# Patient Record
Sex: FEMALE | Race: WHITE | NOT HISPANIC OR LATINO | Employment: OTHER | URBAN - METROPOLITAN AREA
[De-identification: names, ages, dates, MRNs, and addresses within clinical notes are randomized per-mention and may not be internally consistent; named-entity substitution may affect disease eponyms.]

---

## 2017-02-01 ENCOUNTER — OFFICE VISIT (OUTPATIENT)
Dept: OBSTETRICS AND GYNECOLOGY | Facility: CLINIC | Age: 72
End: 2017-02-01
Payer: MEDICARE

## 2017-02-01 VITALS
HEIGHT: 62 IN | DIASTOLIC BLOOD PRESSURE: 84 MMHG | SYSTOLIC BLOOD PRESSURE: 122 MMHG | BODY MASS INDEX: 29.32 KG/M2 | WEIGHT: 159.31 LBS

## 2017-02-01 DIAGNOSIS — N89.8 VAGINAL ODOR: Primary | ICD-10-CM

## 2017-02-01 DIAGNOSIS — R82.90 CLOUDY URINE: ICD-10-CM

## 2017-02-01 LAB
CANDIDA RRNA VAG QL PROBE: NEGATIVE
G VAGINALIS RRNA GENITAL QL PROBE: NEGATIVE
T VAGINALIS RRNA GENITAL QL PROBE: NEGATIVE

## 2017-02-01 PROCEDURE — 1160F RVW MEDS BY RX/DR IN RCRD: CPT | Mod: S$GLB,,, | Performed by: OBSTETRICS & GYNECOLOGY

## 2017-02-01 PROCEDURE — 1159F MED LIST DOCD IN RCRD: CPT | Mod: S$GLB,,, | Performed by: OBSTETRICS & GYNECOLOGY

## 2017-02-01 PROCEDURE — 87088 URINE BACTERIA CULTURE: CPT

## 2017-02-01 PROCEDURE — 87186 SC STD MICRODIL/AGAR DIL: CPT

## 2017-02-01 PROCEDURE — 87077 CULTURE AEROBIC IDENTIFY: CPT

## 2017-02-01 PROCEDURE — 99999 PR PBB SHADOW E&M-EST. PATIENT-LVL II: CPT | Mod: PBBFAC,,, | Performed by: OBSTETRICS & GYNECOLOGY

## 2017-02-01 PROCEDURE — 1157F ADVNC CARE PLAN IN RCRD: CPT | Mod: S$GLB,,, | Performed by: OBSTETRICS & GYNECOLOGY

## 2017-02-01 PROCEDURE — 99203 OFFICE O/P NEW LOW 30 MIN: CPT | Mod: S$GLB,,, | Performed by: OBSTETRICS & GYNECOLOGY

## 2017-02-01 PROCEDURE — 87480 CANDIDA DNA DIR PROBE: CPT

## 2017-02-01 PROCEDURE — 87086 URINE CULTURE/COLONY COUNT: CPT

## 2017-02-01 PROCEDURE — 1126F AMNT PAIN NOTED NONE PRSNT: CPT | Mod: S$GLB,,, | Performed by: OBSTETRICS & GYNECOLOGY

## 2017-02-01 RX ORDER — DICLOFENAC SODIUM 10 MG/G
2 GEL TOPICAL DAILY
COMMUNITY
End: 2020-07-28

## 2017-02-01 RX ORDER — CYANOCOBALAMIN 1000 UG/ML
INJECTION, SOLUTION INTRAMUSCULAR; SUBCUTANEOUS
Refills: 1 | COMMUNITY
Start: 2016-12-31 | End: 2021-03-02

## 2017-02-01 RX ORDER — FUROSEMIDE 20 MG/1
TABLET ORAL
Refills: 1 | COMMUNITY
Start: 2016-12-20 | End: 2018-07-09 | Stop reason: ALTCHOICE

## 2017-02-01 RX ORDER — LACTULOSE 10 G/15ML
SOLUTION ORAL; RECTAL
Refills: 0 | COMMUNITY
Start: 2017-01-25 | End: 2018-07-09 | Stop reason: ALTCHOICE
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
## 2017-02-01 NOTE — LETTER
February 1, 2017      Aaron Tim MD  [address]  [address]  [facility] [address]           [facility] [place] [id number]  [facility] [address]  Phone: [phone]  Fax: [phone]          Patient: Trudy R Dakin   MR Number: [id number]   YOB: 1945   Date of Visit: 2/1/2017       Dear Dr. Aaron Tim:    Thank you for referring Trudy Dakin to me for evaluation. Attached you will find relevant portions of my assessment and plan of care.    If you have questions, please do not hesitate to call me. I look forward to following Trudy Dakin along with you.    Sincerely,    Khadijah Lewis MD    Enclosure  CC:  No Recipients    If you would like to receive this communication electronically, please contact [email or web address] or [phone] to request more information on [employer] access.    For providers and/or their staff who would like to refer a patient to [facility], please contact us through our one-stop-shop provider referral line, [facility], at [phone].    If you feel you have received this communication in error or would no longer like to receive these types of communications, please e-mail [email or web address]

## 2017-02-03 ENCOUNTER — TELEPHONE (OUTPATIENT)
Dept: OBSTETRICS AND GYNECOLOGY | Facility: CLINIC | Age: 72
End: 2017-02-03

## 2017-02-03 LAB — BACTERIA UR CULT: NORMAL

## 2017-02-03 NOTE — TELEPHONE ENCOUNTER
Debbie Thomspon patient called -wants to know the results for her culture that she had recently,please call at  [phone]

## 2017-02-06 ENCOUNTER — TELEPHONE (OUTPATIENT)
Dept: OBSTETRICS AND GYNECOLOGY | Facility: CLINIC | Age: 72
End: 2017-02-06

## 2017-02-06 DIAGNOSIS — N39.0 URINARY TRACT INFECTION WITHOUT HEMATURIA, SITE UNSPECIFIED: Primary | ICD-10-CM

## 2017-02-06 RX ORDER — NITROFURANTOIN 25; 75 MG/1; MG/1
100 CAPSULE ORAL 2 TIMES DAILY
Qty: 14 CAPSULE | Refills: 0 | Status: SHIPPED | OUTPATIENT
Start: 2017-02-06 | End: 2017-02-13

## 2017-02-06 NOTE — TELEPHONE ENCOUNTER
Message left for patient explaining culture results and abx treatment plan. Macrobid sent to pharmacy.

## 2017-02-06 NOTE — TELEPHONE ENCOUNTER
Message left for patient explaining culture results and treatment plan. Asked to call the office with any questions.

## 2017-05-09 ENCOUNTER — HOSPITAL ENCOUNTER (EMERGENCY)
Facility: HOSPITAL | Age: 72
Discharge: HOME | End: 2017-05-09
Attending: EMERGENCY MEDICINE
Payer: MEDICARE

## 2017-05-09 VITALS
TEMPERATURE: 99 F | OXYGEN SATURATION: 100 % | WEIGHT: 161 LBS | HEART RATE: 53 BPM | SYSTOLIC BLOOD PRESSURE: 154 MMHG | BODY MASS INDEX: 29.63 KG/M2 | HEIGHT: 62 IN | DIASTOLIC BLOOD PRESSURE: 66 MMHG | RESPIRATION RATE: 18 BRPM

## 2017-05-09 DIAGNOSIS — R07.9 CHEST PAIN: ICD-10-CM

## 2017-05-09 DIAGNOSIS — I49.9 CARDIAC ARRHYTHMIA, UNSPECIFIED CARDIAC ARRHYTHMIA TYPE: ICD-10-CM

## 2017-05-09 DIAGNOSIS — R07.9 CHEST PAIN, UNSPECIFIED TYPE: Primary | ICD-10-CM

## 2017-05-09 LAB
ALBUMIN SERPL BCP-MCNC: 3.6 G/DL
ALP SERPL-CCNC: 64 U/L
ALT SERPL W/O P-5'-P-CCNC: 13 U/L
ANION GAP SERPL CALC-SCNC: 10 MMOL/L
AST SERPL-CCNC: 14 U/L
BACTERIA #/AREA URNS HPF: ABNORMAL /HPF
BASOPHILS # BLD AUTO: 0.04 K/UL
BASOPHILS NFR BLD: 0.5 %
BILIRUB SERPL-MCNC: 0.5 MG/DL
BILIRUB UR QL STRIP: NEGATIVE
BNP SERPL-MCNC: 91 PG/ML
BUN SERPL-MCNC: 10 MG/DL
CALCIUM SERPL-MCNC: 9.5 MG/DL
CHLORIDE SERPL-SCNC: 104 MMOL/L
CLARITY UR: ABNORMAL
CO2 SERPL-SCNC: 25 MMOL/L
COLOR UR: YELLOW
CREAT SERPL-MCNC: 0.8 MG/DL
DIFFERENTIAL METHOD: NORMAL
EOSINOPHIL # BLD AUTO: 0.4 K/UL
EOSINOPHIL NFR BLD: 4.3 %
ERYTHROCYTE [DISTWIDTH] IN BLOOD BY AUTOMATED COUNT: 13.8 %
EST. GFR  (AFRICAN AMERICAN): >60 ML/MIN/1.73 M^2
EST. GFR  (NON AFRICAN AMERICAN): >60 ML/MIN/1.73 M^2
GLUCOSE SERPL-MCNC: 104 MG/DL
GLUCOSE SERPL-MCNC: 105 MG/DL (ref 70–110)
GLUCOSE UR QL STRIP: NEGATIVE
HCT VFR BLD AUTO: 38.8 %
HGB BLD-MCNC: 12.9 G/DL
HGB UR QL STRIP: ABNORMAL
KETONES UR QL STRIP: NEGATIVE
LEUKOCYTE ESTERASE UR QL STRIP: ABNORMAL
LYMPHOCYTES # BLD AUTO: 2.4 K/UL
LYMPHOCYTES NFR BLD: 29.6 %
MCH RBC QN AUTO: 29.8 PG
MCHC RBC AUTO-ENTMCNC: 33.2 %
MCV RBC AUTO: 90 FL
MICROSCOPIC COMMENT: ABNORMAL
MONOCYTES # BLD AUTO: 0.5 K/UL
MONOCYTES NFR BLD: 5.5 %
NEUTROPHILS # BLD AUTO: 4.9 K/UL
NEUTROPHILS NFR BLD: 59.9 %
NITRITE UR QL STRIP: NEGATIVE
PH UR STRIP: 6 [PH] (ref 5–8)
PLATELET # BLD AUTO: 245 K/UL
PMV BLD AUTO: 9.7 FL
POTASSIUM SERPL-SCNC: 4.2 MMOL/L
PROT SERPL-MCNC: 6.9 G/DL
PROT UR QL STRIP: NEGATIVE
RBC # BLD AUTO: 4.33 M/UL
RBC #/AREA URNS HPF: 0 /HPF (ref 0–4)
SODIUM SERPL-SCNC: 139 MMOL/L
SP GR UR STRIP: 1.01 (ref 1–1.03)
SQUAMOUS #/AREA URNS HPF: 2 /HPF
TROPONIN I SERPL DL<=0.01 NG/ML-MCNC: 0.01 NG/ML
URN SPEC COLLECT METH UR: ABNORMAL
UROBILINOGEN UR STRIP-ACNC: NEGATIVE EU/DL
WBC # BLD AUTO: 8.18 K/UL
WBC #/AREA URNS HPF: 36 /HPF (ref 0–5)

## 2017-05-09 PROCEDURE — 81000 URINALYSIS NONAUTO W/SCOPE: CPT

## 2017-05-09 PROCEDURE — 93005 ELECTROCARDIOGRAM TRACING: CPT

## 2017-05-09 PROCEDURE — 63600175 PHARM REV CODE 636 W HCPCS: Performed by: EMERGENCY MEDICINE

## 2017-05-09 PROCEDURE — 87186 SC STD MICRODIL/AGAR DIL: CPT

## 2017-05-09 PROCEDURE — 96365 THER/PROPH/DIAG IV INF INIT: CPT

## 2017-05-09 PROCEDURE — 84484 ASSAY OF TROPONIN QUANT: CPT

## 2017-05-09 PROCEDURE — 83880 ASSAY OF NATRIURETIC PEPTIDE: CPT

## 2017-05-09 PROCEDURE — 80053 COMPREHEN METABOLIC PANEL: CPT

## 2017-05-09 PROCEDURE — 82962 GLUCOSE BLOOD TEST: CPT

## 2017-05-09 PROCEDURE — 87086 URINE CULTURE/COLONY COUNT: CPT

## 2017-05-09 PROCEDURE — 99284 EMERGENCY DEPT VISIT MOD MDM: CPT | Mod: 25

## 2017-05-09 PROCEDURE — 87088 URINE BACTERIA CULTURE: CPT

## 2017-05-09 PROCEDURE — 25000003 PHARM REV CODE 250: Performed by: EMERGENCY MEDICINE

## 2017-05-09 PROCEDURE — 85025 COMPLETE CBC W/AUTO DIFF WBC: CPT

## 2017-05-09 PROCEDURE — 87077 CULTURE AEROBIC IDENTIFY: CPT

## 2017-05-09 RX ORDER — DICYCLOMINE HYDROCHLORIDE 10 MG/ML
20 INJECTION INTRAMUSCULAR
Status: DISCONTINUED | OUTPATIENT
Start: 2017-05-09 | End: 2017-05-09

## 2017-05-09 RX ORDER — ASPIRIN 325 MG
325 TABLET ORAL
Status: COMPLETED | OUTPATIENT
Start: 2017-05-09 | End: 2017-05-09

## 2017-05-09 RX ORDER — CEPHALEXIN 500 MG/1
500 CAPSULE ORAL 4 TIMES DAILY
Qty: 40 CAPSULE | Refills: 0 | Status: SHIPPED | OUTPATIENT
Start: 2017-05-09 | End: 2017-05-19

## 2017-05-09 RX ADMIN — ASPIRIN 325 MG ORAL TABLET 325 MG: 325 PILL ORAL at 01:05

## 2017-05-09 RX ADMIN — CEFTRIAXONE 1 G: 1 INJECTION, SOLUTION INTRAVENOUS at 01:05

## 2017-05-09 NOTE — ED TRIAGE NOTES
Patient comes to ED with complaints of chest pain that started last night at 1245. Mid sternal chest pain and SOB. Describes a tightness feeling in chest. Chest pain at rest and exertion. Chest pain at the moment only comes and goes. Right now she states no pain. History of DM.

## 2017-05-09 NOTE — ED NOTES
Pt states she now feels tingling in both arms. She has one episode before she came to ED and then one just now. Both last only a minute or so. Also states she is feeling dizzy at rest.

## 2017-05-09 NOTE — DISCHARGE INSTRUCTIONS
About Arrhythmias    Electrical impulses cause the normal heart to beat 60 to 100 times a minute while at rest. These impulses come from a natural pacemaker deep inside the heart muscle. Each impulse causes the heart muscle to contract. This causes the blood to flow through the heart and out to the tissues and organs of your body.  An arrhythmia is a change from the normal speed or pattern of these electrical impulses. This can cause the heart to beat too fast (tachycardia); or too slow (bradycardia); or in an unsteady pattern (irregular rhythm).  Symptoms of arrhythmias  Different people experience arrhythmias differently. Sometimes they may not have symptoms, but just notice a change in their pulse. Symptoms can include:  · Fluttering feeling in the chest  · Shortness of breath  · Chest pain or pressure  · Neck fullness  · Lightheadedness or dizziness  · Fainting or almost fainting  · Palpitations (the sense that your heart is fluttering or beating fast or hard or irregularly)  · Tiredness, fatigue, or weakness  · Cardiac arrest  Causes of arrhythmias  Arrhythmias are most often due to heart disease such as:  · Coronary artery disease  · Heart valve disease  · Enlarged heart  · High blood pressure  · Heart failure  Other causes of  arrhythmia include:  · Certain medicines (such as asthma inhalers and decongestants)  · Some herbal supplements  · Cardiac stimulant drugs (such as cocaine, amphetamine, diet pills, certain decongestant cold medicines, caffeine, and nicotine)  · Excessive alcohol use  · Anxiety and panic disorder  · Thyroid disease  · Anemia  · Diabetes  · Sleep apnea  · Obesity  · Congenital heart disease  · Cardiac genetic diseases  Arrhythmias can often be prevented. The cause and type of arrhythmia determines the best treatment. Sometimes your doctor may want to monitor your heart rate over a 24-hour period or longer. This can help identify the cause of your arrhythmia and find the best treatment.  This can be done with a Holter monitor, a portable EKG recording device attached by wires to your chest. Or you may get an event monitor, which you can place over the skin in front of your heart to record heart rhythms. You can carry this with you as you go about your routine activities during the monitoring period. Implantable loop recorders may also be used to monitor the heart rhythm for up to 2 years. This miniature device is placed underneath the skin overlying the heart.  Home care  The following guidelines will help you care for yourself at home:  · Avoid cardiac stimulants (such as cocaine, amphetamine, diet pills, certain decongestant cold medicines, caffeine, and nicotine).  · If you smoke, stop smoking. Contact your doctor or a local stop-smoking program for help.  · Tell your doctor about any prescription, over-the-counter, or herbal medicines you take. These may be affecting your heart rhythm.  Follow-up care  Follow up with your healthcare provider, or as advised. If a Holter monitor has been recommended, contact the cardiologist you have been referred to as soon as you can  the device. Other outpatient tests may also be arranged for you at that time.  Call 911  This is the fastest and safest way to get to the emergency department. The paramedics can also start treatment on the way to the hospital, if needed.  Don't wait until your symptoms are severe to call 911. Other reasons to call 911 besides chest pain include:  · Chest, shoulder, arm, neck, or back pain  · Shortness of breath  · Feeling lightheaded, faint, or dizzy  · Unexplained fainting  · Rapid heart beat  · Slower than usual heart rate compared to your normal  · Very irregular heartbeat  · Chest pain (angina) with weakness, dizziness, heavy sweating, nausea, or vomiting  · Extreme drowsiness, or confusion  · Weakness of an arm or leg or one side of the face  · Difficulty with speech or vision  When to seek medical advice  Remember,  "things are not always like they are on TV. Sometimes it is not so obvious. You may only feel weak or just "not right." If it is not clear or if you have any doubt, call for advice.  · Seek help for chest pain, or it feels different from usual, even if your symptoms are mild.  · Don't drive yourself. Have someone else drive. If no one can drive you, call 911.  · If your doctor has given you medicines to take when you have symptoms, take them, but do not delay getting help while trying to find them.  Date Last Reviewed: 4/25/2016 © 2000-2016 [employer]. [address] [place] [address], [place] [address]. All rights reserved. This information is not intended as a substitute for professional medical care. Always follow your healthcare professional's instructions.          Uncertain Causes of Chest Pain    Chest pain can happen for a number of reasons. Sometimes the cause can't be determined. If your condition does not seem serious, and your pain does not appear to be coming from your heart, your healthcare provider may recommend watching it closely. Sometimes the signs of a serious problem take more time to appear. Many problems not related to your heart can cause chest pain.These include:  · Musculoskeletal. Costochondritis, an inflammation of the tissues around the ribs that can occur from trauma or overuse injuries  · Respiratory. Pneumonia, pneumothorax, or pneumonitis (inflammation of the lining of the chest and lungs)  · Gastrointestinal. Esophageal reflux, heartburn, or gallbladder disease  · Anxiety and panic disorders  · Nerve compression and neuritis  · Miscellaneous problems such as aortic aneurysm or pulmonary embolism (a blood clot in the lungs)  Home care  After your visit, follow these recommendations:  · Rest today and avoid strenuous activity.  · Take any prescribed medicine as directed.  · Be aware of any recurrent chest pain and notice any changes  Follow-up care  Follow up with your healthcare " provider if you do not start to feel better within 24 hours, or as advised.  Call 911  Call 911 if any of these occur:  · A change in the type of pain: if it feels different, becomes more severe, lasts longer, or begins to spread into your shoulder, arm, neck, jaw or back  · Shortness of breath or increased pain with breathing  · Weakness, dizziness, or fainting  · Rapid heart beat  · Crushing sensation in your chest  When to seek medical advice  Call your healthcare provider right away if any of the following occur:  · Cough with dark colored sputum (phlegm) or blood  · Fever of 100.4ºF (38ºC) or higher, or as directed by your healthcare provider  · Swelling, pain or redness in one leg  · Shortness of breath  Date Last Reviewed: 12/30/2015  © 9114-8050 [employer]. [address] [place] [address]. All rights reserved. This information is not intended as a substitute for professional medical care. Always follow your healthcare professional's instructions.

## 2017-05-09 NOTE — ED AVS SNAPSHOT
[facility]  [address] [facility] [address]               Trudy R Dakin   2017 12:26 PM   ED    Description:  Female : 1945   Department:  [facility]           Your Care was Coordinated By:     Provider Role From To    Ezequiel Leiva MD Attending Provider 17 1235 --      Reason for Visit     Chest Pain           Diagnoses this Visit        Comments    Chest pain, unspecified type    -  Primary     Chest pain         Cardiac arrhythmia, unspecified cardiac arrhythmia type           ED Disposition     ED Disposition Condition Comment    Discharge  Our goal in the emergency department is to always give you outstanding care and exceptional service. You may receive a survey by mail or e-mail in the next week regarding your experience in our ED. We would greatly appreciate your completing and returnin g the survey. Your feedback provides us with a way to recognize our staff who give very good care and it helps us learn how to improve when your experience was below our aspiration of excellence.              To Do List           Follow-up Information     Follow up with Aaron Tim MD.    Specialty:  Family Medicine    Why:  As needed    Contact information:    [address]  [address]  [place] [address]  [phone]          Follow up with [facility].    Specialty:  Emergency Medicine    Why:  If symptoms worsen    Contact information:    [address]  [facility] [address] [phone]        Follow up with Panchito Connor MD.    Specialties:  Cardiology, INTERVENTIONAL CARDIOLOGY    Why:  FOR SPECIALTY EVALUATION    Contact information:    [address]  [address]  [facility] [address]  [phone]         These Medications        Disp Refills Start End    cephALEXin (KEFLEX) 500 MG capsule 40 capsule 0 2017    Take 1 capsule (500 mg total) by mouth 4 (four) times daily. -  Oral    Pharmacy: [facility] [id number]  [address] [place] [address] [address]  AT [facility] [address] Ph #: [phone]         [facility]     [facility] On Call Nurse Care Line - 24/7 Assistance  Unless otherwise directed by your provider, please contact [facility], our nurse care line that is available for 24/7 assistance.     Registered nurses in the [facility] provide: appointment scheduling, clinical advisement, health education, and other advisory services.  Call: [phone] (toll free)               Medications           Message regarding Medications     Verify the changes and/or additions to your medication regime listed below are the same as discussed with your clinician today.  If any of these changes or additions are incorrect, please notify your healthcare provider.        START taking these NEW medications        Refills    cephALEXin (KEFLEX) 500 MG capsule 0    Sig: Take 1 capsule (500 mg total) by mouth 4 (four) times daily.    Class: Normal    Route: Oral      These medications were administered today        Dose Freq    aspirin tablet 325 mg 325 mg ED 1 Time    Sig: Take 1 tablet (325 mg total) by mouth ED 1 Time.    Class: Normal    Route: Oral    cefTRIAXone (ROCEPHIN) 1 g in dextrose 5 % 50 mL IVPB 1 g ED 1 Time    Sig: Inject 50 mLs (1 g total) into the vein ED 1 Time.    Class: Normal    Route: Intravenous           Verify that the below list of medications is an accurate representation of the medications you are currently taking.  If none reported, the list may be blank. If incorrect, please contact your healthcare provider. Carry this list with you in case of emergency.           Current Medications     amitriptyline (ELAVIL) 50 MG tablet Take 50 mg by mouth every evening.    cephALEXin (KEFLEX) 500 MG capsule Take 1 capsule (500 mg total) by mouth 4 (four) times daily.    cyanocobalamin 1,000 mcg/mL injection     diclofenac sodium (VOLTAREN) 1 % Gel Apply 2 g  "topically once daily.    furosemide (LASIX) 20 MG tablet TK 1 T PO  QD PRF SWELLING    GENERLAC 10 gram/15 mL solution TK 15 ML PO ONCE D FOR 10 DAYS    hydrocodone-acetaminophen 10-325mg (NORCO)  mg Tab Take 1 tablet by mouth every 6 (six) hours as needed (pain).    ibuprofen (ADVIL,MOTRIN) 600 MG tablet Take 600mg PO TID with food    irbesartan (AVAPRO) 300 MG tablet Take 300 mg by mouth once daily.    metformin (GLUCOPHAGE-XR) 500 MG 24 hr tablet Take 500 mg by mouth 2 (two) times daily with meals.    metoprolol tartrate (LOPRESSOR) 50 MG tablet Take 50 mg by mouth 2 (two) times daily.    oxycodone-acetaminophen (PERCOCET)  mg per tablet Take 1 tablet by mouth every 4 (four) hours as needed for Pain.    pregabalin (LYRICA) 75 MG capsule Take 75 mg by mouth 3 (three) times daily.           Clinical Reference Information           Your Vitals Were     BP Pulse Temp Resp Height Weight    154/66 53 98.5 °F (36.9 °C) 18 5' 2" (1.575 m) 73 kg (161 lb)    SpO2 BMI             100% 29.45 kg/m2         Allergies as of 5/9/2017     No Known Allergies      Immunizations Administered on Date of Encounter - 5/9/2017     None      ED Micro, Lab, POCT     Start Ordered       Status Ordering Provider    05/09/17 1329 05/09/17 1328  Urine culture **CANNOT BE ORDERED STAT**  Add-on      Completed     05/09/17 1329 05/09/17 1329  Urine culture  Once      In process     05/09/17 1315 05/09/17 1314    Once,   Status:  Canceled      Canceled     05/09/17 1248 05/09/17 1247  Urinalysis  STAT      Final result     05/09/17 1247 05/09/17 1247  Urinalysis Microscopic  Once      Final result     05/09/17 1237 05/09/17 1236  CBC auto differential  STAT      Final result     05/09/17 1237 05/09/17 1236  Comprehensive metabolic panel  STAT      Final result     05/09/17 1237 05/09/17 1236  Troponin I  Once      Final result     05/09/17 1237 05/09/17 1236  B-Type natriuretic peptide (BNP)  STAT      Final result     05/09/17 0000 " 05/09/17 1226  POCT glucose     Comments:  This order was created through External Result Entry    Completed       ED Imaging Orders     Start Ordered       Status Ordering Provider    05/09/17 1237 05/09/17 1236  X-Ray Chest PA And Lateral  1 time imaging      Final result         Discharge Instructions         About Arrhythmias    Electrical impulses cause the normal heart to beat 60 to 100 times a minute while at rest. These impulses come from a natural pacemaker deep inside the heart muscle. Each impulse causes the heart muscle to contract. This causes the blood to flow through the heart and out to the tissues and organs of your body.  An arrhythmia is a change from the normal speed or pattern of these electrical impulses. This can cause the heart to beat too fast (tachycardia); or too slow (bradycardia); or in an unsteady pattern (irregular rhythm).  Symptoms of arrhythmias  Different people experience arrhythmias differently. Sometimes they may not have symptoms, but just notice a change in their pulse. Symptoms can include:  · Fluttering feeling in the chest  · Shortness of breath  · Chest pain or pressure  · Neck fullness  · Lightheadedness or dizziness  · Fainting or almost fainting  · Palpitations (the sense that your heart is fluttering or beating fast or hard or irregularly)  · Tiredness, fatigue, or weakness  · Cardiac arrest  Causes of arrhythmias  Arrhythmias are most often due to heart disease such as:  · Coronary artery disease  · Heart valve disease  · Enlarged heart  · High blood pressure  · Heart failure  Other causes of  arrhythmia include:  · Certain medicines (such as asthma inhalers and decongestants)  · Some herbal supplements  · Cardiac stimulant drugs (such as cocaine, amphetamine, diet pills, certain decongestant cold medicines, caffeine, and nicotine)  · Excessive alcohol use  · Anxiety and panic disorder  · Thyroid disease  · Anemia  · Diabetes  · Sleep apnea  · Obesity  · Congenital  heart disease  · Cardiac genetic diseases  Arrhythmias can often be prevented. The cause and type of arrhythmia determines the best treatment. Sometimes your doctor may want to monitor your heart rate over a 24-hour period or longer. This can help identify the cause of your arrhythmia and find the best treatment. This can be done with a Holter monitor, a portable EKG recording device attached by wires to your chest. Or you may get an event monitor, which you can place over the skin in front of your heart to record heart rhythms. You can carry this with you as you go about your routine activities during the monitoring period. Implantable loop recorders may also be used to monitor the heart rhythm for up to 2 years. This miniature device is placed underneath the skin overlying the heart.  Home care  The following guidelines will help you care for yourself at home:  · Avoid cardiac stimulants (such as cocaine, amphetamine, diet pills, certain decongestant cold medicines, caffeine, and nicotine).  · If you smoke, stop smoking. Contact your doctor or a local stop-smoking program for help.  · Tell your doctor about any prescription, over-the-counter, or herbal medicines you take. These may be affecting your heart rhythm.  Follow-up care  Follow up with your healthcare provider, or as advised. If a Holter monitor has been recommended, contact the cardiologist you have been referred to as soon as you can  the device. Other outpatient tests may also be arranged for you at that time.  Call 911  This is the fastest and safest way to get to the emergency department. The paramedics can also start treatment on the way to the hospital, if needed.  Don't wait until your symptoms are severe to call 911. Other reasons to call 911 besides chest pain include:  · Chest, shoulder, arm, neck, or back pain  · Shortness of breath  · Feeling lightheaded, faint, or dizzy  · Unexplained fainting  · Rapid heart beat  · Slower than usual  "heart rate compared to your normal  · Very irregular heartbeat  · Chest pain (angina) with weakness, dizziness, heavy sweating, nausea, or vomiting  · Extreme drowsiness, or confusion  · Weakness of an arm or leg or one side of the face  · Difficulty with speech or vision  When to seek medical advice  Remember, things are not always like they are on TV. Sometimes it is not so obvious. You may only feel weak or just "not right." If it is not clear or if you have any doubt, call for advice.  · Seek help for chest pain, or it feels different from usual, even if your symptoms are mild.  · Don't drive yourself. Have someone else drive. If no one can drive you, call 911.  · If your doctor has given you medicines to take when you have symptoms, take them, but do not delay getting help while trying to find them.  Date Last Reviewed: 4/25/2016  © 1481-9594 [employer]. [address] [place] [address], [place] [address]. All rights reserved. This information is not intended as a substitute for professional medical care. Always follow your healthcare professional's instructions.          Uncertain Causes of Chest Pain    Chest pain can happen for a number of reasons. Sometimes the cause can't be determined. If your condition does not seem serious, and your pain does not appear to be coming from your heart, your healthcare provider may recommend watching it closely. Sometimes the signs of a serious problem take more time to appear. Many problems not related to your heart can cause chest pain.These include:  · Musculoskeletal. Costochondritis, an inflammation of the tissues around the ribs that can occur from trauma or overuse injuries  · Respiratory. Pneumonia, pneumothorax, or pneumonitis (inflammation of the lining of the chest and lungs)  · Gastrointestinal. Esophageal reflux, heartburn, or gallbladder disease  · Anxiety and panic disorders  · Nerve compression and neuritis  · Miscellaneous problems such as aortic " aneurysm or pulmonary embolism (a blood clot in the lungs)  Home care  After your visit, follow these recommendations:  · Rest today and avoid strenuous activity.  · Take any prescribed medicine as directed.  · Be aware of any recurrent chest pain and notice any changes  Follow-up care  Follow up with your healthcare provider if you do not start to feel better within 24 hours, or as advised.  Call 911  Call 911 if any of these occur:  · A change in the type of pain: if it feels different, becomes more severe, lasts longer, or begins to spread into your shoulder, arm, neck, jaw or back  · Shortness of breath or increased pain with breathing  · Weakness, dizziness, or fainting  · Rapid heart beat  · Crushing sensation in your chest  When to seek medical advice  Call your healthcare provider right away if any of the following occur:  · Cough with dark colored sputum (phlegm) or blood  · Fever of 100.4ºF (38ºC) or higher, or as directed by your healthcare provider  · Swelling, pain or redness in one leg  · Shortness of breath  Date Last Reviewed: 12/30/2015  © 1337-7639 [employer]. [address] [place] [address]. All rights reserved. This information is not intended as a substitute for professional medical care. Always follow your healthcare professional's instructions.          [facility] Sign-Up     Activating your [facility] account is as easy as 1-2-3!     1) Visit [email or web address], select Sign Up Now, enter this activation code and your date of birth, then select Next.  [email or web address]  Expires: 6/23/2017  3:19 PM      2) Create a username and password to use when you visit [facility] in the future and select a security question in case you lose your password and select Next.    3) Enter your e-mail address and click Sign Up!    Additional Information  If you have questions, please e-mail [email or web address] or call [phone] to talk to our [facility] staff. Remember, [facility] is NOT to  be used for urgent needs. For medical emergencies, dial 911.          [facility] complies with applicable Federal civil rights laws and does not discriminate on the basis of race, color, national origin, age, disability, or sex.        Language Assistance Services     ATTENTION: Language assistance services are available, free of charge. Please call [phone].      ATENCIÓN: Si habla español, tiene a callaway disposición servicios gratuitos de asistencia lingüística. Llame al [phone].     CHÚ Ý: N?u b?n nói Ti?ng Vi?t, có các d?ch v? h? tr? ngôn ng? mi?n phí dành cho b?n. G?i s? [phone].
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
## 2017-05-11 LAB — BACTERIA UR CULT: NORMAL

## 2017-05-11 NOTE — PROVIDER PROGRESS NOTES - EMERGENCY DEPT.
Encounter Date: 5/9/2017    ED Physician Progress Notes        Physician Note:   5/10/17 1714:  Pt sent home on Keflex.  Susceptibility pending in urine culture.  JS    05/11/2017 5:26 PM Culture did not test keflex sensitivity, although pansensitive. LC

## 2017-06-14 ENCOUNTER — OFFICE VISIT (OUTPATIENT)
Dept: CARDIOLOGY | Facility: CLINIC | Age: 72
End: 2017-06-14
Payer: MEDICARE

## 2017-06-14 VITALS — BODY MASS INDEX: 30 KG/M2 | WEIGHT: 164 LBS

## 2017-06-14 DIAGNOSIS — E78.2 MIXED HYPERLIPIDEMIA: ICD-10-CM

## 2017-06-14 DIAGNOSIS — R07.9 CHEST PAIN, UNSPECIFIED TYPE: Primary | ICD-10-CM

## 2017-06-14 DIAGNOSIS — E66.9 OBESITY, UNSPECIFIED OBESITY SEVERITY, UNSPECIFIED OBESITY TYPE: ICD-10-CM

## 2017-06-14 DIAGNOSIS — I10 ESSENTIAL HYPERTENSION: ICD-10-CM

## 2017-06-14 DIAGNOSIS — M79.605 PAIN IN BOTH LOWER EXTREMITIES: ICD-10-CM

## 2017-06-14 DIAGNOSIS — M79.604 PAIN IN BOTH LOWER EXTREMITIES: ICD-10-CM

## 2017-06-14 PROCEDURE — 99204 OFFICE O/P NEW MOD 45 MIN: CPT | Mod: S$GLB,,, | Performed by: INTERNAL MEDICINE

## 2017-06-14 PROCEDURE — 99999 PR PBB SHADOW E&M-EST. PATIENT-LVL II: CPT | Mod: PBBFAC,,, | Performed by: INTERNAL MEDICINE

## 2017-06-14 PROCEDURE — 1159F MED LIST DOCD IN RCRD: CPT | Mod: S$GLB,,, | Performed by: INTERNAL MEDICINE

## 2017-06-14 PROCEDURE — 1126F AMNT PAIN NOTED NONE PRSNT: CPT | Mod: S$GLB,,, | Performed by: INTERNAL MEDICINE

## 2017-06-14 RX ORDER — NITROGLYCERIN 0.4 MG/1
0.4 TABLET SUBLINGUAL EVERY 5 MIN PRN
Qty: 20 TABLET | Refills: 12 | Status: ON HOLD | OUTPATIENT
Start: 2017-06-14 | End: 2018-06-20 | Stop reason: HOSPADM

## 2017-06-14 RX ORDER — ATORVASTATIN CALCIUM 10 MG/1
TABLET, FILM COATED ORAL
COMMUNITY
Start: 2017-05-08 | End: 2020-06-23

## 2017-06-14 RX ORDER — TRAMADOL HYDROCHLORIDE 50 MG/1
TABLET ORAL
COMMUNITY
Start: 2017-05-08 | End: 2018-08-24

## 2017-06-14 NOTE — PROGRESS NOTES
Subjective:   Patient ID:  Trudy R Dakin is a 71 y.o. female who presents for evaluation and treatment of Chest Pain; Hyperlipidemia; and Hypertension      HPI:       She is here with her  for chest pain that she describes as tightness. Occasionally she has dyspnea as well but not at the same time. She experienced chest tightness while she was dancing but not while mowing her yard with pusher mower. No previous heart disease. She has HTN, HLP, DM, age, and family history of AMI (father). No previous cardiac issues.       ECG 5/9/2017: sinus bradycardia      ED visit 5/9/2017: TNI -nl      H/H 12.9/38.8  Cr 0.8  Plt 245  BNP 91          Patient Active Problem List    Diagnosis Date Noted    Mixed hyperlipidemia 06/14/2017    Essential hypertension 06/14/2017    Chest pain 06/14/2017    Obesity 06/14/2017    Rotator cuff tear 11/10/2016    Limb pain 11/08/2012                    Review of Systems   Constitution: Negative for diaphoresis, weakness, night sweats, weight gain and weight loss.   HENT: Negative for congestion.    Eyes: Negative for blurred vision, discharge and double vision.   Cardiovascular: Positive for chest pain, claudication and dyspnea on exertion. Negative for cyanosis, irregular heartbeat, leg swelling, near-syncope, orthopnea, palpitations, paroxysmal nocturnal dyspnea and syncope.   Respiratory: Negative for cough, shortness of breath and wheezing.    Endocrine: Negative for cold intolerance, heat intolerance and polyphagia.   Hematologic/Lymphatic: Negative for adenopathy and bleeding problem. Does not bruise/bleed easily.   Skin: Negative for dry skin and nail changes.   Musculoskeletal: Negative for arthritis, back pain, falls, joint pain, myalgias and neck pain.   Gastrointestinal: Negative for bloating, abdominal pain, change in bowel habit and constipation.   Genitourinary: Negative for bladder incontinence, dysuria, flank pain, genital sores and missed menses.   Neurological:  Negative for aphonia, brief paralysis, difficulty with concentration and dizziness.   Psychiatric/Behavioral: Negative for altered mental status and memory loss. The patient does not have insomnia.    Allergic/Immunologic: Negative for environmental allergies.       Objective:   Physical Exam   Constitutional: She is oriented to person, place, and time. She appears well-developed and well-nourished. She is not intubated.   HENT:   Head: Normocephalic and atraumatic.   Right Ear: External ear normal.   Left Ear: External ear normal.   Mouth/Throat: Oropharynx is clear and moist.   Eyes: Conjunctivae and EOM are normal. Pupils are equal, round, and reactive to light. Right eye exhibits no discharge. Left eye exhibits no discharge. No scleral icterus.   Neck: Normal range of motion. Neck supple. Normal carotid pulses, no hepatojugular reflux and no JVD present. Carotid bruit is not present. No tracheal deviation present. No thyromegaly present.   Cardiovascular: Normal rate, regular rhythm, S1 normal and S2 normal.   No extrasystoles are present. PMI is not displaced.  Exam reveals no gallop, no S3, no distant heart sounds, no friction rub and no midsystolic click.    Murmur heard.   Systolic murmur is present with a grade of 2/6  at the upper right sternal border, lower right sternal border  Pulses:       Carotid pulses are 2+ on the right side, and 2+ on the left side.       Radial pulses are 2+ on the right side, and 2+ on the left side.        Femoral pulses are 2+ on the right side, and 2+ on the left side.       Popliteal pulses are 2+ on the right side, and 2+ on the left side.        Dorsalis pedis pulses are 1+ on the right side, and 1+ on the left side.        Posterior tibial pulses are 1+ on the right side, and 1+ on the left side.   Pulmonary/Chest: Effort normal and breath sounds normal. No accessory muscle usage or stridor. No apnea, no tachypnea and no bradypnea. She is not intubated. No respiratory  distress. She has no decreased breath sounds. She has no wheezes. She has no rales. She exhibits no tenderness and no bony tenderness.   Abdominal: She exhibits no distension, no pulsatile liver, no abdominal bruit, no ascites, no pulsatile midline mass and no mass. There is no tenderness. There is no rebound and no guarding.   Musculoskeletal: Normal range of motion. She exhibits no edema or tenderness.   Lymphadenopathy:     She has no cervical adenopathy.   Neurological: She is alert and oriented to person, place, and time. She has normal reflexes. No cranial nerve deficit. Coordination normal.   Skin: Skin is warm. No rash noted. No erythema. No pallor.   Psychiatric: She has a normal mood and affect. Her behavior is normal. Judgment and thought content normal.       Assessment:     1. Chest pain, unspecified type    2. Mixed hyperlipidemia    3. Essential hypertension    4. Obesity, unspecified obesity severity, unspecified obesity type    5. Pain in both lower extremities        Plan:           Ischemic work up   Echo     Nuclear stress test   If abnormal she will come for [facility]    R radial access    5 fr sheath      AMANDA candidate  Patient is a candidate for drug eluting stents. Verbally the patient has expressed full understanding of the clinical importance of dual antiplatelet therapy compliance. Drug eluting stents require a minimum of 12 months of dual anti-platelet therapy.      Risks, benefits and alternatives of cardiac catheterization and possible intervention were discussed with the patient. All questions were answered and informed consent obtained.   I discussed the importance of compliance with dual antiplatelet therapy with the patient to prevent acute or late stent thrombosis with premature discontinuation of the therapy.         Continue with current medical plan and lifestyle changes.  Return sooner for concerns or questions. If symptoms persist go to the ED  I have reviewed all pertinent data on  this patient       I have reviewed the patient's medical history in detail and updated the computerized patient record.    Orders Placed This Encounter   Procedures    NM Myocardial Perfusion Spect Multi Pharmacologic     Standing Status:   Future     Standing Expiration Date:   6/14/2018     Order Specific Question:   Stress Medication to use:     Answer:   Regadenoson    Lipid panel     fasting     Standing Status:   Future     Standing Expiration Date:   8/14/2018    Cardiology Lab Ankle Brachial Indices W Stress     Standing Status:   Future     Standing Expiration Date:   6/14/2018    NM Multi Study RX Stress Card Component     Standing Status:   Future     Standing Expiration Date:   6/14/2018     Order Specific Question:   Which medicaton for the stress procedure?     Answer:   Regadenoson    2D Echo w/ Color Flow Doppler     Standing Status:   Future     Standing Expiration Date:   6/14/2018       Follow up as scheduled. Return sooner for concerns or questions            She expressed verbal understanding and agreed with the plan        Patient's Medications   New Prescriptions    NITROGLYCERIN (NITROSTAT) 0.4 MG SL TABLET    Place 1 tablet (0.4 mg total) under the tongue every 5 (five) minutes as needed for Chest pain.   Previous Medications    AMITRIPTYLINE (ELAVIL) 50 MG TABLET    Take 50 mg by mouth every evening.    ATORVASTATIN (LIPITOR) 10 MG TABLET        CYANOCOBALAMIN 1,000 MCG/ML INJECTION        DICLOFENAC SODIUM (VOLTAREN) 1 % GEL    Apply 2 g topically once daily.    FUROSEMIDE (LASIX) 20 MG TABLET    TK 1 T PO  QD PRF SWELLING    GENERLAC 10 GRAM/15 ML SOLUTION    TK 15 ML PO ONCE D FOR 10 DAYS    HYDROCODONE-ACETAMINOPHEN 10-325MG (NORCO)  MG TAB    Take 1 tablet by mouth every 6 (six) hours as needed (pain).    IBUPROFEN (ADVIL,MOTRIN) 600 MG TABLET    Take 600mg PO TID with food    IRBESARTAN (AVAPRO) 300 MG TABLET    Take 300 mg by mouth once daily.    METFORMIN (GLUCOPHAGE-XR)  500 MG 24 HR TABLET    Take 500 mg by mouth 2 (two) times daily with meals.    METOPROLOL TARTRATE (LOPRESSOR) 50 MG TABLET    Take 50 mg by mouth 2 (two) times daily.    OXYCODONE-ACETAMINOPHEN (PERCOCET)  MG PER TABLET    Take 1 tablet by mouth every 4 (four) hours as needed for Pain.    PREGABALIN (LYRICA) 75 MG CAPSULE    Take 75 mg by mouth 3 (three) times daily.    TRAMADOL (ULTRAM) 50 MG TABLET       Modified Medications    No medications on file   Discontinued Medications    No medications on file

## 2017-06-14 NOTE — PATIENT INSTRUCTIONS
Heart Disease Education    The heart beats 60 to 100 times per minute, 24 hours a day. This equals almost 1000,000 times a day. It pumps blood with oxygen and nutrients to the tissues and organs of the body. But the heart is a muscle and needs its own supply of blood. Blood flow to the heart is supplied by the coronary arteries. Coronary artery disease (atherosclerosis) is a result of cholesterol, saturated fat, and calcium deposits (plaques) that build up inside the walls. This causes inflammation within the coronary arteries. These plaques narrow the artery and reduce blood flow to the heart muscle. The reduction in blood flow to the heart muscle decreases oxygen supply to the heart. If the narrowing is significant enough, the oxygen supply to one or more regions of the heart can be temporarily or permanently shut down. This can cause chest pain, and possibly death of heart tissue (heart attack).  Types of chest pain  Angina is the name for pain in the heart muscle. Angina is a warning sign of serious heart disease. When untreated it can lead to a heart attack, also known as acute myocardial infarction, or AMI. Angina occurs when there is not enough blood and oxygen flowing to the heart for the amount of work it is doing. This most often happens during physical exertion, when the heart is working hardest. It is usually relieved by rest or nitroglycerin. Angina may also occur after a large meal when extra blood is sent to the digestive organs and less goes to the heart. In the case of advanced or unstable heart disease, angina can occur at rest or awaken you from sleep. Angina usually lasts from a few minutes up to 20 minutes or more. When treated early, the effects of angina can be reversed without permanent damage to the heart. Angina is a serious condition and needs to be evaluated by a medical professional immediately.  There are two types of angina -- stable and unstable:  · Stable angina usually occurs  with a predictable level of activity. Being stable, its character, severity, and occurrence do not change much over time. It usually starts with activity, and resolves with rest or taking your medicine as instructed by your doctor. The symptoms usually do not last long.  · Unstable angina changes or gets worse over time. It is different from whatever you are used to. It may feel different or worse, begin without cause, occur with exercise or exertion, wake you up from sleep, and last longer. It may not respond in the same way as it does when you take your usual medicines for an attack. This type of angina can be a warning sign of an impending heart attack.     A heart attack is usually the result of a blood clot that suddenly forms in a coronary artery that has been narrowed with plaque. When this occurs, blood flow may be cut off to a part of the heart muscle, causing the cells to die. This weakens the pumping action of the heart, which affects the delivery of blood to all the other organs in the body including the brain. This damage is not reversible. However, early treatment can limit the amount of damage.  The pain you feel with angina and a heart attack may have a similar quality. However, it is usually different in intensity and duration. Here are some typical descriptions of a heart attack:  · It is most often experienced as a squeezing, crushing, pressure-like sensation in the center of the chest.  · It is sometimes described as something heavy sitting on my chest.  · It may feel more like a bad case of indigestion.  · The pain may spread from the chest to the arm, shoulder, throat or jaw.  · Sometimes the pain is not felt in the chest at all, but only in the arm, shoulder, throat or jaw.  · There may also be nausea, vomiting, dizziness or light-headedness, sweating and trouble breathing.  · Palpitations, or your heart beating rapidly  · A new, irregular heart beat  · Unexplained weakness  You may not be  "able to tell the difference between "bad" angina and a heart attack at home. Seek help if your symptoms are different than usual. Do not be in denial or just try to "tough it out."  Call 911  This is the fastest and safest way to get to the emergency department. The paramedics can also start treatment on the way to the hospital, saving valuable time for your heart.  · If the angina gets worse, if it continues, or if it stops and returns, call 911 immediately. Do not delay. You may be having a heart attack.  · After you call 911, take a second tablet or spray unless instructed otherwise. When repeating doses, sit down if possible, because it can make you feel lightheaded or dizzy. Wait another 5 minutes. If the angina still does not go away, take a third tablet or spray. Do not take more than 3 tablets or sprays within 15 minutes. Stay on the phone with 911 for further instruction.  · Your healthcare provider may give you slightly different instructions than those above. If so, follow them carefully.  Do not wait until symptoms become severe to call 911.  Other reasons to call 911 include:  · Trouble breathing  · Feeling lightheaded, faint, or dizzy  · Rapid heart beat  · Slower than usual heart rate compared to your normal  · Angina with weakness, dizziness, fainting, heavy sweating, nausea, or vomiting  · Extreme drowsiness, confusion  · Weakness of an arm or leg or one side of the face  · Difficulty with speech or vision  When to seek medical care  Remember, the signs and symptoms of a heart attack are not always like they are on TV. Sometimes they are not so obvious. You may only feel weak, or just not right. If it is not clear or if you have any doubt, call for advice.  · Seek help if there is a change in the type of pain, if it feels different, or if your symptoms are mild.  · Do not drive yourself. Have someone else drive you. If no one can drive, call 911.  · Do not delay. Fast diagnosis and treatment can " "prevent or limit the amount of heart damage during a heart attack.  · Do not go to your doctor's office or a clinic as they may not be able to provide all the testing and treatment required for this condition.  · If your doctor has given you medicine to take when symptoms occur, take them but don't delay getting help trying to locate medicines.  What happens in the emergency department  The emergency department is connected to your local emergency medical system (EMS) through 911. That's why during a cardiac emergency, calling 911 is the fastest way to get help. The goal of the emergency department is to rapidly screen, evaluate, and treat people.  Once you are there, an electrocardiogram (ECG or heart tracing) will be done. Blood samples may be taken to look for the presence of heart enzymes that leak from damaged heart cells and show if a heart attack is occurring. You will often be evaluated by a heart specialist (cardiologist) who decides the best course of action. In the case of severe angina or early heart attack, and depending on the circumstances, powerful "clot busting" medicines can be used to dissolve blood clots in the coronary artery. In other cases, you may be taken to a cardiac catheterization lab. Here, a tiny balloon-tipped catheter is advanced through blood vessels to the heart. There the balloon is inflated pushing open the blood vessel restoring blood flow.  Risk factors for heart disease  Risk factors for heart disease are a combination of genetic and lifestyle. Many risk factors work by either directly or indirectly damaging the blood vessels of the heart, or by increasing the risk of forming blood or cholesterol clots, which then clog up and block the arteries.     Examples of physical lifestyle risk factors:  · Cigarette smoking  · High blood pressure  · High blood cholesterol  · Use of stimulant drugs such as cocaine, crack, and amphetamines  · Eating a high-fat, high-cholesterol " meal  · Diabetes   · Obesity which increases risk for diabetes and high blood pressure  · Lack of regular physical activity     Examples of emotional lifestyle factors:  · Chronic high stress levels release stress hormones. These raise blood pressure and cholesterol level and makes blood clot more easily.  · Held-in anger, hostile or cynical attitude  · Social and emotional isolation, lack of intimacy  · Loss of relationship  · Depression  Other factors that increase the risk of heart attack that you cannot control :  · Age. The older you get beyond 40, the greater is your risk of significant coronary artery disease.  · Gender. More men than women get heart disease; but once past menopause, women who are not taking estrogen replacement have the same risk as men for a heart attack.  · Family history. If your mother, father, brother or sister has coronary artery disease, your risk of having it is higher than a person your age without this family history.  What can you do to decrease your risk  To reduce your risk of heart disease:  · Get regular checkups with your doctor.  · Take your medicines for blood pressure, cholesterol or diabetes as directed.  · Watch your diet. Eat a heart healthy diet choosing fresh foods, less salt, cholesterol, and fat  · Stop smoking. Get help if needed.  · Get regular exercise.  · Manage stress.  · Carry a list of medicines and doses in your wallet.  Date Last Reviewed: 12/30/2015  © 4125-0355 [employer]. [address] [place] [address], [place] [address]. All rights reserved. This information is not intended as a substitute for professional medical care. Always follow your healthcare professional's instructions.

## 2017-06-29 ENCOUNTER — HOSPITAL ENCOUNTER (OUTPATIENT)
Dept: RADIOLOGY | Facility: HOSPITAL | Age: 72
Discharge: HOME | End: 2017-06-29
Attending: INTERNAL MEDICINE
Payer: MEDICARE

## 2017-06-29 ENCOUNTER — HOSPITAL ENCOUNTER (OUTPATIENT)
Dept: CARDIOLOGY | Facility: HOSPITAL | Age: 72
Discharge: HOME | End: 2017-06-29
Attending: INTERNAL MEDICINE
Payer: MEDICARE

## 2017-06-29 DIAGNOSIS — M79.604 PAIN IN BOTH LOWER EXTREMITIES: ICD-10-CM

## 2017-06-29 DIAGNOSIS — R07.9 CHEST PAIN, UNSPECIFIED TYPE: ICD-10-CM

## 2017-06-29 DIAGNOSIS — M79.605 PAIN IN BOTH LOWER EXTREMITIES: ICD-10-CM

## 2017-06-29 LAB
AORTIC VALVE STENOSIS: ABNORMAL
DIASTOLIC DYSFUNCTION: NO
DIASTOLIC DYSFUNCTION: YES
ESTIMATED PA SYSTOLIC PRESSURE: 47.36
MITRAL VALVE MOBILITY: NORMAL
MITRAL VALVE REGURGITATION: ABNORMAL
RETIRED EF AND QEF - SEE NOTES: 60 (ref 55–65)
TRICUSPID VALVE REGURGITATION: ABNORMAL

## 2017-06-29 PROCEDURE — 93924 LWR XTR VASC STDY BILAT: CPT

## 2017-06-29 PROCEDURE — 93306 TTE W/DOPPLER COMPLETE: CPT

## 2017-06-29 PROCEDURE — 78452 HT MUSCLE IMAGE SPECT MULT: CPT | Mod: TC

## 2017-06-29 PROCEDURE — 93018 CV STRESS TEST I&R ONLY: CPT | Mod: ,,, | Performed by: INTERNAL MEDICINE

## 2017-06-29 PROCEDURE — 93924 LWR XTR VASC STDY BILAT: CPT | Mod: 26,,, | Performed by: INTERNAL MEDICINE

## 2017-06-29 PROCEDURE — 93016 CV STRESS TEST SUPVJ ONLY: CPT | Mod: ,,, | Performed by: INTERNAL MEDICINE

## 2017-06-29 PROCEDURE — 78452 HT MUSCLE IMAGE SPECT MULT: CPT | Mod: 26,,, | Performed by: RADIOLOGY

## 2017-06-29 PROCEDURE — 93306 TTE W/DOPPLER COMPLETE: CPT | Mod: 26,,, | Performed by: INTERNAL MEDICINE

## 2017-06-30 LAB — VASCULAR ANKLE BRACHIAL INDEX (ABI) LEFT: 1.11 (ref 0.9–1.2)

## 2017-07-04 NOTE — PROGRESS NOTES
Your stress test was normal        If chest tightness continues we will proceed with an angiogram         Thanks        ZN

## 2017-07-04 NOTE — PROGRESS NOTES
Your heart function is preserved        You have diastolic dysfunction with mild aortic stenosis         Overall unremarkable          Thanks        ZN

## 2017-07-10 ENCOUNTER — TELEPHONE (OUTPATIENT)
Dept: CARDIOLOGY | Facility: CLINIC | Age: 72
End: 2017-07-10

## 2017-07-10 NOTE — TELEPHONE ENCOUNTER
----- Message from Mary Jane Lockett sent at 7/10/2017 11:43 AM CDT -----  Contact: [phone]/self   Patient would like to get her test results. Please advise.

## 2017-07-11 DIAGNOSIS — Z12.31 SCREENING MAMMOGRAM FOR HIGH-RISK PATIENT: Primary | ICD-10-CM

## 2017-07-26 ENCOUNTER — HOSPITAL ENCOUNTER (OUTPATIENT)
Dept: RADIOLOGY | Facility: HOSPITAL | Age: 72
Discharge: HOME | End: 2017-07-26
Attending: FAMILY MEDICINE
Payer: MEDICARE

## 2017-07-26 DIAGNOSIS — Z12.31 SCREENING MAMMOGRAM FOR HIGH-RISK PATIENT: ICD-10-CM

## 2017-07-26 PROCEDURE — 77067 SCR MAMMO BI INCL CAD: CPT | Mod: 26,,, | Performed by: RADIOLOGY

## 2017-07-26 PROCEDURE — 77067 SCR MAMMO BI INCL CAD: CPT | Mod: TC

## 2017-07-26 PROCEDURE — 77063 BREAST TOMOSYNTHESIS BI: CPT | Mod: 26,,, | Performed by: RADIOLOGY

## 2017-12-01 DIAGNOSIS — M25.562 LEFT KNEE PAIN: Primary | ICD-10-CM

## 2017-12-01 DIAGNOSIS — R05.9 COUGH: Primary | ICD-10-CM

## 2017-12-04 ENCOUNTER — HOSPITAL ENCOUNTER (OUTPATIENT)
Dept: RADIOLOGY | Facility: HOSPITAL | Age: 72
Discharge: HOME | End: 2017-12-04
Attending: FAMILY MEDICINE
Payer: MEDICARE

## 2017-12-04 DIAGNOSIS — R05.9 COUGH: ICD-10-CM

## 2017-12-04 DIAGNOSIS — M25.562 LEFT KNEE PAIN: ICD-10-CM

## 2017-12-04 PROCEDURE — 71020 XR CHEST PA AND LATERAL: CPT | Mod: 26,,, | Performed by: RADIOLOGY

## 2017-12-04 PROCEDURE — 73560 X-RAY EXAM OF KNEE 1 OR 2: CPT | Mod: 26,LT,, | Performed by: RADIOLOGY

## 2017-12-04 PROCEDURE — 71020 XR CHEST PA AND LATERAL: CPT | Mod: TC

## 2017-12-04 PROCEDURE — 73560 X-RAY EXAM OF KNEE 1 OR 2: CPT | Mod: TC,LT

## 2018-04-06 DIAGNOSIS — R51.9 HEAD ACHE: Primary | ICD-10-CM

## 2018-04-11 ENCOUNTER — HOSPITAL ENCOUNTER (OUTPATIENT)
Dept: RADIOLOGY | Facility: HOSPITAL | Age: 73
Discharge: HOME | End: 2018-04-11
Attending: FAMILY MEDICINE
Payer: MEDICARE

## 2018-04-11 DIAGNOSIS — R51.9 HEAD ACHE: ICD-10-CM

## 2018-04-11 PROCEDURE — 70450 CT HEAD/BRAIN W/O DYE: CPT | Mod: TC

## 2018-04-11 PROCEDURE — 70450 CT HEAD/BRAIN W/O DYE: CPT | Mod: 26,,, | Performed by: RADIOLOGY

## 2018-06-19 ENCOUNTER — HOSPITAL ENCOUNTER (OUTPATIENT)
Facility: HOSPITAL | Age: 73
LOS: 1 days | Discharge: HOME | End: 2018-06-21
Attending: EMERGENCY MEDICINE | Admitting: INTERNAL MEDICINE
Payer: MEDICARE

## 2018-06-19 DIAGNOSIS — R00.1 JUNCTIONAL BRADYCARDIA: ICD-10-CM

## 2018-06-19 DIAGNOSIS — E87.6 HYPOKALEMIA: ICD-10-CM

## 2018-06-19 DIAGNOSIS — R00.1 BRADYCARDIA: ICD-10-CM

## 2018-06-19 DIAGNOSIS — R53.1 WEAKNESS: ICD-10-CM

## 2018-06-19 DIAGNOSIS — R42 DIZZINESS: ICD-10-CM

## 2018-06-19 DIAGNOSIS — M54.6 THORACIC SPINE PAIN: ICD-10-CM

## 2018-06-19 DIAGNOSIS — R29.898 RIGHT LEG WEAKNESS: ICD-10-CM

## 2018-06-19 DIAGNOSIS — R20.0 RIGHT LEG NUMBNESS: ICD-10-CM

## 2018-06-19 DIAGNOSIS — I49.8 JUNCTIONAL RHYTHM: Primary | ICD-10-CM

## 2018-06-19 DIAGNOSIS — I51.89 DIASTOLIC DYSFUNCTION: ICD-10-CM

## 2018-06-19 DIAGNOSIS — R07.9 CHEST PAIN: ICD-10-CM

## 2018-06-19 PROBLEM — I35.0 AORTIC STENOSIS: Status: ACTIVE | Noted: 2018-06-19

## 2018-06-19 PROBLEM — E11.9 TYPE 2 DIABETES MELLITUS, WITHOUT LONG-TERM CURRENT USE OF INSULIN: Status: ACTIVE | Noted: 2018-06-19

## 2018-06-19 LAB
ALBUMIN SERPL BCP-MCNC: 3.4 G/DL
ALP SERPL-CCNC: 70 U/L
ALT SERPL W/O P-5'-P-CCNC: 20 U/L
ANION GAP SERPL CALC-SCNC: 9 MMOL/L
AORTIC VALVE REGURGITATION: ABNORMAL
AST SERPL-CCNC: 22 U/L
BASOPHILS # BLD AUTO: 0.03 K/UL
BASOPHILS NFR BLD: 0.5 %
BILIRUB SERPL-MCNC: 0.5 MG/DL
BUN SERPL-MCNC: 11 MG/DL
CALCIUM SERPL-MCNC: 9.4 MG/DL
CHLORIDE SERPL-SCNC: 106 MMOL/L
CHOLEST SERPL-MCNC: 206 MG/DL
CHOLEST/HDLC SERPL: 6.4 {RATIO}
CO2 SERPL-SCNC: 26 MMOL/L
CREAT SERPL-MCNC: 0.8 MG/DL
DIASTOLIC DYSFUNCTION: YES
DIFFERENTIAL METHOD: ABNORMAL
EOSINOPHIL # BLD AUTO: 0.2 K/UL
EOSINOPHIL NFR BLD: 2.5 %
ERYTHROCYTE [DISTWIDTH] IN BLOOD BY AUTOMATED COUNT: 13.7 %
EST. GFR  (AFRICAN AMERICAN): >60 ML/MIN/1.73 M^2
EST. GFR  (NON AFRICAN AMERICAN): >60 ML/MIN/1.73 M^2
ESTIMATED AVG GLUCOSE: 134 MG/DL
FERRITIN SERPL-MCNC: 53 NG/ML
FOLATE SERPL-MCNC: 5.2 NG/ML
GLUCOSE SERPL-MCNC: 140 MG/DL
HBA1C MFR BLD HPLC: 6.3 %
HCT VFR BLD AUTO: 36.7 %
HDLC SERPL-MCNC: 32 MG/DL
HDLC SERPL: 15.5 %
HGB BLD-MCNC: 11.7 G/DL
INR PPP: 1
IRON SERPL-MCNC: 63 UG/DL
LDLC SERPL CALC-MCNC: 121.4 MG/DL
LYMPHOCYTES # BLD AUTO: 2.3 K/UL
LYMPHOCYTES NFR BLD: 36.1 %
MCH RBC QN AUTO: 29.3 PG
MCHC RBC AUTO-ENTMCNC: 31.9 G/DL
MCV RBC AUTO: 92 FL
MITRAL VALVE MOBILITY: NORMAL
MITRAL VALVE REGURGITATION: ABNORMAL
MONOCYTES # BLD AUTO: 0.3 K/UL
MONOCYTES NFR BLD: 4.8 %
NEUTROPHILS # BLD AUTO: 3.5 K/UL
NEUTROPHILS NFR BLD: 55.9 %
NONHDLC SERPL-MCNC: 174 MG/DL
PLATELET # BLD AUTO: 227 K/UL
PMV BLD AUTO: 9.9 FL
POCT GLUCOSE: 141 MG/DL (ref 70–110)
POTASSIUM SERPL-SCNC: 4.3 MMOL/L
PROT SERPL-MCNC: 6.6 G/DL
PROTHROMBIN TIME: 10.3 SEC
RBC # BLD AUTO: 4 M/UL
RETIRED EF AND QEF - SEE NOTES: 60 (ref 55–65)
SATURATED IRON: 16 %
SODIUM SERPL-SCNC: 141 MMOL/L
TOTAL IRON BINDING CAPACITY: 403 UG/DL
TRANSFERRIN SERPL-MCNC: 272 MG/DL
TRIGL SERPL-MCNC: 263 MG/DL
TROPONIN I SERPL DL<=0.01 NG/ML-MCNC: 0.01 NG/ML
TSH SERPL DL<=0.005 MIU/L-ACNC: 2.25 UIU/ML
VIT B12 SERPL-MCNC: 549 PG/ML
WBC # BLD AUTO: 6.28 K/UL

## 2018-06-19 PROCEDURE — 82728 ASSAY OF FERRITIN: CPT

## 2018-06-19 PROCEDURE — 82962 GLUCOSE BLOOD TEST: CPT | Mod: 59

## 2018-06-19 PROCEDURE — 82746 ASSAY OF FOLIC ACID SERUM: CPT

## 2018-06-19 PROCEDURE — 94761 N-INVAS EAR/PLS OXIMETRY MLT: CPT

## 2018-06-19 PROCEDURE — G8978 MOBILITY CURRENT STATUS: HCPCS | Mod: CL

## 2018-06-19 PROCEDURE — G8997 SWALLOW GOAL STATUS: HCPCS | Mod: CH

## 2018-06-19 PROCEDURE — 85025 COMPLETE CBC W/AUTO DIFF WBC: CPT

## 2018-06-19 PROCEDURE — 99900039 HC SLP GENERIC THERAPY SCREENING (STAT)

## 2018-06-19 PROCEDURE — 84443 ASSAY THYROID STIM HORMONE: CPT

## 2018-06-19 PROCEDURE — 99285 EMERGENCY DEPT VISIT HI MDM: CPT | Mod: 25

## 2018-06-19 PROCEDURE — 93306 TTE W/DOPPLER COMPLETE: CPT

## 2018-06-19 PROCEDURE — 93005 ELECTROCARDIOGRAM TRACING: CPT

## 2018-06-19 PROCEDURE — 63600175 PHARM REV CODE 636 W HCPCS: Performed by: STUDENT IN AN ORGANIZED HEALTH CARE EDUCATION/TRAINING PROGRAM

## 2018-06-19 PROCEDURE — 82607 VITAMIN B-12: CPT

## 2018-06-19 PROCEDURE — 63600175 PHARM REV CODE 636 W HCPCS: Performed by: INTERNAL MEDICINE

## 2018-06-19 PROCEDURE — 99220 PR INITIAL OBSERVATION CARE,LEVL III: CPT | Mod: ,,, | Performed by: NURSE PRACTITIONER

## 2018-06-19 PROCEDURE — G0378 HOSPITAL OBSERVATION PER HR: HCPCS

## 2018-06-19 PROCEDURE — 93010 ELECTROCARDIOGRAM REPORT: CPT | Mod: 76,,, | Performed by: INTERNAL MEDICINE

## 2018-06-19 PROCEDURE — 25500020 PHARM REV CODE 255: Performed by: INTERNAL MEDICINE

## 2018-06-19 PROCEDURE — 25000003 PHARM REV CODE 250: Performed by: INTERNAL MEDICINE

## 2018-06-19 PROCEDURE — G8996 SWALLOW CURRENT STATUS: HCPCS | Mod: CI

## 2018-06-19 PROCEDURE — 97161 PT EVAL LOW COMPLEX 20 MIN: CPT

## 2018-06-19 PROCEDURE — 85610 PROTHROMBIN TIME: CPT

## 2018-06-19 PROCEDURE — 83036 HEMOGLOBIN GLYCOSYLATED A1C: CPT

## 2018-06-19 PROCEDURE — G8979 MOBILITY GOAL STATUS: HCPCS | Mod: CK

## 2018-06-19 PROCEDURE — 84484 ASSAY OF TROPONIN QUANT: CPT

## 2018-06-19 PROCEDURE — 83540 ASSAY OF IRON: CPT

## 2018-06-19 PROCEDURE — 25000003 PHARM REV CODE 250: Performed by: STUDENT IN AN ORGANIZED HEALTH CARE EDUCATION/TRAINING PROGRAM

## 2018-06-19 PROCEDURE — A9585 GADOBUTROL INJECTION: HCPCS | Performed by: INTERNAL MEDICINE

## 2018-06-19 PROCEDURE — 92610 EVALUATE SWALLOWING FUNCTION: CPT

## 2018-06-19 PROCEDURE — 93010 ELECTROCARDIOGRAM REPORT: CPT | Mod: ,,, | Performed by: INTERNAL MEDICINE

## 2018-06-19 PROCEDURE — G8998 SWALLOW D/C STATUS: HCPCS | Mod: CH

## 2018-06-19 PROCEDURE — 93306 TTE W/DOPPLER COMPLETE: CPT | Mod: 26,,, | Performed by: INTERNAL MEDICINE

## 2018-06-19 PROCEDURE — 80061 LIPID PANEL: CPT

## 2018-06-19 PROCEDURE — 80053 COMPREHEN METABOLIC PANEL: CPT

## 2018-06-19 RX ORDER — GLUCAGON 1 MG
1 KIT INJECTION
Status: DISCONTINUED | OUTPATIENT
Start: 2018-06-19 | End: 2018-06-21 | Stop reason: HOSPADM

## 2018-06-19 RX ORDER — INSULIN ASPART 100 [IU]/ML
1-10 INJECTION, SOLUTION INTRAVENOUS; SUBCUTANEOUS
Status: DISCONTINUED | OUTPATIENT
Start: 2018-06-19 | End: 2018-06-21 | Stop reason: HOSPADM

## 2018-06-19 RX ORDER — SODIUM CHLORIDE 0.9 % (FLUSH) 0.9 %
5 SYRINGE (ML) INJECTION
Status: DISCONTINUED | OUTPATIENT
Start: 2018-06-19 | End: 2018-06-21 | Stop reason: HOSPADM

## 2018-06-19 RX ORDER — IRBESARTAN 150 MG/1
300 TABLET ORAL DAILY
Status: DISCONTINUED | OUTPATIENT
Start: 2018-06-19 | End: 2018-06-21 | Stop reason: HOSPADM

## 2018-06-19 RX ORDER — ATORVASTATIN CALCIUM 40 MG/1
40 TABLET, FILM COATED ORAL DAILY
Status: DISCONTINUED | OUTPATIENT
Start: 2018-06-19 | End: 2018-06-21 | Stop reason: HOSPADM

## 2018-06-19 RX ORDER — ACETAMINOPHEN 325 MG/1
650 TABLET ORAL EVERY 4 HOURS PRN
Status: DISCONTINUED | OUTPATIENT
Start: 2018-06-19 | End: 2018-06-21 | Stop reason: HOSPADM

## 2018-06-19 RX ORDER — ENOXAPARIN SODIUM 100 MG/ML
40 INJECTION SUBCUTANEOUS EVERY 24 HOURS
Status: DISCONTINUED | OUTPATIENT
Start: 2018-06-19 | End: 2018-06-21 | Stop reason: HOSPADM

## 2018-06-19 RX ORDER — AMITRIPTYLINE HYDROCHLORIDE 25 MG/1
25 TABLET, FILM COATED ORAL NIGHTLY
Status: DISCONTINUED | OUTPATIENT
Start: 2018-06-19 | End: 2018-06-21 | Stop reason: HOSPADM

## 2018-06-19 RX ORDER — GABAPENTIN 100 MG/1
100 CAPSULE ORAL 3 TIMES DAILY
Status: DISCONTINUED | OUTPATIENT
Start: 2018-06-19 | End: 2018-06-19

## 2018-06-19 RX ORDER — GADOBUTROL 604.72 MG/ML
10 INJECTION INTRAVENOUS
Status: COMPLETED | OUTPATIENT
Start: 2018-06-19 | End: 2018-06-19

## 2018-06-19 RX ORDER — IBUPROFEN 200 MG
24 TABLET ORAL
Status: DISCONTINUED | OUTPATIENT
Start: 2018-06-19 | End: 2018-06-21 | Stop reason: HOSPADM

## 2018-06-19 RX ORDER — AMLODIPINE BESYLATE 5 MG/1
10 TABLET ORAL DAILY
Status: DISCONTINUED | OUTPATIENT
Start: 2018-06-19 | End: 2018-06-21 | Stop reason: HOSPADM

## 2018-06-19 RX ORDER — IBUPROFEN 200 MG
16 TABLET ORAL
Status: DISCONTINUED | OUTPATIENT
Start: 2018-06-19 | End: 2018-06-21 | Stop reason: HOSPADM

## 2018-06-19 RX ORDER — ASPIRIN 81 MG/1
81 TABLET ORAL DAILY
Status: DISCONTINUED | OUTPATIENT
Start: 2018-06-19 | End: 2018-06-21 | Stop reason: HOSPADM

## 2018-06-19 RX ADMIN — AMITRIPTYLINE HYDROCHLORIDE 25 MG: 25 TABLET, FILM COATED ORAL at 09:06

## 2018-06-19 RX ADMIN — GADOBUTROL 10 ML: 604.72 INJECTION INTRAVENOUS at 05:06

## 2018-06-19 RX ADMIN — AMLODIPINE BESYLATE 10 MG: 5 TABLET ORAL at 09:06

## 2018-06-19 RX ADMIN — LORAZEPAM 2 MG: 2 INJECTION INTRAMUSCULAR; INTRAVENOUS at 03:06

## 2018-06-19 RX ADMIN — ATORVASTATIN CALCIUM 40 MG: 40 TABLET, FILM COATED ORAL at 03:06

## 2018-06-19 RX ADMIN — ENOXAPARIN SODIUM 40 MG: 100 INJECTION SUBCUTANEOUS at 05:06

## 2018-06-19 RX ADMIN — IRBESARTAN 300 MG: 150 TABLET ORAL at 05:06

## 2018-06-19 RX ADMIN — ASPIRIN 81 MG: 81 TABLET, COATED ORAL at 05:06

## 2018-06-19 NOTE — PLAN OF CARE
Problem: Physical Therapy Goal  Goal: Physical Therapy Goal  Goals to be met by: 18     Patient will increase functional independence with mobility by performin. Sit to stand transfer with Modified [place]  2. Bed to chair transfer with Modified [place]   3. Gait  x 150 feet with Modified [place].   4. Stand for 5 minutes with Modified [place]    Outcome: Ongoing (interventions implemented as appropriate)  PT evaluation completed, note to follow. Recommendations are ongoing as pt did not participate in standing and gait assessment today 2/2 high BP and RN present requesting no OOB today. Pt sat EOB with distant supervision and presenting with RLE weakness.
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
## 2018-06-19 NOTE — HPI
Trudy Dakin is a 72-year-old female with diverticulitis, DM, HTN. She presented to the ED with RLE weakness this AM. She reports for the past several months she has had some gradual worsening of right leg weakness. She does state that she has known radiculopathy.  She states last night in the middle of the night she got up to use the restroom and noted that her right leg was weak.  She was able to ambulate but seemed to need to drag the right lower extremity.  She went back to sleep and weakness continued into today.  She also reports some lightheadedness.  She states that this began last night as well and that this is worse when she stands up or has certain positional changes.  She complains of associated burning/tingling pain of bilateral upper extremity and of right lower extremity.  She denies any headache, vision loss, chest pain, shortness of breath, nausea, vomiting, abdominal pain, bowel or bladder incontinence, saddle anesthesia, fever, chills or inability to bear weight.  Patient initially thought that symptoms related to elevated blood sugar as she states that she was at her daughter's house over the past 3 days and had not been checking her blood glucose. EKG in the ED was questionable for junctional bradycardia and cardiology was consulted. She is on BB therapy at home. Last dose yesterday. She is SB at baseline with low amplitude p waves. She is being admitted to [facility] medicine for TIA.
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
## 2018-06-19 NOTE — HOSPITAL COURSE
06/19/2018 Patient admitted to [facility] Medicine with TIA, neurosurgery consulted for radiculopathy. CT head unremarkable. EKG in ED SB. Monitoring overnight on telemetry.
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
## 2018-06-19 NOTE — PLAN OF CARE
Notified Dr. Neumann of /89. Dr. Neumann to put in parameters for permissive hypertension.  Pt stated she does not take gabapentin any longer and also wanted her Elavil for tonight to help her sleep.  Dr. Neumann to modify orders accordingly.

## 2018-06-19 NOTE — ASSESSMENT & PLAN NOTE
Felt to be SB with low amplitude p waves similar to previous  Repeat EKG  Monitor on telemetry  Hold BB for now  Echo
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
## 2018-06-19 NOTE — PLAN OF CARE
Pt having a lot of anxiety about MRI.  Pt's /93 HR 68.  Notified Dr. Neumann.  Vaughn ordered for MRI.

## 2018-06-19 NOTE — PLAN OF CARE
Problem: SLP Goal  Goal: SLP Goal  Short Term Goals:  1. Pt will participate in ongoing swallow assessment to determine least restrictive diet.   2. Pt will tolerate regular  tray/thin liquids with no audible s/s of dysphagia and good oral clearance.   3. Pt will implement safe swallowing strategies 100% of the time given min assist.        Outcome: Ongoing (interventions implemented as appropriate)  Clinical swallow eval completed, see report for details.
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
## 2018-06-20 LAB
25(OH)D3+25(OH)D2 SERPL-MCNC: 26 NG/ML
ALBUMIN SERPL BCP-MCNC: 3.2 G/DL
ALP SERPL-CCNC: 71 U/L
ALT SERPL W/O P-5'-P-CCNC: 21 U/L
ANION GAP SERPL CALC-SCNC: 9 MMOL/L
AST SERPL-CCNC: 22 U/L
BASOPHILS # BLD AUTO: 0.02 K/UL
BASOPHILS NFR BLD: 0.4 %
BILIRUB SERPL-MCNC: 0.4 MG/DL
BUN SERPL-MCNC: 8 MG/DL
CALCIUM SERPL-MCNC: 9.5 MG/DL
CHLORIDE SERPL-SCNC: 102 MMOL/L
CO2 SERPL-SCNC: 27 MMOL/L
CREAT SERPL-MCNC: 0.7 MG/DL
DIFFERENTIAL METHOD: NORMAL
EOSINOPHIL # BLD AUTO: 0.2 K/UL
EOSINOPHIL NFR BLD: 2.8 %
ERYTHROCYTE [DISTWIDTH] IN BLOOD BY AUTOMATED COUNT: 13.6 %
EST. GFR  (AFRICAN AMERICAN): >60 ML/MIN/1.73 M^2
EST. GFR  (NON AFRICAN AMERICAN): >60 ML/MIN/1.73 M^2
GLUCOSE SERPL-MCNC: 163 MG/DL
HCT VFR BLD AUTO: 38.3 %
HGB BLD-MCNC: 12.5 G/DL
LYMPHOCYTES # BLD AUTO: 2.1 K/UL
LYMPHOCYTES NFR BLD: 39.5 %
MCH RBC QN AUTO: 29.7 PG
MCHC RBC AUTO-ENTMCNC: 32.6 G/DL
MCV RBC AUTO: 91 FL
MONOCYTES # BLD AUTO: 0.3 K/UL
MONOCYTES NFR BLD: 5.8 %
NEUTROPHILS # BLD AUTO: 2.7 K/UL
NEUTROPHILS NFR BLD: 51.3 %
PLATELET # BLD AUTO: 244 K/UL
PMV BLD AUTO: 10.3 FL
POCT GLUCOSE: 140 MG/DL (ref 70–110)
POCT GLUCOSE: 148 MG/DL (ref 70–110)
POCT GLUCOSE: 150 MG/DL (ref 70–110)
POTASSIUM SERPL-SCNC: 3.7 MMOL/L
PROT SERPL-MCNC: 6.3 G/DL
RBC # BLD AUTO: 4.21 M/UL
SODIUM SERPL-SCNC: 138 MMOL/L
WBC # BLD AUTO: 5.31 K/UL

## 2018-06-20 PROCEDURE — 93005 ELECTROCARDIOGRAM TRACING: CPT

## 2018-06-20 PROCEDURE — 80053 COMPREHEN METABOLIC PANEL: CPT

## 2018-06-20 PROCEDURE — G8987 SELF CARE CURRENT STATUS: HCPCS | Mod: CH

## 2018-06-20 PROCEDURE — 63600175 PHARM REV CODE 636 W HCPCS: Performed by: INTERNAL MEDICINE

## 2018-06-20 PROCEDURE — 25000003 PHARM REV CODE 250: Performed by: STUDENT IN AN ORGANIZED HEALTH CARE EDUCATION/TRAINING PROGRAM

## 2018-06-20 PROCEDURE — G0378 HOSPITAL OBSERVATION PER HR: HCPCS

## 2018-06-20 PROCEDURE — 93010 ELECTROCARDIOGRAM REPORT: CPT | Mod: ,,, | Performed by: INTERNAL MEDICINE

## 2018-06-20 PROCEDURE — 63600175 PHARM REV CODE 636 W HCPCS: Performed by: STUDENT IN AN ORGANIZED HEALTH CARE EDUCATION/TRAINING PROGRAM

## 2018-06-20 PROCEDURE — 94761 N-INVAS EAR/PLS OXIMETRY MLT: CPT

## 2018-06-20 PROCEDURE — 97165 OT EVAL LOW COMPLEX 30 MIN: CPT

## 2018-06-20 PROCEDURE — G8989 SELF CARE D/C STATUS: HCPCS | Mod: CH

## 2018-06-20 PROCEDURE — 85025 COMPLETE CBC W/AUTO DIFF WBC: CPT

## 2018-06-20 PROCEDURE — 97116 GAIT TRAINING THERAPY: CPT | Mod: 59

## 2018-06-20 PROCEDURE — 97530 THERAPEUTIC ACTIVITIES: CPT

## 2018-06-20 PROCEDURE — 36415 COLL VENOUS BLD VENIPUNCTURE: CPT

## 2018-06-20 PROCEDURE — 25000003 PHARM REV CODE 250: Performed by: INTERNAL MEDICINE

## 2018-06-20 PROCEDURE — 82306 VITAMIN D 25 HYDROXY: CPT

## 2018-06-20 RX ORDER — ASPIRIN 81 MG/1
81 TABLET ORAL DAILY
Refills: 0 | COMMUNITY
Start: 2018-06-21 | End: 2018-08-24

## 2018-06-20 RX ADMIN — AMLODIPINE BESYLATE 10 MG: 5 TABLET ORAL at 08:06

## 2018-06-20 RX ADMIN — IRBESARTAN 300 MG: 150 TABLET ORAL at 08:06

## 2018-06-20 RX ADMIN — ENOXAPARIN SODIUM 40 MG: 100 INJECTION SUBCUTANEOUS at 05:06

## 2018-06-20 RX ADMIN — ATORVASTATIN CALCIUM 40 MG: 40 TABLET, FILM COATED ORAL at 08:06

## 2018-06-20 RX ADMIN — ASPIRIN 81 MG: 81 TABLET, COATED ORAL at 08:06

## 2018-06-20 RX ADMIN — LORAZEPAM 2 MG: 2 INJECTION INTRAMUSCULAR; INTRAVENOUS at 10:06

## 2018-06-20 RX ADMIN — LORAZEPAM 2 MG: 2 INJECTION INTRAMUSCULAR; INTRAVENOUS at 06:06

## 2018-06-20 RX ADMIN — AMITRIPTYLINE HYDROCHLORIDE 25 MG: 25 TABLET, FILM COATED ORAL at 08:06

## 2018-06-20 NOTE — PLAN OF CARE
Problem: Patient Care Overview  Goal: Plan of Care Review  Outcome: Ongoing (interventions implemented as appropriate)  Plan of care reviewed with patient. Patient verbalized complete understanding. Patient attempted to have her MRI done, but due to anxiety she was unable to complete it. Ativan was given.  Patient will re-attempt to have the MRI tomorrow.  Fall precautions maintained. Bed in lowest position, locked, call light within reach, and bed alarm on. Side rails up x's 2 with slip resistant socks on. Nurse instructed patient to notify staff for any assistance and pt verbalized complete understanding. Patient on telemetry throughout shift with no ectopy noted. Will continue to monitor
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
## 2018-06-20 NOTE — PLAN OF CARE
Problem: Occupational Therapy Goal  Goal: Occupational Therapy Goal  Outcome: Outcome(s) achieved Date Met: 06/20/18  OT eval/tx completed this date. Pt lives w/ spouse in [place] w/ 0STE; T/S combo. PLOF: (I) w/o DME w/all I/BADLs, driving & yardwork. Pt reports having 3 falls w/in past 2 mos--all while perf standing tub t/f's. Currently, pt perf LB dress, toileting & G/H at Mod (I), however [facility]. Edu re: strongly rec TTB & GB use & provided info for purchase; provided red theraband & UE TE handouts; discussed possibility of ins coverage for aquatic wellness classes. Pt/fly verbalized understanding.    Pt w/o change in fxnl status from PLOF & no further OT svcs indicated at this time.

## 2018-06-20 NOTE — PROGRESS NOTES
"[facility] Internal Medicine Resident HO-1 Progress Note    Subjective:      Trudy R Dakin is a 72 y.o.  female who is being followed by the [facility] IM service at [facility] for TIA.     This morning Ms. Dakin is feeling good. She denies any R leg weakness, other focal numbness/tingling or weakness, or new neurologic symptoms. No chest pain or shortness of breath.      Objective:   Last 24 Hour Vital Signs:  BP  Min: 135/67  Max: 224/93  Temp  Av.7 °F (36.5 °C)  Min: 96.4 °F (35.8 °C)  Max: 98.2 °F (36.8 °C)  Pulse  Av.1  Min: 52  Max: 86  Resp  Av.4  Min: 16  Max: 18  SpO2  Av.2 %  Min: 92 %  Max: 99 %  Height  Av' 2" (157.5 cm)  Min: 5' 2" (157.5 cm)  Max: 5' 2" (157.5 cm)  Weight  Av.4 kg (164 lb 0.4 oz)  Min: 74.4 kg (164 lb 0.4 oz)  Max: 74.4 kg (164 lb 0.4 oz)  No intake/output data recorded.    Physical Examination:  General:          Alert and awake in NAD, on 2L NC, obese.  Head:               Normocephalic and atraumatic  Eyes:               Wearing corrective glasses. EOMi with anicteric sclera and clear conjunctivae  Mouth:             Oropharynx clear and without exudate; moist mucous membranes. Wearing dentures.   Cardio:             Bradycardiac at HR 58. Regular rhythm with normal S1 and S2; no murmurs or rubs  Resp:               CTAB and unlabored; no wheezes, crackles or rhonchi  Abdom:            Soft, NTND with normoactive bowel sounds  Extrem:            WWP with no clubbing, cyanosis or edema  Skin:                No rashes, lesions, or color changes  Pulses:            2+ and symmetric distally  Neuro:               - MS: AAOx3; cooperative, pleasant.   - CN: PERRL, EOMI, visual fields intact. L ptosis. Reported decreased sensation to light touch in L V2 and V3 regions. Gag reflex present. No uvula or tongue deviation. Hearing intact. 5/5 shoulder shrugs. Tongue able to deviate laterally.   - Motor: 5/5 UEs. 4/5 R knee flexion. Otherwise 5/5 in " the rest of LEs.   - Sensation: to light touch intact throughout.    Laboratory:  Laboratory Data Reviewed: yes  Pertinent Findings:  Lab Results   Component Value Date    WBC 5.31 06/20/2018    HGB 12.5 06/20/2018    HCT 38.3 06/20/2018    MCV 91 06/20/2018     06/20/2018     CMP  Sodium   Date Value Ref Range Status   06/20/2018 138 136 - 145 mmol/L Final     Potassium   Date Value Ref Range Status   06/20/2018 3.7 3.5 - 5.1 mmol/L Final     Chloride   Date Value Ref Range Status   06/20/2018 102 95 - 110 mmol/L Final     CO2   Date Value Ref Range Status   06/20/2018 27 23 - 29 mmol/L Final     Glucose   Date Value Ref Range Status   06/20/2018 163 (H) 70 - 110 mg/dL Final     BUN, Bld   Date Value Ref Range Status   06/20/2018 8 8 - 23 mg/dL Final     Creatinine   Date Value Ref Range Status   06/20/2018 0.7 0.5 - 1.4 mg/dL Final     Calcium   Date Value Ref Range Status   06/20/2018 9.5 8.7 - 10.5 mg/dL Final     Total Protein   Date Value Ref Range Status   06/20/2018 6.3 6.0 - 8.4 g/dL Final     Albumin   Date Value Ref Range Status   06/20/2018 3.2 (L) 3.5 - 5.2 g/dL Final     Total Bilirubin   Date Value Ref Range Status   06/20/2018 0.4 0.1 - 1.0 mg/dL Final     Comment:     For infants and newborns, interpretation of results should be based  on gestational age, weight and in agreement with clinical  observations.  Premature Infant recommended reference ranges:  Up to 24 hours.............<8.0 mg/dL  Up to 48 hours............<12.0 mg/dL  3-5 days..................<15.0 mg/dL  6-29 days.................<15.0 mg/dL       Alkaline Phosphatase   Date Value Ref Range Status   06/20/2018 71 55 - 135 U/L Final     AST   Date Value Ref Range Status   06/20/2018 22 10 - 40 U/L Final     ALT   Date Value Ref Range Status   06/20/2018 21 10 - 44 U/L Final     Anion Gap   Date Value Ref Range Status   06/20/2018 9 8 - 16 mmol/L Final     eGFR if    Date Value Ref Range Status   06/20/2018 >60  ">60 mL/min/1.73 m^2 Final     eGFR if non    Date Value Ref Range Status   06/20/2018 >60 >60 mL/min/1.73 m^2 Final     Comment:     Calculation used to obtain the estimated glomerular filtration  rate (eGFR) is the CKD-EPI equation.          Other Results:  EKG (my interpretation):   6/20 EKG - NSR.    Radiology Studies  6/19 MRI/MRA Brain, MRA Neck  No acute intracranial abnormality.  No significant arterial abnormalities.  Empty sella configuration, unchanged.  Posterior right ethmoidal air cell opacification.  Correlate clinically.    Current Medications:     Infusions:       Scheduled:   amitriptyline  25 mg Oral QHS    amLODIPine  10 mg Oral Daily    aspirin  81 mg Oral Daily    atorvastatin  40 mg Oral Daily    enoxaparin  40 mg Subcutaneous Daily    irbesartan  300 mg Oral Daily        PRN:  acetaminophen, dextrose 50%, dextrose 50%, glucagon (human recombinant), glucose, glucose, insulin aspart U-100, sodium chloride 0.9%    Antibiotics and Day Number of Therapy:  None    Lines and Day Number of Therapy:  PIV    Assessment:     Trudy R Dakin is a 72 y.o.female with  Patient Active Problem List    Diagnosis Date Noted    Junctional bradycardia 06/19/2018    Bradycardia 06/19/2018    Weakness 06/19/2018    Diastolic dysfunction 06/19/2018    Type 2 diabetes mellitus, without long-term current use of insulin 06/19/2018    Aortic stenosis 06/19/2018    Mixed hyperlipidemia 06/14/2017    Essential hypertension 06/14/2017    Chest pain 06/14/2017    Obesity 06/14/2017    Rotator cuff tear 11/10/2016    Limb pain 11/08/2012        Plan:     R Leg Weakness, Resolved; Likely TIA.  - 2 month history of intermittent mild R leg weakness - "difficulty to lift up right leg." 1 day history of worsening R leg weakness, associated with tingling. Symptoms resolved upon initial evaluation. No history of CVA per patient.   - Initial glucose 140 from CMP.   - CT head without contrast on 6/19 " without acute abnormality. MRI/MRA negative for acute abnormality.   - Initial NIHSS score of 1 (residual facial asymmetry). ABCD score of 5.   - Continue ASA 81 mg daily, irbesartan 300 mg daily, Lipitor 40 mg daily.   - Will follow up neurology, PT/OT evaluation.     Chronic Low Back Pain  - Reports a few months of bilateral lower back pain with bilateral feet numbness/tingling.    - Neurosurgery consulted. Ordered thoracic and lumbar MRI. Will follow up results and recs.      Sinus Bradycardia, asymptomatic  - Initial EKG with junctional rhythm. Repeat EKG with sinus bradycardia.  - Asymptomatic. No syncope reported.   - Cardiology consulted. Appreciate assistance.   - Repeat EKG with NSR today.   - Will continue irbesartan 300 mg daily. Hold BB per cards recs.     HLD  - Continue Lipitor 40 mg daily     T2DM   - SSI PRN.     Obesity  - Body mass index is 30.99 kg/m².  - Counseled healthy and balanced diet and lifestyle modification.     Dispo: Pending neurosurgery, neurology, PT/OT evaluation.    Ramírez Neumann  [facility] Internal Medicine HO-1  [facility] IM Service Team B    [facility] Medicine Hospitalist Pager numbers:   [facility] Hospitalist Medicine Team A (Denae/Connor): [phone]  [facility] Hospitalist Medicine Team B (Luz/Yudelka):  [phone]
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
## 2018-06-20 NOTE — PLAN OF CARE
Problem: Patient Care Overview  Goal: Plan of Care Review  Outcome: Ongoing (interventions implemented as appropriate)  Pt on RA with documented sats.92%. Will continue to monitor.

## 2018-06-20 NOTE — PLAN OF CARE
Problem: Physical Therapy Goal  Goal: Physical Therapy Goal  Goals to be met by: 18     Patient will increase functional independence with mobility by performin. Sit to stand transfer with Modified [place]  2. Bed to chair transfer with Modified [place]   3. Gait  x 150 feet with Modified [place].   4. Stand for 5 minutes with Modified [place]     Outcome: Ongoing (interventions implemented as appropriate)  Pt with improved activity tolerance today, completing all standing activities with supervision to ensure safety as pt presents with lateral trunk sway at decreased magdy compared to her baseline. Recommending OP PT upon d/c.

## 2018-06-20 NOTE — PT/OT/SLP PROGRESS
Physical Therapy Treatment    Patient Name:  Trudy R Dakin   MRN:  [id number]    Recommendations:     Discharge Recommendations:  outpatient PT   Discharge Equipment Recommendations: bath bench, grab bar   Barriers to discharge: None    Assessment:     Trudy R Dakin is a 72 y.o. female admitted with a medical diagnosis of Junctional bradycardia.  She presents with the following impairments/functional limitations:  gait instability, impaired balance. Pt with improved activity tolerance today, completing all standing activities with supervision to ensure safety as pt presents with lateral trunk sway at decreased magdy compared to her baseline. Recommending OP PT upon d/c.    Rehab Prognosis: Excellent; patient would benefit from acute skilled PT services to address these deficits and reach maximum level of function.      Recent Surgery: * No surgery found *      Plan:     During this hospitalization, patient to be seen 6 x/week to address the above listed problems via gait training, therapeutic activities, therapeutic exercises, neuromuscular re-education  · Plan of Care Expires:  07/19/18   Plan of Care Reviewed with: patient, daughter    Subjective     Communicated with ELIO Juárez prior to session.  Patient found supine with HOB elevated upon PT entry to room, agreeable to treatment.      Chief Complaint: none  Patient comments/goals: pt reporting she wants to go home  Pain/Comfort:  · Pain Rating 1: 0/10  · Pain Rating Post-Intervention 1: 0/10    Patients cultural, spiritual, Jew conflicts given the current situation: none reported    Objective:     Patient found with: telemetry     General Precautions: Standard, fall   Orthopedic Precautions:N/A   Braces: N/A     Functional Mobility:  · Bed Mobility:     · Scooting: modified independence  · Supine to Sit: modified independence  · Sit to Supine: modified independence  · Transfers:     · Sit to Stand:  supervision with no AD  · Gait: ~220 ft with no AD and  supervision as pt ambulating with lateral trunk sway and reports this is not her baseline but it may be due to the medication she took prior to going to the MRI       AM-PAC 6 CLICK MOBILITY  Turning over in bed (including adjusting bedclothes, sheets and blankets)?: 4  Sitting down on and standing up from a chair with arms (e.g., wheelchair, bedside commode, etc.): 3  Moving from lying on back to sitting on the side of the bed?: 4  Moving to and from a bed to a chair (including a wheelchair)?: 3  Need to walk in hospital room?: 3  Climbing 3-5 steps with a railing?: 3  Total Score: 20       Therapeutic Activities and Exercises:  Pt is very pleasant and reporting she is back to feeling at her baseline at rest. Pt still reporting R lower leg numbness today but that it feels like her baseline.   Lateral trunk sway with gait and reporting this is not her baseline but believes it is due to the medication she received to go to the MRI.     Patient left HOB elevated with all lines intact, call button in reach, bed alarm on, RN notified and pt's daughter present.    GOALS:    Physical Therapy Goals        Problem: Physical Therapy Goal    Goal Priority Disciplines Outcome Goal Variances Interventions   Physical Therapy Goal     PT/OT, PT Ongoing (interventions implemented as appropriate)     Description:  Goals to be met by: 18     Patient will increase functional independence with mobility by performin. Sit to stand transfer with Modified [place]  2. Bed to chair transfer with Modified [place]   3. Gait  x 150 feet with Modified [place].   4. Stand for 5 minutes with Modified [place]                      Time Tracking:     PT Received On: 18  PT Start Time: 1305     PT Stop Time: 1324  PT Total Time (min): 19 min     Billable Minutes: Gait Training 15    Treatment Type: Treatment  PT/PTA: PT     PTA Visit Number: 0     Nasima Moran, PT  2018

## 2018-06-20 NOTE — PT/OT/SLP PROGRESS
Physical Therapy  Missed Session    Patient Name:  Trudy R Dakin   MRN:  [id number]    Patient not seen today secondary to pt OFT for MRI. Will follow-up as available.    Nasima Moran, PT   6/20/2018

## 2018-06-21 ENCOUNTER — TELEPHONE (OUTPATIENT)
Dept: NEUROSURGERY | Facility: CLINIC | Age: 73
End: 2018-06-21

## 2018-06-21 VITALS
OXYGEN SATURATION: 95 % | HEART RATE: 98 BPM | SYSTOLIC BLOOD PRESSURE: 140 MMHG | TEMPERATURE: 98 F | WEIGHT: 164 LBS | DIASTOLIC BLOOD PRESSURE: 88 MMHG | BODY MASS INDEX: 30.18 KG/M2 | RESPIRATION RATE: 18 BRPM | HEIGHT: 62 IN

## 2018-06-21 DIAGNOSIS — M47.12 OSTEOARTHRITIS OF CERVICAL SPINE WITH MYELOPATHY: Primary | ICD-10-CM

## 2018-06-21 PROBLEM — R29.898 RIGHT LEG WEAKNESS: Status: ACTIVE | Noted: 2018-06-21

## 2018-06-21 LAB
ALBUMIN SERPL BCP-MCNC: 3.6 G/DL
ALP SERPL-CCNC: 82 U/L
ALT SERPL W/O P-5'-P-CCNC: 23 U/L
ANION GAP SERPL CALC-SCNC: 11 MMOL/L
AST SERPL-CCNC: 19 U/L
BASOPHILS # BLD AUTO: 0.02 K/UL
BASOPHILS NFR BLD: 0.3 %
BILIRUB SERPL-MCNC: 0.6 MG/DL
BUN SERPL-MCNC: 7 MG/DL
CALCIUM SERPL-MCNC: 9.6 MG/DL
CHLORIDE SERPL-SCNC: 102 MMOL/L
CO2 SERPL-SCNC: 25 MMOL/L
CREAT SERPL-MCNC: 0.7 MG/DL
DIFFERENTIAL METHOD: NORMAL
EOSINOPHIL # BLD AUTO: 0.2 K/UL
EOSINOPHIL NFR BLD: 3 %
ERYTHROCYTE [DISTWIDTH] IN BLOOD BY AUTOMATED COUNT: 13.5 %
EST. GFR  (AFRICAN AMERICAN): >60 ML/MIN/1.73 M^2
EST. GFR  (NON AFRICAN AMERICAN): >60 ML/MIN/1.73 M^2
GLUCOSE SERPL-MCNC: 159 MG/DL
HCT VFR BLD AUTO: 40.9 %
HGB BLD-MCNC: 13.6 G/DL
LYMPHOCYTES # BLD AUTO: 2.1 K/UL
LYMPHOCYTES NFR BLD: 35.4 %
MAGNESIUM SERPL-MCNC: 2 MG/DL
MCH RBC QN AUTO: 30 PG
MCHC RBC AUTO-ENTMCNC: 33.3 G/DL
MCV RBC AUTO: 90 FL
MONOCYTES # BLD AUTO: 0.4 K/UL
MONOCYTES NFR BLD: 6.1 %
NEUTROPHILS # BLD AUTO: 3.3 K/UL
NEUTROPHILS NFR BLD: 54.9 %
PLATELET # BLD AUTO: 217 K/UL
PMV BLD AUTO: 10.2 FL
POCT GLUCOSE: 152 MG/DL (ref 70–110)
POCT GLUCOSE: 164 MG/DL (ref 70–110)
POTASSIUM SERPL-SCNC: 3.4 MMOL/L
PROT SERPL-MCNC: 6.8 G/DL
RBC # BLD AUTO: 4.54 M/UL
SODIUM SERPL-SCNC: 138 MMOL/L
WBC # BLD AUTO: 6.04 K/UL

## 2018-06-21 PROCEDURE — 97530 THERAPEUTIC ACTIVITIES: CPT

## 2018-06-21 PROCEDURE — 25000003 PHARM REV CODE 250: Performed by: STUDENT IN AN ORGANIZED HEALTH CARE EDUCATION/TRAINING PROGRAM

## 2018-06-21 PROCEDURE — 36415 COLL VENOUS BLD VENIPUNCTURE: CPT

## 2018-06-21 PROCEDURE — G0378 HOSPITAL OBSERVATION PER HR: HCPCS

## 2018-06-21 PROCEDURE — 25000003 PHARM REV CODE 250: Performed by: INTERNAL MEDICINE

## 2018-06-21 PROCEDURE — 80053 COMPREHEN METABOLIC PANEL: CPT

## 2018-06-21 PROCEDURE — 83735 ASSAY OF MAGNESIUM: CPT

## 2018-06-21 PROCEDURE — 85025 COMPLETE CBC W/AUTO DIFF WBC: CPT

## 2018-06-21 PROCEDURE — 97116 GAIT TRAINING THERAPY: CPT

## 2018-06-21 RX ORDER — POTASSIUM CHLORIDE 20 MEQ/1
20 TABLET, EXTENDED RELEASE ORAL
Status: COMPLETED | OUTPATIENT
Start: 2018-06-21 | End: 2018-06-21

## 2018-06-21 RX ORDER — LORAZEPAM 2 MG/1
2 TABLET ORAL ONCE AS NEEDED
Qty: 1 TABLET | Refills: 0 | Status: SHIPPED | OUTPATIENT
Start: 2018-06-21 | End: 2018-06-21

## 2018-06-21 RX ORDER — LORAZEPAM 2 MG/1
2 TABLET ORAL ONCE AS NEEDED
Qty: 1 TABLET | Refills: 0 | Status: SHIPPED | OUTPATIENT
Start: 2018-06-21 | End: 2018-08-24

## 2018-06-21 RX ADMIN — ASPIRIN 81 MG: 81 TABLET, COATED ORAL at 09:06

## 2018-06-21 RX ADMIN — POTASSIUM CHLORIDE 20 MEQ: 1500 TABLET, EXTENDED RELEASE ORAL at 12:06

## 2018-06-21 RX ADMIN — POTASSIUM CHLORIDE 20 MEQ: 1500 TABLET, EXTENDED RELEASE ORAL at 09:06

## 2018-06-21 RX ADMIN — ATORVASTATIN CALCIUM 40 MG: 40 TABLET, FILM COATED ORAL at 09:06

## 2018-06-21 RX ADMIN — IRBESARTAN 300 MG: 150 TABLET ORAL at 09:06

## 2018-06-21 NOTE — PROGRESS NOTES
MRI thoracic and lumbar spine reviewed and discussed with Dr. Martin. No significant pathology to explain the leg weakness. Will order cervical spine MRI    MD Charissa Hull PA-C  Neurosurgery

## 2018-06-21 NOTE — PROGRESS NOTES
"[facility] Resident HO-1 Progress Note    Subjective:      Trudy R Dakin is a 72 y.o.  female who is being followed by the [facility] IM service at [facility] for TIA.     No acute event overnight noted. No foot numbness or tingling reported. No back pain. Patient is feeling "good" this morning.      Objective:   Last 24 Hour Vital Signs:  BP  Min: 123/73  Max: 158/87  Temp  Av.2 °F (36.8 °C)  Min: 96.9 °F (36.1 °C)  Max: 98.8 °F (37.1 °C)  Pulse  Av.4  Min: 78  Max: 108  Resp  Av.3  Min: 18  Max: 20  SpO2  Av.3 %  Min: 92 %  Max: 96 %  I/O last 3 completed shifts:  In: -   Out: 430 [Urine:430]    Physical Examination:  General:          Alert and awake in NAD, on room air, obese.  Head:               Normocephalic and atraumatic  Eyes:               Wearing corrective glasses. EOMi with anicteric sclera and clear conjunctivae  Mouth:             Oropharynx clear and without exudate; moist mucous membranes. Wearing dentures.   Cardio:             2/6 holosystolic murmur best heard at mitral region that radiates to L axilla.  normal S1 and S2; no murmurs or rubs  Resp:               CTAB and unlabored; no wheezes, crackles or rhonchi  Abdom:            Soft, NTND with normoactive bowel sounds  Extrem:            WWP with no clubbing, cyanosis or edema  Skin:                No rashes, lesions, or color changes  Pulses:            2+ and symmetric distally  Neuro:               - MS: AAOx3; cooperative, pleasant.   - CN: PERRL, EOMI, visual fields intact. L ptosis. Reported decreased sensation to light touch in L V2 and V3 regions. Gag reflex present. No uvula or tongue deviation. Hearing intact. 5/5 shoulder shrugs. Tongue able to deviate laterally.   - Motor: 5/5 ext strength bilaterally.   - Sensation: to light touch intact throughout.    Laboratory:  Laboratory Data Reviewed: yes  Pertinent Findings:  Lab Results   Component Value Date    WBC 6.04 2018    " HGB 13.6 06/21/2018    HCT 40.9 06/21/2018    MCV 90 06/21/2018     06/21/2018     CMP  Sodium   Date Value Ref Range Status   06/21/2018 138 136 - 145 mmol/L Final     Potassium   Date Value Ref Range Status   06/21/2018 3.4 (L) 3.5 - 5.1 mmol/L Final     Chloride   Date Value Ref Range Status   06/21/2018 102 95 - 110 mmol/L Final     CO2   Date Value Ref Range Status   06/21/2018 25 23 - 29 mmol/L Final     Glucose   Date Value Ref Range Status   06/21/2018 159 (H) 70 - 110 mg/dL Final     BUN, Bld   Date Value Ref Range Status   06/21/2018 7 (L) 8 - 23 mg/dL Final     Creatinine   Date Value Ref Range Status   06/21/2018 0.7 0.5 - 1.4 mg/dL Final     Calcium   Date Value Ref Range Status   06/21/2018 9.6 8.7 - 10.5 mg/dL Final     Total Protein   Date Value Ref Range Status   06/21/2018 6.8 6.0 - 8.4 g/dL Final     Albumin   Date Value Ref Range Status   06/21/2018 3.6 3.5 - 5.2 g/dL Final     Total Bilirubin   Date Value Ref Range Status   06/21/2018 0.6 0.1 - 1.0 mg/dL Final     Comment:     For infants and newborns, interpretation of results should be based  on gestational age, weight and in agreement with clinical  observations.  Premature Infant recommended reference ranges:  Up to 24 hours.............<8.0 mg/dL  Up to 48 hours............<12.0 mg/dL  3-5 days..................<15.0 mg/dL  6-29 days.................<15.0 mg/dL       Alkaline Phosphatase   Date Value Ref Range Status   06/21/2018 82 55 - 135 U/L Final     AST   Date Value Ref Range Status   06/21/2018 19 10 - 40 U/L Final     ALT   Date Value Ref Range Status   06/21/2018 23 10 - 44 U/L Final     Anion Gap   Date Value Ref Range Status   06/21/2018 11 8 - 16 mmol/L Final     eGFR if    Date Value Ref Range Status   06/21/2018 >60 >60 mL/min/1.73 m^2 Final     eGFR if non    Date Value Ref Range Status   06/21/2018 >60 >60 mL/min/1.73 m^2 Final     Comment:     Calculation used to obtain the estimated  glomerular filtration  rate (eGFR) is the CKD-EPI equation.          Other Results:  EKG (my interpretation):   6/20 EKG - NSR.    Radiology Studies  6/20 MRI Lumbar Spine  Multilevel degenerative change of the lumbar spine as detailed above most pronounced at L3/L4 with posterior disc osteophyte, ligamentum flavum hypertrophy and facet joint arthropathy with moderate central canal and bilateral neural foraminal stenosis as detailed above.    Previous L4/L5 disc extrusion is no longer seen.    6/20 MRI Thoracic spine  1. Disc dehydration throughout the dorsal spine from degenerative disc disease.  2. Multiple T1 and T2 hyperintense lesions representing hemangiomas.  3. No disc herniations or spinal stenosis or cord compression.    6/19 MRI/MRA Brain, MRA Neck  No acute intracranial abnormality.  No significant arterial abnormalities.  Empty sella configuration, unchanged.  Posterior right ethmoidal air cell opacification.  Correlate clinically.    Current Medications:     Infusions:       Scheduled:   amitriptyline  25 mg Oral QHS    amLODIPine  10 mg Oral Daily    aspirin  81 mg Oral Daily    atorvastatin  40 mg Oral Daily    enoxaparin  40 mg Subcutaneous Daily    irbesartan  300 mg Oral Daily    potassium chloride  20 mEq Oral Q2H        PRN:  acetaminophen, dextrose 50%, dextrose 50%, glucagon (human recombinant), glucose, glucose, insulin aspart U-100, sodium chloride 0.9%    Antibiotics and Day Number of Therapy:  None    Lines and Day Number of Therapy:  PIV    Assessment:     Trudy R Dakin is a 72 y.o.female with  Patient Active Problem List    Diagnosis Date Noted    Junctional bradycardia 06/19/2018    Bradycardia 06/19/2018    Weakness 06/19/2018    Diastolic dysfunction 06/19/2018    Type 2 diabetes mellitus, without long-term current use of insulin 06/19/2018    Aortic stenosis 06/19/2018    Mixed hyperlipidemia 06/14/2017    Essential hypertension 06/14/2017    Chest pain 06/14/2017     "Obesity 06/14/2017    Rotator cuff tear 11/10/2016    Limb pain 11/08/2012        Plan:     R Leg Weakness, Resolved; Likely TIA.  - 2 month history of intermittent mild R leg weakness - "difficulty to lift up right leg." 1 day history of worsening R leg weakness, associated with tingling. Symptoms resolved upon initial evaluation. No history of CVA per patient.   - Initial glucose 140 from CMP.   - CT head without contrast on 6/19 without acute abnormality. MRI/MRA negative for acute abnormality.   - Initial NIHSS score of 1 (residual facial asymmetry). ABCD score of 5.   - Continue ASA 81 mg daily, irbesartan 300 mg daily, Lipitor 40 mg daily.   - Symptoms resolved.     Chronic Low Back Pain  - Reports a few months of bilateral lower back pain with bilateral feet numbness/tingling.    - Neurosurgery consulted. Will follow up results and recs.      Sinus Bradycardia, asymptomatic  - Initial EKG with junctional rhythm. Repeat EKG with sinus bradycardia.  - Asymptomatic. No syncope reported.   - Cardiology consulted. Appreciate assistance.   - Repeat EKG with NSR today.   - Will continue irbesartan 300 mg daily. Hold BB per cards recs.     HLD  - Continue Lipitor 40 mg daily     T2DM   - SSI PRN.     Obesity  - Body mass index is 30.99 kg/m².  - Counseled healthy and balanced diet and lifestyle modification.     Dispo: Pending neurosurgery recs.     Ramírez Neumann  [facility] Internal Medicine HO-1  [facility] IM Service Team B    [facility] Medicine Hospitalist Pager numbers:   [facility] Hospitalist Medicine Team A (Denae/Connor): [phone]  [facility] Hospitalist Medicine Team B (Luz/Yudelka):  [phone]    "

## 2018-06-21 NOTE — TELEPHONE ENCOUNTER
----- Message from Mira Whyte sent at 6/21/2018  8:39 AM CDT -----  Contact: [phone]/Dr Neumann with [facility] in patient   Dr Neumann called stating Charissa Preethi saw the pt yesterday and recommended her to have imagine . Dr Neumann states pt got the imagine done know he wants to know what else she can recommend for the pt. Please advise

## 2018-06-21 NOTE — PLAN OF CARE
Patient has her family here to bring her home at this time.    Patient is going to be contacted by Dr. Martin office regarding date and time for her OP MRI of C-Spine.    Follow-up With  Details  Why  Contact Info   Aaron Tim MD  Go on 7/3/2018  @ 9:15am  [address]  [address]  [place] [address]  [phone]   Rickey Martin MD  In 2 weeks  Scheduled outpatient C-spine MRI. Evaluation of R leg weakness, intermittent feet numbness/tingling.   [address]  [address]  [place] [address]  [phone]             06/21/18 1523   Final Note   Assessment Type Final Discharge Note   Discharge Disposition Home   What phone number can be called within the next 1-3 days to see how you are doing after discharge? [phone]   Hospital Follow Up  Appt(s) scheduled? Yes   Discharge plans and expectations educations in teach back method with documentation complete? Yes   Right Care Referral Info   Post Acute Recommendation No Care

## 2018-06-21 NOTE — NURSING
Received report from Nurse Hoff. Patient is asleep. On heart monitoring running Sinus Rhythm at 98bpm. Bed in lowest position, bed alarms on and call light  within reach. Nurse Hoff ambulated the patient  to the bathroom,  not steady on her feet. Will continue to monitor.

## 2018-06-21 NOTE — TELEPHONE ENCOUNTER
----- Message from Rickey Martin MD sent at 6/21/2018  2:35 PM CDT -----  Contact: Dr Feng from [facility]  OK to be discharged today. Please order cervical spine MRI outpatient and follow-up.  Thanks  DD  ----- Message -----  From: Eboni Bartholomew  Sent: 6/21/2018   1:03 PM  To: Mario Lang Staff    Dr Feng would like to speak with Dr Martin regarding pts current condition and plan of care.  She wants to know if he is okay with discharging the pt.    Dr Feng can be reached at [phone]

## 2018-06-21 NOTE — PT/OT/SLP PROGRESS
Physical Therapy Treatment    Patient Name:  Trudy R Dakin   MRN:  [id number]    Recommendations:     Discharge Recommendations:  outpatient PT   Discharge Equipment Recommendations: bath bench, grab bar, and walker, rolling(discussed with P.T. To add RW secondary to two bouts LOB requiring Tasneem today).   Barriers to discharge: None    Assessment:     Trudy R Dakin is a 72 y.o. female admitted with a medical diagnosis of Junctional bradycardia.  She presents with the following impairments/functional limitations:    Pt demonstrated an increase in ambulation distance today, but pt did have bouts of unsteadiness to the right with right trunk sway noted. Pt required CGA with 2 bouts of Tasneem when pt veered too far to the right with increased trunk sway.     Rehab Prognosis:  good; patient would benefit from acute skilled PT services to address these deficits and reach maximum level of function.      Recent Surgery: * No surgery found *      Plan:     During this hospitalization, patient to be seen 6 x/week to address the above listed problems via gait training, therapeutic activities, therapeutic exercises, neuromuscular re-education  · Plan of Care Expires:  07/19/18   Plan of Care Reviewed with: patient    Subjective     Communicated with RN (Lorraine) prior to session.  Patient found supine upon PT entry to room, agreeable to treatment.      Patient comments/goals: Pt agreed to tx.  Pain/Comfort:  · Pain Rating 1: 0/10  · Pain Rating Post-Intervention 1: 0/10    Patients cultural, spiritual, Pent[employer]al conflicts given the current situation: none reported    Objective:     Patient found with: telemetry     General Precautions: Standard, fall   Orthopedic Precautions:N/A   Braces:       Functional Mobility:  · Bed Mobility:     · Rolling Left:  independence  · Scooting: independence  · Supine to Sit: independence  · Sit to Supine: independence  · Transfers:     · Sit to Stand:  stand by assistance and contact guard  assistance with no AD  · Toilet Transfer: contact guard assistance with  grab bars  using  Step Transfer  · Gait: 150ft, 200ft x 2 without A.D. with CGA/Tasneem(2 bouts).  Pt veered to the right and was unsteady requiring Tasneem(twice) to regain balance.  Pt, otherwise, ambulates with increased right trunk sway/lean and is  more unsteady initially with gait and balance improves with continued ambulation.   · Balance: sitting good, standing without A.D. fair/fair+, gait without A.D. fair/ fair-      AM-PAC 6 CLICK MOBILITY  Turning over in bed (including adjusting bedclothes, sheets and blankets)?: 4  Sitting down on and standing up from a chair with arms (e.g., wheelchair, bedside commode, etc.): 3  Moving from lying on back to sitting on the side of the bed?: 4  Moving to and from a bed to a chair (including a wheelchair)?: 3  Need to walk in hospital room?: 3  Climbing 3-5 steps with a railing?: 3  Total Score: 20       Therapeutic Activities and Exercises:   Seated BLE therex: Ap, LAQ, hip flexion, ABD/ADD, and glut sets x 15 reps with vc's for proper technique.    Gait as above.  Pt may benefit from RW use at this time for increased stability.  Balance acts:  Sidestepping with B HHA(Tasneem), retro gait with Mod/Tasneem(one HHA and gait belt hold for stability), 1/2 braiding L and R with Mod A(B HHA - with continual vc's for technique) all 10ft to 15 ft each.    Patient left supine with all lines intact, call button in reach, bed alarm on and RN notified..    GOALS:    Physical Therapy Goals        Problem: Physical Therapy Goal    Goal Priority Disciplines Outcome Goal Variances Interventions   Physical Therapy Goal     PT/OT, PT Ongoing (interventions implemented as appropriate)     Description:  Goals to be met by: 18     Patient will increase functional independence with mobility by performin. Sit to stand transfer with Modified [place]  2. Bed to chair transfer with Modified [place]   3. Gait  x  150 feet with Modified [place].   4. Stand for 5 minutes with Modified [place]                      Time Tracking:     PT Received On: 06/21/18  PT Start Time: 1315     PT Stop Time: 1339  PT Total Time (min): 24 min     Billable Minutes: Gait Training 15 and Therapeutic Activity 9    Treatment Type: Treatment  PT/PTA: PTA     PTA Visit Number: 1     Mayra Naqvi, RACH  06/21/2018

## 2018-06-21 NOTE — PLAN OF CARE
Problem: Physical Therapy Goal  Goal: Physical Therapy Goal  Goals to be met by: 18     Patient will increase functional independence with mobility by performin. Sit to stand transfer with Modified [place]  2. Bed to chair transfer with Modified [place]   3. Gait  x 150 feet with Modified [place].   4. Stand for 5 minutes with Modified [place]     Outcome: Ongoing (interventions implemented as appropriate)  Continue working toward goals.

## 2018-06-21 NOTE — TELEPHONE ENCOUNTER
Sent a message to Dr Martin    ----- Message from Eboni Bartholomew sent at 6/21/2018  1:03 PM CDT -----  Contact: Dr Feng from [facility]  Dr Feng would like to speak with Dr Martin regarding pts current condition and plan of care.  She wants to know if he is okay with discharging the pt.    Dr Feng can be reached at [phone]

## 2018-06-21 NOTE — NURSING
Patient d/c home. D/c instructions given to the patient and family. Verbalized understanding. Education given and reviewed. Refer to clinical reference for education. Iv removed tip intact. Telemetry d/c. Waiting for transport

## 2018-06-22 ENCOUNTER — TELEPHONE (OUTPATIENT)
Dept: NEUROSURGERY | Facility: CLINIC | Age: 73
End: 2018-06-22

## 2018-06-22 NOTE — TELEPHONE ENCOUNTER
----- Message from Charissa Oro PA-C sent at 6/21/2018  9:09 PM CDT -----  Contact: emmy [phone]  Please defer to Dr. Martin. I only put the order for the MRI in the system. I will be forwarding the results to Dr. Martin     ----- Message -----  From: Kait Aguilar LPN  Sent: 6/21/2018   4:19 PM  To: Charissa Oro PA-C        ----- Message -----  From: Aida Cardoso  Sent: 6/21/2018   4:02 PM  To: Preethi [place] Staff    Rep advised patient would like refill of LORazepam (ATIVAN) 2 MG Tab sent to [facility] [place] [facility] [id number]. Please advise.

## 2018-06-22 NOTE — TELEPHONE ENCOUNTER
Left message for patient to return my call in reference to her ativan refill she had a prescription for one pill to take before the MRI.
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
## 2018-06-22 NOTE — PT/OT/SLP DISCHARGE
Physical Therapy Discharge Summary    Name: Trudy R Dakin  MRN: [id number]   Principal Problem: Junctional bradycardia     Patient Discharged from acute Physical Therapy on 2018.  Please refer to prior PT noted date on 2018 for functional status.     Assessment:     Patient appropriate for care in another setting.    Objective:     GOALS:    Physical Therapy Goals     Not on file          Multidisciplinary Problems (Resolved)        Problem: Physical Therapy Goal    Goal Priority Disciplines Outcome Goal Variances Interventions   Physical Therapy Goal   (Resolved)     PT/OT, PT Outcome(s) achieved     Description:  Goals to be met by: 18     Patient will increase functional independence with mobility by performin. Sit to stand transfer with Modified [place]  2. Bed to chair transfer with Modified [place]   3. Gait  x 150 feet with Modified [place].   4. Stand for 5 minutes with Modified [place]                      Reasons for Discontinuation of Therapy Services  Transfer to alternate level of care.      Plan:     Patient Discharged to: Home no PT services needed Out patient PT recommended.    Rickey Aburto, PT  2018

## 2018-07-09 ENCOUNTER — OFFICE VISIT (OUTPATIENT)
Dept: NEUROSURGERY | Facility: CLINIC | Age: 73
End: 2018-07-09
Payer: MEDICARE

## 2018-07-09 ENCOUNTER — HOSPITAL ENCOUNTER (OUTPATIENT)
Dept: RADIOLOGY | Facility: HOSPITAL | Age: 73
Discharge: HOME | End: 2018-07-09
Attending: PHYSICIAN ASSISTANT
Payer: MEDICARE

## 2018-07-09 VITALS
WEIGHT: 164 LBS | BODY MASS INDEX: 30 KG/M2 | SYSTOLIC BLOOD PRESSURE: 147 MMHG | DIASTOLIC BLOOD PRESSURE: 85 MMHG | HEART RATE: 78 BPM

## 2018-07-09 DIAGNOSIS — M47.12 CERVICAL SPONDYLOSIS WITH MYELOPATHY: Primary | ICD-10-CM

## 2018-07-09 DIAGNOSIS — M47.12 OSTEOARTHRITIS OF CERVICAL SPINE WITH MYELOPATHY: ICD-10-CM

## 2018-07-09 PROCEDURE — 99999 PR PBB SHADOW E&M-EST. PATIENT-LVL III: CPT | Mod: PBBFAC,,, | Performed by: NEUROLOGICAL SURGERY

## 2018-07-09 PROCEDURE — 72141 MRI NECK SPINE W/O DYE: CPT | Mod: 26,,, | Performed by: RADIOLOGY

## 2018-07-09 PROCEDURE — 72141 MRI NECK SPINE W/O DYE: CPT | Mod: TC

## 2018-07-09 PROCEDURE — 3079F DIAST BP 80-89 MM HG: CPT | Mod: CPTII,S$GLB,, | Performed by: NEUROLOGICAL SURGERY

## 2018-07-09 PROCEDURE — 99214 OFFICE O/P EST MOD 30 MIN: CPT | Mod: S$GLB,,, | Performed by: NEUROLOGICAL SURGERY

## 2018-07-09 PROCEDURE — 3077F SYST BP >= 140 MM HG: CPT | Mod: CPTII,S$GLB,, | Performed by: NEUROLOGICAL SURGERY
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
## 2018-07-10 ENCOUNTER — HOSPITAL ENCOUNTER (OUTPATIENT)
Dept: RADIOLOGY | Facility: HOSPITAL | Age: 73
Discharge: HOME | End: 2018-07-10
Attending: NEUROLOGICAL SURGERY
Payer: MEDICARE

## 2018-07-10 DIAGNOSIS — M47.12 CERVICAL SPONDYLOSIS WITH MYELOPATHY: ICD-10-CM

## 2018-07-10 PROCEDURE — 72050 X-RAY EXAM NECK SPINE 4/5VWS: CPT | Mod: TC,FY

## 2018-07-10 PROCEDURE — 72050 X-RAY EXAM NECK SPINE 4/5VWS: CPT | Mod: 26,,, | Performed by: RADIOLOGY

## 2018-07-11 ENCOUNTER — TELEPHONE (OUTPATIENT)
Dept: NEUROSURGERY | Facility: CLINIC | Age: 73
End: 2018-07-11

## 2018-07-11 DIAGNOSIS — Z98.1 S/P CERVICAL SPINAL FUSION: ICD-10-CM

## 2018-07-11 DIAGNOSIS — M47.12 CERVICAL SPONDYLOSIS WITH MYELOPATHY: Primary | ICD-10-CM

## 2018-07-16 ENCOUNTER — TELEPHONE (OUTPATIENT)
Dept: NEUROSURGERY | Facility: CLINIC | Age: 73
End: 2018-07-16

## 2018-07-16 NOTE — TELEPHONE ENCOUNTER
Spoke to the patient and instructed her that er surgery is scheduled here at [facility] and not [facility] Verbalizes understanding.

## 2018-07-16 NOTE — TELEPHONE ENCOUNTER
----- Message from Any Zurita sent at 7/16/2018 10:54 AM CDT -----  Contact: Self [phone]  Patient is calling to talk to nurse concerning her procedure. Please advice

## 2018-07-19 ENCOUNTER — TELEPHONE (OUTPATIENT)
Dept: NEUROSURGERY | Facility: CLINIC | Age: 73
End: 2018-07-19

## 2018-07-19 NOTE — TELEPHONE ENCOUNTER
----- Message from Mayela Isis sent at 7/19/2018  1:34 PM CDT -----  Contact: Dr. Glenroy Kumar, [phone] option 1  Called in requesting to speak with you regarding patient's clearance needed. Please advise.

## 2018-07-25 DIAGNOSIS — I10 ESSENTIAL HYPERTENSION, MALIGNANT: Primary | ICD-10-CM

## 2018-07-25 DIAGNOSIS — Z01.818 PRE-OP EVALUATION: ICD-10-CM

## 2018-07-26 ENCOUNTER — HOSPITAL ENCOUNTER (OUTPATIENT)
Dept: RADIOLOGY | Facility: HOSPITAL | Age: 73
Discharge: HOME | End: 2018-07-26
Attending: FAMILY MEDICINE
Payer: MEDICARE

## 2018-07-26 ENCOUNTER — DOCUMENTATION ONLY (OUTPATIENT)
Dept: REHABILITATION | Facility: HOSPITAL | Age: 73
End: 2018-07-26

## 2018-07-26 DIAGNOSIS — Z01.818 PRE-OP EVALUATION: ICD-10-CM

## 2018-07-26 DIAGNOSIS — I10 ESSENTIAL HYPERTENSION, MALIGNANT: ICD-10-CM

## 2018-07-26 PROCEDURE — 71046 X-RAY EXAM CHEST 2 VIEWS: CPT | Mod: TC,FY

## 2018-07-26 PROCEDURE — 71046 X-RAY EXAM CHEST 2 VIEWS: CPT | Mod: 26,,, | Performed by: RADIOLOGY

## 2018-07-26 NOTE — PROGRESS NOTES
"PT spoke with pt prior to eval and she stated that she would rather begin OP PT after sx to avoid a co-pay at this time.  She stated "honey Im walking, driving and all I can just come back to you after surgery".  PT will eval pt after new orders issues post op fusion if pt is appropriate for OP PT.      Fatoumata Velasquez  DPT  "

## 2018-08-06 ENCOUNTER — CLINICAL SUPPORT (OUTPATIENT)
Dept: LAB | Facility: HOSPITAL | Age: 73
End: 2018-08-06
Attending: FAMILY MEDICINE
Payer: MEDICARE

## 2018-08-06 DIAGNOSIS — I10 ESSENTIAL HYPERTENSION, MALIGNANT: ICD-10-CM

## 2018-08-06 PROCEDURE — 93010 ELECTROCARDIOGRAM REPORT: CPT | Mod: ,,, | Performed by: INTERNAL MEDICINE

## 2018-08-06 PROCEDURE — 93005 ELECTROCARDIOGRAM TRACING: CPT

## 2018-08-06 PROCEDURE — 93010 EKG 12-LEAD: ICD-10-PCS | Mod: ,,, | Performed by: INTERNAL MEDICINE

## 2018-08-24 ENCOUNTER — HOSPITAL ENCOUNTER (OUTPATIENT)
Dept: PREADMISSION TESTING | Facility: HOSPITAL | Age: 73
Discharge: HOME | End: 2018-08-24
Attending: NEUROLOGICAL SURGERY
Payer: MEDICARE

## 2018-08-24 ENCOUNTER — ANESTHESIA EVENT (OUTPATIENT)
Dept: SURGERY | Facility: HOSPITAL | Age: 73
End: 2018-08-24
Payer: MEDICARE

## 2018-08-24 VITALS
HEIGHT: 61 IN | HEART RATE: 58 BPM | DIASTOLIC BLOOD PRESSURE: 73 MMHG | RESPIRATION RATE: 16 BRPM | OXYGEN SATURATION: 99 % | SYSTOLIC BLOOD PRESSURE: 147 MMHG | BODY MASS INDEX: 30.78 KG/M2 | WEIGHT: 163 LBS

## 2018-08-24 DIAGNOSIS — E11.9 CONTROLLED TYPE 2 DIABETES MELLITUS WITHOUT COMPLICATION, WITHOUT LONG-TERM CURRENT USE OF INSULIN: ICD-10-CM

## 2018-08-24 DIAGNOSIS — Z01.818 PRE-OP TESTING: Primary | ICD-10-CM

## 2018-08-24 RX ORDER — CALCIUM CARBONATE 300MG(750)
TABLET,CHEWABLE ORAL
COMMUNITY
End: 2020-06-25

## 2018-08-24 RX ORDER — LANOLIN ALCOHOL/MO/W.PET/CERES
400 CREAM (GRAM) TOPICAL DAILY
COMMUNITY
End: 2020-06-25

## 2018-08-24 RX ORDER — SODIUM CHLORIDE, SODIUM LACTATE, POTASSIUM CHLORIDE, CALCIUM CHLORIDE 600; 310; 30; 20 MG/100ML; MG/100ML; MG/100ML; MG/100ML
INJECTION, SOLUTION INTRAVENOUS CONTINUOUS
Status: CANCELLED | OUTPATIENT
Start: 2018-08-24

## 2018-08-24 RX ORDER — LIDOCAINE HYDROCHLORIDE 10 MG/ML
1 INJECTION, SOLUTION EPIDURAL; INFILTRATION; INTRACAUDAL; PERINEURAL ONCE
Status: CANCELLED | OUTPATIENT
Start: 2018-08-24 | End: 2018-08-24

## 2018-08-24 NOTE — DISCHARGE INSTRUCTIONS
Your surgery is scheduled for 8/30/18.    Please report to Outpatient Surgery Intake Office on the 2nd FLOOR at 5:30a.m.          INSTRUCTIONS IMPORTANT!!!  ¨ Do not eat or drink after 12 midnight-including water. OK to brush teeth, no   gum, candy or mints!    ¨ Take only these medicines with a small swallow of water-morning of surgery: Metoprolol         ____  Proceed to [facility] on 8/24/18 for additional blood test.        ____  Do not wear makeup, including mascara.  ____  No powder, lotions or creams to surgical area.  ____  Please remove all jewelry, including piercings and leave at home.  ____  No money or valuables needed. Please leave at home.  ____  Please bring any documents given by your doctor.  ____  If going home the same day, arrange for a ride home. You will not be able to             drive if Anesthesia was used.  ____  Wear loose fitting clothing. Allow for dressings, bandages.  ____  Stop Aspirin, Ibuprofen, Motrin and Aleve at least 3-5 days before surgery, unless otherwise instructed by your doctor, or the nurse.   You MAY use Tylenol/acetaminophen until day of surgery.  ____  Wash the surgical area with Hibiclens the night before surgery, and again the             morning of surgery.  Be sure to rinse hibiclens off completely (if instructed by   nurse).  ____  If you take diabetic medication, do not take am of surgery unless instructed by Doctor.  ____  Call MD for temperature above 101 degrees.  ____ Stop taking any Fish Oil supplement or any Vitamins that contain Vitamin E at least 5 days prior to surgery.  ____ Do Not wear your contact lenses the day of your procedure.  You may wear your glasses.        I have read or had read and explained to me, and understand the above information.  Additional comments or instructions:  For additional questions call [phone]      Pre-Op Bathing Instructions    Before surgery, you can play an important role in your own health.    Because  skin is not sterile, we need to be sure that your skin is as free of germs as possible. By following the instructions below, you can reduce the number of germs on your skin before surgery.    IMPORTANT: You will need to shower with a special soap called Hibiclens*, available at any pharmacy.  If you are allergic to Chlorhexidine (the antiseptic in Hibiclens), use an antibacterial soap such as Dial Soap for your preoperative shower.  You will shower with Hibiclens both the night before your surgery and the morning of your surgery.  Do not use Hibiclens on the head, face or genitals to avoid injury to those areas.    STEP #1: THE NIGHT BEFORE YOUR SURGERY     1. Do not shave the area of your body where your surgery will be performed.  2. Shower and wash your hair and body as usual with your normal soap and shampoo.  3. Rinse your hair and body thoroughly after you shower to remove all soap residue.  4. With your hand, apply one packet of Hibiclens soap to the surgical site.   5. Wash the site gently for five (5) minutes. Do not scrub your skin too hard.   6. Do not wash with your regular soap after Hibiclens is used.  7. Rinse your body thoroughly.  8. Pat yourself dry with a clean, soft towel.  9. Do not use lotion, cream, or powder.  10. Wear clean clothes.    STEP #2: THE MORNING OF YOUR SURGERY     1. Repeat Step #1.    * Not to be used by people allergic to Chlorhexidine.          Planning for Cervical Disk Surgery  You can help make your surgery a success by preparing for it mentally and physically. This preparation includes planning ahead for your surgery, having realistic expectations about what surgery can do for you, and following your healthcare provider's instructions.     Before surgery, consider moving items you use often so that you can reach them easily after surgery.   Plan ahead  Surgery can be stressful. But if you plan ahead, you can make your recovery easier. Talk to your surgeon about how much  time youll need to be away from work. Make sure family or friends can help you with errands and household chores for a few weeks after surgery. Make sure you have someone to drive you home from the surgery and to stay with you for a few days if you live alone.   Have realistic expectations  Cervical disk surgery can provide relief from neck and arm symptoms. But it may not eliminate your symptoms completely. Before you have surgery, talk to your surgeon about what this procedure can and cant do for your problem.  If you need a bone graft  Depending on the type of surgery you have, you may need a bone graft. A graft is a piece of bone that can be obtained from a bone in your own body (autograft) or from a bone bank. If you need a bone graft, your surgeon will discuss these choices with you.  Fitting a brace  A neck brace can help protect your cervical spine while its healing. A neck brace isnt always needed. But if it is, your surgeon may recommend a rigid brace or a soft cervical collar. The brace may be fitted before surgery or right after surgery. If you have to wear the neck brace for any length of time, make sure that you don't get pressure sores by having someone examine your chin, shoulder, and back of your neck every few days.    Before surgery  · Tell your surgeon all medicines you take. This includes herbs, supplements, and over-the-counter medicines, such as ibuprofen or other NSAIDs (nonsteroidal anti-inflammatory medicines). You may be told to stop certain medicines before surgery.  · Smoking and other nicotine products may slow bone healing. If you smoke, your surgeon may talk to you about stopping before surgery. Quitting smoking may significantly improve your results after surgery.  · Follow any directions you are given for not eating or drinking before surgery.  Risks and complications of cervical disk surgery  Your surgeon will discuss the risks and possible complications of surgery with you,  which include:  · Swallowing problems  · Persistent hoarseness  · Side effects from anesthesia  · Failure of the graft to fuse  · Damage to nearby tissues  · Bone graft shifting or displacement  · Bleeding and possible need for transfusion  · Infection  · Spinal cord or nerve damage  · Fluid or blood collection, which may affect breathing   Date Last Reviewed: 10/5/2015  © 5380-7285 [employer], [employer]. [address] [place] [address]. All rights reserved. This information is not intended as a substitute for professional medical care. Always follow your healthcare professional's instructions.
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
## 2018-08-24 NOTE — PRE-PROCEDURE INSTRUCTIONS
Daughter - Hope - pt does not know her cell number    Allergies, medical, surgical, family and psychosocial histories reviewed with patient. Periop plan of care reviewed. Preop instructions given, including medications to take and to hold. Time allotted for questions to be addressed.  Patient verbalized understanding.

## 2018-08-30 ENCOUNTER — ANESTHESIA (OUTPATIENT)
Dept: SURGERY | Facility: HOSPITAL | Age: 73
End: 2018-08-30
Payer: MEDICARE

## 2018-08-30 ENCOUNTER — HOSPITAL ENCOUNTER (OUTPATIENT)
Facility: HOSPITAL | Age: 73
Discharge: HOME | End: 2018-08-31
Attending: NEUROLOGICAL SURGERY | Admitting: NEUROLOGICAL SURGERY
Payer: MEDICARE

## 2018-08-30 DIAGNOSIS — M47.12 CERVICAL SPONDYLOSIS WITH MYELOPATHY: Primary | ICD-10-CM

## 2018-08-30 LAB
ANION GAP SERPL CALC-SCNC: 8 MMOL/L
BASOPHILS # BLD AUTO: 0.29 K/UL
BASOPHILS NFR BLD: 2 %
BUN SERPL-MCNC: 13 MG/DL
CALCIUM SERPL-MCNC: 8.5 MG/DL
CHLORIDE SERPL-SCNC: 107 MMOL/L
CO2 SERPL-SCNC: 22 MMOL/L
CREAT SERPL-MCNC: 0.7 MG/DL
DIFFERENTIAL METHOD: ABNORMAL
EOSINOPHIL # BLD AUTO: 0.1 K/UL
EOSINOPHIL NFR BLD: 1 %
ERYTHROCYTE [DISTWIDTH] IN BLOOD BY AUTOMATED COUNT: 13.9 %
EST. GFR  (AFRICAN AMERICAN): >60 ML/MIN/1.73 M^2
EST. GFR  (NON AFRICAN AMERICAN): >60 ML/MIN/1.73 M^2
GIANT PLATELETS BLD QL SMEAR: PRESENT
GLUCOSE SERPL-MCNC: 179 MG/DL
HCT VFR BLD AUTO: 38.3 %
HGB BLD-MCNC: 12.6 G/DL
LYMPHOCYTES # BLD AUTO: 2.7 K/UL
LYMPHOCYTES NFR BLD: 18.1 %
MCH RBC QN AUTO: 30.4 PG
MCHC RBC AUTO-ENTMCNC: 32.9 G/DL
MCV RBC AUTO: 92 FL
MONOCYTES # BLD AUTO: 1 K/UL
MONOCYTES NFR BLD: 6.6 %
NEUTROPHILS # BLD AUTO: 10.2 K/UL
NEUTROPHILS NFR BLD: 72.3 %
NRBC BLD-RTO: 0 /100 WBC
PLATELET # BLD AUTO: 206 K/UL
PMV BLD AUTO: 10.6 FL
POCT GLUCOSE: 172 MG/DL (ref 70–110)
POTASSIUM SERPL-SCNC: 4.1 MMOL/L
RBC # BLD AUTO: 4.15 M/UL
SODIUM SERPL-SCNC: 137 MMOL/L
WBC # BLD AUTO: 14.64 K/UL

## 2018-08-30 PROCEDURE — C1713 ANCHOR/SCREW BN/BN,TIS/BN: HCPCS | Performed by: NEUROLOGICAL SURGERY

## 2018-08-30 PROCEDURE — 22551 ARTHRD ANT NTRBDY CERVICAL: CPT | Mod: ,,, | Performed by: NEUROLOGICAL SURGERY

## 2018-08-30 PROCEDURE — 80048 BASIC METABOLIC PNL TOTAL CA: CPT

## 2018-08-30 PROCEDURE — 63600175 PHARM REV CODE 636 W HCPCS: Performed by: NEUROLOGICAL SURGERY

## 2018-08-30 PROCEDURE — 25000003 PHARM REV CODE 250: Performed by: NEUROLOGICAL SURGERY

## 2018-08-30 PROCEDURE — 36415 COLL VENOUS BLD VENIPUNCTURE: CPT

## 2018-08-30 PROCEDURE — 71000039 HC RECOVERY, EACH ADD'L HOUR: Performed by: NEUROLOGICAL SURGERY

## 2018-08-30 PROCEDURE — 37000009 HC ANESTHESIA EA ADD 15 MINS: Performed by: NEUROLOGICAL SURGERY

## 2018-08-30 PROCEDURE — 71000033 HC RECOVERY, INTIAL HOUR: Performed by: NEUROLOGICAL SURGERY

## 2018-08-30 PROCEDURE — A4216 STERILE WATER/SALINE, 10 ML: HCPCS | Performed by: NEUROLOGICAL SURGERY

## 2018-08-30 PROCEDURE — 37000008 HC ANESTHESIA 1ST 15 MINUTES: Performed by: NEUROLOGICAL SURGERY

## 2018-08-30 PROCEDURE — 63600175 PHARM REV CODE 636 W HCPCS: Performed by: ANESTHESIOLOGY

## 2018-08-30 PROCEDURE — 27201423 OPTIME MED/SURG SUP & DEVICES STERILE SUPPLY: Performed by: NEUROLOGICAL SURGERY

## 2018-08-30 PROCEDURE — 22845 INSERT SPINE FIXATION DEVICE: CPT | Mod: 59,,, | Performed by: NEUROLOGICAL SURGERY

## 2018-08-30 PROCEDURE — A4216 STERILE WATER/SALINE, 10 ML: HCPCS | Performed by: ANESTHESIOLOGY

## 2018-08-30 PROCEDURE — 25000003 PHARM REV CODE 250: Performed by: NURSE ANESTHETIST, CERTIFIED REGISTERED

## 2018-08-30 PROCEDURE — 85025 COMPLETE CBC W/AUTO DIFF WBC: CPT

## 2018-08-30 PROCEDURE — 22853 INSJ BIOMECHANICAL DEVICE: CPT | Mod: ,,, | Performed by: NEUROLOGICAL SURGERY

## 2018-08-30 PROCEDURE — 36000711: Performed by: NEUROLOGICAL SURGERY

## 2018-08-30 PROCEDURE — 20930 SP BONE ALGRFT MORSEL ADD-ON: CPT | Mod: ,,, | Performed by: NEUROLOGICAL SURGERY

## 2018-08-30 PROCEDURE — 36000710: Performed by: NEUROLOGICAL SURGERY

## 2018-08-30 PROCEDURE — 25000003 PHARM REV CODE 250: Performed by: ANESTHESIOLOGY

## 2018-08-30 PROCEDURE — 27800903 OPTIME MED/SURG SUP & DEVICES OTHER IMPLANTS: Performed by: NEUROLOGICAL SURGERY

## 2018-08-30 PROCEDURE — 63600175 PHARM REV CODE 636 W HCPCS: Performed by: NURSE ANESTHETIST, CERTIFIED REGISTERED

## 2018-08-30 DEVICE — PUTTY DBX 1CC: Type: IMPLANTABLE DEVICE | Site: NECK | Status: FUNCTIONAL

## 2018-08-30 RX ORDER — MUPIROCIN 20 MG/G
1 OINTMENT TOPICAL DAILY
Status: DISCONTINUED | OUTPATIENT
Start: 2018-08-31 | End: 2020-06-25

## 2018-08-30 RX ORDER — OXYCODONE HYDROCHLORIDE 5 MG/1
5 TABLET ORAL
Status: COMPLETED | OUTPATIENT
Start: 2018-08-30 | End: 2018-08-30

## 2018-08-30 RX ORDER — CYCLOBENZAPRINE HCL 10 MG
10 TABLET ORAL
Status: COMPLETED | OUTPATIENT
Start: 2018-08-30 | End: 2018-08-30

## 2018-08-30 RX ORDER — SODIUM CHLORIDE 9 MG/ML
INJECTION, SOLUTION INTRAVENOUS CONTINUOUS
Status: DISCONTINUED | OUTPATIENT
Start: 2018-08-30 | End: 2018-08-30

## 2018-08-30 RX ORDER — MUPIROCIN 20 MG/G
1 OINTMENT TOPICAL
Status: COMPLETED | OUTPATIENT
Start: 2018-08-30 | End: 2018-08-30

## 2018-08-30 RX ORDER — CELECOXIB 100 MG/1
200 CAPSULE ORAL
Status: COMPLETED | OUTPATIENT
Start: 2018-08-30 | End: 2018-08-30

## 2018-08-30 RX ORDER — PREGABALIN 75 MG/1
75 CAPSULE ORAL
Status: COMPLETED | OUTPATIENT
Start: 2018-08-30 | End: 2018-08-30

## 2018-08-30 RX ORDER — ROCURONIUM BROMIDE 10 MG/ML
INJECTION, SOLUTION INTRAVENOUS
Status: DISCONTINUED | OUTPATIENT
Start: 2018-08-30 | End: 2018-08-30

## 2018-08-30 RX ORDER — CEFAZOLIN SODIUM 2 G/50ML
2 SOLUTION INTRAVENOUS
Status: COMPLETED | OUTPATIENT
Start: 2018-08-30 | End: 2018-08-30

## 2018-08-30 RX ORDER — EPHEDRINE SULFATE 50 MG/ML
INJECTION, SOLUTION INTRAVENOUS
Status: DISCONTINUED | OUTPATIENT
Start: 2018-08-30 | End: 2018-08-30

## 2018-08-30 RX ORDER — SUCCINYLCHOLINE CHLORIDE 20 MG/ML
INJECTION INTRAMUSCULAR; INTRAVENOUS
Status: DISCONTINUED | OUTPATIENT
Start: 2018-08-30 | End: 2018-08-30

## 2018-08-30 RX ORDER — ONDANSETRON 2 MG/ML
4 INJECTION INTRAMUSCULAR; INTRAVENOUS EVERY 12 HOURS PRN
Status: DISCONTINUED | OUTPATIENT
Start: 2018-08-30 | End: 2018-08-30

## 2018-08-30 RX ORDER — LIDOCAINE HYDROCHLORIDE 10 MG/ML
1 INJECTION, SOLUTION EPIDURAL; INFILTRATION; INTRACAUDAL; PERINEURAL
Status: DISCONTINUED | OUTPATIENT
Start: 2018-08-30 | End: 2020-06-25

## 2018-08-30 RX ORDER — SODIUM CHLORIDE 0.9 % (FLUSH) 0.9 %
3 SYRINGE (ML) INJECTION EVERY 8 HOURS
Status: DISCONTINUED | OUTPATIENT
Start: 2018-08-30 | End: 2020-06-25

## 2018-08-30 RX ORDER — FENTANYL CITRATE 50 UG/ML
INJECTION, SOLUTION INTRAMUSCULAR; INTRAVENOUS
Status: DISCONTINUED | OUTPATIENT
Start: 2018-08-30 | End: 2018-08-30

## 2018-08-30 RX ORDER — SODIUM CHLORIDE 0.9 % (FLUSH) 0.9 %
3 SYRINGE (ML) INJECTION EVERY 8 HOURS
Status: DISCONTINUED | OUTPATIENT
Start: 2018-08-30 | End: 2018-08-30 | Stop reason: HOSPADM

## 2018-08-30 RX ORDER — ONDANSETRON 2 MG/ML
4 INJECTION INTRAMUSCULAR; INTRAVENOUS ONCE AS NEEDED
Status: COMPLETED | OUTPATIENT
Start: 2018-08-30 | End: 2018-08-30

## 2018-08-30 RX ORDER — KETAMINE HYDROCHLORIDE 50 MG/ML
INJECTION, SOLUTION INTRAMUSCULAR; INTRAVENOUS
Status: DISCONTINUED | OUTPATIENT
Start: 2018-08-30 | End: 2018-08-30

## 2018-08-30 RX ORDER — ACETAMINOPHEN 500 MG
500 TABLET ORAL EVERY 6 HOURS PRN
Status: DISCONTINUED | OUTPATIENT
Start: 2018-08-30 | End: 2020-06-25

## 2018-08-30 RX ORDER — LIDOCAINE HYDROCHLORIDE 10 MG/ML
1 INJECTION, SOLUTION EPIDURAL; INFILTRATION; INTRACAUDAL; PERINEURAL ONCE
Status: DISCONTINUED | OUTPATIENT
Start: 2018-08-30 | End: 2018-08-30

## 2018-08-30 RX ORDER — SODIUM CHLORIDE, SODIUM LACTATE, POTASSIUM CHLORIDE, CALCIUM CHLORIDE 600; 310; 30; 20 MG/100ML; MG/100ML; MG/100ML; MG/100ML
INJECTION, SOLUTION INTRAVENOUS CONTINUOUS
Status: DISCONTINUED | OUTPATIENT
Start: 2018-08-30 | End: 2018-08-30

## 2018-08-30 RX ORDER — METHOCARBAMOL 750 MG/1
750 TABLET, FILM COATED ORAL 4 TIMES DAILY PRN
Status: DISCONTINUED | OUTPATIENT
Start: 2018-08-30 | End: 2020-06-25

## 2018-08-30 RX ORDER — BACITRACIN 50000 [IU]/1
INJECTION, POWDER, FOR SOLUTION INTRAMUSCULAR
Status: DISCONTINUED | OUTPATIENT
Start: 2018-08-30 | End: 2018-08-30 | Stop reason: HOSPADM

## 2018-08-30 RX ORDER — MUPIROCIN 20 MG/G
1 OINTMENT TOPICAL 2 TIMES DAILY
Status: DISCONTINUED | OUTPATIENT
Start: 2018-08-30 | End: 2018-08-30 | Stop reason: SDUPTHER

## 2018-08-30 RX ORDER — CLONIDINE HYDROCHLORIDE 0.1 MG/1
0.1 TABLET ORAL EVERY 6 HOURS PRN
Status: DISCONTINUED | OUTPATIENT
Start: 2018-08-30 | End: 2018-08-31 | Stop reason: HOSPADM

## 2018-08-30 RX ORDER — ACETAMINOPHEN 500 MG
1000 TABLET ORAL
Status: COMPLETED | OUTPATIENT
Start: 2018-08-30 | End: 2018-08-30

## 2018-08-30 RX ORDER — AMOXICILLIN 250 MG
1 CAPSULE ORAL 2 TIMES DAILY
Status: DISCONTINUED | OUTPATIENT
Start: 2018-08-30 | End: 2020-06-25

## 2018-08-30 RX ORDER — BUPIVACAINE HYDROCHLORIDE AND EPINEPHRINE 5; 5 MG/ML; UG/ML
INJECTION, SOLUTION EPIDURAL; INTRACAUDAL; PERINEURAL
Status: DISCONTINUED | OUTPATIENT
Start: 2018-08-30 | End: 2018-08-30 | Stop reason: HOSPADM

## 2018-08-30 RX ORDER — PROPOFOL 10 MG/ML
VIAL (ML) INTRAVENOUS
Status: DISCONTINUED | OUTPATIENT
Start: 2018-08-30 | End: 2018-08-30

## 2018-08-30 RX ORDER — LIDOCAINE HCL/PF 100 MG/5ML
SYRINGE (ML) INTRAVENOUS
Status: DISCONTINUED | OUTPATIENT
Start: 2018-08-30 | End: 2018-08-30

## 2018-08-30 RX ORDER — HYDROMORPHONE HYDROCHLORIDE 2 MG/ML
0.5 INJECTION, SOLUTION INTRAMUSCULAR; INTRAVENOUS; SUBCUTANEOUS EVERY 5 MIN PRN
Status: DISCONTINUED | OUTPATIENT
Start: 2018-08-30 | End: 2018-08-30 | Stop reason: HOSPADM

## 2018-08-30 RX ORDER — OXYCODONE HYDROCHLORIDE 5 MG/1
10 TABLET ORAL EVERY 4 HOURS PRN
Status: DISCONTINUED | OUTPATIENT
Start: 2018-08-30 | End: 2020-06-25

## 2018-08-30 RX ORDER — SODIUM CHLORIDE 0.9 % (FLUSH) 0.9 %
3 SYRINGE (ML) INJECTION
Status: DISCONTINUED | OUTPATIENT
Start: 2018-08-30 | End: 2018-08-31 | Stop reason: HOSPADM

## 2018-08-30 RX ORDER — VANCOMYCIN HYDROCHLORIDE 1 G/20ML
INJECTION, POWDER, LYOPHILIZED, FOR SOLUTION INTRAVENOUS
Status: DISCONTINUED | OUTPATIENT
Start: 2018-08-30 | End: 2018-08-30 | Stop reason: HOSPADM

## 2018-08-30 RX ORDER — LIDOCAINE 50 MG/G
1 PATCH TOPICAL
Status: DISCONTINUED | OUTPATIENT
Start: 2018-08-30 | End: 2020-06-25

## 2018-08-30 RX ORDER — ONDANSETRON 2 MG/ML
4 INJECTION INTRAMUSCULAR; INTRAVENOUS EVERY 8 HOURS PRN
Status: DISCONTINUED | OUTPATIENT
Start: 2018-08-30 | End: 2018-08-31 | Stop reason: HOSPADM

## 2018-08-30 RX ORDER — MIDAZOLAM HYDROCHLORIDE 1 MG/ML
INJECTION INTRAMUSCULAR; INTRAVENOUS
Status: DISCONTINUED | OUTPATIENT
Start: 2018-08-30 | End: 2018-08-30

## 2018-08-30 RX ADMIN — CYCLOBENZAPRINE HYDROCHLORIDE 10 MG: 10 TABLET, FILM COATED ORAL at 06:08

## 2018-08-30 RX ADMIN — LIDOCAINE HYDROCHLORIDE 80 MG: 20 INJECTION, SOLUTION INTRAVENOUS at 07:08

## 2018-08-30 RX ADMIN — SODIUM CHLORIDE: 0.9 INJECTION, SOLUTION INTRAVENOUS at 02:08

## 2018-08-30 RX ADMIN — MUPIROCIN 1 G: 20 OINTMENT TOPICAL at 06:08

## 2018-08-30 RX ADMIN — CLONIDINE HYDROCHLORIDE 0.1 MG: 0.1 TABLET ORAL at 08:08

## 2018-08-30 RX ADMIN — PROMETHAZINE HYDROCHLORIDE 6.25 MG: 25 INJECTION INTRAMUSCULAR; INTRAVENOUS at 08:08

## 2018-08-30 RX ADMIN — VASOPRESSIN 1 UNITS: 20 INJECTION, SOLUTION INTRAMUSCULAR; SUBCUTANEOUS at 07:08

## 2018-08-30 RX ADMIN — SENNOSIDES AND DOCUSATE SODIUM 1 TABLET: 8.6; 5 TABLET ORAL at 08:08

## 2018-08-30 RX ADMIN — ROCURONIUM BROMIDE 5 MG: 10 INJECTION, SOLUTION INTRAVENOUS at 07:08

## 2018-08-30 RX ADMIN — EPHEDRINE SULFATE 10 MG: 50 INJECTION, SOLUTION INTRAMUSCULAR; INTRAVENOUS; SUBCUTANEOUS at 08:08

## 2018-08-30 RX ADMIN — EPHEDRINE SULFATE 5 MG: 50 INJECTION, SOLUTION INTRAMUSCULAR; INTRAVENOUS; SUBCUTANEOUS at 07:08

## 2018-08-30 RX ADMIN — CEFAZOLIN SODIUM 2 G: 2 SOLUTION INTRAVENOUS at 07:08

## 2018-08-30 RX ADMIN — ACETAMINOPHEN 1000 MG: 500 TABLET ORAL at 06:08

## 2018-08-30 RX ADMIN — PROPOFOL 100 MG: 10 INJECTION, EMULSION INTRAVENOUS at 07:08

## 2018-08-30 RX ADMIN — VASOPRESSIN 1 UNITS: 20 INJECTION, SOLUTION INTRAMUSCULAR; SUBCUTANEOUS at 09:08

## 2018-08-30 RX ADMIN — OXYCODONE HYDROCHLORIDE 10 MG: 5 TABLET ORAL at 11:08

## 2018-08-30 RX ADMIN — PREGABALIN 75 MG: 75 CAPSULE ORAL at 06:08

## 2018-08-30 RX ADMIN — VASOPRESSIN 1 UNITS: 20 INJECTION, SOLUTION INTRAMUSCULAR; SUBCUTANEOUS at 08:08

## 2018-08-30 RX ADMIN — OXYCODONE HYDROCHLORIDE 5 MG: 5 TABLET ORAL at 06:08

## 2018-08-30 RX ADMIN — ONDANSETRON 4 MG: 2 INJECTION INTRAMUSCULAR; INTRAVENOUS at 02:08

## 2018-08-30 RX ADMIN — HYDROMORPHONE HYDROCHLORIDE 0.5 MG: 2 INJECTION INTRAMUSCULAR; INTRAVENOUS; SUBCUTANEOUS at 10:08

## 2018-08-30 RX ADMIN — KETAMINE HYDROCHLORIDE 35 MG: 50 INJECTION, SOLUTION, CONCENTRATE INTRAMUSCULAR; INTRAVENOUS at 08:08

## 2018-08-30 RX ADMIN — SODIUM CHLORIDE: 0.9 INJECTION, SOLUTION INTRAVENOUS at 09:08

## 2018-08-30 RX ADMIN — HYDROMORPHONE HYDROCHLORIDE 0.5 MG: 2 INJECTION INTRAMUSCULAR; INTRAVENOUS; SUBCUTANEOUS at 11:08

## 2018-08-30 RX ADMIN — MIDAZOLAM HYDROCHLORIDE 1 MG: 1 INJECTION, SOLUTION INTRAMUSCULAR; INTRAVENOUS at 07:08

## 2018-08-30 RX ADMIN — Medication 3 ML: at 02:08

## 2018-08-30 RX ADMIN — PROMETHAZINE HYDROCHLORIDE 6.25 MG: 25 INJECTION INTRAMUSCULAR; INTRAVENOUS at 02:08

## 2018-08-30 RX ADMIN — FENTANYL CITRATE 50 MCG: 50 INJECTION, SOLUTION INTRAMUSCULAR; INTRAVENOUS at 07:08

## 2018-08-30 RX ADMIN — SENNOSIDES AND DOCUSATE SODIUM 1 TABLET: 8.6; 5 TABLET ORAL at 11:08

## 2018-08-30 RX ADMIN — SODIUM CHLORIDE: 0.9 INJECTION, SOLUTION INTRAVENOUS at 06:08

## 2018-08-30 RX ADMIN — EPHEDRINE SULFATE 5 MG: 50 INJECTION, SOLUTION INTRAMUSCULAR; INTRAVENOUS; SUBCUTANEOUS at 08:08

## 2018-08-30 RX ADMIN — OXYCODONE HYDROCHLORIDE 10 MG: 5 TABLET ORAL at 06:08

## 2018-08-30 RX ADMIN — SUCCINYLCHOLINE CHLORIDE 120 MG: 20 INJECTION, SOLUTION INTRAMUSCULAR; INTRAVENOUS at 07:08

## 2018-08-30 RX ADMIN — PHENYLEPHRINE HYDROCHLORIDE 0.1 MCG/KG/MIN: 10 INJECTION, SOLUTION INTRAMUSCULAR; INTRAVENOUS; SUBCUTANEOUS at 08:08

## 2018-08-30 RX ADMIN — EPHEDRINE SULFATE 10 MG: 50 INJECTION, SOLUTION INTRAMUSCULAR; INTRAVENOUS; SUBCUTANEOUS at 07:08

## 2018-08-30 RX ADMIN — ONDANSETRON 4 MG: 2 INJECTION INTRAMUSCULAR; INTRAVENOUS at 05:08

## 2018-08-30 RX ADMIN — CELECOXIB 200 MG: 100 CAPSULE ORAL at 06:08

## 2018-08-30 NOTE — PLAN OF CARE
Dr Martin at bedside to assess patient, vss, patient able to move all extremities with good strength

## 2018-08-30 NOTE — TRANSFER OF CARE
"Anesthesia Transfer of Care Note    Patient: Trudy R Dakin    Procedure(s) Performed: Procedure(s) (LRB):  DISCECTOMY, SPINE, CERVICAL, ANTERIOR APPROACH, WITH FUSION C6-7 ACDF (N/A)    Patient location: PACU    Anesthesia Type: general    Transport from OR: Transported from OR on 6-10 L/min O2 by face mask with adequate spontaneous ventilation    Post pain: adequate analgesia    Post assessment: no apparent anesthetic complications    Post vital signs: stable    Level of consciousness: responds to stimulation    Nausea/Vomiting: no nausea/vomiting    Complications: none    Transfer of care protocol was followed      Last vitals:   Visit Vitals  BP (!) 183/79 (BP Location: Left arm, Patient Position: Sitting)   Pulse (!) 52   Temp 36.2 °C (97.2 °F) (Oral)   Resp 18   Ht 5' 1" (1.549 m)   Wt 73.9 kg (162 lb 14.7 oz)   LMP 01/01/1970   SpO2 98%   Breastfeeding? No   BMI 30.78 kg/m²     "

## 2018-08-30 NOTE — ANESTHESIA POSTPROCEDURE EVALUATION
"Anesthesia Post Evaluation    Patient: Trudy R Dakin    Procedure(s) Performed: Procedure(s) (LRB):  DISCECTOMY, SPINE, CERVICAL, ANTERIOR APPROACH, WITH FUSION C6-7 ACDF (N/A)    Final Anesthesia Type: general  Patient location during evaluation: PACU  Patient participation: Yes- Able to Participate  Level of consciousness: awake and alert  Post-procedure vital signs: reviewed and stable  Pain management: adequate  Airway patency: patent  PONV status at discharge: No PONV  Anesthetic complications: no      Cardiovascular status: blood pressure returned to baseline  Respiratory status: unassisted  Hydration status: euvolemic  Follow-up not needed.        Visit Vitals  BP (!) 142/65   Pulse 76   Temp 36.2 °C (97.1 °F) (Skin)   Resp 10   Ht 5' 1" (1.549 m)   Wt 73.9 kg (162 lb 14.7 oz)   LMP 01/01/1970   SpO2 (!) 92%   Breastfeeding? No   BMI 30.78 kg/m²       Pain/Jason Score: Pain Assessment Performed: Yes (8/30/2018 10:00 AM)  Presence of Pain: denies (8/30/2018 10:00 AM)  Pain Rating Prior to Med Admin: 8 (8/30/2018 11:10 AM)  Jason Score: 8 (8/30/2018 10:00 AM)        "

## 2018-08-30 NOTE — PT/OT/SLP PROGRESS
Occupational Therapy  Missed Eval    Patient Name:  Trudy R Dakin   MRN:  [id number]    Patient not seen today secondary to nsg hold x 2 attempts this PM. Will follow-up on Fri.    JIMMY Wiseman  8/30/2018

## 2018-08-30 NOTE — PT/OT/SLP PROGRESS
Physical Therapy      Patient Name:  Trudy R Dakin   MRN:  [id number]    Patient unable to maintain level of alertness to participate with PT claudia Patient lethargic and nauseated. Will follow up as able. Primary nurse in PACU aware of same.    Rickey Aburto, PT
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
## 2018-08-30 NOTE — OP NOTE
DATE OF SURGERY: 08/30/2018     PREOPERATIVE DIAGNOSES:     1. C6-7 spondylosis with myelopathy     POSTOPERATIVE DIAGNOSES:     1. C6-7 spondylosis with myelopathy      PROCEDURES PERFORMED:  1. Right anterior access of the cervical spine.  2.  C6-7 discectomy, bilateral foraminotomy  3. C6-7 anterior arthrodesis with placement of [employer] cages filled with DBX  4. Instrumentation at C6-7 with screws on plate.  5. Microscope assistance.  6. Fluoroscopic localization.     PRIMARY SURGEON: Rickey Martin M.D.     ASSISTANT SURGEON: POLINA     INDICATION: This is a 72-year-old female with cervical spondylosis, myelopathy and gait imbalance. Risks, benefits, alternatives and limitations were discussed with the patient. The risks included nerve root injury, dysphagia, vocal cord paralysis, spinal cord injury that can cause paralysis, hardware failure and subsidence that could require re-operation as well as hardware and screw malpositioning and vertebral artery injury that can cause a stroke. The patient wanted to proceed and the consent was obtained.       DESCRIPTION OF THE PROCEDURE: The patient was intubated under general    anesthesia and the head was placed on the horseshoe head orellana in the supine    position. All pressure points were carefully padded. We installed a    [employer] tong and put 5 pounds of traction. Fluoroscopic localization was    utilized to localize the C6-7 levels. A right 3 cm horizontal incision medial to the border of the sternocleidomastoid muscle was planned with fluoroscopy. The patient was prepped and draped in the typical sterile fashion. Incision was made with a #15 blade. The fascial layer was then dissected using both blunt and sharp dissection. Platysma was divided and hemostasis was carried out with a bipolar and a corridor was created in the avascular plane between the trachea and the esophagus medially and the carotid artery laterally.      We placed our retractor at  C6-7. Diskectomy was carried out C6-7 using the #15 blade, pituitary, Kerissons and the high speed drill.    The diskectomy was completed by using a curved curette and the cartilage on the plate was scrapped off. The endplates were slightly drilled and the bone on each side of the endplate was harvested as an autograft. The foramen on each side was drilled and the posterior longitudinal ligament was also resected to expose the anterior portion of the nerve root bilaterally. After completion of the decompression, the dura was fully decompressed. Hemostasis in the epidural space was done with Gelfoam powder soaked in thrombin. After copious irrigation, we inserted a 9 mm trial in the disk space and then inserted a [id number] mm 12 degrees lordotic cage filled with DBX. The graft was well positioned using fluoroscopy and there was no distraction of the facet posteriorly.      We removed the weights from the traction.      We fixated the plate with a 20 mm variable screw at C6 and a 18 mm variable screw at C7. The locking mechanism was engaged.       Hemostasis was completed with bipolar and  Surgifoam and copious irrigation with saline was carried out. The platysma was then closed with interrupted 3-0 Vicryl suture and the subcutaneous layer was closed with running subcuticular suture with 4-0 Monocryl. Steri-Strips were placed on the skin edges using Mastisol. The retractor was removed at  the end of the surgery. There was no complication.     Blood loss 40 cc

## 2018-08-31 VITALS
DIASTOLIC BLOOD PRESSURE: 72 MMHG | OXYGEN SATURATION: 99 % | SYSTOLIC BLOOD PRESSURE: 148 MMHG | RESPIRATION RATE: 18 BRPM | HEIGHT: 61 IN | HEART RATE: 83 BPM | BODY MASS INDEX: 30.76 KG/M2 | WEIGHT: 162.94 LBS | TEMPERATURE: 99 F

## 2018-08-31 PROCEDURE — G8978 MOBILITY CURRENT STATUS: HCPCS | Mod: CJ

## 2018-08-31 PROCEDURE — 27000221 HC OXYGEN, UP TO 24 HOURS

## 2018-08-31 PROCEDURE — 94761 N-INVAS EAR/PLS OXIMETRY MLT: CPT

## 2018-08-31 PROCEDURE — 63600175 PHARM REV CODE 636 W HCPCS: Performed by: NEUROLOGICAL SURGERY

## 2018-08-31 PROCEDURE — 94799 UNLISTED PULMONARY SVC/PX: CPT

## 2018-08-31 PROCEDURE — 97165 OT EVAL LOW COMPLEX 30 MIN: CPT

## 2018-08-31 PROCEDURE — 97161 PT EVAL LOW COMPLEX 20 MIN: CPT

## 2018-08-31 PROCEDURE — 97535 SELF CARE MNGMENT TRAINING: CPT

## 2018-08-31 PROCEDURE — 25000003 PHARM REV CODE 250: Performed by: NEUROLOGICAL SURGERY

## 2018-08-31 PROCEDURE — G8979 MOBILITY GOAL STATUS: HCPCS | Mod: CH

## 2018-08-31 PROCEDURE — A4216 STERILE WATER/SALINE, 10 ML: HCPCS | Performed by: NEUROLOGICAL SURGERY

## 2018-08-31 PROCEDURE — 97116 GAIT TRAINING THERAPY: CPT

## 2018-08-31 RX ORDER — HYDROCODONE BITARTRATE AND ACETAMINOPHEN 10; 325 MG/1; MG/1
1 TABLET ORAL EVERY 6 HOURS PRN
Qty: 40 TABLET | Refills: 0 | Status: SHIPPED | OUTPATIENT
Start: 2018-08-31 | End: 2020-06-17 | Stop reason: SDUPTHER

## 2018-08-31 RX ADMIN — PROMETHAZINE HYDROCHLORIDE 6.25 MG: 25 INJECTION INTRAMUSCULAR; INTRAVENOUS at 07:08

## 2018-08-31 RX ADMIN — CLONIDINE HYDROCHLORIDE 0.1 MG: 0.1 TABLET ORAL at 05:08

## 2018-08-31 RX ADMIN — OXYCODONE HYDROCHLORIDE 10 MG: 5 TABLET ORAL at 05:08

## 2018-08-31 RX ADMIN — ONDANSETRON 4 MG: 2 INJECTION INTRAMUSCULAR; INTRAVENOUS at 03:08

## 2018-08-31 RX ADMIN — Medication 3 ML: at 05:08

## 2018-08-31 RX ADMIN — ACETAMINOPHEN 500 MG: 500 TABLET ORAL at 03:08

## 2018-08-31 RX ADMIN — SENNOSIDES AND DOCUSATE SODIUM 1 TABLET: 8.6; 5 TABLET ORAL at 09:08

## 2018-08-31 RX ADMIN — ONDANSETRON 4 MG: 2 INJECTION INTRAMUSCULAR; INTRAVENOUS at 05:08
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
## 2018-08-31 NOTE — PLAN OF CARE
Problem: Occupational Therapy Goal  Goal: Occupational Therapy Goal  Goals to be met by: 9/4/2018    Patient will increase functional independence with ADLs by performing:    UE Dressing with Modified Rice.  LE Dressing with Modified Rice.  Grooming while standing with Modified Rice.  Toileting from toilet with Modified Rice for hygiene and clothing management.   Stand pivot transfers with Modified Rice.  Toilet transfer to toilet with Modified Rice.  Upper extremity exercise program x10 reps per handout, with independence  UE strength and AROM wfl.    Outcome: Ongoing (interventions implemented as appropriate)  OT initial eval completed and treatment initiated. Pt. Limited by nausea and headache when ambulating.  She would benefit from shower chair for home, BSC and RW.  Continue with OT POC.

## 2018-08-31 NOTE — PROGRESS NOTES
NEUROSURGICAL POST-OPERATIVE PROGRESS NOTE    DATE OF SERVICE:  08/31/2018      ATTENDING PHYSICIAN:  Rickey Martin MD    SUBJECTIVE:    INTERIM HISTORY:    This is a very pleasant 72 y.o. y.o. female, who is status day one C6-7 ACDF. Doing well without significant pain. Reports improvement in hands strength. Has not eat a regular diet yet. Walked with PT.               OBJECTIVE:    PHYSICAL EXAMINATION:   Vitals:    08/31/18 1159   BP: (!) 182/85   Pulse: 84   Resp: 16   Temp: 98.4 °F (36.9 °C)       Neurosurgery Physical Exam    Ortho Exam    Neurologic Exam  Moves 4Es well  Dressing CDI    DIAGNOSTIC DATA:    X-ray of the cervical spine, AP and lateral views reveals the fusion hardware is intact. No loosening of screws or migration of hardware.    ASSESMENT:    This is a 72 y.o. female who is s/p day one C6-7 ACDF. Swallowing not evaluated yet    Problem List Items Addressed This Visit        Neuro    Cervical spondylosis with myelopathy - Primary    Relevant Orders    Chlorhexidine (CHG) 2% Wipes    Insert peripheral IV    Notify Physician    Notify physician if the patient has not discontinued the following medications:    Place LEONA hose (Completed)    Place sequential compression device (Completed)    Chaidez catheter - discontinue (Completed)    Intake and output    Measure post void residual    Bladder scan    Ice to affected area (Completed)    No Chadiez Catheter (Completed)    No surgical drains (Completed)    Notify Physician    Notify physician for any change in neurological status    Notify physician    Advance diet as tolerated to Regular diet    PT evaluate and treat    OT evaluate and treat    X-Ray Cervical Spine AP And Lateral (Completed)    Vital signs    CBC auto differential (Completed)    Incentive spirometry    Discontinue IV - Prior to Discharge    Diet Adult Regular    No dressing needed    Call MD for:  temperature >100.4    Call MD for:  persistent nausea and vomiting or diarrhea    Call  MD for:  severe uncontrolled pain    Call MD for:  redness, tenderness, or signs of infection (pain, swelling, redness, odor or green/yellow discharge around incision site)    Call MD for:  difficulty breathing or increased cough    Call MD for:  severe persistent headache    Call MD for:  worsening rash    Call MD for:  persistent dizziness, light-headedness, or visual disturbances    Call MD for:  increased confusion or weakness    Full code    Place in Outpatient (Completed)    Place in Outpatient - Extended Recovery    Place LEONA hose (Completed)    Place sequential compression device (Completed)    Diet Adult Regular (IDDSI Level 7)    SLP Swallowing evaluate and treat    DISCHARGE PATIENT After evaluation by speech therapy    BATH/SHOWER CHAIR FOR HOME USE    WALKER FOR HOME USE    Ambulatory referral to Home Health          PLAN:    Discharge home with HH PT-OT if no contraindication per speech therapy  Rolling walker  Shower chair  Remove dressing on tomorrow.  Allow steri-strips to fall off on their own.  Ok to shower on tomorrow, but do not immerse wound in water  OK to remove Aspen collar when showering.  All questions answered      Rickey Martin MD  Pager: [phone]

## 2018-08-31 NOTE — DISCHARGE INSTRUCTIONS
Remove dressing on tomorrow.  Allow steri-strips to fall off on their own.  Ok to shower on tomorrow, but do not immerse wound in water  OK to remove Aspen cervical collar when showering.

## 2018-08-31 NOTE — PLAN OF CARE
Problem: Physical Therapy Goal  Goal: Physical Therapy Goal  Goals to be met by: 2018     Patient will increase functional independence with mobility by performin. Supine to sit with Modified Bath  2. Sit to stand transfer with Modified Bath  3. Gait  x 150 feet with Modified Bath using Rolling Walker.     Outcome: Ongoing (interventions implemented as appropriate)  Recommend Home with HH   May need RW , bedside commode, shower chair

## 2018-08-31 NOTE — PROGRESS NOTES
Patient discharged per MD orders. Patient verbalized understanding of discharge instructions. PIV discontinued per MD orders. Cathter tip intact and pressure dressing applied. Patient and patients daughter verbalized understanding of how to take medications prescribed at discharge. Transport wheeled patient to main lobby via wheelchair

## 2018-08-31 NOTE — PLAN OF CARE
Problem: Patient Care Overview  Goal: Plan of Care Review  Outcome: Ongoing (interventions implemented as appropriate)  Patient on oxygen with documented flow.  Will attempt to wean per O2 order protocol. Patient found with sats of 83%.  RN chichi and charge nurse notified of low sats.  500 on IS. Instructed family to please continue IS with patient every hour   Will continue to monitor.

## 2018-08-31 NOTE — PROGRESS NOTES
Received a call from lab that pt's potassium was 6.4 but the sample was slightly hemolyzed and it could be a false reading. Reordered BMP stat and potassium came back at 4.1. NAD, will cont to monitor.

## 2018-08-31 NOTE — PLAN OF CARE
Problem: Patient Care Overview  Goal: Plan of Care Review  Outcome: Ongoing (interventions implemented as appropriate)  Pt AAOx4, daughter at bedside throughout shift. Pt complains of intermittent back pain and nausea but no complaints of emesis or SOB. Regular diet tolerated well. C-collar in place throughout shift, neck dressing CDI. Pt turned on wedge q2hrs. Safety maintained, bed alarm on - will cont to monitor.

## 2018-08-31 NOTE — PLAN OF CARE
Patient here for o/p procedure with Dr Martin. Plan to d/c today after ST evaluation. Patient will need RW, BSC, and SC.  TN spoke with  patient's daughter, discussed d/c plan.  Patient will only get RW at this time, daughter to purchase own Shower chair.BSC declined. Per Lala, okay to pull RW from [employer].      HH orders sent to [facility] for set up.  TN informed daughter  HH agency will reach out to patient, once auth provided by [facility].    Patient okay to d/c once RW delivered to bedside. BEd side delivery to be used for new medications.      Future Appointments   Date Time Provider Department Center   9/17/2018 10:45 AM [facility] XR-A [facility] XRAY OP [facility]   9/17/2018 11:30 AM Rickey Martin MD [facility] NEUROSU [facility]   10/15/2018 10:45 AM [facility] XR-A [facility] XRAY OP [facility]   10/15/2018 11:30 AM Rickey Martin MD [facility] NEUROSU [facility]

## 2018-09-01 NOTE — DISCHARGE SUMMARY
"  [facility]  Neurosurgery  Discharge Summary      Patient Name: Trudy R Dakin  MRN: [id number]  Admission Date: 8/30/2018  Hospital Length of Stay: 0 days  Discharge Date and Time: 8/31/2018  5:53 PM  Attending Physician: No att. providers found   Discharging Provider: Rickey Martin MD  Primary Care Provider: Aaron Tim MD     HPI: Patient with cervical spondylosis with myelopathy    Procedure(s) (LRB):  DISCECTOMY, SPINE, CERVICAL, ANTERIOR APPROACH, WITH FUSION C6-7 ACDF (N/A)     Hospital Course: She underwent a C6-7 anterior cervical discectomy and fusion on 08/30/2018. The procedure was uncomplicated and post-operative course uneventful. The patient was discharged on post-op day one. She reported improvement in her UE strength after the surgery and ambulate with PT. She tolerated PO diet.     Consults:     Significant Diagnostic Studies: NA    Pending Diagnostic Studies:     None        Final Active Diagnoses:    Diagnosis Date Noted POA    Cervical spondylosis with myelopathy [M47.12] 08/30/2018 Yes      Problems Resolved During this Admission:      Discharged Condition: good    Disposition: Home or Self Care    Follow Up:  Follow-up Information     Rickey Martin MD On 9/17/2018.    Specialty:  Neurosurgery  Why:  @11:30- xray before @ 10:45am  Contact information:  [address]  [address]  [facility] [address]  [phone]             [facility]    Specialty:  DME Provider  Why:  DME  Contact information:  [address]  [place] [place] [facility] [address]  [phone]             Please follow up.    Why:  Home Health  Contact information:  N to arrange HH. orders have been faxed. HH agency will call you with visit information               Patient Instructions:      BATH/SHOWER CHAIR FOR HOME USE     Order Specific Question Answer Comments   Height: 5' 1" (1.549 m)    Weight: 73.9 kg (162 lb 14.7 oz)    Does patient have medical equipment at home? none    Length of " "need (1-99 months): 1    Type: With back      WALKER FOR HOME USE     Order Specific Question Answer Comments   Type of Walker: Nima (4'4"-5'7")    With wheels? Yes    Height: 5' 1" (1.549 m)    Weight: 73.9 kg (162 lb 14.7 oz)    Length of need (1-99 months): 1    Does patient have medical equipment at home? none    Please check all that apply: Patient's condition impairs ambulation.      Ambulatory referral to Home Health   Referral Priority: Routine Referral Type: Home Health   Referral Reason: Specialty Services Required   Requested Specialty: Home Health Services   Number of Visits Requested: 1     Diet Adult Regular     No dressing needed   Order Comments: See ACDF Spine discharge instructions     Call MD for:  temperature >100.4     Call MD for:  persistent nausea and vomiting or diarrhea     Call MD for:  severe uncontrolled pain     Call MD for:  redness, tenderness, or signs of infection (pain, swelling, redness, odor or green/yellow discharge around incision site)     Call MD for:  difficulty breathing or increased cough     Call MD for:  severe persistent headache     Call MD for:  worsening rash     Call MD for:  persistent dizziness, light-headedness, or visual disturbances     Call MD for:  increased confusion or weakness     Medications:  Reconciled Home Medications:      Medication List      CHANGE how you take these medications    * HYDROcodone-acetaminophen  mg per tablet  Commonly known as:  NORCO  Take 1 tablet by mouth every 6 (six) hours as needed (pain).  What changed:  Another medication with the same name was added. Make sure you understand how and when to take each.     * HYDROcodone-acetaminophen  mg per tablet  Commonly known as:  NORCO  Take 1 tablet by mouth every 6 (six) hours as needed for Pain.  What changed:  You were already taking a medication with the same name, and this prescription was added. Make sure you understand how and when to take each.         * This list " has 2 medication(s) that are the same as other medications prescribed for you. Read the directions carefully, and ask your doctor or other care provider to review them with you.            CONTINUE taking these medications    atorvastatin 10 MG tablet  Commonly known as:  LIPITOR     cyanocobalamin 1,000 mcg/mL injection     irbesartan 300 MG tablet  Commonly known as:  AVAPRO  Take 300 mg by mouth once daily.     magnesium oxide 400 mg tablet  Commonly known as:  MAG-OX  Take 400 mg by mouth once daily.     melatonin 10 mg Tbdl  Take by mouth.     metFORMIN 500 MG 24 hr tablet  Commonly known as:  GLUCOPHAGE-XR  Take 500 mg by mouth 2 (two) times daily with meals.     metoprolol tartrate 50 MG tablet  Commonly known as:  LOPRESSOR  Take 50 mg by mouth 2 (two) times daily.     pregabalin 75 MG capsule  Commonly known as:  LYRICA  Take 75 mg by mouth 3 (three) times daily.     VOLTAREN 1 % Gel  Generic drug:  diclofenac sodium  Apply 2 g topically once daily.            Rickey Martin MD  Neurosurgery  [facility]

## 2018-09-14 ENCOUNTER — TELEPHONE (OUTPATIENT)
Dept: ADMINISTRATIVE | Facility: CLINIC | Age: 73
End: 2018-09-14

## 2018-09-14 NOTE — TELEPHONE ENCOUNTER
Home Health SOC 09/05/2018 - 11/03/2018 with [facility] ([place] - Dr. Rickey Martin. Patient received SN, PT and OT services.

## 2018-09-17 ENCOUNTER — HOSPITAL ENCOUNTER (OUTPATIENT)
Dept: RADIOLOGY | Facility: HOSPITAL | Age: 73
Discharge: HOME | End: 2018-09-17
Attending: NEUROLOGICAL SURGERY
Payer: MEDICARE

## 2018-09-17 ENCOUNTER — OFFICE VISIT (OUTPATIENT)
Dept: NEUROSURGERY | Facility: CLINIC | Age: 73
End: 2018-09-17
Payer: MEDICARE

## 2018-09-17 VITALS — DIASTOLIC BLOOD PRESSURE: 81 MMHG | HEART RATE: 58 BPM | SYSTOLIC BLOOD PRESSURE: 160 MMHG

## 2018-09-17 DIAGNOSIS — Z98.1 S/P CERVICAL SPINAL FUSION: Primary | ICD-10-CM

## 2018-09-17 DIAGNOSIS — Z98.1 S/P CERVICAL SPINAL FUSION: ICD-10-CM

## 2018-09-17 PROCEDURE — 99999 PR PBB SHADOW E&M-EST. PATIENT-LVL III: CPT | Mod: PBBFAC,,, | Performed by: NEUROLOGICAL SURGERY

## 2018-09-17 PROCEDURE — 72040 X-RAY EXAM NECK SPINE 2-3 VW: CPT | Mod: TC,FY

## 2018-09-17 PROCEDURE — 72040 X-RAY EXAM NECK SPINE 2-3 VW: CPT | Mod: 26,,, | Performed by: RADIOLOGY

## 2018-09-17 PROCEDURE — 99213 OFFICE O/P EST LOW 20 MIN: CPT | Mod: PBBFAC,25,PN | Performed by: NEUROLOGICAL SURGERY

## 2018-09-17 PROCEDURE — 99024 POSTOP FOLLOW-UP VISIT: CPT | Mod: ,,, | Performed by: NEUROLOGICAL SURGERY

## 2018-09-17 NOTE — PROGRESS NOTES
NEUROSURGICAL POST-OPERATIVE PROGRESS NOTE    DATE OF SERVICE:  09/17/2018      ATTENDING PHYSICIAN:  Rickey Martin MD    SUBJECTIVE:    INTERIM HISTORY:    This is a very pleasant 73 y.o. y.o. female, who is status 2 weeks post-op C6-7 ACDF. Doing well, reports improvement in her proximal legs strength and resolution of her hands numbness.    Has been compliant with wearing her neck brace.             OBJECTIVE:    PHYSICAL EXAMINATION:   Vitals:    09/17/18 1119   BP: (!) 160/81   Pulse: (!) 58       Neurosurgery Physical Exam    Ortho Exam    Neurologic Exam    Wound has healed.    DIAGNOSTIC DATA:    X-ray of the cervical spine, AP and lateral views reveals the fusion hardware is intact. No loosening of screws or migration of hardware.    ASSESMENT:    This is a 73 y.o. female who is s/p 2 weeks C6-7 ACDF. Improvement in myelopathy symptoms.    Problem List Items Addressed This Visit     None      Visit Diagnoses     S/P cervical spinal fusion    -  Primary          PLAN:    FU in 4 weeks with repeated cervical XR        Rickey Martin MD  Pager: [phone]

## 2018-09-20 ENCOUNTER — TELEPHONE (OUTPATIENT)
Dept: NEUROSURGERY | Facility: CLINIC | Age: 73
End: 2018-09-20

## 2018-09-20 NOTE — TELEPHONE ENCOUNTER
Spoke to Debra with [facility], she states that Orquidea spoke to Armando with [facility] on 9/19/18 and Mrs. Dakin does not have a co-pay. Debra instructed the patient if she receives a bill to notify her and she would handle it.

## 2018-10-15 ENCOUNTER — HOSPITAL ENCOUNTER (OUTPATIENT)
Dept: RADIOLOGY | Facility: HOSPITAL | Age: 73
Discharge: HOME | End: 2018-10-15
Attending: NEUROLOGICAL SURGERY
Payer: MEDICARE

## 2018-10-15 ENCOUNTER — OFFICE VISIT (OUTPATIENT)
Dept: NEUROSURGERY | Facility: CLINIC | Age: 73
End: 2018-10-15
Payer: MEDICARE

## 2018-10-15 VITALS
HEART RATE: 60 BPM | HEIGHT: 61 IN | BODY MASS INDEX: 31.3 KG/M2 | WEIGHT: 165.81 LBS | SYSTOLIC BLOOD PRESSURE: 152 MMHG | DIASTOLIC BLOOD PRESSURE: 88 MMHG

## 2018-10-15 DIAGNOSIS — Z98.1 S/P CERVICAL SPINAL FUSION: ICD-10-CM

## 2018-10-15 DIAGNOSIS — Z98.1 S/P CERVICAL SPINAL FUSION: Primary | ICD-10-CM

## 2018-10-15 PROCEDURE — 72040 X-RAY EXAM NECK SPINE 2-3 VW: CPT | Mod: 26,,, | Performed by: RADIOLOGY

## 2018-10-15 PROCEDURE — 99999 PR PBB SHADOW E&M-EST. PATIENT-LVL III: CPT | Mod: PBBFAC,,, | Performed by: NEUROLOGICAL SURGERY

## 2018-10-15 PROCEDURE — 99213 OFFICE O/P EST LOW 20 MIN: CPT | Mod: PBBFAC,25,PN | Performed by: NEUROLOGICAL SURGERY

## 2018-10-15 PROCEDURE — 99024 POSTOP FOLLOW-UP VISIT: CPT | Mod: ,,, | Performed by: NEUROLOGICAL SURGERY

## 2018-10-15 PROCEDURE — 72040 X-RAY EXAM NECK SPINE 2-3 VW: CPT | Mod: TC,FY

## 2018-10-15 NOTE — PROGRESS NOTES
NEUROSURGICAL POST-OPERATIVE PROGRESS NOTE    DATE OF SERVICE:  10/15/2018      ATTENDING PHYSICIAN:  Rickey Martin MD    SUBJECTIVE:    INTERIM HISTORY:    This is a very pleasant 73 y.o. y.o. female, who is status 6 weeks C6-7 ACDF for worsening myelopathy and radiculopathy. Since the surgery she reports significant improvement in balance and resolution of UE numbness and pain. Denies having neck pain. Does not reports swallowing difficulties. Has been compliant with wearing her cervical collar.          Neck Disability  Neck Disability-Pain Score: 0  Neck Disability-Pain Intensity: I have no pain at the moment  Neck Disability-Personal Care: I can look after myself normally without causing extra pain  Neck Disability-Lifting: Pain prevents me from lifting heavy weights off the floor but I can manage light to medium weights if they are conveniently positioned  Neck Disability-Reading: I can read as much as I want to, with no pain my neck  Neck Disability-Headaches: I have no headaches at all  Neck Disability-Concentration: I can concentrate fully, when I want to, with no difficulty  Neck Disability-work: I can do as much work as I want to  Neck Disability-Driving: I can drive my car without any neck pain  Neck Disability-Sleeping: I have no trouble sleeping    OBJECTIVE:    PHYSICAL EXAMINATION:   Vitals:    10/15/18 1104   BP: (!) 152/88   Pulse: 60       Neurosurgery Physical Exam    Back Exam     Muscle Strength   Right Quadriceps:  5/5   Left Quadriceps:  5/5             Neurologic Exam     Motor Exam     Strength   Right deltoid: 5/5  Left deltoid: 5/5  Right biceps: 5/5  Left biceps: 5/5  Right triceps: 5/5  Left triceps: 5/5  Right interossei: 4/5  Left interossei: 4/5  Right iliopsoas: 5/5  Left iliopsoas: 5/5  Right quadriceps: 5/5  Left quadriceps: 5/5  Right anterior tibial: 5/5  Left anterior tibial: 5/5      Wound has healed.    DIAGNOSTIC DATA:    X-ray of the cervical spine, AP and lateral views  reveals the fusion hardware is intact. No loosening of screws or migration of hardware.    ASSESMENT:    This is a 73 y.o. female who is s/p 6 weeks C6-7 ACDF with improvement in myelopathy and radiculopathy. Excellent outcome without neck pain.     Problem List Items Addressed This Visit     None      Visit Diagnoses     S/P cervical spinal fusion    -  Primary          PLAN:    OK to dc cervical collar  FU in 6 weeks  All questions answered        Rickey Mratin MD  Pager: [phone]

## 2018-11-20 ENCOUNTER — TELEPHONE (OUTPATIENT)
Dept: NEUROSURGERY | Facility: CLINIC | Age: 73
End: 2018-11-20

## 2018-11-20 DIAGNOSIS — Z98.1 S/P CERVICAL SPINAL FUSION: Primary | ICD-10-CM

## 2018-11-26 ENCOUNTER — OFFICE VISIT (OUTPATIENT)
Dept: NEUROSURGERY | Facility: CLINIC | Age: 73
End: 2018-11-26
Payer: MEDICARE

## 2018-11-26 ENCOUNTER — TELEPHONE (OUTPATIENT)
Dept: NEUROSURGERY | Facility: CLINIC | Age: 73
End: 2018-11-26

## 2018-11-26 ENCOUNTER — HOSPITAL ENCOUNTER (OUTPATIENT)
Dept: RADIOLOGY | Facility: HOSPITAL | Age: 73
Discharge: HOME | End: 2018-11-26
Attending: NEUROLOGICAL SURGERY
Payer: MEDICARE

## 2018-11-26 VITALS
SYSTOLIC BLOOD PRESSURE: 160 MMHG | BODY MASS INDEX: 31.32 KG/M2 | HEART RATE: 58 BPM | WEIGHT: 165.81 LBS | DIASTOLIC BLOOD PRESSURE: 83 MMHG

## 2018-11-26 DIAGNOSIS — Z98.1 S/P CERVICAL SPINAL FUSION: Primary | ICD-10-CM

## 2018-11-26 DIAGNOSIS — Z98.1 S/P CERVICAL SPINAL FUSION: ICD-10-CM

## 2018-11-26 PROBLEM — R53.1 WEAKNESS: Status: RESOLVED | Noted: 2018-06-19 | Resolved: 2018-11-26

## 2018-11-26 PROCEDURE — 99999 PR PBB SHADOW E&M-EST. PATIENT-LVL III: CPT | Mod: PBBFAC,,, | Performed by: NEUROLOGICAL SURGERY

## 2018-11-26 PROCEDURE — 72040 X-RAY EXAM NECK SPINE 2-3 VW: CPT | Mod: TC,PN

## 2018-11-26 PROCEDURE — 99024 POSTOP FOLLOW-UP VISIT: CPT | Mod: S$GLB,,, | Performed by: NEUROLOGICAL SURGERY

## 2018-11-26 PROCEDURE — 72040 X-RAY EXAM NECK SPINE 2-3 VW: CPT | Mod: 26,,, | Performed by: RADIOLOGY

## 2018-11-26 NOTE — PROGRESS NOTES
NEUROSURGICAL POST-OPERATIVE PROGRESS NOTE    DATE OF SERVICE:  11/26/2018      ATTENDING PHYSICIAN:  Rickey Martin MD    SUBJECTIVE:    INTERIM HISTORY:    This is a very pleasant 73 y.o. y.o. female, who is status 6 weeks C6-7 ACDF for worsening myelopathy. Since the surgery reports significant improvement in gait and hands strength. No neck pain. No complaint except for her neuropathy symptoms treated with Lyrica 75 that she is taking BID.          Neck Disability  Neck Disability-Pain Score: 0  Neck Disability-Pain Intensity: I have no pain at the moment  Neck Disability-Personal Care: I can look after myself normally without causing extra pain  Neck Disability-Lifting: I can lift heavy weights without extra pain  Neck Disability-Reading: I can read as much as I want to, with no pain my neck  Neck Disability-Headaches: I have no headaches at all  Neck Disability-Concentration: I can concentrate fully, when I want to, with no difficulty  Neck Disability-work: I can do as much work as I want to  Neck Disability-Driving: I can drive my car without any neck pain  Neck Disability-Sleeping: I have no trouble sleeping  Neck Disability-Recreation: I am able to engage in all my recreation activities with no neck pain at all    OBJECTIVE:    PHYSICAL EXAMINATION:   Vitals:    11/26/18 0815   BP: (!) 160/83   Pulse: (!) 58       Neurosurgery Physical Exam    Ortho Exam    Neurologic Exam     Motor Exam     Strength   Right deltoid: 5/5  Left deltoid: 5/5  Right biceps: 5/5  Left biceps: 5/5  Right triceps: 5/5  Left triceps: 5/5  Right interossei: 4/5  Left interossei: 4/5  Right iliopsoas: 5/5  Left iliopsoas: 5/5    Gait, Coordination, and Reflexes     Reflexes   Right Bedoya: present  Left Bedoya: present      Wound has healed.    DIAGNOSTIC DATA:    X-ray of the cervical spine, AP and lateral views reveals the fusion hardware is intact. No loosening of screws or migration of hardware.    ASSESMENT:    This is a 73  y.o. female who is s/p 6 weeks C6-7 ACDF. Doing well. Improvd myelopathy symptoms.     Problem List Items Addressed This Visit     None      Visit Diagnoses     S/P cervical spinal fusion    -  Primary          PLAN:    FU in 6 weeks  OK to resume outdoor ADLs, mowing grass.         Rickey Martin MD  Pager: [phone]

## 2019-01-15 ENCOUNTER — TELEPHONE (OUTPATIENT)
Dept: NEUROSURGERY | Facility: CLINIC | Age: 74
End: 2019-01-15

## 2019-01-15 ENCOUNTER — TELEPHONE (OUTPATIENT)
Dept: GASTROENTEROLOGY | Facility: CLINIC | Age: 74
End: 2019-01-15

## 2019-05-02 ENCOUNTER — HOSPITAL ENCOUNTER (OUTPATIENT)
Dept: RADIOLOGY | Facility: HOSPITAL | Age: 74
Discharge: HOME | End: 2019-05-02
Attending: FAMILY MEDICINE
Payer: MEDICARE

## 2019-05-02 DIAGNOSIS — R07.81 RIB PAIN: ICD-10-CM

## 2019-05-02 DIAGNOSIS — R07.81 RIB PAIN: Primary | ICD-10-CM

## 2019-05-02 PROCEDURE — 71100 X-RAY EXAM RIBS UNI 2 VIEWS: CPT | Mod: TC,FY,RT

## 2019-05-02 PROCEDURE — 71100 XR RIBS 2 VIEW RIGHT: ICD-10-PCS | Mod: 26,RT,, | Performed by: RADIOLOGY

## 2019-05-02 PROCEDURE — 71100 X-RAY EXAM RIBS UNI 2 VIEWS: CPT | Mod: 26,RT,, | Performed by: RADIOLOGY

## 2019-07-25 DIAGNOSIS — Z12.31 VISIT FOR SCREENING MAMMOGRAM: Primary | ICD-10-CM

## 2019-10-15 DIAGNOSIS — Z12.31 VISIT FOR SCREENING MAMMOGRAM: Primary | ICD-10-CM

## 2019-12-03 ENCOUNTER — HOSPITAL ENCOUNTER (OUTPATIENT)
Dept: RADIOLOGY | Facility: HOSPITAL | Age: 74
Discharge: HOME | End: 2019-12-03
Attending: FAMILY MEDICINE
Payer: MEDICARE

## 2019-12-03 DIAGNOSIS — Z12.31 VISIT FOR SCREENING MAMMOGRAM: ICD-10-CM

## 2019-12-03 PROCEDURE — 77067 MAMMO DIGITAL SCREENING BILAT WITH TOMOSYNTHESIS_CAD: ICD-10-PCS | Mod: 26,,, | Performed by: RADIOLOGY

## 2019-12-03 PROCEDURE — 77067 SCR MAMMO BI INCL CAD: CPT | Mod: 26,,, | Performed by: RADIOLOGY

## 2019-12-03 PROCEDURE — 77063 BREAST TOMOSYNTHESIS BI: CPT | Mod: 26,,, | Performed by: RADIOLOGY

## 2019-12-03 PROCEDURE — 77063 MAMMO DIGITAL SCREENING BILAT WITH TOMOSYNTHESIS_CAD: ICD-10-PCS | Mod: 26,,, | Performed by: RADIOLOGY

## 2019-12-03 PROCEDURE — 77067 SCR MAMMO BI INCL CAD: CPT | Mod: TC

## 2020-04-17 ENCOUNTER — HOSPITAL ENCOUNTER (EMERGENCY)
Facility: HOSPITAL | Age: 75
Discharge: HOME | End: 2020-04-17
Attending: EMERGENCY MEDICINE
Payer: MEDICARE

## 2020-04-17 VITALS
OXYGEN SATURATION: 99 % | RESPIRATION RATE: 18 BRPM | WEIGHT: 140 LBS | HEART RATE: 82 BPM | HEIGHT: 61 IN | TEMPERATURE: 98 F | SYSTOLIC BLOOD PRESSURE: 210 MMHG | DIASTOLIC BLOOD PRESSURE: 95 MMHG | BODY MASS INDEX: 26.43 KG/M2

## 2020-04-17 DIAGNOSIS — M53.3 COCCYDYNIA: Primary | ICD-10-CM

## 2020-04-17 DIAGNOSIS — M53.3 PAIN IN THE COCCYX: ICD-10-CM

## 2020-04-17 PROCEDURE — 99284 EMERGENCY DEPT VISIT MOD MDM: CPT | Mod: 25

## 2020-04-17 PROCEDURE — 25000003 PHARM REV CODE 250: Performed by: NURSE PRACTITIONER

## 2020-04-17 RX ORDER — HYDROCODONE BITARTRATE AND ACETAMINOPHEN 5; 325 MG/1; MG/1
1 TABLET ORAL
Status: COMPLETED | OUTPATIENT
Start: 2020-04-17 | End: 2020-04-17

## 2020-04-17 RX ORDER — ONDANSETRON 4 MG/1
4 TABLET, ORALLY DISINTEGRATING ORAL EVERY 8 HOURS PRN
Qty: 18 TABLET | Refills: 0 | Status: SHIPPED | OUTPATIENT
Start: 2020-04-17 | End: 2020-06-25

## 2020-04-17 RX ORDER — LIDOCAINE 50 MG/G
1 PATCH TOPICAL DAILY
Qty: 15 PATCH | Refills: 0 | Status: SHIPPED | OUTPATIENT
Start: 2020-04-17 | End: 2020-06-25

## 2020-04-17 RX ADMIN — HYDROCODONE BITARTRATE AND ACETAMINOPHEN 1 TABLET: 5; 325 TABLET ORAL at 06:04

## 2020-04-17 NOTE — ED NOTES
"Pt reports mechanical fall, c/o tailbone pain, states all pain medication prescriptions cause nausea, "I want to go home with injections". Educated patient about taking meds with food and to use nausea rx.   "

## 2020-04-17 NOTE — PROVIDER PROGRESS NOTES - EMERGENCY DEPT.
Emergency Department TeleTRIAGE Encounter Note      CHIEF COMPLAINT    Chief Complaint   Patient presents with    Tailbone Pain     pt presents to ED today c/o pain to her tailbone for the last 3 weeks after a slip and fall. Pt reports has been treated for the fall by her doctor, but has not been able to receive any relief in her pain.        VITAL SIGNS   Initial Vitals [04/17/20 1546]   BP Pulse Resp Temp SpO2   (!) 206/100 82 20 97.5 °F (36.4 °C) 99 %      MAP       --            ALLERGIES    Review of patient's allergies indicates:  No Known Allergies    PROVIDER TRIAGE NOTE  74-year-old female had a slip and fall 3 weeks ago and landed on her bottom.  Since then she has had pain to her tailbone.  She says it is worse when sitting or transitioning from sitting to standing.  She has to have help going to the bathroom.  She has seen an outpatient provider who prescribed pain medication.  She says this is not brought her any relief.  No imaging has been done.  I ordered plain films of the coccyx, sacrum, and L-spine.  Further imaging and medical management deferred to face-to-face provider.  Did bedside teaching.  All questions answered.  Patient acknowledges understanding.      ORDERS  X-ray of Lumbar spine, Sacrum, and Coccyx ordered.      Virtual Visit Note: The provider triage portion of this emergency department evaluation and documentation was performed via [employer], a HIPAA-compliant telemedicine application, in concert with a tele-presenter in the room. A face to face patient evaluation with one of my colleagues will occur once the patient is placed in an emergency department room.      DISCLAIMER: This note was prepared with [employer] voice recognition transcription software. Garbled syntax, mangled pronouns, and other bizarre constructions may be attributed to that software system.

## 2020-04-17 NOTE — DISCHARGE INSTRUCTIONS
Do not take the Hydrocodone and Oxycodone at the same time. Do not take the tizanidine (muscle relaxer) at the same time as the pain medication. All of these medications can make you drowsy and slow your breathing.   Use a pool donut to sit on to take pressure off of the painful area.
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
## 2020-06-25 PROBLEM — M48.061 LUMBAR SPINAL STENOSIS: Status: ACTIVE | Noted: 2018-07-03

## 2020-06-25 PROBLEM — M17.9 OSTEOARTHRITIS OF KNEE: Status: ACTIVE | Noted: 2017-12-06

## 2020-06-25 PROBLEM — M47.816 ARTHROPATHY OF LUMBAR FACET JOINT: Status: ACTIVE | Noted: 2018-07-03

## 2020-06-25 PROBLEM — Z86.010 HX OF ADENOMATOUS COLONIC POLYPS: Status: ACTIVE | Noted: 2020-06-25

## 2020-06-25 PROBLEM — K59.09 CHRONIC CONSTIPATION: Status: ACTIVE | Noted: 2017-04-05

## 2020-06-25 PROBLEM — M86.9 OSTEOMYELITIS: Status: ACTIVE | Noted: 2020-06-25

## 2020-06-25 PROBLEM — R60.9 EDEMA: Status: ACTIVE | Noted: 2020-06-25

## 2020-06-25 PROBLEM — F11.20 OPIOID DEPENDENCE: Status: ACTIVE | Noted: 2018-02-28

## 2020-06-25 PROBLEM — G47.00 INSOMNIA: Status: ACTIVE | Noted: 2018-02-28

## 2020-06-25 PROBLEM — Z86.0101 HX OF ADENOMATOUS COLONIC POLYPS: Status: ACTIVE | Noted: 2020-06-25

## 2020-06-25 PROBLEM — M53.3 COCCYDYNIA: Status: ACTIVE | Noted: 2020-06-25

## 2020-06-25 PROBLEM — K57.90 DIVERTICULAR DISEASE: Status: ACTIVE | Noted: 2020-06-25

## 2020-06-25 PROBLEM — E78.00 HIGH CHOLESTEROL: Status: ACTIVE | Noted: 2017-01-25

## 2020-06-25 PROBLEM — R20.2 PARESTHESIA OF UPPER EXTREMITY: Status: ACTIVE | Noted: 2018-07-03

## 2020-06-25 PROBLEM — M19.90 ARTHRITIS: Status: ACTIVE | Noted: 2020-06-25

## 2020-06-25 PROBLEM — D69.2 SENILE PURPURA: Status: ACTIVE | Noted: 2020-06-25

## 2020-07-28 PROBLEM — I48.0 PAROXYSMAL ATRIAL FIBRILLATION: Status: ACTIVE | Noted: 2020-07-28

## 2020-08-19 ENCOUNTER — DOCUMENT SCAN (OUTPATIENT)
Dept: HOME HEALTH SERVICES | Facility: HOSPITAL | Age: 75
End: 2020-08-19

## 2020-09-03 PROBLEM — I21.9 MYOCARDIAL INFARCTION: Status: ACTIVE | Noted: 2020-09-03

## 2020-09-03 PROBLEM — R29.898 RIGHT LEG WEAKNESS: Status: RESOLVED | Noted: 2018-06-21 | Resolved: 2020-09-03

## 2020-09-03 PROBLEM — I67.82 CEREBRAL ISCHEMIA: Status: ACTIVE | Noted: 2020-09-03

## 2020-09-03 PROBLEM — R00.1 JUNCTIONAL BRADYCARDIA: Status: RESOLVED | Noted: 2018-06-19 | Resolved: 2020-09-03

## 2020-09-03 PROBLEM — I48.0 PAROXYSMAL ATRIAL FIBRILLATION: Chronic | Status: ACTIVE | Noted: 2020-07-28

## 2020-09-03 PROBLEM — I10 ESSENTIAL HYPERTENSION: Chronic | Status: ACTIVE | Noted: 2017-06-14

## 2020-09-03 PROBLEM — M53.3 COCCYDYNIA: Status: RESOLVED | Noted: 2020-06-25 | Resolved: 2020-09-03

## 2020-10-04 PROBLEM — M86.9 OSTEOMYELITIS: Status: RESOLVED | Noted: 2020-06-25 | Resolved: 2020-10-04

## 2020-10-04 PROBLEM — M46.96 UNSPECIFIED INFLAMMATORY SPONDYLOPATHY, LUMBAR REGION: Status: ACTIVE | Noted: 2020-10-04

## 2020-11-05 PROBLEM — K57.90 DIVERTICULAR DISEASE: Status: RESOLVED | Noted: 2020-06-25 | Resolved: 2020-11-05

## 2020-11-05 PROBLEM — I51.89 DIASTOLIC DYSFUNCTION: Status: RESOLVED | Noted: 2018-06-19 | Resolved: 2020-11-05

## 2020-11-05 PROBLEM — I50.32 CHRONIC DIASTOLIC HEART FAILURE: Chronic | Status: ACTIVE | Noted: 2020-11-05

## 2020-11-05 PROBLEM — I21.9 MYOCARDIAL INFARCTION: Status: RESOLVED | Noted: 2020-09-03 | Resolved: 2020-11-05

## 2020-11-05 PROBLEM — I25.10 CORONARY ARTERY DISEASE INVOLVING NATIVE CORONARY ARTERY OF NATIVE HEART WITHOUT ANGINA PECTORIS: Chronic | Status: ACTIVE | Noted: 2020-11-05

## 2020-11-05 PROBLEM — I25.10 CORONARY ARTERY DISEASE INVOLVING NATIVE CORONARY ARTERY OF NATIVE HEART WITHOUT ANGINA PECTORIS: Status: ACTIVE | Noted: 2020-11-05

## 2020-11-05 PROBLEM — E66.9 OBESITY: Status: RESOLVED | Noted: 2017-06-14 | Resolved: 2020-11-05

## 2020-11-05 PROBLEM — F32.A MILD DEPRESSION: Chronic | Status: ACTIVE | Noted: 2020-11-05

## 2020-11-05 PROBLEM — D69.2 SENILE PURPURA: Chronic | Status: ACTIVE | Noted: 2020-06-25

## 2020-11-05 PROBLEM — F32.A MILD DEPRESSION: Status: ACTIVE | Noted: 2020-11-05

## 2020-11-05 PROBLEM — M19.90 ARTHRITIS: Status: RESOLVED | Noted: 2020-06-25 | Resolved: 2020-11-05

## 2020-11-05 PROBLEM — I50.32 CHRONIC DIASTOLIC HEART FAILURE: Status: ACTIVE | Noted: 2020-11-05

## 2020-11-05 PROBLEM — F11.20 UNCOMPLICATED OPIOID DEPENDENCE: Chronic | Status: ACTIVE | Noted: 2018-02-28

## 2020-11-30 PROBLEM — M48.07 SPINAL STENOSIS, LUMBOSACRAL REGION: Status: ACTIVE | Noted: 2020-11-30

## 2020-12-07 PROBLEM — I34.0 MITRAL VALVE INSUFFICIENCY: Status: ACTIVE | Noted: 2020-12-07

## 2020-12-08 ENCOUNTER — HOSPITAL ENCOUNTER (OUTPATIENT)
Dept: RADIOLOGY | Facility: HOSPITAL | Age: 75
Discharge: HOME | End: 2020-12-08
Attending: FAMILY MEDICINE
Payer: MEDICARE

## 2020-12-08 DIAGNOSIS — M54.16 LUMBAR RADICULOPATHY: ICD-10-CM

## 2020-12-08 PROCEDURE — 72148 MRI LUMBAR SPINE WITHOUT CONTRAST: ICD-10-PCS | Mod: 26,,, | Performed by: RADIOLOGY

## 2020-12-08 PROCEDURE — 72148 MRI LUMBAR SPINE W/O DYE: CPT | Mod: TC

## 2020-12-08 PROCEDURE — 72148 MRI LUMBAR SPINE W/O DYE: CPT | Mod: 26,,, | Performed by: RADIOLOGY

## 2020-12-10 ENCOUNTER — TELEPHONE (OUTPATIENT)
Dept: PAIN MEDICINE | Facility: CLINIC | Age: 75
End: 2020-12-10

## 2020-12-10 PROBLEM — M48.061 LUMBAR SPINAL STENOSIS: Chronic | Status: ACTIVE | Noted: 2018-07-03

## 2020-12-10 PROBLEM — M48.07 SPINAL STENOSIS, LUMBOSACRAL REGION: Status: RESOLVED | Noted: 2020-11-30 | Resolved: 2020-12-10

## 2020-12-10 NOTE — TELEPHONE ENCOUNTER
----- Message from Colleen Carvajal MD sent at 12/10/2020 10:41 AM CST -----  Hey,    Her MRI overall does not look too bad, but she has a disc bulge at L5-S1 that does contact that right L5 nerve root and she has an annular tear at L4-5 which could cause inflammation of the nerve roots as well.     I will have my staff set her up for the next available appointment with me.     Sincerely,  Colleen

## 2020-12-15 ENCOUNTER — OFFICE VISIT (OUTPATIENT)
Dept: PAIN MEDICINE | Facility: CLINIC | Age: 75
End: 2020-12-15
Payer: MEDICARE

## 2020-12-15 VITALS
HEART RATE: 83 BPM | SYSTOLIC BLOOD PRESSURE: 132 MMHG | WEIGHT: 112.44 LBS | BODY MASS INDEX: 21.24 KG/M2 | DIASTOLIC BLOOD PRESSURE: 88 MMHG

## 2020-12-15 DIAGNOSIS — M79.2 NEURALGIA: ICD-10-CM

## 2020-12-15 DIAGNOSIS — M53.3 SACROILIAC JOINT DYSFUNCTION: ICD-10-CM

## 2020-12-15 DIAGNOSIS — M47.816 LUMBAR SPONDYLOSIS: ICD-10-CM

## 2020-12-15 DIAGNOSIS — M76.32 ILIOTIBIAL BAND SYNDROME, LEFT: ICD-10-CM

## 2020-12-15 DIAGNOSIS — G89.29 CHRONIC LEFT-SIDED LOW BACK PAIN WITH LEFT-SIDED SCIATICA: ICD-10-CM

## 2020-12-15 DIAGNOSIS — M43.16 SPONDYLOLISTHESIS OF LUMBAR REGION: ICD-10-CM

## 2020-12-15 DIAGNOSIS — M54.42 CHRONIC LEFT-SIDED LOW BACK PAIN WITH LEFT-SIDED SCIATICA: ICD-10-CM

## 2020-12-15 DIAGNOSIS — M54.16 LUMBAR RADICULOPATHY: ICD-10-CM

## 2020-12-15 DIAGNOSIS — M51.36 DDD (DEGENERATIVE DISC DISEASE), LUMBAR: Primary | ICD-10-CM

## 2020-12-15 PROCEDURE — 3075F PR MOST RECENT SYSTOLIC BLOOD PRESS GE 130-139MM HG: ICD-10-PCS | Mod: CPTII,S$GLB,, | Performed by: PHYSICAL MEDICINE & REHABILITATION

## 2020-12-15 PROCEDURE — 99204 PR OFFICE/OUTPT VISIT, NEW, LEVL IV, 45-59 MIN: ICD-10-PCS | Mod: 25,S$GLB,, | Performed by: PHYSICAL MEDICINE & REHABILITATION

## 2020-12-15 PROCEDURE — 3075F SYST BP GE 130 - 139MM HG: CPT | Mod: CPTII,S$GLB,, | Performed by: PHYSICAL MEDICINE & REHABILITATION

## 2020-12-15 PROCEDURE — 99999 PR PBB SHADOW E&M-EST. PATIENT-LVL IV: ICD-10-PCS | Mod: PBBFAC,,, | Performed by: PHYSICAL MEDICINE & REHABILITATION

## 2020-12-15 PROCEDURE — 99204 OFFICE O/P NEW MOD 45 MIN: CPT | Mod: 25,S$GLB,, | Performed by: PHYSICAL MEDICINE & REHABILITATION

## 2020-12-15 PROCEDURE — 1125F AMNT PAIN NOTED PAIN PRSNT: CPT | Mod: S$GLB,,, | Performed by: PHYSICAL MEDICINE & REHABILITATION

## 2020-12-15 PROCEDURE — 64450 NJX AA&/STRD OTHER PN/BRANCH: CPT | Mod: LT,S$GLB,, | Performed by: PHYSICAL MEDICINE & REHABILITATION

## 2020-12-15 PROCEDURE — 1125F PR PAIN SEVERITY QUANTIFIED, PAIN PRESENT: ICD-10-PCS | Mod: S$GLB,,, | Performed by: PHYSICAL MEDICINE & REHABILITATION

## 2020-12-15 PROCEDURE — 99999 PR PBB SHADOW E&M-EST. PATIENT-LVL IV: CPT | Mod: PBBFAC,,, | Performed by: PHYSICAL MEDICINE & REHABILITATION

## 2020-12-15 PROCEDURE — 1159F PR MEDICATION LIST DOCUMENTED IN MEDICAL RECORD: ICD-10-PCS | Mod: S$GLB,,, | Performed by: PHYSICAL MEDICINE & REHABILITATION

## 2020-12-15 PROCEDURE — 64450 PR NERVE BLOCK INJ, ANES/STEROID, OTHER PERIPHERAL: ICD-10-PCS | Mod: LT,S$GLB,, | Performed by: PHYSICAL MEDICINE & REHABILITATION

## 2020-12-15 PROCEDURE — 1159F MED LIST DOCD IN RCRD: CPT | Mod: S$GLB,,, | Performed by: PHYSICAL MEDICINE & REHABILITATION

## 2020-12-15 PROCEDURE — 3079F DIAST BP 80-89 MM HG: CPT | Mod: CPTII,S$GLB,, | Performed by: PHYSICAL MEDICINE & REHABILITATION

## 2020-12-15 PROCEDURE — 3079F PR MOST RECENT DIASTOLIC BLOOD PRESSURE 80-89 MM HG: ICD-10-PCS | Mod: CPTII,S$GLB,, | Performed by: PHYSICAL MEDICINE & REHABILITATION

## 2020-12-15 NOTE — PROCEDURES
Procedures     Procedure: Cluneal nerve block  Under clean technique & after discussing the risks, benefits and alternatives of the procedure with patient the Left cluneal nerve was injected with 4 mL solution of medications, per side, from a mixture of 9 mL of bupivacaine 0.25% and 40 mg of Kenalog (40 mg/mL).    Patient tolerated procedure well.

## 2020-12-15 NOTE — LETTER
December 15, 2020      Aaron Tim MD  [address]  [address]  [place] [address]           [place] [facility]  [address]  [place] [address]  Phone: [phone]          Patient: Trudy R Dakin   MR Number: [id number]   YOB: 1945   Date of Visit: 12/15/2020       Dear Dr. Aaron Tim:    Thank you for referring Trudy Dakin to me for evaluation. Attached you will find relevant portions of my assessment and plan of care.    If you have questions, please do not hesitate to call me. I look forward to following Trudy Dakin along with you.    Sincerely,    Colleen Carvajal MD    Enclosure  CC:  No Recipients    If you would like to receive this communication electronically, please contact [email or web address] or [phone] to request more information on [employer] access.    For providers and/or their staff who would like to refer a patient to [facility], please contact us through our one-stop-shop provider referral line, [facility], at [phone].    If you feel you have received this communication in error or would no longer like to receive these types of communications, please e-mail [email or web address]

## 2020-12-15 NOTE — H&P (VIEW-ONLY)
[facility]  New Patient H&P    Referring Provider: Aaron Tim Md  [address]  [address]  [place] [address]    Chief Complaint:   Chief Complaint   Patient presents with    Low-back Pain       History of Present Illness: Trudy R Dakin is a 75 y.o. female referred by Dr. Aaron Tim for back.      Onset: Years of back pain, but 4 weeks ago she started with this worse pain  Location: left low back and buttocks  Radiation: left leg to the knee and sometimes passed the knee  Timing: constant  Quality: Aching, Sharp and Shooting  Exacerbating Factors: walking for more than 1 minutes  Alleviating Factors: nothing, sometimes laying down helps  Associated Symptoms: She feels mild weakness in the left leg. (+) Tingling in both feet from neuropathy. Denies night fever/night sweats, urinary incontinence, bowel incontinence, significant weight loss    Severity: Currently: 10/10   Typical Range: 9-10/10     Exacerbation: 10/10     Pain is limiting mobility around the house, cleaning her house, cooking, showering.    Pain Disability Index  Family/Home Responsibilities:: 7  Recreation:: 8  Social Activity:: 7  Occupation:: 9  Sexual Behavior:: 10  Self Care:: 7  Life-Support Activities:: 8  Pain Disability Index (PDI): 56    Previous Interventions:  - None    Previous Therapies:  PT/OT: no  Chiropractor: No  Relevant Surgery: no   Previous Medications:   - NSAIDS: Ibuprofen.   - Muscle Relaxants:    - TCAs:   - SNRIs:   - Topicals:   - Anticonvulsants: Pregabalin didn't help. Gabapentin didn't help   - Opioids: Hydrocodone. Tramadol didn't help.     Current Pain Medications:  1. Ibuprofen 600mg  2. Hydrocodone  mg BID prn    Blood Thinners: [id number]    Full Medication List:    Current Outpatient Medications:     apixaban (ELIQUIS) 5 mg Tab, Take 5 mg by mouth 2 (two) times daily., Disp: , Rfl:     atorvastatin (LIPITOR) 10 MG tablet, Take 1 tablet (10 mg total) by mouth once  daily., Disp: 90 tablet, Rfl: 3    clotrimazole-betamethasone 1-0.05% (LOTRISONE) cream, APPLY EXTERNALLY TO THE AFFECTED AREA TWICE DAILY, Disp: 45 g, Rfl: 2    cyanocobalamin 1,000 mcg/mL injection, , Disp: , Rfl: 1    escitalopram oxalate (LEXAPRO) 5 MG Tab, Take 1 tablet (5 mg total) by mouth once daily., Disp: 90 tablet, Rfl: 3    ibuprofen (ADVIL,MOTRIN) 600 MG tablet, Take 1 tablet (600 mg total) by mouth every 8 (eight) hours as needed for Pain., Disp: 30 tablet, Rfl: 0    isosorbide mononitrate (IMDUR) 30 MG 24 hr tablet, Take 1 tablet (30 mg total) by mouth once daily., Disp: 90 tablet, Rfl: 3    metFORMIN (GLUCOPHAGE-XR) 500 MG ER 24hr tablet, TAKE 2 TABLETS BY MOUTH TWICE DAILY, Disp: 360 tablet, Rfl: 3    metoprolol succinate (TOPROL-XL) 25 MG 24 hr tablet, Take 25 mg by mouth once daily., Disp: , Rfl:     ondansetron (ZOFRAN-ODT) 4 MG TbDL, Take 1 tablet (4 mg total) by mouth every 6 (six) hours as needed (nausea)., Disp: 30 tablet, Rfl: 0    sodium bicarbonate 325 MG tablet, Take 325 mg by mouth 3 (three) times daily., Disp: , Rfl:     ALPRAZolam (XANAX) 0.5 MG tablet, Take 1 tablet (0.5 mg total) by mouth 2 (two) times daily as needed for Anxiety., Disp: 2 tablet, Rfl: 0    HYDROcodone-acetaminophen (NORCO)  mg per tablet, Take 1 tablet by mouth every 6 (six) hours as needed for Pain. (Patient not taking: Reported on 12/15/2020), Disp: 60 tablet, Rfl: 0    pregabalin (LYRICA) 75 MG capsule, Take 1 capsule (75 mg total) by mouth 3 (three) times daily. (Patient not taking: Reported on 12/15/2020), Disp: 90 capsule, Rfl: 5     Review of Systems:  Review of Systems   Constitutional: Negative for fever and weight loss.   HENT: Negative for ear pain and tinnitus.    Eyes: Negative for pain and redness.   Respiratory: Negative for cough and shortness of breath.    Cardiovascular: Negative for chest pain and palpitations.   Gastrointestinal: Negative for constipation and heartburn.    Genitourinary: Negative.         Denies urinary incontinence. Denies urine retention.    Musculoskeletal: Positive for back pain, joint pain, myalgias and neck pain.   Skin: Negative for itching and rash.   Neurological: Negative for tingling, focal weakness and seizures.   Endo/Heme/Allergies: Negative for environmental allergies. Does not bruise/bleed easily.   Psychiatric/Behavioral: Negative for depression. The patient is not nervous/anxious and does not have insomnia.        Allergies:  Patient has no known allergies.     Medical History:   has a past medical history of Diabetes, Diverticulitis, and Hypertension.    Surgical History:   has a past surgical history that includes Appendectomy; Carpal tunnel release; Abdominal surgery (2006); Mandible fracture surgery (1970); Rotator cuff repair (Left, 11/2016); Oophorectomy (1970); Anterior cervical discectomy w/ fusion (N/A, 8/30/2018); Total Reduction Mammoplasty (Bilateral, 1990); Hysterectomy (1970); and Colectomy (07/2020).    Family History:  family history includes Heart disease in her father.    Social History:   reports that she has never smoked. She does not have any smokeless tobacco history on file. She reports that she does not drink alcohol or use drugs.    Physical Exam:  /88   Pulse 83   Wt 51 kg (112 lb 7 oz)   LMP 01/01/1970   BMI 21.24 kg/m²   GEN: No acute distress. Calm, comfortable  HENT: Normocephalic, atraumatic, moist mucous membranes  EYE: Anicteric sclera, non-injected.   CV: Non-diaphoretic. Regular Rate. Radial Pulses 2+.  RESP: Breathing comfortably. Chest expansion symmetric.  EXT: No clubbing, cyanosis.   SKIN: Warm, & dry to palpation. No visible rashes or lesions of exposed skin.   PSYCH: Pleasant mood and appropriate affect. Recent and remote memory intact.   GAIT: Mod Independent with walker, antalgic ambulation  Lumbar Spine Exam:       Inspection: No erythema, bruising.       Palpation: (+) TTP of lumbar  paraspinals, along iliac crest and SIJ on the left      ROM:  Limited in flexion, extension, lateral bending.       (+) Facet loading bilaterally      (-) Straight Leg Raise bilaterally      (+) BEBE bilaterally  Hip Exam:      Inspection: No gross deformity or apparent leg length discrepancy      Palpation:  (+) TTP along left ITB. No TTP to bilateral greater trochanteric bursas, piriformis.       ROM:  No limitation or pain in internal rotation, external rotation b/l  Neurologic Exam:     Alert. Speech is fluent and appropriate.     Strength: 4/5 in left hip flexion, knee extension, otherwise appears 5/5 throughout bilateral lower extremities     Sensation:  Grossly intact to light touch in bilateral lower extremities     Reflexes: 2+ in right patella and 1+ in left , could not elicit in b/l achilles     Tone: No abnormality appreciated in bilateral lower extremities     No Clonus     Downgoing toes on plantar stimulation      Imaging:  - MRI Lumbar Spine Without Contrast 12/08/2020:  Multilevel spondylosis is present with anterolisthesis of L3 on L4.  T12-L1: Normal  L1-L2, mild disc bulge and facet arthropathy without stenosis.  L2-L3: Mild facet arthropathy and minimal disc space narrowing without bulge and no stenosis.  L3-L4: 3 mm of anterolisthesis, concentric disc bulge and hypertrophic facet arthropathy result in moderate central and lateral recess stenosis greater on the left without significant constriction of the cauda equina.  Bony foraminal encroachment is present and trapping the left L3 nerve root at the subarticular foraminal level best seen on image 4 series 8.  L4-L5: Disc space narrowing with asymmetric 4 mm left paracentral disc bulge but without evidence of significant central lateral or foraminal stenosis.  Hyperintense annular fissure is noted in the disc centered at the bulge.  L5-S1: Mild disc bulge concentrically asymmetric toward the right contacting the exiting right L5 nerve root but  without compression.  No central canal stenosis is evident.  Bone marrow signal throughout appears normal without evidence of compression fracture or marrow replacement.  Hemangioma or lipoma is suggested at L3.  Parapelvic cyst on the right and cortical cyst on the left is noted in the kidneys.  The aorta is normal in caliber.  The visualized sacrum is unremarkable.  Impression:  Multilevel lumbar spondylosis with most significant central canal stenosis at level E at the L3-4 level mainly due to spondylosis and anterolisthesis.  Left foraminal stenosis at the subarticular level.  Focal disc protrusion left paracentral with annular fissure at L4-L5.  No significant stenosis in the canal.  Mild nerve root contact on the right by disc bulge at L5-S1 without nerve root edema.       - X-Ray Lumbar Spine Ap And Lateral 04/17/2020:  There is a mild lumbar dextroscoliosis.  Lumbar vertebral body heights are satisfactorily maintained.  There is a mild, 7 mm retrolisthesis of L2 on L3 and 8 mm anterolisthesis of L3 on L4.  There is multilevel disc space narrowing mild at L1-2 and moderate at every other lumbar level.  There are small marginal osteophytes throughout the lumbar spine.  There are degenerative changes of the facet joints, most severe in the lower lumbar spine.  There are atherosclerotic calcifications of the aorta and iliac arteries.  There is a moderate amount of stool in the colon.  Impression:  Degenerative changes of the spine.      Labs:  BMP  Lab Results   Component Value Date     08/30/2018    K 4.1 08/30/2018     08/30/2018    CO2 22 (L) 08/30/2018    BUN 13 08/30/2018    CREATININE 0.7 08/30/2018    CALCIUM 8.5 (L) 08/30/2018    ANIONGAP 8 08/30/2018    ESTGFRAFRICA >60 08/30/2018    EGFRNONAA >60 08/30/2018     Lab Results   Component Value Date    ALT 23 06/21/2018    AST 19 06/21/2018    ALKPHOS 82 06/21/2018    BILITOT 0.6 06/21/2018     Lab Results   Component Value Date      08/30/2018     Lab Results   Component Value Date    HGBA1C 6.3 (H) 06/19/2018       Assessment:  Trudy R Dakin is a 75 y.o. female with the following diagnoses based on history, exam, and imaging:    Problem List Items Addressed This Visit     None          This is a pleasant 75 y.o. lady presenting with:     - Acute on Chronic left sided low back pain with radicular leg pain: She has foraminal stenosis at L4-5 and L5-S1 with annular tear at L4-5 which is likely the cause of her symptoms. 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   - Comorbidities: Depression. DM2 w/ neuropathy. Chronic constipation. Paroxysmal A-fib on eliquis.     Treatment Plan:   - PT/OT/HEP: Refer to PT. Discussed benefits of exercise for pain.   - Procedures: Schedule left L4 & L5 TF SCOTT. The procedure risks, benefits, and possible complications were discussed with the patient including nerve damage, infection, bleeding, spinal headache, and paresis.   - Performed left cluneal nerve block today in clinic.  - Medications: No changes recommended at this time.  - Imaging: Reviewed.   - Labs: Reviewed.  Medications are appropriately dosed for current hepatorenal function.    Follow Up: RTC after procedure.     Colleen Carvajal M.D.  Interventional Pain Medicine / Physical Medicine & Rehabilitation    Disclaimer: This note was partly generated using dictation software which may occasionally result in transcription errors.

## 2020-12-15 NOTE — PROGRESS NOTES
[facility]  New Patient H&P    Referring Provider: Aaron Tmi Md  [address]  [address]  [place] [address]    Chief Complaint:   Chief Complaint   Patient presents with    Low-back Pain       History of Present Illness: Trudy R Dakin is a 75 y.o. female referred by Dr. Aaron Tim for back.      Onset: Years of back pain, but 4 weeks ago she started with this worse pain  Location: left low back and buttocks  Radiation: left leg to the knee and sometimes passed the knee  Timing: constant  Quality: Aching, Sharp and Shooting  Exacerbating Factors: walking for more than 1 minutes  Alleviating Factors: nothing, sometimes laying down helps  Associated Symptoms: She feels mild weakness in the left leg. (+) Tingling in both feet from neuropathy. Denies night fever/night sweats, urinary incontinence, bowel incontinence, significant weight loss    Severity: Currently: 10/10   Typical Range: 9-10/10     Exacerbation: 10/10     Pain is limiting mobility around the house, cleaning her house, cooking, showering.    Pain Disability Index  Family/Home Responsibilities:: 7  Recreation:: 8  Social Activity:: 7  Occupation:: 9  Sexual Behavior:: 10  Self Care:: 7  Life-Support Activities:: 8  Pain Disability Index (PDI): 56    Previous Interventions:  - None    Previous Therapies:  PT/OT: no  Chiropractor: No  Relevant Surgery: no   Previous Medications:   - NSAIDS: Ibuprofen.   - Muscle Relaxants:    - TCAs:   - SNRIs:   - Topicals:   - Anticonvulsants: Pregabalin didn't help. Gabapentin didn't help   - Opioids: Hydrocodone. Tramadol didn't help.     Current Pain Medications:  1. Ibuprofen 600mg  2. Hydrocodone  mg BID prn    Blood Thinners: [id number]    Full Medication List:    Current Outpatient Medications:     apixaban (ELIQUIS) 5 mg Tab, Take 5 mg by mouth 2 (two) times daily., Disp: , Rfl:     atorvastatin (LIPITOR) 10 MG tablet, Take 1 tablet (10 mg total) by mouth once  daily., Disp: 90 tablet, Rfl: 3    clotrimazole-betamethasone 1-0.05% (LOTRISONE) cream, APPLY EXTERNALLY TO THE AFFECTED AREA TWICE DAILY, Disp: 45 g, Rfl: 2    cyanocobalamin 1,000 mcg/mL injection, , Disp: , Rfl: 1    escitalopram oxalate (LEXAPRO) 5 MG Tab, Take 1 tablet (5 mg total) by mouth once daily., Disp: 90 tablet, Rfl: 3    ibuprofen (ADVIL,MOTRIN) 600 MG tablet, Take 1 tablet (600 mg total) by mouth every 8 (eight) hours as needed for Pain., Disp: 30 tablet, Rfl: 0    isosorbide mononitrate (IMDUR) 30 MG 24 hr tablet, Take 1 tablet (30 mg total) by mouth once daily., Disp: 90 tablet, Rfl: 3    metFORMIN (GLUCOPHAGE-XR) 500 MG ER 24hr tablet, TAKE 2 TABLETS BY MOUTH TWICE DAILY, Disp: 360 tablet, Rfl: 3    metoprolol succinate (TOPROL-XL) 25 MG 24 hr tablet, Take 25 mg by mouth once daily., Disp: , Rfl:     ondansetron (ZOFRAN-ODT) 4 MG TbDL, Take 1 tablet (4 mg total) by mouth every 6 (six) hours as needed (nausea)., Disp: 30 tablet, Rfl: 0    sodium bicarbonate 325 MG tablet, Take 325 mg by mouth 3 (three) times daily., Disp: , Rfl:     ALPRAZolam (XANAX) 0.5 MG tablet, Take 1 tablet (0.5 mg total) by mouth 2 (two) times daily as needed for Anxiety., Disp: 2 tablet, Rfl: 0    HYDROcodone-acetaminophen (NORCO)  mg per tablet, Take 1 tablet by mouth every 6 (six) hours as needed for Pain. (Patient not taking: Reported on 12/15/2020), Disp: 60 tablet, Rfl: 0    pregabalin (LYRICA) 75 MG capsule, Take 1 capsule (75 mg total) by mouth 3 (three) times daily. (Patient not taking: Reported on 12/15/2020), Disp: 90 capsule, Rfl: 5     Review of Systems:  Review of Systems   Constitutional: Negative for fever and weight loss.   HENT: Negative for ear pain and tinnitus.    Eyes: Negative for pain and redness.   Respiratory: Negative for cough and shortness of breath.    Cardiovascular: Negative for chest pain and palpitations.   Gastrointestinal: Negative for constipation and heartburn.    Genitourinary: Negative.         Denies urinary incontinence. Denies urine retention.    Musculoskeletal: Positive for back pain, joint pain, myalgias and neck pain.   Skin: Negative for itching and rash.   Neurological: Negative for tingling, focal weakness and seizures.   Endo/Heme/Allergies: Negative for environmental allergies. Does not bruise/bleed easily.   Psychiatric/Behavioral: Negative for depression. The patient is not nervous/anxious and does not have insomnia.        Allergies:  Patient has no known allergies.     Medical History:   has a past medical history of Diabetes, Diverticulitis, and Hypertension.    Surgical History:   has a past surgical history that includes Appendectomy; Carpal tunnel release; Abdominal surgery (2006); Mandible fracture surgery (1970); Rotator cuff repair (Left, 11/2016); Oophorectomy (1970); Anterior cervical discectomy w/ fusion (N/A, 8/30/2018); Total Reduction Mammoplasty (Bilateral, 1990); Hysterectomy (1970); and Colectomy (07/2020).    Family History:  family history includes Heart disease in her father.    Social History:   reports that she has never smoked. She does not have any smokeless tobacco history on file. She reports that she does not drink alcohol or use drugs.    Physical Exam:  /88   Pulse 83   Wt 51 kg (112 lb 7 oz)   LMP 01/01/1970   BMI 21.24 kg/m²   GEN: No acute distress. Calm, comfortable  HENT: Normocephalic, atraumatic, moist mucous membranes  EYE: Anicteric sclera, non-injected.   CV: Non-diaphoretic. Regular Rate. Radial Pulses 2+.  RESP: Breathing comfortably. Chest expansion symmetric.  EXT: No clubbing, cyanosis.   SKIN: Warm, & dry to palpation. No visible rashes or lesions of exposed skin.   PSYCH: Pleasant mood and appropriate affect. Recent and remote memory intact.   GAIT: Mod Independent with walker, antalgic ambulation  Lumbar Spine Exam:       Inspection: No erythema, bruising.       Palpation: (+) TTP of lumbar  paraspinals, along iliac crest and SIJ on the left      ROM:  Limited in flexion, extension, lateral bending.       (+) Facet loading bilaterally      (-) Straight Leg Raise bilaterally      (+) BEBE bilaterally  Hip Exam:      Inspection: No gross deformity or apparent leg length discrepancy      Palpation:  (+) TTP along left ITB. No TTP to bilateral greater trochanteric bursas, piriformis.       ROM:  No limitation or pain in internal rotation, external rotation b/l  Neurologic Exam:     Alert. Speech is fluent and appropriate.     Strength: 4/5 in left hip flexion, knee extension, otherwise appears 5/5 throughout bilateral lower extremities     Sensation:  Grossly intact to light touch in bilateral lower extremities     Reflexes: 2+ in right patella and 1+ in left , could not elicit in b/l achilles     Tone: No abnormality appreciated in bilateral lower extremities     No Clonus     Downgoing toes on plantar stimulation      Imaging:  - MRI Lumbar Spine Without Contrast 12/08/2020:  Multilevel spondylosis is present with anterolisthesis of L3 on L4.  T12-L1: Normal  L1-L2, mild disc bulge and facet arthropathy without stenosis.  L2-L3: Mild facet arthropathy and minimal disc space narrowing without bulge and no stenosis.  L3-L4: 3 mm of anterolisthesis, concentric disc bulge and hypertrophic facet arthropathy result in moderate central and lateral recess stenosis greater on the left without significant constriction of the cauda equina.  Bony foraminal encroachment is present and trapping the left L3 nerve root at the subarticular foraminal level best seen on image 4 series 8.  L4-L5: Disc space narrowing with asymmetric 4 mm left paracentral disc bulge but without evidence of significant central lateral or foraminal stenosis.  Hyperintense annular fissure is noted in the disc centered at the bulge.  L5-S1: Mild disc bulge concentrically asymmetric toward the right contacting the exiting right L5 nerve root but  without compression.  No central canal stenosis is evident.  Bone marrow signal throughout appears normal without evidence of compression fracture or marrow replacement.  Hemangioma or lipoma is suggested at L3.  Parapelvic cyst on the right and cortical cyst on the left is noted in the kidneys.  The aorta is normal in caliber.  The visualized sacrum is unremarkable.  Impression:  Multilevel lumbar spondylosis with most significant central canal stenosis at level E at the L3-4 level mainly due to spondylosis and anterolisthesis.  Left foraminal stenosis at the subarticular level.  Focal disc protrusion left paracentral with annular fissure at L4-L5.  No significant stenosis in the canal.  Mild nerve root contact on the right by disc bulge at L5-S1 without nerve root edema.       - X-Ray Lumbar Spine Ap And Lateral 04/17/2020:  There is a mild lumbar dextroscoliosis.  Lumbar vertebral body heights are satisfactorily maintained.  There is a mild, 7 mm retrolisthesis of L2 on L3 and 8 mm anterolisthesis of L3 on L4.  There is multilevel disc space narrowing mild at L1-2 and moderate at every other lumbar level.  There are small marginal osteophytes throughout the lumbar spine.  There are degenerative changes of the facet joints, most severe in the lower lumbar spine.  There are atherosclerotic calcifications of the aorta and iliac arteries.  There is a moderate amount of stool in the colon.  Impression:  Degenerative changes of the spine.      Labs:  BMP  Lab Results   Component Value Date     08/30/2018    K 4.1 08/30/2018     08/30/2018    CO2 22 (L) 08/30/2018    BUN 13 08/30/2018    CREATININE 0.7 08/30/2018    CALCIUM 8.5 (L) 08/30/2018    ANIONGAP 8 08/30/2018    ESTGFRAFRICA >60 08/30/2018    EGFRNONAA >60 08/30/2018     Lab Results   Component Value Date    ALT 23 06/21/2018    AST 19 06/21/2018    ALKPHOS 82 06/21/2018    BILITOT 0.6 06/21/2018     Lab Results   Component Value Date      08/30/2018     Lab Results   Component Value Date    HGBA1C 6.3 (H) 06/19/2018       Assessment:  Trudy R Dakin is a 75 y.o. female with the following diagnoses based on history, exam, and imaging:    Problem List Items Addressed This Visit     None          This is a pleasant 75 y.o. lady presenting with:     - Acute on Chronic left sided low back pain with radicular leg pain: She has foraminal stenosis at L4-5 and L5-S1 with annular tear at L4-5 which is likely the cause of her symptoms. 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   - Comorbidities: Depression. DM2 w/ neuropathy. Chronic constipation. Paroxysmal A-fib on eliquis.     Treatment Plan:   - PT/OT/HEP: Refer to PT. Discussed benefits of exercise for pain.   - Procedures: Schedule left L4 & L5 TF SCOTT. The procedure risks, benefits, and possible complications were discussed with the patient including nerve damage, infection, bleeding, spinal headache, and paresis.   - Performed left cluneal nerve block today in clinic.  - Medications: No changes recommended at this time.  - Imaging: Reviewed.   - Labs: Reviewed.  Medications are appropriately dosed for current hepatorenal function.    Follow Up: RTC after procedure.     Colleen Carvajal M.D.  Interventional Pain Medicine / Physical Medicine & Rehabilitation    Disclaimer: This note was partly generated using dictation software which may occasionally result in transcription errors.

## 2020-12-16 ENCOUNTER — TELEPHONE (OUTPATIENT)
Dept: PAIN MEDICINE | Facility: CLINIC | Age: 75
End: 2020-12-16

## 2020-12-16 DIAGNOSIS — M54.16 LUMBAR RADICULOPATHY: Primary | ICD-10-CM

## 2020-12-16 NOTE — TELEPHONE ENCOUNTER
Pt scheduled for Left Transforaminal Epidural Steroid Injection on 12/23/20 with Dr. Colleen Carvajal. Requesting medication clearance to hold Eliquis 3 days prior to procedure. Please advise.

## 2020-12-16 NOTE — TELEPHONE ENCOUNTER
Pt scheduled for 12/23/20 at 1:00pm for Left TFESI. Pt aware to check in at registration desk on the first floor of the hospital for 12:00 pm. Pt is taking Eliquis. Sent med clearance request to Dr. Tim. Pt aware we will contact her to advise her when to hold medications. Pt denied having a pacemaker/ICD.

## 2020-12-17 NOTE — TELEPHONE ENCOUNTER
Pt aware she was cleared to hold Eliquis 3 days prior to procedure and can restart 24 hours after procedure.

## 2020-12-18 ENCOUNTER — TELEPHONE (OUTPATIENT)
Dept: PAIN MEDICINE | Facility: CLINIC | Age: 75
End: 2020-12-18

## 2020-12-22 NOTE — DISCHARGE INSTRUCTIONS
Home Care Instructions Pain Management:    1.  DIET:    You may resume your normal diet today.    2.  BATHING:    You may shower with luke warm water.    3.  DRESSING:    You may remove your bandage today.    4.  ACTIVITY LEVEL:      You may resume your normal activities 24 hours after your procedure.    5.  MEDICATIONS:    You may resume your normal medications today.    6.  SPECIAL INSTRUCTIONS:    No heat to the injection site for 24 hours including bath or shower, heating pad, moist heat or hot tubs.    Use an ice pack to the injection site for any pain or discomfort.  Apply ice packs for 20 minute intervals as needed.    If you have received any sedatives by mouth today, you can not drive for 12 hours.    If you have received sedation through an IV, you can not drive for 24 hours.    PLEASE CALL YOUR DOCTOR FOR THE FOLLOWIN.  Redness or swelling around the injection site.  2.  Fever of 101 degrees.  3.  Drainage (pus) from the injection site.  4.  For any continuous bleeding (some dried blood over the incision is normal.)    FOR EMERGENCIES:    If any unusual problems or difficulties occur during clinic hours, call [phone] or dial [phone].    Follow up with with your physician in 2-3 weeks.

## 2020-12-23 ENCOUNTER — HOSPITAL ENCOUNTER (OUTPATIENT)
Facility: HOSPITAL | Age: 75
Discharge: HOME | End: 2020-12-23
Attending: PHYSICAL MEDICINE & REHABILITATION | Admitting: PHYSICAL MEDICINE & REHABILITATION
Payer: MEDICARE

## 2020-12-23 VITALS
OXYGEN SATURATION: 98 % | HEART RATE: 82 BPM | TEMPERATURE: 97 F | WEIGHT: 119 LBS | BODY MASS INDEX: 22.47 KG/M2 | HEIGHT: 61 IN | DIASTOLIC BLOOD PRESSURE: 80 MMHG | RESPIRATION RATE: 18 BRPM | SYSTOLIC BLOOD PRESSURE: 166 MMHG

## 2020-12-23 DIAGNOSIS — R52 PAIN: ICD-10-CM

## 2020-12-23 DIAGNOSIS — M54.16 LUMBAR RADICULOPATHY: Primary | ICD-10-CM

## 2020-12-23 LAB — POCT GLUCOSE: 124 MG/DL (ref 70–110)

## 2020-12-23 PROCEDURE — 64484 NJX AA&/STRD TFRM EPI L/S EA: CPT | Performed by: PHYSICAL MEDICINE & REHABILITATION

## 2020-12-23 PROCEDURE — 63600175 PHARM REV CODE 636 W HCPCS: Performed by: PHYSICAL MEDICINE & REHABILITATION

## 2020-12-23 PROCEDURE — 25000003 PHARM REV CODE 250: Performed by: PHYSICAL MEDICINE & REHABILITATION

## 2020-12-23 PROCEDURE — 64483 PR EPIDURAL INJ, ANES/STEROID, TRANSFORAMINAL, LUMB/SACR, SNGL LEVL: ICD-10-PCS | Mod: LT,,, | Performed by: PHYSICAL MEDICINE & REHABILITATION

## 2020-12-23 PROCEDURE — 99152 MOD SED SAME PHYS/QHP 5/>YRS: CPT | Performed by: PHYSICAL MEDICINE & REHABILITATION

## 2020-12-23 PROCEDURE — 64484 NJX AA&/STRD TFRM EPI L/S EA: CPT | Mod: LT,,, | Performed by: PHYSICAL MEDICINE & REHABILITATION

## 2020-12-23 PROCEDURE — 64483 NJX AA&/STRD TFRM EPI L/S 1: CPT | Performed by: PHYSICAL MEDICINE & REHABILITATION

## 2020-12-23 PROCEDURE — 64483 NJX AA&/STRD TFRM EPI L/S 1: CPT | Mod: LT,,, | Performed by: PHYSICAL MEDICINE & REHABILITATION

## 2020-12-23 PROCEDURE — 64484 PRA INJECT ANES/STEROID FORAMEN LUMBAR/SACRAL W IMG GUIDE ,EA ADD LEVEL: ICD-10-PCS | Mod: LT,,, | Performed by: PHYSICAL MEDICINE & REHABILITATION

## 2020-12-23 PROCEDURE — 25500020 PHARM REV CODE 255: Performed by: PHYSICAL MEDICINE & REHABILITATION

## 2020-12-23 RX ORDER — LIDOCAINE HYDROCHLORIDE 10 MG/ML
INJECTION, SOLUTION EPIDURAL; INFILTRATION; INTRACAUDAL; PERINEURAL
Status: DISCONTINUED | OUTPATIENT
Start: 2020-12-23 | End: 2020-12-23 | Stop reason: HOSPADM

## 2020-12-23 RX ORDER — LIDOCAINE HYDROCHLORIDE 10 MG/ML
INJECTION INFILTRATION; PERINEURAL
Status: DISCONTINUED | OUTPATIENT
Start: 2020-12-23 | End: 2020-12-23 | Stop reason: HOSPADM

## 2020-12-23 RX ORDER — SODIUM BICARBONATE 1 MEQ/ML
SYRINGE (ML) INTRAVENOUS
Status: DISCONTINUED | OUTPATIENT
Start: 2020-12-23 | End: 2020-12-23 | Stop reason: HOSPADM

## 2020-12-23 RX ORDER — SODIUM CHLORIDE 9 MG/ML
INJECTION, SOLUTION INTRAVENOUS CONTINUOUS
Status: DISCONTINUED | OUTPATIENT
Start: 2020-12-23 | End: 2022-06-09

## 2020-12-23 RX ORDER — DEXAMETHASONE SODIUM PHOSPHATE 10 MG/ML
INJECTION INTRAMUSCULAR; INTRAVENOUS
Status: DISCONTINUED | OUTPATIENT
Start: 2020-12-23 | End: 2020-12-23 | Stop reason: HOSPADM

## 2020-12-23 RX ADMIN — SODIUM CHLORIDE 25 ML/HR: 0.9 INJECTION, SOLUTION INTRAVENOUS at 08:12

## 2020-12-23 NOTE — OP NOTE
Lumbar Transforaminal Epidural Steroid Injection under fluoroscopic guidance  I have reviewed the patient's medications, allergies and relevant histories prior to the procedure and no contraindications have been identified. The risks, benefits and alternatives to the procedure were discussed with the patient, and all questions regarding the procedure were answered to the patient's satisfaction. I personally obtained [place]'s consent prior to the start of the procedure and the signed consent can be found in the patient's chart.                                                         Time-out was taken to identify patient, procedure, laterality, and allergies prior to starting the procedure.       Date of Service: 12/23/2020  Procedure: Left L4 & L5 transforaminal epidural steroid injection under fluoroscopy  Pre-Operative Diagnosis: Lumbar Radiculopathy  Post-Operative Diagnosis: Lumbar Radiculopathy    Physician: Colleen Carvajal M.D.  Assistants: None    Medications Injected:  Preservative-free dexamethasone 10 mg/mL & 5 mL of Xylocaine 1% MPF (3 mL injected per site).   Local Anesthetic: Xylocaine 1% 10 mL with Sodium Bicarbonate 1ml.   Sedation Medications: None    Procedural Technique:   Laying in a prone position, the patient was prepped and draped in the usual sterile fashion using ChloraPrep and fenestrated drape.  The vertebral foramen of interest was determined under fluoroscopic guidance.  Local anesthetic was given by raising a wheel and going down to the hub of a 25-gauge 1.5 inch needle.  The 3.5 inch 25-gauge spinal needle was introduced towards the inferior-medial aspect transverse process of each above named nerve root levels on the Left side.  The needle was walked medially then hinged into the neural foramen of the levels stated above.  After negative aspiration, 2-3 mL of Omnipaque was injected to confirm appropriate placement and that there was no vascular runoff.  The medication was then injected  slowly. Needles were removed and bandages were applied to the areas. The patient tolerated the procedure well.     Estimated Blood Loss:  None.  Complications:  None.     Disposition: Vital signs remained stable throughout the procedure. The patient was taken to the recovery area where written discharge instructions for the procedure were given.     Follow-Up: We will see the patient back in two weeks or the patient may call to inform of status. The patient was discharged in a stable condition.

## 2020-12-23 NOTE — DISCHARGE SUMMARY
[facility]  Discharge Note  Short Stay     Admit Date: 12/23/2020    Discharge Date: 12/23/2020     Attending Physician: Colleen Carvajal M.D.    Diagnoses:  Active Hospital Problems    Diagnosis  POA    Lumbar radiculopathy [M54.16]  Yes     Chronic      Resolved Hospital Problems   No resolved problems to display.     Discharged Condition: Good     Hospital Course: Patient was admitted for an outpatient interventional pain management procedure and tolerated the procedure well with no complications.     Final Diagnoses: Same as principal problem.     Disposition: Home or Self Care     Follow up/Patient Instructions:   Follow-up in 1-2 weeks unless otherwise instructed. May return sooner as needed.       Reconciled Medications:     Medication List      CONTINUE taking these medications    ALPRAZolam 0.5 MG tablet  Commonly known as: XANAX  Take 1 tablet (0.5 mg total) by mouth 2 (two) times daily as needed for Anxiety.     apixaban 5 mg Tab  Commonly known as: ELIQUIS  Take 5 mg by mouth 2 (two) times daily.     atorvastatin 10 MG tablet  Commonly known as: LIPITOR  Take 1 tablet (10 mg total) by mouth once daily.     clotrimazole-betamethasone 1-0.05% cream  Commonly known as: LOTRISONE  APPLY EXTERNALLY TO THE AFFECTED AREA TWICE DAILY     cyanocobalamin 1,000 mcg/mL injection     escitalopram oxalate 5 MG Tab  Commonly known as: LEXAPRO  Take 1 tablet (5 mg total) by mouth once daily.     HYDROcodone-acetaminophen  mg per tablet  Commonly known as: NORCO  Take 1 tablet by mouth every 6 (six) hours as needed for Pain.     ibuprofen 600 MG tablet  Commonly known as: ADVIL,MOTRIN  Take 1 tablet (600 mg total) by mouth every 8 (eight) hours as needed for Pain.     isosorbide mononitrate 30 MG 24 hr tablet  Commonly known as: IMDUR  Take 1 tablet (30 mg total) by mouth once daily.     metFORMIN 500 MG ER 24hr tablet  Commonly known as: GLUCOPHAGE-XR  TAKE 2 TABLETS BY MOUTH TWICE DAILY     metoprolol  succinate 25 MG 24 hr tablet  Commonly known as: TOPROL-XL  Take 25 mg by mouth once daily.     ondansetron 4 MG Tbdl  Commonly known as: ZOFRAN-ODT  Take 1 tablet (4 mg total) by mouth every 6 (six) hours as needed (nausea).     pregabalin 75 MG capsule  Commonly known as: LYRICA  Take 1 capsule (75 mg total) by mouth 3 (three) times daily.     sodium bicarbonate 325 MG tablet  Take 325 mg by mouth 3 (three) times daily.           Discharge Procedure Orders (must include Diet, Follow-up, Activity)   Ice to affected area   Order Comments: 20 minutes of ice or until area numb to the touch if area is sore 2-3 times per day as needed     No driving until:   Order Comments: Until following day     Notify your health care provider if you experience any of the following:  temperature >100.4     Notify your health care provider if you experience any of the following:  persistent nausea and vomiting or diarrhea     Notify your health care provider if you experience any of the following:  severe uncontrolled pain     Notify your health care provider if you experience any of the following:  redness, tenderness, or signs of infection (pain, swelling, redness, odor or green/yellow discharge around incision site)     Notify your health care provider if you experience any of the following:  difficulty breathing or increased cough     Notify your health care provider if you experience any of the following:  severe persistent headache     Notify your health care provider if you experience any of the following:  worsening rash     Notify your health care provider if you experience any of the following:  persistent dizziness, light-headedness, or visual disturbances     Notify your health care provider if you experience any of the following:  increased confusion or weakness     No dressing needed     Shower on day dressing removed (No bath)       Colleen Carvajal M.D.  Interventional Pain Medicine / Physical Medicine & Rehabilitation

## 2021-01-07 ENCOUNTER — TELEPHONE (OUTPATIENT)
Dept: PAIN MEDICINE | Facility: CLINIC | Age: 76
End: 2021-01-07

## 2021-01-12 ENCOUNTER — OFFICE VISIT (OUTPATIENT)
Dept: PAIN MEDICINE | Facility: CLINIC | Age: 76
End: 2021-01-12
Payer: MEDICARE

## 2021-01-12 VITALS
BODY MASS INDEX: 23.62 KG/M2 | SYSTOLIC BLOOD PRESSURE: 141 MMHG | DIASTOLIC BLOOD PRESSURE: 78 MMHG | HEART RATE: 87 BPM | WEIGHT: 125 LBS

## 2021-01-12 DIAGNOSIS — M47.816 LUMBAR SPONDYLOSIS: ICD-10-CM

## 2021-01-12 DIAGNOSIS — M43.16 SPONDYLOLISTHESIS OF LUMBAR REGION: ICD-10-CM

## 2021-01-12 DIAGNOSIS — M54.42 CHRONIC LEFT-SIDED LOW BACK PAIN WITH LEFT-SIDED SCIATICA: Primary | ICD-10-CM

## 2021-01-12 DIAGNOSIS — G89.29 CHRONIC LEFT-SIDED LOW BACK PAIN WITH LEFT-SIDED SCIATICA: Primary | ICD-10-CM

## 2021-01-12 DIAGNOSIS — M54.16 LUMBAR RADICULOPATHY: ICD-10-CM

## 2021-01-12 DIAGNOSIS — M51.36 DDD (DEGENERATIVE DISC DISEASE), LUMBAR: ICD-10-CM

## 2021-01-12 PROCEDURE — 3078F DIAST BP <80 MM HG: CPT | Mod: CPTII,S$GLB,, | Performed by: PHYSICAL MEDICINE & REHABILITATION

## 2021-01-12 PROCEDURE — 1125F AMNT PAIN NOTED PAIN PRSNT: CPT | Mod: S$GLB,,, | Performed by: PHYSICAL MEDICINE & REHABILITATION

## 2021-01-12 PROCEDURE — 3077F PR MOST RECENT SYSTOLIC BLOOD PRESSURE >= 140 MM HG: ICD-10-PCS | Mod: CPTII,S$GLB,, | Performed by: PHYSICAL MEDICINE & REHABILITATION

## 2021-01-12 PROCEDURE — 3077F SYST BP >= 140 MM HG: CPT | Mod: CPTII,S$GLB,, | Performed by: PHYSICAL MEDICINE & REHABILITATION

## 2021-01-12 PROCEDURE — 1159F MED LIST DOCD IN RCRD: CPT | Mod: S$GLB,,, | Performed by: PHYSICAL MEDICINE & REHABILITATION

## 2021-01-12 PROCEDURE — 3078F PR MOST RECENT DIASTOLIC BLOOD PRESSURE < 80 MM HG: ICD-10-PCS | Mod: CPTII,S$GLB,, | Performed by: PHYSICAL MEDICINE & REHABILITATION

## 2021-01-12 PROCEDURE — 99999 PR PBB SHADOW E&M-EST. PATIENT-LVL IV: CPT | Mod: PBBFAC,,, | Performed by: PHYSICAL MEDICINE & REHABILITATION

## 2021-01-12 PROCEDURE — 1159F PR MEDICATION LIST DOCUMENTED IN MEDICAL RECORD: ICD-10-PCS | Mod: S$GLB,,, | Performed by: PHYSICAL MEDICINE & REHABILITATION

## 2021-01-12 PROCEDURE — 99214 PR OFFICE/OUTPT VISIT, EST, LEVL IV, 30-39 MIN: ICD-10-PCS | Mod: S$GLB,,, | Performed by: PHYSICAL MEDICINE & REHABILITATION

## 2021-01-12 PROCEDURE — 99999 PR PBB SHADOW E&M-EST. PATIENT-LVL IV: ICD-10-PCS | Mod: PBBFAC,,, | Performed by: PHYSICAL MEDICINE & REHABILITATION

## 2021-01-12 PROCEDURE — 1125F PR PAIN SEVERITY QUANTIFIED, PAIN PRESENT: ICD-10-PCS | Mod: S$GLB,,, | Performed by: PHYSICAL MEDICINE & REHABILITATION

## 2021-01-12 PROCEDURE — 99214 OFFICE O/P EST MOD 30 MIN: CPT | Mod: S$GLB,,, | Performed by: PHYSICAL MEDICINE & REHABILITATION

## 2021-01-12 RX ORDER — BACLOFEN 10 MG/1
10 TABLET ORAL NIGHTLY PRN
Qty: 30 TABLET | Refills: 2 | Status: SHIPPED | OUTPATIENT
Start: 2021-01-12 | End: 2021-01-28

## 2021-01-12 RX ORDER — PREGABALIN 150 MG/1
150 CAPSULE ORAL 2 TIMES DAILY
Qty: 60 CAPSULE | Refills: 5 | Status: ON HOLD | OUTPATIENT
Start: 2021-01-12 | End: 2021-04-16 | Stop reason: HOSPADM

## 2021-01-13 ENCOUNTER — TELEPHONE (OUTPATIENT)
Dept: PAIN MEDICINE | Facility: CLINIC | Age: 76
End: 2021-01-13

## 2021-01-13 DIAGNOSIS — M54.16 LUMBAR RADICULOPATHY: Primary | ICD-10-CM

## 2021-01-20 ENCOUNTER — HOSPITAL ENCOUNTER (OUTPATIENT)
Facility: HOSPITAL | Age: 76
Discharge: HOME | End: 2021-01-20
Attending: PHYSICAL MEDICINE & REHABILITATION | Admitting: PHYSICAL MEDICINE & REHABILITATION
Payer: MEDICARE

## 2021-01-20 VITALS
BODY MASS INDEX: 23.6 KG/M2 | RESPIRATION RATE: 15 BRPM | DIASTOLIC BLOOD PRESSURE: 67 MMHG | HEIGHT: 61 IN | WEIGHT: 125 LBS | HEART RATE: 80 BPM | SYSTOLIC BLOOD PRESSURE: 111 MMHG | TEMPERATURE: 97 F | OXYGEN SATURATION: 100 %

## 2021-01-20 DIAGNOSIS — R52 PAIN: ICD-10-CM

## 2021-01-20 DIAGNOSIS — M54.16 LUMBAR RADICULOPATHY: Primary | ICD-10-CM

## 2021-01-20 LAB — POCT GLUCOSE: 111 MG/DL (ref 70–110)

## 2021-01-20 PROCEDURE — 63600175 PHARM REV CODE 636 W HCPCS: Performed by: PHYSICAL MEDICINE & REHABILITATION

## 2021-01-20 PROCEDURE — 25500020 PHARM REV CODE 255: Performed by: PHYSICAL MEDICINE & REHABILITATION

## 2021-01-20 PROCEDURE — 99152 MOD SED SAME PHYS/QHP 5/>YRS: CPT | Performed by: PHYSICAL MEDICINE & REHABILITATION

## 2021-01-20 PROCEDURE — 62323 NJX INTERLAMINAR LMBR/SAC: CPT | Performed by: PHYSICAL MEDICINE & REHABILITATION

## 2021-01-20 PROCEDURE — 25000003 PHARM REV CODE 250: Performed by: PHYSICAL MEDICINE & REHABILITATION

## 2021-01-20 PROCEDURE — 62323 NJX INTERLAMINAR LMBR/SAC: CPT | Mod: ,,, | Performed by: PHYSICAL MEDICINE & REHABILITATION

## 2021-01-20 PROCEDURE — 62323 PR INJ LUMBAR/SACRAL, W/IMAGING GUIDANCE: ICD-10-PCS | Mod: ,,, | Performed by: PHYSICAL MEDICINE & REHABILITATION

## 2021-01-20 RX ORDER — LIDOCAINE HYDROCHLORIDE 10 MG/ML
INJECTION, SOLUTION EPIDURAL; INFILTRATION; INTRACAUDAL; PERINEURAL
Status: DISCONTINUED | OUTPATIENT
Start: 2021-01-20 | End: 2021-01-20 | Stop reason: HOSPADM

## 2021-01-20 RX ORDER — LIDOCAINE HYDROCHLORIDE 10 MG/ML
INJECTION INFILTRATION; PERINEURAL
Status: DISCONTINUED | OUTPATIENT
Start: 2021-01-20 | End: 2021-01-20 | Stop reason: HOSPADM

## 2021-01-20 RX ORDER — SODIUM CHLORIDE 9 MG/ML
INJECTION, SOLUTION INTRAVENOUS CONTINUOUS
Status: DISCONTINUED | OUTPATIENT
Start: 2021-01-20 | End: 2022-06-09

## 2021-01-20 RX ORDER — SODIUM BICARBONATE 1 MEQ/ML
SYRINGE (ML) INTRAVENOUS
Status: DISCONTINUED | OUTPATIENT
Start: 2021-01-20 | End: 2021-01-20 | Stop reason: HOSPADM

## 2021-01-20 RX ORDER — DEXAMETHASONE SODIUM PHOSPHATE 10 MG/ML
INJECTION INTRAMUSCULAR; INTRAVENOUS
Status: DISCONTINUED | OUTPATIENT
Start: 2021-01-20 | End: 2021-01-20 | Stop reason: HOSPADM

## 2021-01-22 ENCOUNTER — TELEPHONE (OUTPATIENT)
Dept: PAIN MEDICINE | Facility: CLINIC | Age: 76
End: 2021-01-22

## 2021-02-09 ENCOUNTER — TELEPHONE (OUTPATIENT)
Dept: PAIN MEDICINE | Facility: CLINIC | Age: 76
End: 2021-02-09

## 2021-02-09 ENCOUNTER — OFFICE VISIT (OUTPATIENT)
Dept: PAIN MEDICINE | Facility: CLINIC | Age: 76
End: 2021-02-09
Payer: MEDICARE

## 2021-02-09 VITALS — DIASTOLIC BLOOD PRESSURE: 71 MMHG | HEART RATE: 78 BPM | SYSTOLIC BLOOD PRESSURE: 107 MMHG

## 2021-02-09 DIAGNOSIS — G89.29 CHRONIC LEFT-SIDED LOW BACK PAIN WITH LEFT-SIDED SCIATICA: ICD-10-CM

## 2021-02-09 DIAGNOSIS — M48.061 LUMBAR FORAMINAL STENOSIS: ICD-10-CM

## 2021-02-09 DIAGNOSIS — M54.42 CHRONIC LEFT-SIDED LOW BACK PAIN WITH LEFT-SIDED SCIATICA: ICD-10-CM

## 2021-02-09 DIAGNOSIS — M51.36 DDD (DEGENERATIVE DISC DISEASE), LUMBAR: ICD-10-CM

## 2021-02-09 DIAGNOSIS — M54.16 LUMBAR RADICULOPATHY: Primary | ICD-10-CM

## 2021-02-09 DIAGNOSIS — M43.16 SPONDYLOLISTHESIS OF LUMBAR REGION: ICD-10-CM

## 2021-02-09 DIAGNOSIS — M47.816 LUMBAR SPONDYLOSIS: ICD-10-CM

## 2021-02-09 PROCEDURE — 3074F PR MOST RECENT SYSTOLIC BLOOD PRESSURE < 130 MM HG: ICD-10-PCS | Mod: CPTII,S$GLB,, | Performed by: PHYSICAL MEDICINE & REHABILITATION

## 2021-02-09 PROCEDURE — 99999 PR PBB SHADOW E&M-EST. PATIENT-LVL III: ICD-10-PCS | Mod: PBBFAC,,, | Performed by: PHYSICAL MEDICINE & REHABILITATION

## 2021-02-09 PROCEDURE — 1125F PR PAIN SEVERITY QUANTIFIED, PAIN PRESENT: ICD-10-PCS | Mod: S$GLB,,, | Performed by: PHYSICAL MEDICINE & REHABILITATION

## 2021-02-09 PROCEDURE — 3078F DIAST BP <80 MM HG: CPT | Mod: CPTII,S$GLB,, | Performed by: PHYSICAL MEDICINE & REHABILITATION

## 2021-02-09 PROCEDURE — 3074F SYST BP LT 130 MM HG: CPT | Mod: CPTII,S$GLB,, | Performed by: PHYSICAL MEDICINE & REHABILITATION

## 2021-02-09 PROCEDURE — 99999 PR PBB SHADOW E&M-EST. PATIENT-LVL III: CPT | Mod: PBBFAC,,, | Performed by: PHYSICAL MEDICINE & REHABILITATION

## 2021-02-09 PROCEDURE — 1159F PR MEDICATION LIST DOCUMENTED IN MEDICAL RECORD: ICD-10-PCS | Mod: S$GLB,,, | Performed by: PHYSICAL MEDICINE & REHABILITATION

## 2021-02-09 PROCEDURE — 3078F PR MOST RECENT DIASTOLIC BLOOD PRESSURE < 80 MM HG: ICD-10-PCS | Mod: CPTII,S$GLB,, | Performed by: PHYSICAL MEDICINE & REHABILITATION

## 2021-02-09 PROCEDURE — 1159F MED LIST DOCD IN RCRD: CPT | Mod: S$GLB,,, | Performed by: PHYSICAL MEDICINE & REHABILITATION

## 2021-02-09 PROCEDURE — 1125F AMNT PAIN NOTED PAIN PRSNT: CPT | Mod: S$GLB,,, | Performed by: PHYSICAL MEDICINE & REHABILITATION

## 2021-02-09 PROCEDURE — 99214 PR OFFICE/OUTPT VISIT, EST, LEVL IV, 30-39 MIN: ICD-10-PCS | Mod: S$GLB,,, | Performed by: PHYSICAL MEDICINE & REHABILITATION

## 2021-02-09 PROCEDURE — 99214 OFFICE O/P EST MOD 30 MIN: CPT | Mod: S$GLB,,, | Performed by: PHYSICAL MEDICINE & REHABILITATION

## 2021-02-09 RX ORDER — BACLOFEN 10 MG/1
20 TABLET ORAL NIGHTLY PRN
Qty: 60 TABLET | Refills: 5 | Status: ON HOLD | OUTPATIENT
Start: 2021-02-09 | End: 2021-04-16 | Stop reason: HOSPADM

## 2021-02-18 ENCOUNTER — PATIENT MESSAGE (OUTPATIENT)
Dept: PODIATRY | Facility: CLINIC | Age: 76
End: 2021-02-18

## 2021-02-18 ENCOUNTER — TELEPHONE (OUTPATIENT)
Dept: PAIN MEDICINE | Facility: CLINIC | Age: 76
End: 2021-02-18

## 2021-02-18 ENCOUNTER — TELEPHONE (OUTPATIENT)
Dept: NEUROSURGERY | Facility: CLINIC | Age: 76
End: 2021-02-18

## 2021-03-01 ENCOUNTER — TELEPHONE (OUTPATIENT)
Dept: NEUROSURGERY | Facility: CLINIC | Age: 76
End: 2021-03-01

## 2021-03-15 ENCOUNTER — OFFICE VISIT (OUTPATIENT)
Dept: NEUROSURGERY | Facility: CLINIC | Age: 76
End: 2021-03-15
Payer: MEDICARE

## 2021-03-15 ENCOUNTER — HOSPITAL ENCOUNTER (OUTPATIENT)
Dept: RADIOLOGY | Facility: HOSPITAL | Age: 76
Discharge: HOME | End: 2021-03-15
Attending: NEUROLOGICAL SURGERY
Payer: MEDICARE

## 2021-03-15 VITALS — HEART RATE: 83 BPM | SYSTOLIC BLOOD PRESSURE: 119 MMHG | DIASTOLIC BLOOD PRESSURE: 70 MMHG

## 2021-03-15 DIAGNOSIS — M51.36 DDD (DEGENERATIVE DISC DISEASE), LUMBAR: ICD-10-CM

## 2021-03-15 DIAGNOSIS — Z98.1 S/P CERVICAL SPINAL FUSION: Primary | ICD-10-CM

## 2021-03-15 DIAGNOSIS — M43.16 SPONDYLOLISTHESIS AT L3-L4 LEVEL: ICD-10-CM

## 2021-03-15 DIAGNOSIS — M40.30 FLAT BACK SYNDROME: ICD-10-CM

## 2021-03-15 DIAGNOSIS — Z98.1 S/P CERVICAL SPINAL FUSION: ICD-10-CM

## 2021-03-15 PROCEDURE — 72050 X-RAY EXAM NECK SPINE 4/5VWS: CPT | Mod: TC,FY

## 2021-03-15 PROCEDURE — 72082 X-RAY EXAM ENTIRE SPI 2/3 VW: CPT | Mod: 26,,, | Performed by: RADIOLOGY

## 2021-03-15 PROCEDURE — 72120 X-RAY BEND ONLY L-S SPINE: CPT | Mod: 26,59,, | Performed by: RADIOLOGY

## 2021-03-15 PROCEDURE — 72082 X-RAY EXAM ENTIRE SPI 2/3 VW: CPT | Mod: TC,FY

## 2021-03-15 PROCEDURE — 1100F PTFALLS ASSESS-DOCD GE2>/YR: CPT | Mod: CPTII,S$GLB,, | Performed by: NEUROLOGICAL SURGERY

## 2021-03-15 PROCEDURE — 99999 PR PBB SHADOW E&M-EST. PATIENT-LVL III: CPT | Mod: PBBFAC,,, | Performed by: NEUROLOGICAL SURGERY

## 2021-03-15 PROCEDURE — 1100F PR PT FALLS ASSESS DOC 2+ FALLS/FALL W/INJURY/YR: ICD-10-PCS | Mod: CPTII,S$GLB,, | Performed by: NEUROLOGICAL SURGERY

## 2021-03-15 PROCEDURE — 72120 XR LUMBAR SPINE FLEXION AND EXTENSION ONLY: ICD-10-PCS | Mod: 26,59,, | Performed by: RADIOLOGY

## 2021-03-15 PROCEDURE — 3288F PR FALLS RISK ASSESSMENT DOCUMENTED: ICD-10-PCS | Mod: CPTII,S$GLB,, | Performed by: NEUROLOGICAL SURGERY

## 2021-03-15 PROCEDURE — 99214 OFFICE O/P EST MOD 30 MIN: CPT | Mod: S$GLB,,, | Performed by: NEUROLOGICAL SURGERY

## 2021-03-15 PROCEDURE — 99999 PR PBB SHADOW E&M-EST. PATIENT-LVL III: ICD-10-PCS | Mod: PBBFAC,,, | Performed by: NEUROLOGICAL SURGERY

## 2021-03-15 PROCEDURE — 99214 PR OFFICE/OUTPT VISIT, EST, LEVL IV, 30-39 MIN: ICD-10-PCS | Mod: S$GLB,,, | Performed by: NEUROLOGICAL SURGERY

## 2021-03-15 PROCEDURE — 3074F PR MOST RECENT SYSTOLIC BLOOD PRESSURE < 130 MM HG: ICD-10-PCS | Mod: CPTII,S$GLB,, | Performed by: NEUROLOGICAL SURGERY

## 2021-03-15 PROCEDURE — 1125F AMNT PAIN NOTED PAIN PRSNT: CPT | Mod: S$GLB,,, | Performed by: NEUROLOGICAL SURGERY

## 2021-03-15 PROCEDURE — 72050 XR CERVICAL SPINE AP LAT WITH FLEX EXTEN: ICD-10-PCS | Mod: 26,59,, | Performed by: RADIOLOGY

## 2021-03-15 PROCEDURE — 1159F PR MEDICATION LIST DOCUMENTED IN MEDICAL RECORD: ICD-10-PCS | Mod: S$GLB,,, | Performed by: NEUROLOGICAL SURGERY

## 2021-03-15 PROCEDURE — 72050 X-RAY EXAM NECK SPINE 4/5VWS: CPT | Mod: 26,59,, | Performed by: RADIOLOGY

## 2021-03-15 PROCEDURE — 3078F PR MOST RECENT DIASTOLIC BLOOD PRESSURE < 80 MM HG: ICD-10-PCS | Mod: CPTII,S$GLB,, | Performed by: NEUROLOGICAL SURGERY

## 2021-03-15 PROCEDURE — 3078F DIAST BP <80 MM HG: CPT | Mod: CPTII,S$GLB,, | Performed by: NEUROLOGICAL SURGERY

## 2021-03-15 PROCEDURE — 3074F SYST BP LT 130 MM HG: CPT | Mod: CPTII,S$GLB,, | Performed by: NEUROLOGICAL SURGERY

## 2021-03-15 PROCEDURE — 3288F FALL RISK ASSESSMENT DOCD: CPT | Mod: CPTII,S$GLB,, | Performed by: NEUROLOGICAL SURGERY

## 2021-03-15 PROCEDURE — 1125F PR PAIN SEVERITY QUANTIFIED, PAIN PRESENT: ICD-10-PCS | Mod: S$GLB,,, | Performed by: NEUROLOGICAL SURGERY

## 2021-03-15 PROCEDURE — 72082 XR SCOLIOSIS COMPLETE: ICD-10-PCS | Mod: 26,,, | Performed by: RADIOLOGY

## 2021-03-15 PROCEDURE — 1159F MED LIST DOCD IN RCRD: CPT | Mod: S$GLB,,, | Performed by: NEUROLOGICAL SURGERY

## 2021-03-15 PROCEDURE — 72120 X-RAY BEND ONLY L-S SPINE: CPT | Mod: TC,FY,59

## 2021-03-16 ENCOUNTER — TELEPHONE (OUTPATIENT)
Dept: NEUROSURGERY | Facility: CLINIC | Age: 76
End: 2021-03-16

## 2021-03-16 DIAGNOSIS — M51.26 LUMBAR DISC HERNIATION: Primary | ICD-10-CM

## 2021-03-31 ENCOUNTER — HOSPITAL ENCOUNTER (OUTPATIENT)
Dept: RADIOLOGY | Facility: HOSPITAL | Age: 76
Discharge: HOME | End: 2021-03-31
Attending: FAMILY MEDICINE
Payer: MEDICARE

## 2021-03-31 DIAGNOSIS — Z01.818 PREOPERATIVE CLEARANCE: ICD-10-CM

## 2021-03-31 PROCEDURE — 71046 X-RAY EXAM CHEST 2 VIEWS: CPT | Mod: 26,,, | Performed by: RADIOLOGY

## 2021-03-31 PROCEDURE — 71046 X-RAY EXAM CHEST 2 VIEWS: CPT | Mod: TC,FY

## 2021-03-31 PROCEDURE — 71046 XR CHEST PA AND LATERAL: ICD-10-PCS | Mod: 26,,, | Performed by: RADIOLOGY

## 2021-04-05 ENCOUNTER — ANESTHESIA EVENT (OUTPATIENT)
Dept: SURGERY | Facility: HOSPITAL | Age: 76
DRG: 518 | End: 2021-04-05
Payer: MEDICARE

## 2021-04-05 ENCOUNTER — HOSPITAL ENCOUNTER (OUTPATIENT)
Dept: PREADMISSION TESTING | Facility: HOSPITAL | Age: 76
Discharge: HOME | End: 2021-04-05
Attending: NEUROLOGICAL SURGERY
Payer: MEDICARE

## 2021-04-05 VITALS
WEIGHT: 130 LBS | DIASTOLIC BLOOD PRESSURE: 56 MMHG | RESPIRATION RATE: 16 BRPM | SYSTOLIC BLOOD PRESSURE: 116 MMHG | HEART RATE: 65 BPM | HEIGHT: 61 IN | OXYGEN SATURATION: 99 % | BODY MASS INDEX: 24.55 KG/M2

## 2021-04-05 RX ORDER — LIDOCAINE HYDROCHLORIDE 10 MG/ML
1 INJECTION, SOLUTION EPIDURAL; INFILTRATION; INTRACAUDAL; PERINEURAL ONCE
Status: CANCELLED | OUTPATIENT
Start: 2021-04-05 | End: 2021-04-05

## 2021-04-05 RX ORDER — SCOLOPAMINE TRANSDERMAL SYSTEM 1 MG/1
1 PATCH, EXTENDED RELEASE TRANSDERMAL
Status: CANCELLED | OUTPATIENT
Start: 2021-04-05

## 2021-04-05 RX ORDER — SODIUM CHLORIDE, SODIUM LACTATE, POTASSIUM CHLORIDE, CALCIUM CHLORIDE 600; 310; 30; 20 MG/100ML; MG/100ML; MG/100ML; MG/100ML
INJECTION, SOLUTION INTRAVENOUS CONTINUOUS
Status: CANCELLED | OUTPATIENT
Start: 2021-04-05

## 2021-04-06 ENCOUNTER — TELEPHONE (OUTPATIENT)
Dept: NEUROSURGERY | Facility: CLINIC | Age: 76
End: 2021-04-06

## 2021-04-07 ENCOUNTER — TELEPHONE (OUTPATIENT)
Dept: NEUROSURGERY | Facility: CLINIC | Age: 76
End: 2021-04-07

## 2021-04-07 ENCOUNTER — DOCUMENTATION ONLY (OUTPATIENT)
Dept: NEUROSURGERY | Facility: CLINIC | Age: 76
End: 2021-04-07

## 2021-04-08 ENCOUNTER — TELEPHONE (OUTPATIENT)
Dept: NEUROSURGERY | Facility: CLINIC | Age: 76
End: 2021-04-08

## 2021-04-10 ENCOUNTER — LAB VISIT (OUTPATIENT)
Dept: INTERNAL MEDICINE | Facility: CLINIC | Age: 76
End: 2021-04-10
Payer: MEDICARE

## 2021-04-10 DIAGNOSIS — Z41.9 SURGERY, ELECTIVE: ICD-10-CM

## 2021-04-10 PROCEDURE — U0005 INFEC AGEN DETEC AMPLI PROBE: HCPCS | Performed by: NEUROLOGICAL SURGERY

## 2021-04-10 PROCEDURE — U0003 INFECTIOUS AGENT DETECTION BY NUCLEIC ACID (DNA OR RNA); SEVERE ACUTE RESPIRATORY SYNDROME CORONAVIRUS 2 (SARS-COV-2) (CORONAVIRUS DISEASE [COVID-19]), AMPLIFIED PROBE TECHNIQUE, MAKING USE OF HIGH THROUGHPUT TECHNOLOGIES AS DESCRIBED BY CMS-2020-01-R: HCPCS | Performed by: NEUROLOGICAL SURGERY

## 2021-04-11 LAB — SARS-COV-2 RNA RESP QL NAA+PROBE: NOT DETECTED

## 2021-04-12 ENCOUNTER — TELEPHONE (OUTPATIENT)
Dept: NEUROSURGERY | Facility: CLINIC | Age: 76
End: 2021-04-12

## 2021-04-12 RX ORDER — CLOPIDOGREL BISULFATE 75 MG/1
75 TABLET ORAL DAILY
COMMUNITY
End: 2021-11-02

## 2021-04-13 ENCOUNTER — ANESTHESIA (OUTPATIENT)
Dept: SURGERY | Facility: HOSPITAL | Age: 76
DRG: 518 | End: 2021-04-13
Payer: MEDICARE

## 2021-04-13 ENCOUNTER — HOSPITAL ENCOUNTER (INPATIENT)
Facility: HOSPITAL | Age: 76
LOS: 2 days | Discharge: HOME HEALTH | DRG: 518 | End: 2021-04-16
Attending: NEUROLOGICAL SURGERY | Admitting: NEUROLOGICAL SURGERY
Payer: MEDICARE

## 2021-04-13 DIAGNOSIS — J96.00 RESPIRATORY FAILURE, ACUTE: ICD-10-CM

## 2021-04-13 DIAGNOSIS — D50.9 MICROCYTIC ANEMIA: ICD-10-CM

## 2021-04-13 DIAGNOSIS — J96.02 ACUTE RESPIRATORY FAILURE WITH HYPOXIA AND HYPERCARBIA: ICD-10-CM

## 2021-04-13 DIAGNOSIS — I50.32 CHRONIC HEART FAILURE WITH PRESERVED EJECTION FRACTION (HFPEF): ICD-10-CM

## 2021-04-13 DIAGNOSIS — E87.70 HYPERVOLEMIA, UNSPECIFIED HYPERVOLEMIA TYPE: ICD-10-CM

## 2021-04-13 DIAGNOSIS — I25.10 CORONARY ARTERY DISEASE INVOLVING NATIVE CORONARY ARTERY OF NATIVE HEART WITHOUT ANGINA PECTORIS: Chronic | ICD-10-CM

## 2021-04-13 DIAGNOSIS — J81.0 ACUTE PULMONARY EDEMA: ICD-10-CM

## 2021-04-13 DIAGNOSIS — R06.03 RESPIRATORY DISTRESS: ICD-10-CM

## 2021-04-13 DIAGNOSIS — R07.9 CHEST PAIN: ICD-10-CM

## 2021-04-13 DIAGNOSIS — I50.32 CHRONIC DIASTOLIC HEART FAILURE: ICD-10-CM

## 2021-04-13 DIAGNOSIS — M51.16 LUMBAR DISC HERNIATION WITH RADICULOPATHY: Primary | ICD-10-CM

## 2021-04-13 DIAGNOSIS — J96.01 ACUTE RESPIRATORY FAILURE WITH HYPOXIA AND HYPERCARBIA: ICD-10-CM

## 2021-04-13 DIAGNOSIS — I50.9 CHF (CONGESTIVE HEART FAILURE): ICD-10-CM

## 2021-04-13 LAB
POCT GLUCOSE: 132 MG/DL (ref 70–110)
POCT GLUCOSE: 149 MG/DL (ref 70–110)

## 2021-04-13 PROCEDURE — 25000003 PHARM REV CODE 250: Performed by: NURSE ANESTHETIST, CERTIFIED REGISTERED

## 2021-04-13 PROCEDURE — 63600175 PHARM REV CODE 636 W HCPCS: Performed by: NURSE PRACTITIONER

## 2021-04-13 PROCEDURE — 36000711: Performed by: NEUROLOGICAL SURGERY

## 2021-04-13 PROCEDURE — 94799 UNLISTED PULMONARY SVC/PX: CPT

## 2021-04-13 PROCEDURE — 63600175 PHARM REV CODE 636 W HCPCS: Performed by: NEUROLOGICAL SURGERY

## 2021-04-13 PROCEDURE — 63056 PR DECOMPRESS SPINAL CORD,1 SEG: ICD-10-PCS | Mod: ,,, | Performed by: NEUROLOGICAL SURGERY

## 2021-04-13 PROCEDURE — 99900035 HC TECH TIME PER 15 MIN (STAT)

## 2021-04-13 PROCEDURE — 71000039 HC RECOVERY, EACH ADD'L HOUR: Performed by: NEUROLOGICAL SURGERY

## 2021-04-13 PROCEDURE — 71000033 HC RECOVERY, INTIAL HOUR: Performed by: NEUROLOGICAL SURGERY

## 2021-04-13 PROCEDURE — 25000003 PHARM REV CODE 250: Performed by: NURSE PRACTITIONER

## 2021-04-13 PROCEDURE — 25000003 PHARM REV CODE 250: Performed by: NEUROLOGICAL SURGERY

## 2021-04-13 PROCEDURE — 37000008 HC ANESTHESIA 1ST 15 MINUTES: Performed by: NEUROLOGICAL SURGERY

## 2021-04-13 PROCEDURE — 37000009 HC ANESTHESIA EA ADD 15 MINS: Performed by: NEUROLOGICAL SURGERY

## 2021-04-13 PROCEDURE — 63056 DECOMPRESS SPINAL CORD LMBR: CPT | Mod: ,,, | Performed by: NEUROLOGICAL SURGERY

## 2021-04-13 PROCEDURE — 27201423 OPTIME MED/SURG SUP & DEVICES STERILE SUPPLY: Performed by: NEUROLOGICAL SURGERY

## 2021-04-13 PROCEDURE — 63600175 PHARM REV CODE 636 W HCPCS: Performed by: ANESTHESIOLOGY

## 2021-04-13 PROCEDURE — 63600175 PHARM REV CODE 636 W HCPCS: Performed by: NURSE ANESTHETIST, CERTIFIED REGISTERED

## 2021-04-13 PROCEDURE — 36000710: Performed by: NEUROLOGICAL SURGERY

## 2021-04-13 RX ORDER — IBUPROFEN 200 MG
24 TABLET ORAL
Status: DISCONTINUED | OUTPATIENT
Start: 2021-04-13 | End: 2021-04-16 | Stop reason: HOSPADM

## 2021-04-13 RX ORDER — BISACODYL 10 MG
10 SUPPOSITORY, RECTAL RECTAL DAILY
Status: DISCONTINUED | OUTPATIENT
Start: 2021-04-14 | End: 2021-04-16

## 2021-04-13 RX ORDER — ESCITALOPRAM OXALATE 5 MG/1
5 TABLET ORAL DAILY
Status: DISCONTINUED | OUTPATIENT
Start: 2021-04-14 | End: 2021-04-16 | Stop reason: HOSPADM

## 2021-04-13 RX ORDER — AMITRIPTYLINE HYDROCHLORIDE 25 MG/1
25 TABLET, FILM COATED ORAL NIGHTLY
Status: DISCONTINUED | OUTPATIENT
Start: 2021-04-13 | End: 2021-04-14

## 2021-04-13 RX ORDER — DEXAMETHASONE SODIUM PHOSPHATE 4 MG/ML
INJECTION, SOLUTION INTRA-ARTICULAR; INTRALESIONAL; INTRAMUSCULAR; INTRAVENOUS; SOFT TISSUE
Status: DISCONTINUED | OUTPATIENT
Start: 2021-04-13 | End: 2021-04-13

## 2021-04-13 RX ORDER — BACLOFEN 10 MG/1
20 TABLET ORAL NIGHTLY PRN
Status: DISCONTINUED | OUTPATIENT
Start: 2021-04-13 | End: 2021-04-16 | Stop reason: HOSPADM

## 2021-04-13 RX ORDER — PROPOFOL 10 MG/ML
VIAL (ML) INTRAVENOUS
Status: DISCONTINUED | OUTPATIENT
Start: 2021-04-13 | End: 2021-04-13

## 2021-04-13 RX ORDER — SCOLOPAMINE TRANSDERMAL SYSTEM 1 MG/1
1 PATCH, EXTENDED RELEASE TRANSDERMAL
Status: DISCONTINUED | OUTPATIENT
Start: 2021-04-13 | End: 2021-04-13 | Stop reason: HOSPADM

## 2021-04-13 RX ORDER — AMOXICILLIN 250 MG
2 CAPSULE ORAL NIGHTLY PRN
Status: DISCONTINUED | OUTPATIENT
Start: 2021-04-13 | End: 2021-04-16 | Stop reason: HOSPADM

## 2021-04-13 RX ORDER — EPHEDRINE SULFATE 50 MG/ML
INJECTION, SOLUTION INTRAVENOUS
Status: DISCONTINUED | OUTPATIENT
Start: 2021-04-13 | End: 2021-04-13

## 2021-04-13 RX ORDER — ACETAMINOPHEN 325 MG/1
650 TABLET ORAL EVERY 6 HOURS
Status: DISCONTINUED | OUTPATIENT
Start: 2021-04-13 | End: 2021-04-16 | Stop reason: HOSPADM

## 2021-04-13 RX ORDER — TRAMADOL HYDROCHLORIDE 50 MG/1
100 TABLET ORAL EVERY 6 HOURS
Status: DISCONTINUED | OUTPATIENT
Start: 2021-04-13 | End: 2021-04-14

## 2021-04-13 RX ORDER — VASOPRESSIN 20 [USP'U]/ML
INJECTION, SOLUTION INTRAMUSCULAR; SUBCUTANEOUS
Status: DISCONTINUED | OUTPATIENT
Start: 2021-04-13 | End: 2021-04-13

## 2021-04-13 RX ORDER — FUROSEMIDE 40 MG/1
40 TABLET ORAL DAILY
Status: DISCONTINUED | OUTPATIENT
Start: 2021-04-14 | End: 2021-04-14

## 2021-04-13 RX ORDER — ATORVASTATIN CALCIUM 10 MG/1
10 TABLET, FILM COATED ORAL DAILY
Status: DISCONTINUED | OUTPATIENT
Start: 2021-04-14 | End: 2021-04-16 | Stop reason: HOSPADM

## 2021-04-13 RX ORDER — ROCURONIUM BROMIDE 10 MG/ML
INJECTION, SOLUTION INTRAVENOUS
Status: DISCONTINUED | OUTPATIENT
Start: 2021-04-13 | End: 2021-04-13

## 2021-04-13 RX ORDER — BUPIVACAINE HCL/EPINEPHRINE 0.5-1:200K
VIAL (ML) INJECTION
Status: DISCONTINUED | OUTPATIENT
Start: 2021-04-13 | End: 2021-04-13 | Stop reason: HOSPADM

## 2021-04-13 RX ORDER — CLOPIDOGREL BISULFATE 75 MG/1
75 TABLET ORAL DAILY
Status: DISCONTINUED | OUTPATIENT
Start: 2021-04-14 | End: 2021-04-16 | Stop reason: HOSPADM

## 2021-04-13 RX ORDER — HYDROMORPHONE HYDROCHLORIDE 2 MG/ML
0.5 INJECTION, SOLUTION INTRAMUSCULAR; INTRAVENOUS; SUBCUTANEOUS EVERY 5 MIN PRN
Status: DISCONTINUED | OUTPATIENT
Start: 2021-04-13 | End: 2021-04-13 | Stop reason: HOSPADM

## 2021-04-13 RX ORDER — SPIRONOLACTONE 25 MG/1
25 TABLET ORAL DAILY
COMMUNITY
End: 2022-01-06 | Stop reason: SDUPTHER

## 2021-04-13 RX ORDER — SODIUM CHLORIDE, SODIUM LACTATE, POTASSIUM CHLORIDE, CALCIUM CHLORIDE 600; 310; 30; 20 MG/100ML; MG/100ML; MG/100ML; MG/100ML
INJECTION, SOLUTION INTRAVENOUS CONTINUOUS
Status: DISCONTINUED | OUTPATIENT
Start: 2021-04-13 | End: 2021-04-13

## 2021-04-13 RX ORDER — LIDOCAINE HCL/PF 100 MG/5ML
SYRINGE (ML) INTRAVENOUS
Status: DISCONTINUED | OUTPATIENT
Start: 2021-04-13 | End: 2021-04-13

## 2021-04-13 RX ORDER — PREGABALIN 75 MG/1
150 CAPSULE ORAL 2 TIMES DAILY
Status: DISCONTINUED | OUTPATIENT
Start: 2021-04-13 | End: 2021-04-16 | Stop reason: HOSPADM

## 2021-04-13 RX ORDER — ISOSORBIDE MONONITRATE 30 MG/1
30 TABLET, EXTENDED RELEASE ORAL DAILY
Status: DISCONTINUED | OUTPATIENT
Start: 2021-04-14 | End: 2021-04-14

## 2021-04-13 RX ORDER — SODIUM CHLORIDE 0.9 % (FLUSH) 0.9 %
10 SYRINGE (ML) INJECTION
Status: DISCONTINUED | OUTPATIENT
Start: 2021-04-13 | End: 2021-04-16 | Stop reason: HOSPADM

## 2021-04-13 RX ORDER — CEFAZOLIN SODIUM 2 G/50ML
2 SOLUTION INTRAVENOUS ONCE
Status: COMPLETED | OUTPATIENT
Start: 2021-04-13 | End: 2021-04-13

## 2021-04-13 RX ORDER — MAG HYDROX/ALUMINUM HYD/SIMETH 200-200-20
30 SUSPENSION, ORAL (FINAL DOSE FORM) ORAL EVERY 4 HOURS PRN
Status: DISCONTINUED | OUTPATIENT
Start: 2021-04-13 | End: 2021-04-16 | Stop reason: HOSPADM

## 2021-04-13 RX ORDER — PROCHLORPERAZINE EDISYLATE 5 MG/ML
5 INJECTION INTRAMUSCULAR; INTRAVENOUS EVERY 6 HOURS PRN
Status: DISCONTINUED | OUTPATIENT
Start: 2021-04-13 | End: 2021-04-14

## 2021-04-13 RX ORDER — METOPROLOL SUCCINATE 50 MG/1
50 TABLET, EXTENDED RELEASE ORAL DAILY
Status: DISCONTINUED | OUTPATIENT
Start: 2021-04-14 | End: 2021-04-15

## 2021-04-13 RX ORDER — VANCOMYCIN HYDROCHLORIDE 1 G/20ML
INJECTION, POWDER, LYOPHILIZED, FOR SOLUTION INTRAVENOUS
Status: DISCONTINUED | OUTPATIENT
Start: 2021-04-13 | End: 2021-04-13 | Stop reason: HOSPADM

## 2021-04-13 RX ORDER — PHENYLEPHRINE HYDROCHLORIDE 10 MG/ML
INJECTION INTRAVENOUS
Status: DISCONTINUED | OUTPATIENT
Start: 2021-04-13 | End: 2021-04-13

## 2021-04-13 RX ORDER — MUPIROCIN 20 MG/G
OINTMENT TOPICAL 2 TIMES DAILY
Status: DISCONTINUED | OUTPATIENT
Start: 2021-04-13 | End: 2021-04-16 | Stop reason: HOSPADM

## 2021-04-13 RX ORDER — OXYCODONE HCL 10 MG/1
10 TABLET, FILM COATED, EXTENDED RELEASE ORAL
Status: COMPLETED | OUTPATIENT
Start: 2021-04-13 | End: 2021-04-13

## 2021-04-13 RX ORDER — LIDOCAINE HYDROCHLORIDE 10 MG/ML
1 INJECTION, SOLUTION EPIDURAL; INFILTRATION; INTRACAUDAL; PERINEURAL ONCE
Status: DISCONTINUED | OUTPATIENT
Start: 2021-04-13 | End: 2021-04-13 | Stop reason: HOSPADM

## 2021-04-13 RX ORDER — SODIUM CHLORIDE 9 MG/ML
INJECTION, SOLUTION INTRAVENOUS CONTINUOUS
Status: DISCONTINUED | OUTPATIENT
Start: 2021-04-13 | End: 2021-04-14

## 2021-04-13 RX ORDER — ONDANSETRON 2 MG/ML
4 INJECTION INTRAMUSCULAR; INTRAVENOUS ONCE AS NEEDED
Status: DISCONTINUED | OUTPATIENT
Start: 2021-04-13 | End: 2021-04-13 | Stop reason: HOSPADM

## 2021-04-13 RX ORDER — PREGABALIN 75 MG/1
75 CAPSULE ORAL
Status: COMPLETED | OUTPATIENT
Start: 2021-04-13 | End: 2021-04-13

## 2021-04-13 RX ORDER — ONDANSETRON 8 MG/1
8 TABLET, ORALLY DISINTEGRATING ORAL EVERY 6 HOURS PRN
Status: DISCONTINUED | OUTPATIENT
Start: 2021-04-13 | End: 2021-04-16 | Stop reason: HOSPADM

## 2021-04-13 RX ORDER — SODIUM CHLORIDE, SODIUM LACTATE, POTASSIUM CHLORIDE, CALCIUM CHLORIDE 600; 310; 30; 20 MG/100ML; MG/100ML; MG/100ML; MG/100ML
INJECTION, SOLUTION INTRAVENOUS CONTINUOUS
Status: DISCONTINUED | OUTPATIENT
Start: 2021-04-13 | End: 2021-04-14

## 2021-04-13 RX ORDER — FENTANYL CITRATE 50 UG/ML
INJECTION, SOLUTION INTRAMUSCULAR; INTRAVENOUS
Status: DISCONTINUED | OUTPATIENT
Start: 2021-04-13 | End: 2021-04-13

## 2021-04-13 RX ORDER — SPIRONOLACTONE 25 MG/1
25 TABLET ORAL DAILY
Status: DISCONTINUED | OUTPATIENT
Start: 2021-04-14 | End: 2021-04-16 | Stop reason: HOSPADM

## 2021-04-13 RX ORDER — CYCLOBENZAPRINE HCL 10 MG
10 TABLET ORAL
Status: COMPLETED | OUTPATIENT
Start: 2021-04-13 | End: 2021-04-13

## 2021-04-13 RX ORDER — ACETAMINOPHEN 325 MG/1
650 TABLET ORAL
Status: COMPLETED | OUTPATIENT
Start: 2021-04-13 | End: 2021-04-13

## 2021-04-13 RX ORDER — INSULIN ASPART 100 [IU]/ML
1-10 INJECTION, SOLUTION INTRAVENOUS; SUBCUTANEOUS
Status: DISCONTINUED | OUTPATIENT
Start: 2021-04-13 | End: 2021-04-16 | Stop reason: HOSPADM

## 2021-04-13 RX ORDER — SUCCINYLCHOLINE CHLORIDE 20 MG/ML
INJECTION INTRAMUSCULAR; INTRAVENOUS
Status: DISCONTINUED | OUTPATIENT
Start: 2021-04-13 | End: 2021-04-13

## 2021-04-13 RX ORDER — IBUPROFEN 200 MG
16 TABLET ORAL
Status: DISCONTINUED | OUTPATIENT
Start: 2021-04-13 | End: 2021-04-16 | Stop reason: HOSPADM

## 2021-04-13 RX ORDER — HYDROMORPHONE HYDROCHLORIDE 2 MG/ML
2 INJECTION, SOLUTION INTRAMUSCULAR; INTRAVENOUS; SUBCUTANEOUS
Status: DISCONTINUED | OUTPATIENT
Start: 2021-04-13 | End: 2021-04-14

## 2021-04-13 RX ORDER — CELECOXIB 100 MG/1
200 CAPSULE ORAL
Status: COMPLETED | OUTPATIENT
Start: 2021-04-13 | End: 2021-04-13

## 2021-04-13 RX ORDER — GLUCAGON 1 MG
1 KIT INJECTION
Status: DISCONTINUED | OUTPATIENT
Start: 2021-04-13 | End: 2021-04-16 | Stop reason: HOSPADM

## 2021-04-13 RX ADMIN — HYDROMORPHONE HYDROCHLORIDE 0.5 MG: 2 INJECTION, SOLUTION INTRAMUSCULAR; INTRAVENOUS; SUBCUTANEOUS at 03:04

## 2021-04-13 RX ADMIN — VASOPRESSIN 1 UNITS: 20 INJECTION, SOLUTION INTRAMUSCULAR; SUBCUTANEOUS at 11:04

## 2021-04-13 RX ADMIN — SODIUM CHLORIDE, SODIUM LACTATE, POTASSIUM CHLORIDE, AND CALCIUM CHLORIDE: .6; .31; .03; .02 INJECTION, SOLUTION INTRAVENOUS at 08:04

## 2021-04-13 RX ADMIN — SODIUM CHLORIDE, SODIUM LACTATE, POTASSIUM CHLORIDE, AND CALCIUM CHLORIDE: .6; .31; .03; .02 INJECTION, SOLUTION INTRAVENOUS at 11:04

## 2021-04-13 RX ADMIN — EPHEDRINE SULFATE 25 MG: 50 INJECTION, SOLUTION INTRAMUSCULAR; INTRAVENOUS; SUBCUTANEOUS at 10:04

## 2021-04-13 RX ADMIN — SCOPALAMINE 1 PATCH: 1 PATCH, EXTENDED RELEASE TRANSDERMAL at 07:04

## 2021-04-13 RX ADMIN — PHENYLEPHRINE HYDROCHLORIDE 200 MCG: 10 INJECTION INTRAVENOUS at 11:04

## 2021-04-13 RX ADMIN — SODIUM CHLORIDE, SODIUM LACTATE, POTASSIUM CHLORIDE, AND CALCIUM CHLORIDE: .6; .31; .03; .02 INJECTION, SOLUTION INTRAVENOUS at 10:04

## 2021-04-13 RX ADMIN — DEXAMETHASONE SODIUM PHOSPHATE 4 MG: 4 INJECTION, SOLUTION INTRA-ARTICULAR; INTRALESIONAL; INTRAMUSCULAR; INTRAVENOUS; SOFT TISSUE at 10:04

## 2021-04-13 RX ADMIN — VASOPRESSIN 2 UNITS: 20 INJECTION, SOLUTION INTRAMUSCULAR; SUBCUTANEOUS at 11:04

## 2021-04-13 RX ADMIN — CYCLOBENZAPRINE 10 MG: 10 TABLET, FILM COATED ORAL at 08:04

## 2021-04-13 RX ADMIN — PHENYLEPHRINE HYDROCHLORIDE 100 MCG: 10 INJECTION INTRAVENOUS at 11:04

## 2021-04-13 RX ADMIN — OXYCODONE HYDROCHLORIDE 10 MG: 10 TABLET, FILM COATED, EXTENDED RELEASE ORAL at 07:04

## 2021-04-13 RX ADMIN — EPHEDRINE SULFATE 5 MG: 50 INJECTION, SOLUTION INTRAMUSCULAR; INTRAVENOUS; SUBCUTANEOUS at 11:04

## 2021-04-13 RX ADMIN — PROPOFOL 100 MG: 10 INJECTION, EMULSION INTRAVENOUS at 10:04

## 2021-04-13 RX ADMIN — TRAMADOL HYDROCHLORIDE 100 MG: 50 TABLET, COATED ORAL at 02:04

## 2021-04-13 RX ADMIN — ACETAMINOPHEN 650 MG: 325 TABLET ORAL at 02:04

## 2021-04-13 RX ADMIN — PREGABALIN 75 MG: 75 CAPSULE ORAL at 07:04

## 2021-04-13 RX ADMIN — TRAMADOL HYDROCHLORIDE 100 MG: 50 TABLET, COATED ORAL at 11:04

## 2021-04-13 RX ADMIN — LIDOCAINE HYDROCHLORIDE 60 MG: 20 INJECTION, SOLUTION INTRAVENOUS at 10:04

## 2021-04-13 RX ADMIN — ROCURONIUM BROMIDE 5 MG: 10 INJECTION, SOLUTION INTRAVENOUS at 10:04

## 2021-04-13 RX ADMIN — CEFAZOLIN SODIUM 2 G: 2 SOLUTION INTRAVENOUS at 10:04

## 2021-04-13 RX ADMIN — EPHEDRINE SULFATE 10 MG: 50 INJECTION, SOLUTION INTRAMUSCULAR; INTRAVENOUS; SUBCUTANEOUS at 10:04

## 2021-04-13 RX ADMIN — PHENYLEPHRINE HYDROCHLORIDE 100 MCG: 10 INJECTION INTRAVENOUS at 10:04

## 2021-04-13 RX ADMIN — ACETAMINOPHEN 650 MG: 325 TABLET ORAL at 07:04

## 2021-04-13 RX ADMIN — AMITRIPTYLINE HYDROCHLORIDE 25 MG: 25 TABLET, FILM COATED ORAL at 08:04

## 2021-04-13 RX ADMIN — CELECOXIB 200 MG: 100 CAPSULE ORAL at 07:04

## 2021-04-13 RX ADMIN — SUCCINYLCHOLINE CHLORIDE 80 MG: 20 INJECTION, SOLUTION INTRAMUSCULAR; INTRAVENOUS at 10:04

## 2021-04-13 RX ADMIN — FENTANYL CITRATE 100 MCG: 50 INJECTION, SOLUTION INTRAMUSCULAR; INTRAVENOUS at 10:04

## 2021-04-13 RX ADMIN — MUPIROCIN: 20 OINTMENT TOPICAL at 08:04

## 2021-04-13 RX ADMIN — BACLOFEN 20 MG: 10 TABLET ORAL at 08:04

## 2021-04-13 RX ADMIN — VASOPRESSIN 2 UNITS: 20 INJECTION, SOLUTION INTRAMUSCULAR; SUBCUTANEOUS at 10:04

## 2021-04-13 RX ADMIN — PREGABALIN 150 MG: 75 CAPSULE ORAL at 08:04

## 2021-04-13 RX ADMIN — ACETAMINOPHEN 650 MG: 325 TABLET ORAL at 11:04

## 2021-04-13 RX ADMIN — PHENYLEPHRINE HYDROCHLORIDE 200 MCG: 10 INJECTION INTRAVENOUS at 10:04

## 2021-04-14 PROBLEM — R06.03 RESPIRATORY DISTRESS: Status: ACTIVE | Noted: 2021-04-14

## 2021-04-14 LAB
ALBUMIN SERPL BCP-MCNC: 3 G/DL (ref 3.5–5.2)
ALLENS TEST: ABNORMAL
ALP SERPL-CCNC: 65 U/L (ref 55–135)
ALT SERPL W/O P-5'-P-CCNC: <5 U/L (ref 10–44)
ANION GAP SERPL CALC-SCNC: 11 MMOL/L (ref 8–16)
AORTIC ROOT ANNULUS: 3.45 CM
ASCENDING AORTA: 3.23 CM
AST SERPL-CCNC: 11 U/L (ref 10–40)
AV INDEX (PROSTH): 0.32
AV MEAN GRADIENT: 17 MMHG
AV PEAK GRADIENT: 24 MMHG
AV VALVE AREA: 1.06 CM2
AV VELOCITY RATIO: 0.43
BASOPHILS # BLD AUTO: 0.03 K/UL (ref 0–0.2)
BASOPHILS NFR BLD: 0.3 % (ref 0–1.9)
BILIRUB SERPL-MCNC: 0.3 MG/DL (ref 0.1–1)
BNP SERPL-MCNC: 390 PG/ML (ref 0–99)
BSA FOR ECHO PROCEDURE: 1.69 M2
BUN SERPL-MCNC: 26 MG/DL (ref 8–23)
CALCIUM SERPL-MCNC: 8.4 MG/DL (ref 8.7–10.5)
CHLORIDE SERPL-SCNC: 104 MMOL/L (ref 95–110)
CO2 SERPL-SCNC: 23 MMOL/L (ref 23–29)
CREAT SERPL-MCNC: 1.1 MG/DL (ref 0.5–1.4)
CV ECHO LV RWT: 0.39 CM
DELSYS: ABNORMAL
DIFFERENTIAL METHOD: ABNORMAL
DOP CALC AO PEAK VEL: 2.44 M/S
DOP CALC AO VTI: 48.11 CM
DOP CALC LVOT AREA: 3.3 CM2
DOP CALC LVOT DIAMETER: 2.04 CM
DOP CALC LVOT PEAK VEL: 1.04 M/S
DOP CALC LVOT STROKE VOLUME: 50.96 CM3
DOP CALCLVOT PEAK VEL VTI: 15.6 CM
E WAVE DECELERATION TIME: 191.15 MSEC
E/A RATIO: 1
E/E' RATIO: 16.73 M/S
ECHO LV POSTERIOR WALL: 0.86 CM (ref 0.6–1.1)
EJECTION FRACTION: 55 %
EOSINOPHIL # BLD AUTO: 0 K/UL (ref 0–0.5)
EOSINOPHIL NFR BLD: 0.4 % (ref 0–8)
EP: 8
ERYTHROCYTE [DISTWIDTH] IN BLOOD BY AUTOMATED COUNT: 20.1 % (ref 11.5–14.5)
EST. GFR  (AFRICAN AMERICAN): 57 ML/MIN/1.73 M^2
EST. GFR  (NON AFRICAN AMERICAN): 49 ML/MIN/1.73 M^2
FIO2: 60
FLOW: 3
FRACTIONAL SHORTENING: 33 % (ref 28–44)
GLUCOSE SERPL-MCNC: 154 MG/DL (ref 70–110)
HCO3 UR-SCNC: 26.2 MMOL/L (ref 24–28)
HCO3 UR-SCNC: 27.4 MMOL/L (ref 24–28)
HCO3 UR-SCNC: 28.9 MMOL/L (ref 24–28)
HCT VFR BLD AUTO: 30.5 % (ref 37–48.5)
HGB BLD-MCNC: 9.1 G/DL (ref 12–16)
IMM GRANULOCYTES # BLD AUTO: 0.04 K/UL (ref 0–0.04)
IMM GRANULOCYTES NFR BLD AUTO: 0.4 % (ref 0–0.5)
INTERVENTRICULAR SEPTUM: 0.75 CM (ref 0.6–1.1)
IP: 18
IVRT: 102.76 MSEC
LA MAJOR: 5.02 CM
LA MINOR: 5.32 CM
LA WIDTH: 3.8 CM
LEFT ATRIUM SIZE: 3.72 CM
LEFT ATRIUM VOLUME INDEX MOD: 27.5 ML/M2
LEFT ATRIUM VOLUME INDEX: 37.4 ML/M2
LEFT ATRIUM VOLUME MOD: 45.64 CM3
LEFT ATRIUM VOLUME: 62.07 CM3
LEFT INTERNAL DIMENSION IN SYSTOLE: 2.98 CM (ref 2.1–4)
LEFT VENTRICLE DIASTOLIC VOLUME INDEX: 53.28 ML/M2
LEFT VENTRICLE DIASTOLIC VOLUME: 88.45 ML
LEFT VENTRICLE MASS INDEX: 67 G/M2
LEFT VENTRICLE SYSTOLIC VOLUME INDEX: 20.8 ML/M2
LEFT VENTRICLE SYSTOLIC VOLUME: 34.55 ML
LEFT VENTRICULAR INTERNAL DIMENSION IN DIASTOLE: 4.42 CM (ref 3.5–6)
LEFT VENTRICULAR MASS: 111.18 G
LV LATERAL E/E' RATIO: 18.4 M/S
LV SEPTAL E/E' RATIO: 15.33 M/S
LYMPHOCYTES # BLD AUTO: 1 K/UL (ref 1–4.8)
LYMPHOCYTES NFR BLD: 8.9 % (ref 18–48)
MAGNESIUM SERPL-MCNC: 1.8 MG/DL (ref 1.6–2.6)
MCH RBC QN AUTO: 23.3 PG (ref 27–31)
MCHC RBC AUTO-ENTMCNC: 29.8 G/DL (ref 32–36)
MCV RBC AUTO: 78 FL (ref 82–98)
MIN VOL: 8
MODE: ABNORMAL
MODE: ABNORMAL
MONOCYTES # BLD AUTO: 0.5 K/UL (ref 0.3–1)
MONOCYTES NFR BLD: 4.5 % (ref 4–15)
MV A" WAVE DURATION": 12.56 MSEC
MV MEAN GRADIENT: 1 MMHG
MV PEAK A VEL: 0.92 M/S
MV PEAK E VEL: 0.92 M/S
MV PEAK GRADIENT: 5 MMHG
MV STENOSIS PRESSURE HALF TIME: 55.43 MS
MV VALVE AREA P 1/2 METHOD: 3.97 CM2
NEUTROPHILS # BLD AUTO: 9.4 K/UL (ref 1.8–7.7)
NEUTROPHILS NFR BLD: 85.5 % (ref 38–73)
NRBC BLD-RTO: 0 /100 WBC
PCO2 BLDA: 47.2 MMHG (ref 35–45)
PCO2 BLDA: 49.7 MMHG (ref 35–45)
PCO2 BLDA: 70.2 MMHG (ref 35–45)
PH SMN: 7.2 [PH] (ref 7.35–7.45)
PH SMN: 7.33 [PH] (ref 7.35–7.45)
PH SMN: 7.39 [PH] (ref 7.35–7.45)
PHOSPHATE SERPL-MCNC: 4.8 MG/DL (ref 2.7–4.5)
PISA TR MAX VEL: 3.81 M/S
PLATELET # BLD AUTO: 259 K/UL (ref 150–450)
PMV BLD AUTO: 10.7 FL (ref 9.2–12.9)
PO2 BLDA: 56 MMHG (ref 80–100)
PO2 BLDA: 72 MMHG (ref 80–100)
PO2 BLDA: 74 MMHG (ref 80–100)
POC BE: -1 MMOL/L
POC BE: 0 MMOL/L
POC BE: 4 MMOL/L
POC SATURATED O2: 80 % (ref 95–100)
POC SATURATED O2: 93 % (ref 95–100)
POC SATURATED O2: 94 % (ref 95–100)
POC TCO2: 28 MMOL/L (ref 23–27)
POC TCO2: 30 MMOL/L (ref 23–27)
POC TCO2: 30 MMOL/L (ref 23–27)
POCT GLUCOSE: 113 MG/DL (ref 70–110)
POCT GLUCOSE: 131 MG/DL (ref 70–110)
POCT GLUCOSE: 157 MG/DL (ref 70–110)
POCT GLUCOSE: 162 MG/DL (ref 70–110)
POTASSIUM SERPL-SCNC: 3.9 MMOL/L (ref 3.5–5.1)
PROT SERPL-MCNC: 6.1 G/DL (ref 6–8.4)
PULM VEIN S/D RATIO: 1.26
PV PEAK D VEL: 0.23 M/S
PV PEAK S VEL: 0.29 M/S
RA MAJOR: 5.04 CM
RA PRESSURE: 8 MMHG
RA WIDTH: 3.07 CM
RBC # BLD AUTO: 3.9 M/UL (ref 4–5.4)
RIGHT VENTRICULAR END-DIASTOLIC DIMENSION: 3.03 CM
RV TISSUE DOPPLER FREE WALL SYSTOLIC VELOCITY 1 (APICAL 4 CHAMBER VIEW): 12.55 CM/S
SAMPLE: ABNORMAL
SITE: ABNORMAL
SODIUM SERPL-SCNC: 138 MMOL/L (ref 136–145)
SP02: 94
SPONT RATE: 12
STJ: 3.22 CM
TDI LATERAL: 0.05 M/S
TDI SEPTAL: 0.06 M/S
TDI: 0.06 M/S
TR MAX PG: 58 MMHG
TRICUSPID ANNULAR PLANE SYSTOLIC EXCURSION: 1.99 CM
TROPONIN I SERPL DL<=0.01 NG/ML-MCNC: 0.01 NG/ML (ref 0–0.03)
TV REST PULMONARY ARTERY PRESSURE: 66 MMHG
WBC # BLD AUTO: 10.92 K/UL (ref 3.9–12.7)

## 2021-04-14 PROCEDURE — 82803 BLOOD GASES ANY COMBINATION: CPT

## 2021-04-14 PROCEDURE — 25000003 PHARM REV CODE 250: Performed by: NEUROLOGICAL SURGERY

## 2021-04-14 PROCEDURE — 63600175 PHARM REV CODE 636 W HCPCS: Performed by: NURSE PRACTITIONER

## 2021-04-14 PROCEDURE — 83735 ASSAY OF MAGNESIUM: CPT | Performed by: NEUROLOGICAL SURGERY

## 2021-04-14 PROCEDURE — 63600175 PHARM REV CODE 636 W HCPCS: Performed by: NEUROLOGICAL SURGERY

## 2021-04-14 PROCEDURE — 84100 ASSAY OF PHOSPHORUS: CPT | Performed by: NEUROLOGICAL SURGERY

## 2021-04-14 PROCEDURE — 94761 N-INVAS EAR/PLS OXIMETRY MLT: CPT

## 2021-04-14 PROCEDURE — 99291 PR CRITICAL CARE, E/M 30-74 MINUTES: ICD-10-PCS | Mod: 25,,, | Performed by: NURSE PRACTITIONER

## 2021-04-14 PROCEDURE — 63600175 PHARM REV CODE 636 W HCPCS: Performed by: STUDENT IN AN ORGANIZED HEALTH CARE EDUCATION/TRAINING PROGRAM

## 2021-04-14 PROCEDURE — 27000221 HC OXYGEN, UP TO 24 HOURS

## 2021-04-14 PROCEDURE — 93010 ELECTROCARDIOGRAM REPORT: CPT | Mod: ,,, | Performed by: INTERNAL MEDICINE

## 2021-04-14 PROCEDURE — 84484 ASSAY OF TROPONIN QUANT: CPT | Performed by: NEUROLOGICAL SURGERY

## 2021-04-14 PROCEDURE — 99900035 HC TECH TIME PER 15 MIN (STAT)

## 2021-04-14 PROCEDURE — 36600 WITHDRAWAL OF ARTERIAL BLOOD: CPT

## 2021-04-14 PROCEDURE — 80053 COMPREHEN METABOLIC PANEL: CPT | Performed by: NEUROLOGICAL SURGERY

## 2021-04-14 PROCEDURE — 83880 ASSAY OF NATRIURETIC PEPTIDE: CPT | Performed by: NEUROLOGICAL SURGERY

## 2021-04-14 PROCEDURE — 99220 PR INITIAL OBSERVATION CARE,LEVL III: ICD-10-PCS | Mod: 25,,, | Performed by: INTERNAL MEDICINE

## 2021-04-14 PROCEDURE — 99291 CRITICAL CARE FIRST HOUR: CPT | Mod: 25,,, | Performed by: NURSE PRACTITIONER

## 2021-04-14 PROCEDURE — 20000000 HC ICU ROOM

## 2021-04-14 PROCEDURE — 99220 PR INITIAL OBSERVATION CARE,LEVL III: CPT | Mod: 25,,, | Performed by: INTERNAL MEDICINE

## 2021-04-14 PROCEDURE — 85025 COMPLETE CBC W/AUTO DIFF WBC: CPT | Performed by: NEUROLOGICAL SURGERY

## 2021-04-14 PROCEDURE — 94640 AIRWAY INHALATION TREATMENT: CPT

## 2021-04-14 PROCEDURE — 93010 EKG 12-LEAD: ICD-10-PCS | Mod: ,,, | Performed by: INTERNAL MEDICINE

## 2021-04-14 PROCEDURE — 93005 ELECTROCARDIOGRAM TRACING: CPT

## 2021-04-14 PROCEDURE — 25000242 PHARM REV CODE 250 ALT 637 W/ HCPCS: Performed by: STUDENT IN AN ORGANIZED HEALTH CARE EDUCATION/TRAINING PROGRAM

## 2021-04-14 PROCEDURE — 36415 COLL VENOUS BLD VENIPUNCTURE: CPT | Performed by: NEUROLOGICAL SURGERY

## 2021-04-14 RX ORDER — FUROSEMIDE 10 MG/ML
20 INJECTION INTRAMUSCULAR; INTRAVENOUS ONCE
Status: COMPLETED | OUTPATIENT
Start: 2021-04-14 | End: 2021-04-14

## 2021-04-14 RX ORDER — FUROSEMIDE 10 MG/ML
80 INJECTION INTRAMUSCULAR; INTRAVENOUS
Status: DISCONTINUED | OUTPATIENT
Start: 2021-04-14 | End: 2021-04-16

## 2021-04-14 RX ORDER — NALOXONE HCL 0.4 MG/ML
0.4 VIAL (ML) INJECTION ONCE
Status: COMPLETED | OUTPATIENT
Start: 2021-04-14 | End: 2021-04-14

## 2021-04-14 RX ORDER — IPRATROPIUM BROMIDE AND ALBUTEROL SULFATE 2.5; .5 MG/3ML; MG/3ML
3 SOLUTION RESPIRATORY (INHALATION) EVERY 6 HOURS
Status: DISCONTINUED | OUTPATIENT
Start: 2021-04-14 | End: 2021-04-16 | Stop reason: HOSPADM

## 2021-04-14 RX ORDER — FUROSEMIDE 10 MG/ML
40 INJECTION INTRAMUSCULAR; INTRAVENOUS ONCE
Status: COMPLETED | OUTPATIENT
Start: 2021-04-14 | End: 2021-04-14

## 2021-04-14 RX ADMIN — CLOPIDOGREL 75 MG: 75 TABLET, FILM COATED ORAL at 03:04

## 2021-04-14 RX ADMIN — IPRATROPIUM BROMIDE AND ALBUTEROL SULFATE 3 ML: .5; 3 SOLUTION RESPIRATORY (INHALATION) at 01:04

## 2021-04-14 RX ADMIN — PREGABALIN 150 MG: 75 CAPSULE ORAL at 09:04

## 2021-04-14 RX ADMIN — APIXABAN 5 MG: 5 TABLET, FILM COATED ORAL at 09:04

## 2021-04-14 RX ADMIN — FUROSEMIDE 20 MG: 10 INJECTION, SOLUTION INTRAMUSCULAR; INTRAVENOUS at 07:04

## 2021-04-14 RX ADMIN — ATORVASTATIN CALCIUM 10 MG: 10 TABLET, FILM COATED ORAL at 03:04

## 2021-04-14 RX ADMIN — ESCITALOPRAM OXALATE 5 MG: 5 TABLET, FILM COATED ORAL at 06:04

## 2021-04-14 RX ADMIN — INSULIN ASPART 1 UNITS: 100 INJECTION, SOLUTION INTRAVENOUS; SUBCUTANEOUS at 09:04

## 2021-04-14 RX ADMIN — IPRATROPIUM BROMIDE AND ALBUTEROL SULFATE 3 ML: .5; 3 SOLUTION RESPIRATORY (INHALATION) at 08:04

## 2021-04-14 RX ADMIN — FUROSEMIDE 40 MG: 10 INJECTION, SOLUTION INTRAMUSCULAR; INTRAVENOUS at 03:04

## 2021-04-14 RX ADMIN — MUPIROCIN: 20 OINTMENT TOPICAL at 10:04

## 2021-04-14 RX ADMIN — BISACODYL 10 MG: 10 SUPPOSITORY RECTAL at 02:04

## 2021-04-14 RX ADMIN — ACETAMINOPHEN 650 MG: 325 TABLET ORAL at 06:04

## 2021-04-14 RX ADMIN — ONDANSETRON 8 MG: 8 TABLET, ORALLY DISINTEGRATING ORAL at 07:04

## 2021-04-14 RX ADMIN — METOPROLOL SUCCINATE 50 MG: 50 TABLET, EXTENDED RELEASE ORAL at 03:04

## 2021-04-14 RX ADMIN — NALXONE HYDROCHLORIDE 0.4 MG: 0.4 INJECTION INTRAMUSCULAR; INTRAVENOUS; SUBCUTANEOUS at 03:04

## 2021-04-15 LAB
ALBUMIN SERPL BCP-MCNC: 2.8 G/DL (ref 3.5–5.2)
ALP SERPL-CCNC: 53 U/L (ref 55–135)
ALT SERPL W/O P-5'-P-CCNC: <5 U/L (ref 10–44)
ANION GAP SERPL CALC-SCNC: 10 MMOL/L (ref 8–16)
AST SERPL-CCNC: 9 U/L (ref 10–40)
BILIRUB SERPL-MCNC: 0.5 MG/DL (ref 0.1–1)
BUN SERPL-MCNC: 24 MG/DL (ref 8–23)
CALCIUM SERPL-MCNC: 8.4 MG/DL (ref 8.7–10.5)
CHLORIDE SERPL-SCNC: 102 MMOL/L (ref 95–110)
CO2 SERPL-SCNC: 26 MMOL/L (ref 23–29)
CREAT SERPL-MCNC: 1 MG/DL (ref 0.5–1.4)
EST. GFR  (AFRICAN AMERICAN): >60 ML/MIN/1.73 M^2
EST. GFR  (NON AFRICAN AMERICAN): 55 ML/MIN/1.73 M^2
FERRITIN SERPL-MCNC: 128 NG/ML (ref 20–300)
FOLATE SERPL-MCNC: 3.6 NG/ML (ref 4–24)
GLUCOSE SERPL-MCNC: 108 MG/DL (ref 70–110)
IRON SERPL-MCNC: 18 UG/DL (ref 30–160)
POCT GLUCOSE: 107 MG/DL (ref 70–110)
POCT GLUCOSE: 110 MG/DL (ref 70–110)
POCT GLUCOSE: 114 MG/DL (ref 70–110)
POCT GLUCOSE: 118 MG/DL (ref 70–110)
POCT GLUCOSE: 139 MG/DL (ref 70–110)
POCT GLUCOSE: 185 MG/DL (ref 70–110)
POTASSIUM SERPL-SCNC: 3.7 MMOL/L (ref 3.5–5.1)
PROT SERPL-MCNC: 5.8 G/DL (ref 6–8.4)
SATURATED IRON: 5 % (ref 20–50)
SODIUM SERPL-SCNC: 138 MMOL/L (ref 136–145)
TOTAL IRON BINDING CAPACITY: 374 UG/DL (ref 250–450)
TRANSFERRIN SERPL-MCNC: 253 MG/DL (ref 200–375)
TRANSFERRIN SERPL-MCNC: 253 MG/DL (ref 200–375)
VIT B12 SERPL-MCNC: 307 PG/ML (ref 210–950)

## 2021-04-15 PROCEDURE — 25000003 PHARM REV CODE 250: Performed by: NEUROLOGICAL SURGERY

## 2021-04-15 PROCEDURE — 82746 ASSAY OF FOLIC ACID SERUM: CPT | Performed by: STUDENT IN AN ORGANIZED HEALTH CARE EDUCATION/TRAINING PROGRAM

## 2021-04-15 PROCEDURE — 99233 SBSQ HOSP IP/OBS HIGH 50: CPT | Mod: ,,, | Performed by: NURSE PRACTITIONER

## 2021-04-15 PROCEDURE — 80053 COMPREHEN METABOLIC PANEL: CPT | Performed by: NURSE PRACTITIONER

## 2021-04-15 PROCEDURE — 25000242 PHARM REV CODE 250 ALT 637 W/ HCPCS: Performed by: STUDENT IN AN ORGANIZED HEALTH CARE EDUCATION/TRAINING PROGRAM

## 2021-04-15 PROCEDURE — 82607 VITAMIN B-12: CPT | Performed by: STUDENT IN AN ORGANIZED HEALTH CARE EDUCATION/TRAINING PROGRAM

## 2021-04-15 PROCEDURE — 82728 ASSAY OF FERRITIN: CPT | Performed by: STUDENT IN AN ORGANIZED HEALTH CARE EDUCATION/TRAINING PROGRAM

## 2021-04-15 PROCEDURE — 20000000 HC ICU ROOM

## 2021-04-15 PROCEDURE — 94640 AIRWAY INHALATION TREATMENT: CPT

## 2021-04-15 PROCEDURE — 99900035 HC TECH TIME PER 15 MIN (STAT)

## 2021-04-15 PROCEDURE — 36415 COLL VENOUS BLD VENIPUNCTURE: CPT | Performed by: STUDENT IN AN ORGANIZED HEALTH CARE EDUCATION/TRAINING PROGRAM

## 2021-04-15 PROCEDURE — 83540 ASSAY OF IRON: CPT | Performed by: STUDENT IN AN ORGANIZED HEALTH CARE EDUCATION/TRAINING PROGRAM

## 2021-04-15 PROCEDURE — 27000221 HC OXYGEN, UP TO 24 HOURS

## 2021-04-15 PROCEDURE — 97530 THERAPEUTIC ACTIVITIES: CPT

## 2021-04-15 PROCEDURE — 99233 PR SUBSEQUENT HOSPITAL CARE,LEVL III: ICD-10-PCS | Mod: ,,, | Performed by: NURSE PRACTITIONER

## 2021-04-15 PROCEDURE — 63600175 PHARM REV CODE 636 W HCPCS: Performed by: STUDENT IN AN ORGANIZED HEALTH CARE EDUCATION/TRAINING PROGRAM

## 2021-04-15 PROCEDURE — 36415 COLL VENOUS BLD VENIPUNCTURE: CPT | Performed by: NURSE PRACTITIONER

## 2021-04-15 PROCEDURE — 94761 N-INVAS EAR/PLS OXIMETRY MLT: CPT

## 2021-04-15 PROCEDURE — 97165 OT EVAL LOW COMPLEX 30 MIN: CPT

## 2021-04-15 PROCEDURE — 94799 UNLISTED PULMONARY SVC/PX: CPT

## 2021-04-15 PROCEDURE — 63600175 PHARM REV CODE 636 W HCPCS: Performed by: NEUROLOGICAL SURGERY

## 2021-04-15 PROCEDURE — 97162 PT EVAL MOD COMPLEX 30 MIN: CPT

## 2021-04-15 RX ORDER — NAPROXEN SODIUM 220 MG/1
81 TABLET, FILM COATED ORAL DAILY
Status: DISCONTINUED | OUTPATIENT
Start: 2021-04-16 | End: 2021-04-16 | Stop reason: HOSPADM

## 2021-04-15 RX ADMIN — IPRATROPIUM BROMIDE AND ALBUTEROL SULFATE 3 ML: .5; 3 SOLUTION RESPIRATORY (INHALATION) at 08:04

## 2021-04-15 RX ADMIN — MUPIROCIN: 20 OINTMENT TOPICAL at 08:04

## 2021-04-15 RX ADMIN — MUPIROCIN: 20 OINTMENT TOPICAL at 09:04

## 2021-04-15 RX ADMIN — IPRATROPIUM BROMIDE AND ALBUTEROL SULFATE 3 ML: .5; 3 SOLUTION RESPIRATORY (INHALATION) at 01:04

## 2021-04-15 RX ADMIN — PREGABALIN 150 MG: 75 CAPSULE ORAL at 08:04

## 2021-04-15 RX ADMIN — APIXABAN 5 MG: 5 TABLET, FILM COATED ORAL at 09:04

## 2021-04-15 RX ADMIN — ESCITALOPRAM OXALATE 5 MG: 5 TABLET, FILM COATED ORAL at 08:04

## 2021-04-15 RX ADMIN — PREGABALIN 150 MG: 75 CAPSULE ORAL at 09:04

## 2021-04-15 RX ADMIN — BISACODYL 10 MG: 10 SUPPOSITORY RECTAL at 08:04

## 2021-04-15 RX ADMIN — ATORVASTATIN CALCIUM 10 MG: 10 TABLET, FILM COATED ORAL at 08:04

## 2021-04-15 RX ADMIN — CLOPIDOGREL 75 MG: 75 TABLET, FILM COATED ORAL at 08:04

## 2021-04-15 RX ADMIN — ACETAMINOPHEN 650 MG: 325 TABLET ORAL at 05:04

## 2021-04-15 RX ADMIN — FUROSEMIDE 80 MG: 10 INJECTION, SOLUTION INTRAVENOUS at 08:04

## 2021-04-15 RX ADMIN — FUROSEMIDE 80 MG: 10 INJECTION, SOLUTION INTRAVENOUS at 09:04

## 2021-04-15 RX ADMIN — INSULIN ASPART 2 UNITS: 100 INJECTION, SOLUTION INTRAVENOUS; SUBCUTANEOUS at 05:04

## 2021-04-15 RX ADMIN — SPIRONOLACTONE 25 MG: 25 TABLET ORAL at 08:04

## 2021-04-15 RX ADMIN — ACETAMINOPHEN 650 MG: 325 TABLET ORAL at 12:04

## 2021-04-15 RX ADMIN — APIXABAN 5 MG: 5 TABLET, FILM COATED ORAL at 08:04

## 2021-04-16 ENCOUNTER — TELEPHONE (OUTPATIENT)
Dept: NEUROSURGERY | Facility: CLINIC | Age: 76
End: 2021-04-16

## 2021-04-16 VITALS
WEIGHT: 140 LBS | BODY MASS INDEX: 26.43 KG/M2 | TEMPERATURE: 98 F | SYSTOLIC BLOOD PRESSURE: 118 MMHG | DIASTOLIC BLOOD PRESSURE: 67 MMHG | RESPIRATION RATE: 26 BRPM | OXYGEN SATURATION: 97 % | HEIGHT: 61 IN | HEART RATE: 80 BPM

## 2021-04-16 LAB
POCT GLUCOSE: 124 MG/DL (ref 70–110)
POCT GLUCOSE: 141 MG/DL (ref 70–110)

## 2021-04-16 PROCEDURE — 94640 AIRWAY INHALATION TREATMENT: CPT

## 2021-04-16 PROCEDURE — 94799 UNLISTED PULMONARY SVC/PX: CPT

## 2021-04-16 PROCEDURE — 25000242 PHARM REV CODE 250 ALT 637 W/ HCPCS: Performed by: STUDENT IN AN ORGANIZED HEALTH CARE EDUCATION/TRAINING PROGRAM

## 2021-04-16 PROCEDURE — 63600175 PHARM REV CODE 636 W HCPCS: Performed by: STUDENT IN AN ORGANIZED HEALTH CARE EDUCATION/TRAINING PROGRAM

## 2021-04-16 PROCEDURE — 94761 N-INVAS EAR/PLS OXIMETRY MLT: CPT

## 2021-04-16 PROCEDURE — 25000003 PHARM REV CODE 250: Performed by: NEUROLOGICAL SURGERY

## 2021-04-16 PROCEDURE — 27000221 HC OXYGEN, UP TO 24 HOURS

## 2021-04-16 PROCEDURE — 25000003 PHARM REV CODE 250: Performed by: STUDENT IN AN ORGANIZED HEALTH CARE EDUCATION/TRAINING PROGRAM

## 2021-04-16 RX ORDER — HYDROCODONE BITARTRATE AND ACETAMINOPHEN 5; 325 MG/1; MG/1
1 TABLET ORAL EVERY 6 HOURS PRN
Qty: 60 TABLET | Refills: 0 | Status: SHIPPED | OUTPATIENT
Start: 2021-04-16 | End: 2021-04-21 | Stop reason: SDUPTHER

## 2021-04-16 RX ORDER — FUROSEMIDE 40 MG/1
40 TABLET ORAL 2 TIMES DAILY
Status: DISCONTINUED | OUTPATIENT
Start: 2021-04-16 | End: 2021-04-16 | Stop reason: HOSPADM

## 2021-04-16 RX ORDER — METHOCARBAMOL 500 MG/1
500 TABLET, FILM COATED ORAL 4 TIMES DAILY
Qty: 40 TABLET | Refills: 0 | Status: SHIPPED | OUTPATIENT
Start: 2021-04-16 | End: 2021-04-26

## 2021-04-16 RX ADMIN — ATORVASTATIN CALCIUM 10 MG: 10 TABLET, FILM COATED ORAL at 09:04

## 2021-04-16 RX ADMIN — FUROSEMIDE 80 MG: 10 INJECTION, SOLUTION INTRAVENOUS at 09:04

## 2021-04-16 RX ADMIN — MUPIROCIN: 20 OINTMENT TOPICAL at 09:04

## 2021-04-16 RX ADMIN — ASPIRIN 81 MG CHEWABLE TABLET 81 MG: 81 TABLET CHEWABLE at 09:04

## 2021-04-16 RX ADMIN — PREGABALIN 150 MG: 75 CAPSULE ORAL at 09:04

## 2021-04-16 RX ADMIN — SPIRONOLACTONE 25 MG: 25 TABLET ORAL at 09:04

## 2021-04-16 RX ADMIN — CLOPIDOGREL 75 MG: 75 TABLET, FILM COATED ORAL at 09:04

## 2021-04-16 RX ADMIN — IPRATROPIUM BROMIDE AND ALBUTEROL SULFATE 3 ML: .5; 3 SOLUTION RESPIRATORY (INHALATION) at 01:04

## 2021-04-16 RX ADMIN — APIXABAN 5 MG: 5 TABLET, FILM COATED ORAL at 09:04

## 2021-04-16 RX ADMIN — ESCITALOPRAM OXALATE 5 MG: 5 TABLET, FILM COATED ORAL at 09:04

## 2021-04-16 RX ADMIN — IPRATROPIUM BROMIDE AND ALBUTEROL SULFATE 3 ML: .5; 3 SOLUTION RESPIRATORY (INHALATION) at 07:04

## 2021-04-19 ENCOUNTER — PATIENT OUTREACH (OUTPATIENT)
Dept: ADMINISTRATIVE | Facility: CLINIC | Age: 76
End: 2021-04-19

## 2021-04-28 ENCOUNTER — CLINICAL SUPPORT (OUTPATIENT)
Dept: NEUROSURGERY | Facility: CLINIC | Age: 76
End: 2021-04-28
Payer: MEDICARE

## 2021-04-28 VITALS — SYSTOLIC BLOOD PRESSURE: 118 MMHG | DIASTOLIC BLOOD PRESSURE: 79 MMHG | HEART RATE: 74 BPM

## 2021-04-28 PROCEDURE — 99999 PR PBB SHADOW E&M-EST. PATIENT-LVL III: CPT | Mod: PBBFAC,,,

## 2021-04-28 PROCEDURE — 99999 PR PBB SHADOW E&M-EST. PATIENT-LVL III: ICD-10-PCS | Mod: PBBFAC,,,

## 2021-05-19 ENCOUNTER — TELEPHONE (OUTPATIENT)
Dept: OTOLARYNGOLOGY | Facility: CLINIC | Age: 76
End: 2021-05-19

## 2021-05-20 ENCOUNTER — LAB VISIT (OUTPATIENT)
Dept: LAB | Facility: HOSPITAL | Age: 76
End: 2021-05-20
Attending: OTOLARYNGOLOGY
Payer: MEDICARE

## 2021-05-20 ENCOUNTER — OFFICE VISIT (OUTPATIENT)
Dept: OTOLARYNGOLOGY | Facility: CLINIC | Age: 76
End: 2021-05-20
Payer: MEDICARE

## 2021-05-20 VITALS
DIASTOLIC BLOOD PRESSURE: 70 MMHG | BODY MASS INDEX: 25.75 KG/M2 | HEART RATE: 81 BPM | WEIGHT: 136.38 LBS | SYSTOLIC BLOOD PRESSURE: 120 MMHG | HEIGHT: 61 IN

## 2021-05-20 DIAGNOSIS — J38.01 PARALYSIS OF LEFT VOCAL CORD: ICD-10-CM

## 2021-05-20 DIAGNOSIS — J38.01 PARALYSIS OF LEFT VOCAL CORD: Primary | ICD-10-CM

## 2021-05-20 DIAGNOSIS — R49.0 HOARSENESS OF VOICE: Chronic | ICD-10-CM

## 2021-05-20 DIAGNOSIS — J37.0 CHRONIC LARYNGITIS: Chronic | ICD-10-CM

## 2021-05-20 DIAGNOSIS — J38.00 PARALYSIS OF VOCAL CORDS AND LARYNX, UNSPECIFIED: ICD-10-CM

## 2021-05-20 LAB
CREAT SERPL-MCNC: 1.2 MG/DL (ref 0.5–1.4)
EST. GFR  (AFRICAN AMERICAN): 51 ML/MIN/1.73 M^2
EST. GFR  (NON AFRICAN AMERICAN): 44 ML/MIN/1.73 M^2

## 2021-05-20 PROCEDURE — 31575 DIAGNOSTIC LARYNGOSCOPY: CPT | Mod: S$GLB,,, | Performed by: OTOLARYNGOLOGY

## 2021-05-20 PROCEDURE — 1159F PR MEDICATION LIST DOCUMENTED IN MEDICAL RECORD: ICD-10-PCS | Mod: S$GLB,,, | Performed by: OTOLARYNGOLOGY

## 2021-05-20 PROCEDURE — 99204 OFFICE O/P NEW MOD 45 MIN: CPT | Mod: 25,S$GLB,, | Performed by: OTOLARYNGOLOGY

## 2021-05-20 PROCEDURE — 1101F PR PT FALLS ASSESS DOC 0-1 FALLS W/OUT INJ PAST YR: ICD-10-PCS | Mod: CPTII,S$GLB,, | Performed by: OTOLARYNGOLOGY

## 2021-05-20 PROCEDURE — 31575 LARYNGOSCOPY: ICD-10-PCS | Mod: S$GLB,,, | Performed by: OTOLARYNGOLOGY

## 2021-05-20 PROCEDURE — 3288F PR FALLS RISK ASSESSMENT DOCUMENTED: ICD-10-PCS | Mod: CPTII,S$GLB,, | Performed by: OTOLARYNGOLOGY

## 2021-05-20 PROCEDURE — 82565 ASSAY OF CREATININE: CPT | Performed by: OTOLARYNGOLOGY

## 2021-05-20 PROCEDURE — 1159F MED LIST DOCD IN RCRD: CPT | Mod: S$GLB,,, | Performed by: OTOLARYNGOLOGY

## 2021-05-20 PROCEDURE — 1101F PT FALLS ASSESS-DOCD LE1/YR: CPT | Mod: CPTII,S$GLB,, | Performed by: OTOLARYNGOLOGY

## 2021-05-20 PROCEDURE — 99999 PR PBB SHADOW E&M-EST. PATIENT-LVL V: ICD-10-PCS | Mod: PBBFAC,,, | Performed by: OTOLARYNGOLOGY

## 2021-05-20 PROCEDURE — 1126F PR PAIN SEVERITY QUANTIFIED, NO PAIN PRESENT: ICD-10-PCS | Mod: S$GLB,,, | Performed by: OTOLARYNGOLOGY

## 2021-05-20 PROCEDURE — 1126F AMNT PAIN NOTED NONE PRSNT: CPT | Mod: S$GLB,,, | Performed by: OTOLARYNGOLOGY

## 2021-05-20 PROCEDURE — 3288F FALL RISK ASSESSMENT DOCD: CPT | Mod: CPTII,S$GLB,, | Performed by: OTOLARYNGOLOGY

## 2021-05-20 PROCEDURE — 36415 COLL VENOUS BLD VENIPUNCTURE: CPT | Performed by: OTOLARYNGOLOGY

## 2021-05-20 PROCEDURE — 99204 PR OFFICE/OUTPT VISIT, NEW, LEVL IV, 45-59 MIN: ICD-10-PCS | Mod: 25,S$GLB,, | Performed by: OTOLARYNGOLOGY

## 2021-05-20 PROCEDURE — 99999 PR PBB SHADOW E&M-EST. PATIENT-LVL V: CPT | Mod: PBBFAC,,, | Performed by: OTOLARYNGOLOGY

## 2021-05-25 ENCOUNTER — HOSPITAL ENCOUNTER (OUTPATIENT)
Dept: RADIOLOGY | Facility: HOSPITAL | Age: 76
Discharge: HOME | End: 2021-05-25
Attending: OTOLARYNGOLOGY
Payer: MEDICARE

## 2021-05-25 ENCOUNTER — OFFICE VISIT (OUTPATIENT)
Dept: NEUROSURGERY | Facility: CLINIC | Age: 76
End: 2021-05-25
Payer: MEDICARE

## 2021-05-25 VITALS — SYSTOLIC BLOOD PRESSURE: 124 MMHG | DIASTOLIC BLOOD PRESSURE: 74 MMHG | HEART RATE: 73 BPM

## 2021-05-25 DIAGNOSIS — M54.17 LUMBOSACRAL RADICULOPATHY AT L3: ICD-10-CM

## 2021-05-25 DIAGNOSIS — J37.0 CHRONIC LARYNGITIS: ICD-10-CM

## 2021-05-25 DIAGNOSIS — Z98.890 S/P LUMBAR MICRODISCECTOMY: Primary | ICD-10-CM

## 2021-05-25 PROCEDURE — 99999 PR PBB SHADOW E&M-EST. PATIENT-LVL III: ICD-10-PCS | Mod: PBBFAC,,, | Performed by: NEUROLOGICAL SURGERY

## 2021-05-25 PROCEDURE — 1126F PR PAIN SEVERITY QUANTIFIED, NO PAIN PRESENT: ICD-10-PCS | Mod: S$GLB,,, | Performed by: NEUROLOGICAL SURGERY

## 2021-05-25 PROCEDURE — 1126F AMNT PAIN NOTED NONE PRSNT: CPT | Mod: S$GLB,,, | Performed by: NEUROLOGICAL SURGERY

## 2021-05-25 PROCEDURE — 99999 PR PBB SHADOW E&M-EST. PATIENT-LVL III: CPT | Mod: PBBFAC,,, | Performed by: NEUROLOGICAL SURGERY

## 2021-05-25 PROCEDURE — 3288F PR FALLS RISK ASSESSMENT DOCUMENTED: ICD-10-PCS | Mod: CPTII,S$GLB,, | Performed by: NEUROLOGICAL SURGERY

## 2021-05-25 PROCEDURE — 25500020 PHARM REV CODE 255: Performed by: OTOLARYNGOLOGY

## 2021-05-25 PROCEDURE — 3044F HG A1C LEVEL LT 7.0%: CPT | Mod: CPTII,S$GLB,, | Performed by: NEUROLOGICAL SURGERY

## 2021-05-25 PROCEDURE — 3288F FALL RISK ASSESSMENT DOCD: CPT | Mod: CPTII,S$GLB,, | Performed by: NEUROLOGICAL SURGERY

## 2021-05-25 PROCEDURE — 1101F PT FALLS ASSESS-DOCD LE1/YR: CPT | Mod: CPTII,S$GLB,, | Performed by: NEUROLOGICAL SURGERY

## 2021-05-25 PROCEDURE — 99024 POSTOP FOLLOW-UP VISIT: CPT | Mod: S$GLB,,, | Performed by: NEUROLOGICAL SURGERY

## 2021-05-25 PROCEDURE — 70491 CT SOFT TISSUE NECK W/DYE: CPT | Mod: 26,,, | Performed by: RADIOLOGY

## 2021-05-25 PROCEDURE — 70491 CT SOFT TISSUE NECK W/DYE: CPT | Mod: TC

## 2021-05-25 PROCEDURE — 70491 CT SOFT TISSUE NECK WITH CONTRAST: ICD-10-PCS | Mod: 26,,, | Performed by: RADIOLOGY

## 2021-05-25 PROCEDURE — 3044F PR MOST RECENT HEMOGLOBIN A1C LEVEL <7.0%: ICD-10-PCS | Mod: CPTII,S$GLB,, | Performed by: NEUROLOGICAL SURGERY

## 2021-05-25 PROCEDURE — 99024 PR POST-OP FOLLOW-UP VISIT: ICD-10-PCS | Mod: S$GLB,,, | Performed by: NEUROLOGICAL SURGERY

## 2021-05-25 PROCEDURE — 1101F PR PT FALLS ASSESS DOC 0-1 FALLS W/OUT INJ PAST YR: ICD-10-PCS | Mod: CPTII,S$GLB,, | Performed by: NEUROLOGICAL SURGERY

## 2021-05-25 RX ADMIN — IOHEXOL 75 ML: 350 INJECTION, SOLUTION INTRAVENOUS at 09:05

## 2021-05-26 ENCOUNTER — TELEPHONE (OUTPATIENT)
Dept: OTOLARYNGOLOGY | Facility: CLINIC | Age: 76
End: 2021-05-26

## 2021-05-28 ENCOUNTER — TELEPHONE (OUTPATIENT)
Dept: OTOLARYNGOLOGY | Facility: CLINIC | Age: 76
End: 2021-05-28

## 2021-06-03 ENCOUNTER — TELEPHONE (OUTPATIENT)
Dept: OTOLARYNGOLOGY | Facility: CLINIC | Age: 76
End: 2021-06-03

## 2021-06-08 ENCOUNTER — CLINICAL SUPPORT (OUTPATIENT)
Dept: REHABILITATION | Facility: HOSPITAL | Age: 76
End: 2021-06-08
Attending: NEUROLOGICAL SURGERY
Payer: MEDICARE

## 2021-06-08 DIAGNOSIS — M54.17 LUMBOSACRAL RADICULOPATHY AT L3: ICD-10-CM

## 2021-06-08 DIAGNOSIS — Z74.09 IMPAIRED FUNCTIONAL MOBILITY, BALANCE, AND ENDURANCE: ICD-10-CM

## 2021-06-08 DIAGNOSIS — Z98.890 S/P LUMBAR MICRODISCECTOMY: ICD-10-CM

## 2021-06-08 DIAGNOSIS — R53.1 DECREASED STRENGTH: ICD-10-CM

## 2021-06-08 PROCEDURE — 97530 THERAPEUTIC ACTIVITIES: CPT | Mod: PO

## 2021-06-08 PROCEDURE — 97162 PT EVAL MOD COMPLEX 30 MIN: CPT | Mod: PO

## 2021-06-11 ENCOUNTER — TELEPHONE (OUTPATIENT)
Dept: OTOLARYNGOLOGY | Facility: CLINIC | Age: 76
End: 2021-06-11

## 2021-06-15 ENCOUNTER — TELEPHONE (OUTPATIENT)
Dept: REHABILITATION | Facility: HOSPITAL | Age: 76
End: 2021-06-15

## 2021-06-15 ENCOUNTER — DOCUMENTATION ONLY (OUTPATIENT)
Dept: REHABILITATION | Facility: HOSPITAL | Age: 76
End: 2021-06-15

## 2021-06-17 ENCOUNTER — TELEPHONE (OUTPATIENT)
Dept: REHABILITATION | Facility: HOSPITAL | Age: 76
End: 2021-06-17

## 2021-06-17 ENCOUNTER — TELEPHONE (OUTPATIENT)
Dept: OTOLARYNGOLOGY | Facility: CLINIC | Age: 76
End: 2021-06-17

## 2021-06-25 ENCOUNTER — DOCUMENTATION ONLY (OUTPATIENT)
Dept: REHABILITATION | Facility: HOSPITAL | Age: 76
End: 2021-06-25

## 2021-06-29 ENCOUNTER — OFFICE VISIT (OUTPATIENT)
Dept: OTOLARYNGOLOGY | Facility: CLINIC | Age: 76
End: 2021-06-29
Payer: MEDICARE

## 2021-06-29 VITALS
BODY MASS INDEX: 28.1 KG/M2 | WEIGHT: 148.81 LBS | HEART RATE: 93 BPM | HEIGHT: 61 IN | DIASTOLIC BLOOD PRESSURE: 79 MMHG | SYSTOLIC BLOOD PRESSURE: 130 MMHG

## 2021-06-29 DIAGNOSIS — R49.0 HOARSENESS OF VOICE: Primary | Chronic | ICD-10-CM

## 2021-06-29 DIAGNOSIS — J37.0 CHRONIC LARYNGITIS: Chronic | ICD-10-CM

## 2021-06-29 DIAGNOSIS — J38.01 PARALYSIS OF LEFT VOCAL CORD: Chronic | ICD-10-CM

## 2021-06-29 PROCEDURE — 99999 PR PBB SHADOW E&M-EST. PATIENT-LVL V: ICD-10-PCS | Mod: PBBFAC,,, | Performed by: OTOLARYNGOLOGY

## 2021-06-29 PROCEDURE — 1126F AMNT PAIN NOTED NONE PRSNT: CPT | Mod: S$GLB,,, | Performed by: OTOLARYNGOLOGY

## 2021-06-29 PROCEDURE — 1159F PR MEDICATION LIST DOCUMENTED IN MEDICAL RECORD: ICD-10-PCS | Mod: S$GLB,,, | Performed by: OTOLARYNGOLOGY

## 2021-06-29 PROCEDURE — 1101F PT FALLS ASSESS-DOCD LE1/YR: CPT | Mod: CPTII,S$GLB,, | Performed by: OTOLARYNGOLOGY

## 2021-06-29 PROCEDURE — 99214 OFFICE O/P EST MOD 30 MIN: CPT | Mod: 25,S$GLB,, | Performed by: OTOLARYNGOLOGY

## 2021-06-29 PROCEDURE — 1159F MED LIST DOCD IN RCRD: CPT | Mod: S$GLB,,, | Performed by: OTOLARYNGOLOGY

## 2021-06-29 PROCEDURE — 31575 LARYNGOSCOPY: ICD-10-PCS | Mod: S$GLB,,, | Performed by: OTOLARYNGOLOGY

## 2021-06-29 PROCEDURE — 1126F PR PAIN SEVERITY QUANTIFIED, NO PAIN PRESENT: ICD-10-PCS | Mod: S$GLB,,, | Performed by: OTOLARYNGOLOGY

## 2021-06-29 PROCEDURE — 99999 PR PBB SHADOW E&M-EST. PATIENT-LVL V: CPT | Mod: PBBFAC,,, | Performed by: OTOLARYNGOLOGY

## 2021-06-29 PROCEDURE — 3288F PR FALLS RISK ASSESSMENT DOCUMENTED: ICD-10-PCS | Mod: CPTII,S$GLB,, | Performed by: OTOLARYNGOLOGY

## 2021-06-29 PROCEDURE — 99214 PR OFFICE/OUTPT VISIT, EST, LEVL IV, 30-39 MIN: ICD-10-PCS | Mod: 25,S$GLB,, | Performed by: OTOLARYNGOLOGY

## 2021-06-29 PROCEDURE — 31575 DIAGNOSTIC LARYNGOSCOPY: CPT | Mod: S$GLB,,, | Performed by: OTOLARYNGOLOGY

## 2021-06-29 PROCEDURE — 1101F PR PT FALLS ASSESS DOC 0-1 FALLS W/OUT INJ PAST YR: ICD-10-PCS | Mod: CPTII,S$GLB,, | Performed by: OTOLARYNGOLOGY

## 2021-06-29 PROCEDURE — 3288F FALL RISK ASSESSMENT DOCD: CPT | Mod: CPTII,S$GLB,, | Performed by: OTOLARYNGOLOGY

## 2021-07-08 ENCOUNTER — CLINICAL SUPPORT (OUTPATIENT)
Dept: REHABILITATION | Facility: HOSPITAL | Age: 76
End: 2021-07-08
Attending: OTOLARYNGOLOGY
Payer: MEDICARE

## 2021-07-08 DIAGNOSIS — J38.01 PARALYSIS OF LEFT VOCAL CORD: Chronic | ICD-10-CM

## 2021-07-08 DIAGNOSIS — R49.0 HOARSENESS OF VOICE: Chronic | ICD-10-CM

## 2021-07-08 PROCEDURE — 92524 BEHAVRAL QUALIT ANALYS VOICE: CPT | Mod: PO | Performed by: SPEECH-LANGUAGE PATHOLOGIST

## 2021-07-08 PROCEDURE — 92507 TX SP LANG VOICE COMM INDIV: CPT | Mod: PO | Performed by: SPEECH-LANGUAGE PATHOLOGIST

## 2021-07-15 ENCOUNTER — DOCUMENTATION ONLY (OUTPATIENT)
Dept: REHABILITATION | Facility: HOSPITAL | Age: 76
End: 2021-07-15

## 2021-07-19 ENCOUNTER — DOCUMENTATION ONLY (OUTPATIENT)
Dept: REHABILITATION | Facility: HOSPITAL | Age: 76
End: 2021-07-19

## 2021-07-26 ENCOUNTER — DOCUMENTATION ONLY (OUTPATIENT)
Dept: REHABILITATION | Facility: HOSPITAL | Age: 76
End: 2021-07-26

## 2021-07-28 ENCOUNTER — TELEPHONE (OUTPATIENT)
Dept: REHABILITATION | Facility: HOSPITAL | Age: 76
End: 2021-07-28

## 2021-08-05 ENCOUNTER — PATIENT MESSAGE (OUTPATIENT)
Dept: OTOLARYNGOLOGY | Facility: CLINIC | Age: 76
End: 2021-08-05

## 2021-08-17 ENCOUNTER — DOCUMENT SCAN (OUTPATIENT)
Dept: HOME HEALTH SERVICES | Facility: HOSPITAL | Age: 76
End: 2021-08-17
Payer: MEDICARE

## 2021-09-09 ENCOUNTER — DOCUMENTATION ONLY (OUTPATIENT)
Dept: REHABILITATION | Facility: HOSPITAL | Age: 76
End: 2021-09-09

## 2021-11-03 PROBLEM — J38.01 PARALYSIS OF LEFT VOCAL CORD: Chronic | Status: ACTIVE | Noted: 2021-11-03

## 2021-11-03 PROBLEM — R06.03 RESPIRATORY DISTRESS: Status: RESOLVED | Noted: 2021-04-14 | Resolved: 2021-11-03

## 2021-11-03 PROBLEM — R20.2 PARESTHESIA OF UPPER EXTREMITY: Status: RESOLVED | Noted: 2018-07-03 | Resolved: 2021-11-03

## 2021-11-03 PROBLEM — E11.22 TYPE 2 DIABETES MELLITUS WITH STAGE 3B CHRONIC KIDNEY DISEASE, WITHOUT LONG-TERM CURRENT USE OF INSULIN: Status: ACTIVE | Noted: 2021-11-03

## 2021-11-03 PROBLEM — J38.01 PARALYSIS OF LEFT VOCAL CORD: Status: ACTIVE | Noted: 2021-11-03

## 2021-11-03 PROBLEM — N18.32 TYPE 2 DIABETES MELLITUS WITH STAGE 3B CHRONIC KIDNEY DISEASE, WITHOUT LONG-TERM CURRENT USE OF INSULIN: Status: ACTIVE | Noted: 2021-11-03

## 2021-11-03 PROBLEM — R52 PAIN: Status: RESOLVED | Noted: 2020-12-23 | Resolved: 2021-11-03

## 2021-11-03 PROBLEM — E11.22 TYPE 2 DIABETES MELLITUS WITH STAGE 3B CHRONIC KIDNEY DISEASE, WITHOUT LONG-TERM CURRENT USE OF INSULIN: Chronic | Status: ACTIVE | Noted: 2021-11-03

## 2021-11-03 PROBLEM — N18.32 TYPE 2 DIABETES MELLITUS WITH STAGE 3B CHRONIC KIDNEY DISEASE, WITHOUT LONG-TERM CURRENT USE OF INSULIN: Chronic | Status: ACTIVE | Noted: 2021-11-03

## 2021-11-04 ENCOUNTER — OFFICE VISIT (OUTPATIENT)
Dept: OTOLARYNGOLOGY | Facility: CLINIC | Age: 76
End: 2021-11-04
Payer: MEDICARE

## 2021-11-04 VITALS
DIASTOLIC BLOOD PRESSURE: 72 MMHG | BODY MASS INDEX: 28.22 KG/M2 | WEIGHT: 149.5 LBS | HEART RATE: 84 BPM | HEIGHT: 61 IN | SYSTOLIC BLOOD PRESSURE: 123 MMHG

## 2021-11-04 DIAGNOSIS — J37.0 CHRONIC LARYNGITIS: Chronic | ICD-10-CM

## 2021-11-04 DIAGNOSIS — J38.01 PARALYSIS OF LEFT VOCAL CORD: Primary | Chronic | ICD-10-CM

## 2021-11-04 DIAGNOSIS — R49.0 HOARSENESS OF VOICE: Chronic | ICD-10-CM

## 2021-11-04 PROCEDURE — 99213 PR OFFICE/OUTPT VISIT, EST, LEVL III, 20-29 MIN: ICD-10-PCS | Mod: 25,S$GLB,, | Performed by: OTOLARYNGOLOGY

## 2021-11-04 PROCEDURE — 1126F AMNT PAIN NOTED NONE PRSNT: CPT | Mod: CPTII,S$GLB,, | Performed by: OTOLARYNGOLOGY

## 2021-11-04 PROCEDURE — 1101F PT FALLS ASSESS-DOCD LE1/YR: CPT | Mod: CPTII,S$GLB,, | Performed by: OTOLARYNGOLOGY

## 2021-11-04 PROCEDURE — 1159F PR MEDICATION LIST DOCUMENTED IN MEDICAL RECORD: ICD-10-PCS | Mod: CPTII,S$GLB,, | Performed by: OTOLARYNGOLOGY

## 2021-11-04 PROCEDURE — 3074F PR MOST RECENT SYSTOLIC BLOOD PRESSURE < 130 MM HG: ICD-10-PCS | Mod: CPTII,S$GLB,, | Performed by: OTOLARYNGOLOGY

## 2021-11-04 PROCEDURE — 1101F PR PT FALLS ASSESS DOC 0-1 FALLS W/OUT INJ PAST YR: ICD-10-PCS | Mod: CPTII,S$GLB,, | Performed by: OTOLARYNGOLOGY

## 2021-11-04 PROCEDURE — 99999 PR PBB SHADOW E&M-EST. PATIENT-LVL IV: CPT | Mod: PBBFAC,,, | Performed by: OTOLARYNGOLOGY

## 2021-11-04 PROCEDURE — 99213 OFFICE O/P EST LOW 20 MIN: CPT | Mod: 25,S$GLB,, | Performed by: OTOLARYNGOLOGY

## 2021-11-04 PROCEDURE — 3288F PR FALLS RISK ASSESSMENT DOCUMENTED: ICD-10-PCS | Mod: CPTII,S$GLB,, | Performed by: OTOLARYNGOLOGY

## 2021-11-04 PROCEDURE — 3074F SYST BP LT 130 MM HG: CPT | Mod: CPTII,S$GLB,, | Performed by: OTOLARYNGOLOGY

## 2021-11-04 PROCEDURE — 31575 DIAGNOSTIC LARYNGOSCOPY: CPT | Mod: S$GLB,,, | Performed by: OTOLARYNGOLOGY

## 2021-11-04 PROCEDURE — 1126F PR PAIN SEVERITY QUANTIFIED, NO PAIN PRESENT: ICD-10-PCS | Mod: CPTII,S$GLB,, | Performed by: OTOLARYNGOLOGY

## 2021-11-04 PROCEDURE — 3288F FALL RISK ASSESSMENT DOCD: CPT | Mod: CPTII,S$GLB,, | Performed by: OTOLARYNGOLOGY

## 2021-11-04 PROCEDURE — 1159F MED LIST DOCD IN RCRD: CPT | Mod: CPTII,S$GLB,, | Performed by: OTOLARYNGOLOGY

## 2021-11-04 PROCEDURE — 99999 PR PBB SHADOW E&M-EST. PATIENT-LVL IV: ICD-10-PCS | Mod: PBBFAC,,, | Performed by: OTOLARYNGOLOGY

## 2021-11-04 PROCEDURE — 31575 LARYNGOSCOPY: ICD-10-PCS | Mod: S$GLB,,, | Performed by: OTOLARYNGOLOGY

## 2021-11-04 PROCEDURE — 3078F DIAST BP <80 MM HG: CPT | Mod: CPTII,S$GLB,, | Performed by: OTOLARYNGOLOGY

## 2021-11-04 PROCEDURE — 1160F RVW MEDS BY RX/DR IN RCRD: CPT | Mod: CPTII,S$GLB,, | Performed by: OTOLARYNGOLOGY

## 2021-11-04 PROCEDURE — 1160F PR REVIEW ALL MEDS BY PRESCRIBER/CLIN PHARMACIST DOCUMENTED: ICD-10-PCS | Mod: CPTII,S$GLB,, | Performed by: OTOLARYNGOLOGY

## 2021-11-04 PROCEDURE — 3078F PR MOST RECENT DIASTOLIC BLOOD PRESSURE < 80 MM HG: ICD-10-PCS | Mod: CPTII,S$GLB,, | Performed by: OTOLARYNGOLOGY

## 2021-11-10 ENCOUNTER — OFFICE VISIT (OUTPATIENT)
Dept: OTOLARYNGOLOGY | Facility: CLINIC | Age: 76
End: 2021-11-10
Payer: MEDICARE

## 2021-11-10 ENCOUNTER — TELEPHONE (OUTPATIENT)
Dept: PULMONOLOGY | Facility: CLINIC | Age: 76
End: 2021-11-10
Payer: MEDICARE

## 2021-11-10 VITALS — DIASTOLIC BLOOD PRESSURE: 78 MMHG | SYSTOLIC BLOOD PRESSURE: 124 MMHG | HEART RATE: 94 BPM

## 2021-11-10 DIAGNOSIS — R49.0 DYSPHONIA: ICD-10-CM

## 2021-11-10 DIAGNOSIS — R49.0 HOARSENESS OF VOICE: Chronic | ICD-10-CM

## 2021-11-10 DIAGNOSIS — J38.01 UNILATERAL COMPLETE VOCAL FOLD PARALYSIS: Primary | ICD-10-CM

## 2021-11-10 DIAGNOSIS — J37.0 CHRONIC LARYNGITIS: Chronic | ICD-10-CM

## 2021-11-10 DIAGNOSIS — R59.0 THORACIC LYMPHADENOPATHY: ICD-10-CM

## 2021-11-10 DIAGNOSIS — J38.01 PARALYSIS OF LEFT VOCAL CORD: Chronic | ICD-10-CM

## 2021-11-10 PROCEDURE — 99999 PR PBB SHADOW E&M-EST. PATIENT-LVL V: ICD-10-PCS | Mod: PBBFAC,,, | Performed by: OTOLARYNGOLOGY

## 2021-11-10 PROCEDURE — 3288F FALL RISK ASSESSMENT DOCD: CPT | Mod: CPTII,S$GLB,, | Performed by: OTOLARYNGOLOGY

## 2021-11-10 PROCEDURE — 1160F RVW MEDS BY RX/DR IN RCRD: CPT | Mod: CPTII,S$GLB,, | Performed by: OTOLARYNGOLOGY

## 2021-11-10 PROCEDURE — 1160F PR REVIEW ALL MEDS BY PRESCRIBER/CLIN PHARMACIST DOCUMENTED: ICD-10-PCS | Mod: CPTII,S$GLB,, | Performed by: OTOLARYNGOLOGY

## 2021-11-10 PROCEDURE — 99214 PR OFFICE/OUTPT VISIT, EST, LEVL IV, 30-39 MIN: ICD-10-PCS | Mod: 25,S$GLB,, | Performed by: OTOLARYNGOLOGY

## 2021-11-10 PROCEDURE — 3078F DIAST BP <80 MM HG: CPT | Mod: CPTII,S$GLB,, | Performed by: OTOLARYNGOLOGY

## 2021-11-10 PROCEDURE — 31579 PR LARYNGOSCOPY, FLEX/RIGID TELESCOPIC, W/STROBOSCOPY: ICD-10-PCS | Mod: S$GLB,,, | Performed by: OTOLARYNGOLOGY

## 2021-11-10 PROCEDURE — 99999 PR PBB SHADOW E&M-EST. PATIENT-LVL V: CPT | Mod: PBBFAC,,, | Performed by: OTOLARYNGOLOGY

## 2021-11-10 PROCEDURE — 1159F PR MEDICATION LIST DOCUMENTED IN MEDICAL RECORD: ICD-10-PCS | Mod: CPTII,S$GLB,, | Performed by: OTOLARYNGOLOGY

## 2021-11-10 PROCEDURE — 3078F PR MOST RECENT DIASTOLIC BLOOD PRESSURE < 80 MM HG: ICD-10-PCS | Mod: CPTII,S$GLB,, | Performed by: OTOLARYNGOLOGY

## 2021-11-10 PROCEDURE — 3074F SYST BP LT 130 MM HG: CPT | Mod: CPTII,S$GLB,, | Performed by: OTOLARYNGOLOGY

## 2021-11-10 PROCEDURE — 1100F PTFALLS ASSESS-DOCD GE2>/YR: CPT | Mod: CPTII,S$GLB,, | Performed by: OTOLARYNGOLOGY

## 2021-11-10 PROCEDURE — 1159F MED LIST DOCD IN RCRD: CPT | Mod: CPTII,S$GLB,, | Performed by: OTOLARYNGOLOGY

## 2021-11-10 PROCEDURE — 1100F PR PT FALLS ASSESS DOC 2+ FALLS/FALL W/INJURY/YR: ICD-10-PCS | Mod: CPTII,S$GLB,, | Performed by: OTOLARYNGOLOGY

## 2021-11-10 PROCEDURE — 99214 OFFICE O/P EST MOD 30 MIN: CPT | Mod: 25,S$GLB,, | Performed by: OTOLARYNGOLOGY

## 2021-11-10 PROCEDURE — 3288F PR FALLS RISK ASSESSMENT DOCUMENTED: ICD-10-PCS | Mod: CPTII,S$GLB,, | Performed by: OTOLARYNGOLOGY

## 2021-11-10 PROCEDURE — 1126F AMNT PAIN NOTED NONE PRSNT: CPT | Mod: CPTII,S$GLB,, | Performed by: OTOLARYNGOLOGY

## 2021-11-10 PROCEDURE — 3074F PR MOST RECENT SYSTOLIC BLOOD PRESSURE < 130 MM HG: ICD-10-PCS | Mod: CPTII,S$GLB,, | Performed by: OTOLARYNGOLOGY

## 2021-11-10 PROCEDURE — 1126F PR PAIN SEVERITY QUANTIFIED, NO PAIN PRESENT: ICD-10-PCS | Mod: CPTII,S$GLB,, | Performed by: OTOLARYNGOLOGY

## 2021-11-10 PROCEDURE — 31579 LARYNGOSCOPY TELESCOPIC: CPT | Mod: S$GLB,,, | Performed by: OTOLARYNGOLOGY

## 2021-11-10 RX ORDER — DEXAMETHASONE SODIUM PHOSPHATE 4 MG/ML
8 INJECTION, SOLUTION INTRA-ARTICULAR; INTRALESIONAL; INTRAMUSCULAR; INTRAVENOUS; SOFT TISSUE
Status: CANCELLED | OUTPATIENT
Start: 2021-11-10

## 2021-11-10 RX ORDER — LIDOCAINE HYDROCHLORIDE 10 MG/ML
1 INJECTION, SOLUTION EPIDURAL; INFILTRATION; INTRACAUDAL; PERINEURAL ONCE
Status: CANCELLED | OUTPATIENT
Start: 2021-11-10 | End: 2021-11-10

## 2021-11-11 ENCOUNTER — TELEPHONE (OUTPATIENT)
Dept: PULMONOLOGY | Facility: CLINIC | Age: 76
End: 2021-11-11
Payer: MEDICARE

## 2021-11-23 ENCOUNTER — HOSPITAL ENCOUNTER (OUTPATIENT)
Dept: RADIOLOGY | Facility: HOSPITAL | Age: 76
Discharge: HOME | End: 2021-11-23
Attending: OTOLARYNGOLOGY
Payer: MEDICARE

## 2021-11-23 DIAGNOSIS — J38.01 PARALYSIS OF LEFT VOCAL CORD: ICD-10-CM

## 2021-11-23 DIAGNOSIS — J37.0 CHRONIC LARYNGITIS: ICD-10-CM

## 2021-11-23 DIAGNOSIS — J38.00 PARALYSIS OF VOCAL CORDS AND LARYNX, UNSPECIFIED: ICD-10-CM

## 2021-11-23 LAB
CREAT SERPL-MCNC: 1.6 MG/DL (ref 0.5–1.4)
SAMPLE: ABNORMAL

## 2021-11-23 PROCEDURE — 71260 CT THORAX DX C+: CPT | Mod: 26,,, | Performed by: RADIOLOGY

## 2021-11-23 PROCEDURE — 25500020 PHARM REV CODE 255: Performed by: OTOLARYNGOLOGY

## 2021-11-23 PROCEDURE — 71260 CT THORAX DX C+: CPT | Mod: TC

## 2021-11-23 PROCEDURE — 71260 CT CHEST WITH CONTRAST: ICD-10-PCS | Mod: 26,,, | Performed by: RADIOLOGY

## 2021-11-23 RX ADMIN — IOHEXOL 75 ML: 350 INJECTION, SOLUTION INTRAVENOUS at 01:11

## 2021-11-29 ENCOUNTER — TELEPHONE (OUTPATIENT)
Dept: OTOLARYNGOLOGY | Facility: CLINIC | Age: 76
End: 2021-11-29
Payer: MEDICARE

## 2021-12-03 DIAGNOSIS — Z01.818 PRE-OP TESTING: Primary | ICD-10-CM

## 2021-12-03 NOTE — ANESTHESIA PAT ROS NOTE
12/03/2021  Trudy R Dakin is a 76 y.o., female.      Pre-op Assessment          Review of Systems  Anesthesia Hx:  Denies Hx of Anesthetic complications  History of prior surgery of interest to airway management or planning: facial plastic/reconstructive. Previous anesthesia: General MICRODISCECTOMY, SPINE LEFT 4/13/21, MANDIBLE FRACTURE SURGERY 1970 with general anesthesia.  Procedure performed at an [facility]. Airway issues documented on chart review include mask, easy, easy direct laryngoscopy , view on direct laryngoscopy Grade I  Denies Family Hx of Anesthesia complications.   Denies Personal Hx of Anesthesia complications.   Social:  Non-Smoker, No Alcohol Use    EENT/Dental:   UNILATERAL COMPLETE VOCAL FOLD PARALYSIS.   Cardiovascular:   Hypertension Denies MI.   CHF hyperlipidemia  Functional Capacity 3 METS  Coronary Artery Disease: S/P Percutaneous Coronary Intervention (PCI) coronary stent, unknown type, currently on other antiplatelet Rx.  Valvular Heart Disease: Aortic Stenosis (AS) Aortic Regurgitation (AR), Mitral Regurgitation (MR), Tricuspid Regurgitation (TR)   Disorder of Cardiac Rhythm, Atrial Fibrillation    Pulmonary:   Denies Shortness of breath.  Denies Recent URI.  Pulmonary Hypertension    Hepatic/GI:   GERD S/P COLECTOMY.   Musculoskeletal:   Arthritis   Cervical Spine Disorder, Cervical Disc Disease, Radiculopathy, S/P Cervical Fusion  Lumbar Spine Disorders, Lumbar Disc Disease, Radiculopathy, Spondylolisthesis   Neurological:   Denies TIA. Denies CVA. S/P LUMBAR SPINE SURGERY. S/P RIGHT ACDF  Peripheral Neuropathy    Endocrine:   Diabetes, type 2    Psych:   Psychiatric History             Anesthesia Assessment: Preoperative EQUATION    Planned Procedure: Procedure(s) (LRB):  Suspension microlaryngoscopy with left vocal fold injection augmentation - Restylane L  "(N/A)  Requested Anesthesia Type:General  Surgeon: Yuan Mir MD  Service: ENT  Known or anticipated Date of Surgery:12/7/2021    Surgeon notes: reviewed    Electronic QUestionnaire Assessment completed via nurse interview with patient.        Triage considerations:     The patient has no apparent active cardiac condition (No unstable coronary Syndrome such as severe unstable angina or recent [<1 month] myocardial infarction, decompensated CHF, severe valvular   disease or significant arrhythmia)    Previous anesthesia records:GETA and No problems     Airway/Jaw/Neck:  Airway Findings: Mouth Opening: Small, but > 3cm Tongue: Normal  General Airway Assessment: Adult  Mallampati: II  TM Distance: 4 - 6 cm  Jaw/Neck Findings:  Mandibular Fracture: Negative (hx of mandibular fracture 1970 with residual "stiffness") Neck ROM: Extension Decreased, Mild     Dental:  Dental Findings: Upper Dentures, Lower Dentures     Intubation     Date/Time: 4/13/2021 10:06 AM  Performed by: Pinky Whyte CRNA  Authorized by: Jos Velazco MD      Intubation:     Induction:  Intravenous    Intubated:  Postinduction    Mask Ventilation:  Easy mask    Attempts:  1    Attempted By:  Student (Sammy ROSAS)    Method of Intubation:  Direct    Blade:  Hussein 2    Laryngeal View Grade: Grade I - full view of chords      Difficult Airway Encountered?: No      Complications:  None    Airway Device:  Oral endotracheal tube    Airway Device Size:  7.0    Style/Cuff Inflation:  Cuffed (inflated to minimal occlusive pressure)    Tube secured:  21    Secured at:  The lips    Placement Verified By:  Capnometry    Complicating Factors:  None    Findings Post-Intubation:  BS equal bilateral       Last PCP note: within 3 months , within [facility]   Subspecialty notes: Cardiology: General, ENT, Neurosurgery, Pain Management    Other important co-morbidities: A FIB, CHF, DM2, GERD, HLD, HTN and S/P MANDIBLE FRACTURE SURGERY 1970      Tests " already available:  Available tests,  6-12 months ago , within [facility] . 5/20/21: CREATININE 4/15/21:IRON TIBC, FERRITIN, TRANSFERRIN, FOLATE, B12, TROPONIN, PHOS, MAG, CMP, CBC, BNP 4/14/21: ECHO-EF 55%, EKG            Instructions given. (See in Nurse's note)    Optimization:  Anesthesia Preop Clinic Assessment NOT Indicated    Medical Opinion Indicated       Sub-specialist consult indicated:   [facility] CARDIOLOGY WITH MEDICATION INSTRUCITONS       Plan:    Testing:  BMP, EKG and Hematology Profile - am of surgery        Consultation:PATIENT'S CARDIOLOGIST WITH STATEMENT OF OPTIMIZATION     Patient  has previously scheduled Medical Appointment: NOT AT THIS TIME    Navigation: Tests Scheduled.              Consults scheduled.             Results will be tracked by Preop Clinic.   CARDIAC CLEARANCE FROM DR. RUVALCABA SCANNED TO MEDIA. 11/28/21 ECHO INTERPRETATION SCANNED TO MEDIA (EF 55-60%).

## 2021-12-06 ENCOUNTER — PATIENT MESSAGE (OUTPATIENT)
Dept: OTOLARYNGOLOGY | Facility: CLINIC | Age: 76
End: 2021-12-06
Payer: MEDICARE

## 2021-12-28 NOTE — PRE-PROCEDURE INSTRUCTIONS
PRE OP INSTRUCTIONS REVIEWED. PATIENT VERBALIZED UNDERSTANDING. PATIENT REMAINS OFF ANTICOAGULANTS POST FALL.

## 2022-01-01 NOTE — ED NOTES
Dr. Johnson ok with pt being discharge with elevated bp. Pt remains in stable condition. States pain is 8/10 at this time   Resident

## 2022-01-03 ENCOUNTER — TELEPHONE (OUTPATIENT)
Dept: OTOLARYNGOLOGY | Facility: CLINIC | Age: 77
End: 2022-01-03
Payer: MEDICARE

## 2022-01-04 ENCOUNTER — ANESTHESIA EVENT (OUTPATIENT)
Dept: SURGERY | Facility: HOSPITAL | Age: 77
End: 2022-01-04
Payer: MEDICARE

## 2022-01-04 ENCOUNTER — ANESTHESIA (OUTPATIENT)
Dept: SURGERY | Facility: HOSPITAL | Age: 77
End: 2022-01-04
Payer: MEDICARE

## 2022-01-04 ENCOUNTER — HOSPITAL ENCOUNTER (OUTPATIENT)
Facility: HOSPITAL | Age: 77
Discharge: HOME | End: 2022-01-04
Attending: OTOLARYNGOLOGY | Admitting: OTOLARYNGOLOGY
Payer: MEDICARE

## 2022-01-04 VITALS
OXYGEN SATURATION: 91 % | WEIGHT: 139 LBS | HEIGHT: 61 IN | DIASTOLIC BLOOD PRESSURE: 81 MMHG | SYSTOLIC BLOOD PRESSURE: 139 MMHG | RESPIRATION RATE: 20 BRPM | HEART RATE: 97 BPM | TEMPERATURE: 98 F | BODY MASS INDEX: 26.24 KG/M2

## 2022-01-04 DIAGNOSIS — J38.01 UNILATERAL COMPLETE VOCAL FOLD PARALYSIS: Primary | ICD-10-CM

## 2022-01-04 DIAGNOSIS — J38.01 PARALYSIS OF LEFT VOCAL CORD: Chronic | ICD-10-CM

## 2022-01-04 DIAGNOSIS — Z01.818 PRE-OP TESTING: ICD-10-CM

## 2022-01-04 LAB
ANION GAP SERPL CALC-SCNC: 12 MMOL/L (ref 8–16)
BUN SERPL-MCNC: 27 MG/DL (ref 8–23)
CALCIUM SERPL-MCNC: 9.7 MG/DL (ref 8.7–10.5)
CHLORIDE SERPL-SCNC: 102 MMOL/L (ref 95–110)
CO2 SERPL-SCNC: 26 MMOL/L (ref 23–29)
CREAT SERPL-MCNC: 1.1 MG/DL (ref 0.5–1.4)
CTP QC/QA: YES
ERYTHROCYTE [DISTWIDTH] IN BLOOD BY AUTOMATED COUNT: 19.3 % (ref 11.5–14.5)
EST. GFR  (AFRICAN AMERICAN): 56.4 ML/MIN/1.73 M^2
EST. GFR  (NON AFRICAN AMERICAN): 48.9 ML/MIN/1.73 M^2
GLUCOSE SERPL-MCNC: 120 MG/DL (ref 70–110)
HCT VFR BLD AUTO: 36.2 % (ref 37–48.5)
HGB BLD-MCNC: 11.1 G/DL (ref 12–16)
MCH RBC QN AUTO: 26.9 PG (ref 27–31)
MCHC RBC AUTO-ENTMCNC: 30.7 G/DL (ref 32–36)
MCV RBC AUTO: 88 FL (ref 82–98)
PLATELET # BLD AUTO: 197 K/UL (ref 150–450)
PMV BLD AUTO: 10.7 FL (ref 9.2–12.9)
POCT GLUCOSE: 128 MG/DL (ref 70–110)
POCT GLUCOSE: 134 MG/DL (ref 70–110)
POTASSIUM SERPL-SCNC: 4.1 MMOL/L (ref 3.5–5.1)
RBC # BLD AUTO: 4.13 M/UL (ref 4–5.4)
SARS-COV-2 AG RESP QL IA.RAPID: NEGATIVE
SODIUM SERPL-SCNC: 140 MMOL/L (ref 136–145)
WBC # BLD AUTO: 6.95 K/UL (ref 3.9–12.7)

## 2022-01-04 PROCEDURE — D9220A PRA ANESTHESIA: ICD-10-PCS | Mod: ANES,,, | Performed by: ANESTHESIOLOGY

## 2022-01-04 PROCEDURE — 36000708 HC OR TIME LEV III 1ST 15 MIN: Performed by: OTOLARYNGOLOGY

## 2022-01-04 PROCEDURE — D9220A PRA ANESTHESIA: Mod: CRNA,,, | Performed by: NURSE ANESTHETIST, CERTIFIED REGISTERED

## 2022-01-04 PROCEDURE — D9220A PRA ANESTHESIA: ICD-10-PCS | Mod: CRNA,,, | Performed by: NURSE ANESTHETIST, CERTIFIED REGISTERED

## 2022-01-04 PROCEDURE — 63600175 PHARM REV CODE 636 W HCPCS: Performed by: OTOLARYNGOLOGY

## 2022-01-04 PROCEDURE — 71000044 HC DOSC ROUTINE RECOVERY FIRST HOUR: Performed by: OTOLARYNGOLOGY

## 2022-01-04 PROCEDURE — 85027 COMPLETE CBC AUTOMATED: CPT | Performed by: ANESTHESIOLOGY

## 2022-01-04 PROCEDURE — D9220A PRA ANESTHESIA: Mod: ANES,,, | Performed by: ANESTHESIOLOGY

## 2022-01-04 PROCEDURE — 37000009 HC ANESTHESIA EA ADD 15 MINS: Performed by: OTOLARYNGOLOGY

## 2022-01-04 PROCEDURE — 63600175 PHARM REV CODE 636 W HCPCS: Performed by: NURSE ANESTHETIST, CERTIFIED REGISTERED

## 2022-01-04 PROCEDURE — 82962 GLUCOSE BLOOD TEST: CPT | Performed by: OTOLARYNGOLOGY

## 2022-01-04 PROCEDURE — 31571 LARYNGOSCOP W/VC INJ + SCOPE: CPT | Mod: ,,, | Performed by: OTOLARYNGOLOGY

## 2022-01-04 PROCEDURE — 80048 BASIC METABOLIC PNL TOTAL CA: CPT | Performed by: ANESTHESIOLOGY

## 2022-01-04 PROCEDURE — 31571 PR LARYNGOSCOPY,DIRECT,SCOPE,INJ CORDS: ICD-10-PCS | Mod: ,,, | Performed by: OTOLARYNGOLOGY

## 2022-01-04 PROCEDURE — 25000003 PHARM REV CODE 250: Performed by: NURSE ANESTHETIST, CERTIFIED REGISTERED

## 2022-01-04 PROCEDURE — 25000003 PHARM REV CODE 250: Performed by: OTOLARYNGOLOGY

## 2022-01-04 PROCEDURE — C1878 MATRL FOR VOCAL CORD: HCPCS | Performed by: OTOLARYNGOLOGY

## 2022-01-04 PROCEDURE — 82962 GLUCOSE BLOOD TEST: CPT | Mod: 91 | Performed by: OTOLARYNGOLOGY

## 2022-01-04 PROCEDURE — 37000008 HC ANESTHESIA 1ST 15 MINUTES: Performed by: OTOLARYNGOLOGY

## 2022-01-04 PROCEDURE — 36000709 HC OR TIME LEV III EA ADD 15 MIN: Performed by: OTOLARYNGOLOGY

## 2022-01-04 PROCEDURE — 71000015 HC POSTOP RECOV 1ST HR: Performed by: OTOLARYNGOLOGY

## 2022-01-04 RX ORDER — MIDAZOLAM HYDROCHLORIDE 1 MG/ML
INJECTION, SOLUTION INTRAMUSCULAR; INTRAVENOUS
Status: DISCONTINUED | OUTPATIENT
Start: 2022-01-04 | End: 2022-01-04

## 2022-01-04 RX ORDER — DEXAMETHASONE SODIUM PHOSPHATE 4 MG/ML
INJECTION, SOLUTION INTRA-ARTICULAR; INTRALESIONAL; INTRAMUSCULAR; INTRAVENOUS; SOFT TISSUE
Status: DISCONTINUED | OUTPATIENT
Start: 2022-01-04 | End: 2022-01-04

## 2022-01-04 RX ORDER — LIDOCAINE HYDROCHLORIDE 40 MG/ML
INJECTION, SOLUTION RETROBULBAR
Status: DISCONTINUED | OUTPATIENT
Start: 2022-01-04 | End: 2022-01-04 | Stop reason: HOSPADM

## 2022-01-04 RX ORDER — ROCURONIUM BROMIDE 10 MG/ML
INJECTION, SOLUTION INTRAVENOUS
Status: DISCONTINUED | OUTPATIENT
Start: 2022-01-04 | End: 2022-01-04

## 2022-01-04 RX ORDER — EPINEPHRINE 1 MG/ML
INJECTION, SOLUTION INTRACARDIAC; INTRAMUSCULAR; INTRAVENOUS; SUBCUTANEOUS
Status: DISCONTINUED | OUTPATIENT
Start: 2022-01-04 | End: 2022-01-04 | Stop reason: HOSPADM

## 2022-01-04 RX ORDER — ONDANSETRON 2 MG/ML
INJECTION INTRAMUSCULAR; INTRAVENOUS
Status: DISCONTINUED | OUTPATIENT
Start: 2022-01-04 | End: 2022-01-04

## 2022-01-04 RX ORDER — HYDROMORPHONE HYDROCHLORIDE 1 MG/ML
0.2 INJECTION, SOLUTION INTRAMUSCULAR; INTRAVENOUS; SUBCUTANEOUS EVERY 5 MIN PRN
Status: DISCONTINUED | OUTPATIENT
Start: 2022-01-04 | End: 2022-01-04 | Stop reason: HOSPADM

## 2022-01-04 RX ORDER — ONDANSETRON 2 MG/ML
4 INJECTION INTRAMUSCULAR; INTRAVENOUS ONCE
Status: DISCONTINUED | OUTPATIENT
Start: 2022-01-04 | End: 2022-01-04 | Stop reason: HOSPADM

## 2022-01-04 RX ORDER — PROPOFOL 10 MG/ML
VIAL (ML) INTRAVENOUS
Status: DISCONTINUED | OUTPATIENT
Start: 2022-01-04 | End: 2022-01-04

## 2022-01-04 RX ORDER — FENTANYL CITRATE 50 UG/ML
INJECTION, SOLUTION INTRAMUSCULAR; INTRAVENOUS
Status: DISCONTINUED | OUTPATIENT
Start: 2022-01-04 | End: 2022-01-04

## 2022-01-04 RX ORDER — DEXTROMETHORPHAN HYDROBROMIDE, GUAIFENESIN 5; 100 MG/5ML; MG/5ML
650 LIQUID ORAL EVERY 8 HOURS
Refills: 0
Start: 2022-01-04

## 2022-01-04 RX ORDER — LIDOCAINE HYDROCHLORIDE 20 MG/ML
INJECTION INTRAVENOUS
Status: DISCONTINUED | OUTPATIENT
Start: 2022-01-04 | End: 2022-01-04

## 2022-01-04 RX ORDER — LIDOCAINE HYDROCHLORIDE 10 MG/ML
1 INJECTION, SOLUTION EPIDURAL; INFILTRATION; INTRACAUDAL; PERINEURAL ONCE
Status: DISCONTINUED | OUTPATIENT
Start: 2022-01-04 | End: 2022-01-04 | Stop reason: HOSPADM

## 2022-01-04 RX ORDER — SODIUM CHLORIDE 0.9 % (FLUSH) 0.9 %
3 SYRINGE (ML) INJECTION
Status: DISCONTINUED | OUTPATIENT
Start: 2022-01-04 | End: 2022-01-04 | Stop reason: HOSPADM

## 2022-01-04 RX ADMIN — DEXAMETHASONE SODIUM PHOSPHATE 8 MG: 4 INJECTION, SOLUTION INTRAMUSCULAR; INTRAVENOUS at 02:01

## 2022-01-04 RX ADMIN — ROCURONIUM BROMIDE 30 MG: 10 INJECTION, SOLUTION INTRAVENOUS at 01:01

## 2022-01-04 RX ADMIN — MIDAZOLAM HYDROCHLORIDE 1 MG: 1 INJECTION, SOLUTION INTRAMUSCULAR; INTRAVENOUS at 01:01

## 2022-01-04 RX ADMIN — SUGAMMADEX 200 MG: 100 INJECTION, SOLUTION INTRAVENOUS at 02:01

## 2022-01-04 RX ADMIN — FENTANYL CITRATE 50 MCG: 50 INJECTION, SOLUTION INTRAMUSCULAR; INTRAVENOUS at 01:01

## 2022-01-04 RX ADMIN — SODIUM CHLORIDE: 0.9 INJECTION, SOLUTION INTRAVENOUS at 01:01

## 2022-01-04 RX ADMIN — PROPOFOL 100 MG: 10 INJECTION, EMULSION INTRAVENOUS at 01:01

## 2022-01-04 RX ADMIN — ONDANSETRON 4 MG: 2 INJECTION INTRAMUSCULAR; INTRAVENOUS at 02:01

## 2022-01-04 RX ADMIN — LIDOCAINE HYDROCHLORIDE 60 MG: 20 INJECTION, SOLUTION INTRAVENOUS at 01:01

## 2022-01-04 NOTE — ANESTHESIA PREPROCEDURE EVALUATION
01/04/2022  Trudy R Dakin is a 76 y.o., female.      Anesthesia Evaluation    I have reviewed the Patient Summary Reports.         Review of Systems  Anesthesia Hx:  No problems with previous Anesthesia Denies Hx of Anesthetic complications  History of prior surgery of interest to airway management or planning: facial plastic/reconstructive. Previous anesthesia: General MICRODISCECTOMY, SPINE LEFT 4/13/21, MANDIBLE FRACTURE SURGERY 1970 with general anesthesia.  Procedure performed at an [facility]. Airway issues documented on chart review include mask, easy, easy direct laryngoscopy , view on direct laryngoscopy Grade I  Denies Family Hx of Anesthesia complications.   Denies Personal Hx of Anesthesia complications.   Social:  Non-Smoker    Hematology/Oncology:  Hematology Normal   Oncology Normal     EENT/Dental:  EENT/Dental Normal UNILATERAL COMPLETE VOCAL FOLD PARALYSIS.   Cardiovascular:   Hypertension Denies MI. CAD    CHF hyperlipidemia ECG has been reviewed. Pulm HTN, PASP in 60s Functional Capacity 3 METS  Coronary Artery Disease: S/P Percutaneous Coronary Intervention (PCI) coronary stent, unknown type, currently on other antiplatelet Rx.  Valvular Heart Disease: Aortic Stenosis (AS) Aortic Regurgitation (AR), Mitral Regurgitation (MR), Tricuspid Regurgitation (TR)   Disorder of Cardiac Rhythm, Atrial Fibrillation    Pulmonary:  Pulmonary Normal  Denies Shortness of breath.  Denies Recent URI.  Pulmonary Hypertension    Renal/:   Chronic Renal Disease    Hepatic/GI:  Hepatic/GI Normal S/P COLECTOMY.   Musculoskeletal:   Arthritis   Cervical Spine Disorder, Cervical Disc Disease, Radiculopathy, S/P Cervical Fusion  Lumbar Spine Disorders, Lumbar Disc Disease, Radiculopathy, Spondylolisthesis   Neurological:   Denies TIA. Denies CVA. Neuromuscular Disease,  S/P LUMBAR SPINE SURGERY. S/P RIGHT  "ACDF  Peripheral Neuropathy    Endocrine:   Diabetes, type 2    Dermatological:  Skin Normal    Psych:   Psychiatric History          Physical Exam  General:  Well nourished    Airway/Jaw/Neck:  Airway Findings: Mouth Opening: Normal Tongue: Normal  General Airway Assessment: Adult  Mallampati: II  Improves to II with phonation.  TM Distance: Normal, at least 6 cm  Jaw/Neck Findings:  Neck ROM: Normal ROM      Dental:  Dental Findings: In tact   Chest/Lungs:  Chest/Lungs Findings: Clear to auscultation, Normal Respiratory Rate     Heart/Vascular:  Heart Findings: Rate: Normal  Rhythm: Regular Rhythm  Sounds: Normal             Anesthesia Assessment: Preoperative EQUATION    Planned Procedure: Procedure(s) (LRB):  Suspension microlaryngoscopy with left vocal fold injection augmentation - Restylane L (N/A)  Requested Anesthesia Type:General  Surgeon: Yuan Mir MD  Service: ENT  Known or anticipated Date of Surgery:12/7/2021    Surgeon notes: reviewed    Electronic QUestionnaire Assessment completed via nurse interview with patient.        Triage considerations:     The patient has no apparent active cardiac condition (No unstable coronary Syndrome such as severe unstable angina or recent [<1 month] myocardial infarction, decompensated CHF, severe valvular   disease or significant arrhythmia)    Previous anesthesia records:GETA and No problems     Airway/Jaw/Neck:  Airway Findings: Mouth Opening: Small, but > 3cm Tongue: Normal  General Airway Assessment: Adult  Mallampati: II  TM Distance: 4 - 6 cm  Jaw/Neck Findings:  Mandibular Fracture: Negative (hx of mandibular fracture 1970 with residual "stiffness") Neck ROM: Extension Decreased, Mild     Dental:  Dental Findings: Upper Dentures, Lower Dentures     Intubation     Date/Time: 4/13/2021 10:06 AM  Performed by: Pinky Whyte CRNA  Authorized by: Jos Velazco MD      Intubation:     Induction:  Intravenous    Intubated:  Postinduction    Mask " Ventilation:  Easy mask    Attempts:  1    Attempted By:  Student (Sammy ROSAS)    Method of Intubation:  Direct    Blade:  Hussein 2    Laryngeal View Grade: Grade I - full view of chords      Difficult Airway Encountered?: No      Complications:  None    Airway Device:  Oral endotracheal tube    Airway Device Size:  7.0    Style/Cuff Inflation:  Cuffed (inflated to minimal occlusive pressure)    Tube secured:  21    Secured at:  The lips    Placement Verified By:  Capnometry    Complicating Factors:  None    Findings Post-Intubation:  BS equal bilateral       Last PCP note: within 3 months , within [facility]   Subspecialty notes: Cardiology: General, ENT, Neurosurgery, Pain Management    Other important co-morbidities: A FIB, CHF, DM2, GERD, HLD, HTN and S/P MANDIBLE FRACTURE SURGERY 1970      Tests already available:  Available tests,  6-12 months ago , within [facility] . 5/20/21: CREATININE 4/15/21:IRON TIBC, FERRITIN, TRANSFERRIN, FOLATE, B12, TROPONIN, PHOS, MAG, CMP, CBC, BNP 4/14/21: ECHO-EF 55%, EKG            Instructions given. (See in Nurse's note)    Optimization:  Anesthesia Preop Clinic Assessment NOT Indicated    Medical Opinion Indicated       Sub-specialist consult indicated:   [facility] CARDIOLOGY WITH MEDICATION INSTRUCITONS       Plan:    Testing:  BMP, EKG and Hematology Profile - am of surgery        Consultation:PATIENT'S CARDIOLOGIST WITH STATEMENT OF OPTIMIZATION     Patient  has previously scheduled Medical Appointment: NOT AT THIS TIME    Navigation: Tests Scheduled.              Consults scheduled.             Results will be tracked by Preop Clinic.   CARDIAC CLEARANCE FROM DR. RUVALCABA SCANNED TO MEDIA. 11/28/21 ECHO INTERPRETATION SCANNED TO MEDIA (EF 55-60%).      Anesthesia Plan  Type of Anesthesia, risks & benefits discussed:  Anesthesia Type:  general    Patient's Preference: General  Plan Factors:          Intra-op Monitoring Plan: standard ASA monitors  Intra-op Monitoring Plan  Comments:   Post Op Pain Control Plan: multimodal analgesia  Post Op Pain Control Plan Comments:     Induction:   IV  Beta Blocker:  Patient is not currently on a Beta-Blocker (No further documentation required).       Informed Consent: Patient understands risks and agrees with Anesthesia plan.  Questions answered. Anesthesia consent signed with patient.  ASA Score: 4     Day of Surgery Review of History & Physical: I have interviewed and examined the patient. I have reviewed the patient's H&P dated:  There are no significant changes.          Ready For Surgery From Anesthesia Perspective.

## 2022-01-04 NOTE — PLAN OF CARE
Patient was prepared for surgery.    Patient's POCT done in [facility] was negative.
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
## 2022-01-04 NOTE — DISCHARGE SUMMARY
[facility] (2nd Fl)  Discharge Note  Short Stay    Procedure(s) (LRB):  Suspension microlaryngoscopy with left vocal fold injection augmentation - Restylane L (N/A)    OUTCOME: Patient tolerated treatment/procedure well without complication and is now ready for discharge.    DISPOSITION: Home or Self Care    FINAL DIAGNOSIS:  Paralysis of left vocal cord    FOLLOWUP: In clinic    DISCHARGE INSTRUCTIONS:    Discharge Procedure Orders   Diet Adult Regular     Notify your health care provider if you experience any of the following:  temperature >100.4     Notify your health care provider if you experience any of the following:  difficulty breathing or increased cough     No dressing needed     Activity as tolerated        TIME SPENT ON DISCHARGE: 5 minutes

## 2022-01-04 NOTE — TRANSFER OF CARE
"Anesthesia Transfer of Care Note    Patient: Trudy R Dakin    Procedure(s) Performed: Procedure(s) (LRB):  Suspension microlaryngoscopy with left vocal fold injection augmentation - Restylane L (N/A)    Patient location: [facility]    Anesthesia Type: general    Transport from OR: Transported from OR on 6-10 L/min O2 by face mask with adequate spontaneous ventilation    Post pain: adequate analgesia    Post assessment: no apparent anesthetic complications and tolerated procedure well    Post vital signs: stable    Level of consciousness: lethargic and responds to stimulation    Nausea/Vomiting: no nausea/vomiting    Complications: none    Transfer of care protocol was followed      Last vitals:   Visit Vitals  /69 (BP Location: Right arm, Patient Position: Lying)   Pulse 93   Temp 36.7 °C (98.1 °F) (Oral)   Resp 20   Ht 5' 1" (1.549 m)   Wt 63 kg (139 lb)   LMP 01/01/1970   SpO2 97%   Breastfeeding No   BMI 26.26 kg/m²     "

## 2022-01-04 NOTE — ANESTHESIA PROCEDURE NOTES
Intubation    Date/Time: 1/4/2022 2:00 PM  Performed by: Gabriella Cochran CRNA  Authorized by: Marlon Espana MD     Intubation:     Induction:  Intravenous    Intubated:  Postinduction    Mask Ventilation:  Easy mask    Attempts:  1    Attempted By:  CRNA    Method of Intubation:  Direct and video laryngoscopy    Blade:  Cameron 3    Laryngeal View Grade: Grade I - full view of cords      Difficult Airway Encountered?: No      Complications:  None    Airway Device:  Oral endotracheal tube    Airway Device Size:  6.0    Style/Cuff Inflation:  Cuffed (inflated to minimal occlusive pressure)    Inflation Amount (mL):  7    Tube secured:  20    Secured at:  The lips    Placement Verified By:  Capnometry    Complicating Factors:  Small mouth    Findings Post-Intubation:  BS equal bilateral and atraumatic/condition of teeth unchanged

## 2022-01-04 NOTE — PLAN OF CARE
RT en route to do EKG, and got called away for Rapid Response pt.  When RT arrived to room, pt unavailable.

## 2022-01-04 NOTE — PATIENT INSTRUCTIONS
MICROLARYNGOSCOPY    Description  Laryngoscopy is a procedure in which the surgeon closely examines the larynx (voice box). The key instrument for laryngoscopy is the laryngoscope, which is a hollow metal tube inserted into the mouth. Microlaryngoscopy entails--at minimum--a magnified examination of the larynx using a microscope and/or telescopes. This is performed in the operating room. This allows the surgeon to achieve a diagnosis and also to perform precise treatment for problems on the vocal folds (vocal cords) or other parts of the larynx. Additional interventions may be performed in conjunction with microlaryngoscopy. Common interventions include but are not limited to   Biopsy   Removal of abnormal tissue (polyps, cysts, nodules, leukoplakia)   Resection of cancer   Laser treatment of abnormal tissue (papilloma, leukoplakia)   Vocal fold injection augmentation   Injection of medication (steroid, Avastin)    Instructions: before the procedure  1. NPO after midnight: This is a medical expression for nothing to eat or drink after midnight. It is important to refrain from eating or drinking anything after midnight the night before your surgery.   2. Please refrain from taking any anti-platelet medications such as aspirin; ibuprofen (Advil, Motrin); Aleve; or Plavix (clopidogrel) for 7 days prior to the procedure.  3. If you usually take Coumadin (warfarin), you may need to stop using this a few days prior to the injection.   4. If you are any type of blood thinning (anti-platelet or anti-coagulant) medication and it is not clear what you should do, please clarify this with your surgeon ahead of time. In some cases we rely on other physicians such as cardiologists or hematologists to help with these decisions.  5. Diabetes precautions: If you are usually take oral diabetes medications (such as metformin), refrain from taking your morning dose the day of the procedure and use sliding-scale  insulin for blood sugar control.  6. On the morning of your procedure, it is OK to take your other regular morning medications with a small sip of water. It is particularly important to take any medications that treat heart problems (such as blood pressure, heart rate, heart rhythm) and lung problems  (asthma, COPD). Otherwise, please remember: nothing to eat or drink after midnight.      What to expect during the procedure  Microlaryngoscopy is performed in the operating room while you are asleep under general anesthesia. In most instances, you can expect to be under general anesthesia for approximately 1 to 1 ½ hours.  Nevertheless, the duration of the procedure varies depending on the indication for surgery, intraoperative findings, and other patient-specific/anatomic issues. Our primary concerns are your safety and comfort. Our secondary goal is to provide you with the best possible surgical treatment for your problem. Your surgery will take as long as necessary to accomplish these goals.    What to expect afterwards  The laryngoscope is inserted through the mouth and presses on the tongue. The most common postoperative issues patients encounter are related to the laryngoscope being positioned in the mouth. The extent of these issues is related to patient-specific anatomic issues, the indications for surgery, and the duration of the procedure.    Pain. We will do everything we can to make sure you are as comfortable as possible. Most patients experience very little discomfort after this surgery. Nevertheless, you should expect some soreness in the mouth and throat. Because the ear and the throat share the same sensory nerve, you may also experience some discomfort in the ear. The discomfort is usually worst for the first 48-72 hours and usually resolves within a week. You will be prescribed pain medication, but most patients are sufficiently comfortable with plain Tylenol as needed.    Laceration. Some patients  may notice a small cut in the tongue or in another part of the mouth or throat. This may result in a minor about of blood-tinged saliva for the first 24 hours. This usually heals on its own over the course of a week.    Other tongue problems. As the scope presses down on the tongue, the taste buds get compressed. In addition, sometimes the nerves to the tongue also get compressed. As a result, some patients notice a disturbance in taste, numbness of the tongue, or (even more rarely) weakness of the tongue after surgery. Although sometimes it may take several weeks to months for the problem to completely go away, the tongue problems are temporary in almost every instance.    Tooth problems. Reinforced mouth guards are placed on the teeth to protect them during the surgery and we give a great deal of care and attention to minimizing any pressure on the teeth. However, a chipped or cracked tooth, loss of a tooth, and/or other tooth irregularities are rare but well-recognized complications of laryngoscopy.    Jaw problems. You may experience some pain in the jaw joints (in front of the ears). Even less frequent would be dislocation of the joint. This usually occurs in patients who already have jaw problems.    Instructions: after the procedure  1. Please call our office at [phone] if  - You have a temperature above 101°F  - You develop Increasing pain not relieved by medications  - You have any other questions or concerns  2. Please go immediately to the nearest emergency room if you are experiencing  - Shortness of breath  - Difficulty breathing  - Severe bleeding

## 2022-01-04 NOTE — BRIEF OP NOTE
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.01]    Post-op Diagnosis:  Post-Op Diagnosis Codes:     * Unilateral complete vocal fold paralysis [J38.01]    Procedure(s) (LRB):  Suspension microlaryngoscopy with left vocal fold injection augmentation - Restylane L (N/A)    Anesthesia: General    Operative Findings: left TVF restylane injection with 0.8 mL       Estimated Blood Loss: * No values recorded between 1/4/2022  2:03 PM and 1/4/2022  2:21 PM *         Specimens:   Specimen (24h ago, onward)            None            Discharge Note    OUTCOME: Patient tolerated treatment/procedure well without complication and is now ready for discharge.    DISPOSITION: Home or Self Care    FINAL DIAGNOSIS:  Paralysis of left vocal cord    FOLLOWUP: In clinic    DISCHARGE INSTRUCTIONS:    Discharge Procedure Orders   Diet Adult Regular     Notify your health care provider if you experience any of the following:  temperature >100.4     Notify your health care provider if you experience any of the following:  difficulty breathing or increased cough     No dressing needed     Activity as tolerated

## 2022-01-04 NOTE — DISCHARGE INSTRUCTIONS
MICROLARYNGOSCOPY    Description  Laryngoscopy is a procedure in which the surgeon closely examines the larynx (voice box). The key instrument for laryngoscopy is the laryngoscope, which is a hollow metal tube inserted into the mouth. Microlaryngoscopy entails--at minimum--a magnified examination of the larynx using a microscope and/or telescopes. This is performed in the operating room. This allows the surgeon to achieve a diagnosis and also to perform precise treatment for problems on the vocal folds (vocal cords) or other parts of the larynx. Additional interventions may be performed in conjunction with microlaryngoscopy. Common interventions include but are not limited to   Biopsy   Removal of abnormal tissue (polyps, cysts, nodules, leukoplakia)   Resection of cancer   Laser treatment of abnormal tissue (papilloma, leukoplakia)   Vocal fold injection augmentation   Injection of medication (steroid, Avastin)    Instructions: before the procedure  1. NPO after midnight: This is a medical expression for nothing to eat or drink after midnight. It is important to refrain from eating or drinking anything after midnight the night before your surgery.   2. Please refrain from taking any anti-platelet medications such as aspirin; ibuprofen (Advil, Motrin); Aleve; or Plavix (clopidogrel) for 7 days prior to the procedure.  3. If you usually take Coumadin (warfarin), you may need to stop using this a few days prior to the injection.   4. If you are any type of blood thinning (anti-platelet or anti-coagulant) medication and it is not clear what you should do, please clarify this with your surgeon ahead of time. In some cases we rely on other physicians such as cardiologists or hematologists to help with these decisions.  5. Diabetes precautions: If you are usually take oral diabetes medications (such as metformin), refrain from taking your morning dose the day of the procedure and use sliding-scale insulin for  blood sugar control.  6. On the morning of your procedure, it is OK to take your other regular morning medications with a small sip of water. It is particularly important to take any medications that treat heart problems (such as blood pressure, heart rate, heart rhythm) and lung problems  (asthma, COPD). Otherwise, please remember: nothing to eat or drink after midnight.      What to expect during the procedure  Microlaryngoscopy is performed in the operating room while you are asleep under general anesthesia. In most instances, you can expect to be under general anesthesia for approximately 1 to 1 ½ hours.  Nevertheless, the duration of the procedure varies depending on the indication for surgery, intraoperative findings, and other patient-specific/anatomic issues. Our primary concerns are your safety and comfort. Our secondary goal is to provide you with the best possible surgical treatment for your problem. Your surgery will take as long as necessary to accomplish these goals.    What to expect afterwards  The laryngoscope is inserted through the mouth and presses on the tongue. The most common postoperative issues patients encounter are related to the laryngoscope being positioned in the mouth. The extent of these issues is related to patient-specific anatomic issues, the indications for surgery, and the duration of the procedure.    Pain. We will do everything we can to make sure you are as comfortable as possible. Most patients experience very little discomfort after this surgery. Nevertheless, you should expect some soreness in the mouth and throat. Because the ear and the throat share the same sensory nerve, you may also experience some discomfort in the ear. The discomfort is usually worst for the first 48-72 hours and usually resolves within a week. You will be prescribed pain medication, but most patients are sufficiently comfortable with plain Tylenol as needed.    Laceration. Some patients may notice a  small cut in the tongue or in another part of the mouth or throat. This may result in a minor about of blood-tinged saliva for the first 24 hours. This usually heals on its own over the course of a week.    Other tongue problems. As the scope presses down on the tongue, the taste buds get compressed. In addition, sometimes the nerves to the tongue also get compressed. As a result, some patients notice a disturbance in taste, numbness of the tongue, or (even more rarely) weakness of the tongue after surgery. Although sometimes it may take several weeks to months for the problem to completely go away, the tongue problems are temporary in almost every instance.    Tooth problems. Reinforced mouth guards are placed on the teeth to protect them during the surgery and we give a great deal of care and attention to minimizing any pressure on the teeth. However, a chipped or cracked tooth, loss of a tooth, and/or other tooth irregularities are rare but well-recognized complications of laryngoscopy.    Jaw problems. You may experience some pain in the jaw joints (in front of the ears). Even less frequent would be dislocation of the joint. This usually occurs in patients who already have jaw problems.    Instructions: after the procedure  1. Please take the prescribed pain medication as directed. If you are still having discomfort after the prescription runs out, or if you would rather not take a prescription narcotic pain medicine, you may instead take plain acetaminophen (Tylenol) as directed on the packaging.  2. Voice rest. In many instances, we will recommend COMPLETE VOICE REST until your follow-up visit with your surgeon. This means COMPLETE SILENCE - NO TALKING, NO COUGHING, NO WHISPERING. Please see additional information on how to manage complete voice rest. Even if voice rest is not prescribed, for the first week, you should avoid speaking over heavy background noise or in a very loud voice.   3. Please call our  office at [phone] if  - You have a temperature above 101°F  - You develop Increasing pain not relieved by medications  - You have any other questions or concerns  4. Please go immediately to the nearest emergency room if you are experiencing  - Shortness of breath  - Difficulty breathing              -     Severe bleeding       Patient Education       General Anesthesia Discharge Instructions   About this topic   You may need general anesthesia if you need to be asleep during a procedure. Your doctor will use drugs to block the signals that go from your nerves to your brain. Doctors give general anesthesia during a surgery or procedure to:  · Allow you to sleep  · Help your body be still  · Relax your muscles  · Help you to relax and be pain free  · Keep you from remembering the surgery  · Let the doctor manage your airway, breathing, and blood flow  The doctor or nurse anesthetist gives general anesthesia by a shot into your vein. Sometimes, you may breathe in a gas through a mask placed over your face.  What care is needed at home?   · Ask your doctor what you need to do when you go home. Make sure you ask questions if you do not understand what the doctor says.  · Your doctor may give you drugs to prevent or treat an upset stomach from the anesthetic. Take them as ordered.  · If your throat is sore, suck on ice chips or popsicles to ease throat pain.  · Put 2 to 3 pillows under your head and back when you lie down to help you breathe easier.  · For the first 24 to 48 hours:  ? Do not operate heavy or dangerous machinery.  ? Do not make major decisions or sign important papers. You may not be able to think clearly.  ? Avoid beer, wine, or mixed drinks.  · You are at a higher risk of falling for at least 24 hours after general anesthesia.  ? Take extra care when you get up.  ? Do not change positions quickly.  ? Do not rush when you need to go to the bathroom or to answer the phone.  ? Ask for help if you  feel unsteady when you try to walk.  ? Wear shoes with non-slip soles and low heels.  What follow-up care is needed?   · Your doctor may ask you to come back to the office to check on your progress. Be sure to keep these visits.  · If you have stitches that do not dissolve or staples, you will need to have them removed. Your doctor will want to do this in 1 to 2 weeks. If the doctor used skin glue, the glue will fall off on its own.  What drugs may be needed?   The doctor may order drugs to:  · Help with pain  · Treat an upset stomach or throwing up  Will physical activity be limited?   · You will not be allowed to drive right away after the procedure. Ask a family member or a friend to drive you home.  · Avoid trying to get out of bed without help until you are sure of your balance.  · You may have to limit your activity. Talk to your doctor about if you need to limit how much you lift or limit exercise after your procedure.  What changes to diet are needed?   Start with a light diet when you are fully awake. This includes things that are easy to swallow like soups, pudding, jello, toast, and eggs. Slowly progress to your normal diet.  What problems could happen?   · Low blood pressure  · Breathing problems  · Upset stomach or throwing up  · Dizziness  · Blood clots  · Infection  When do I need to call the doctor?   · Trouble breathing  · Upset stomach or throwing up more than 3 times in the next 2 days  · Dizziness  Teach Back: Helping You Understand   The Teach Back Method helps you understand the information we are giving you. After you talk with the staff, tell them in your own words what you learned. This helps to make sure the staff has described each thing clearly. It also helps to explain things that may have been confusing. Before going home, make sure you can do these:  · I can tell you about my procedure.  · I can tell you if I need to follow up with my doctor.  · I can tell you what is good for me to  eat and drink the next day.  · I can tell you what I would do if I have trouble breathing, an upset stomach, or dizziness.  Where can I learn more?   [employer] [place] [employer]  https://www.nigms.nih.gov/education/pages/factsheet_Anesthesia.aspx   NHS Choices  http://www.nhs.uk/conditions/Anaesthetic-general/Pages/Definition.aspx   Last Reviewed Date   2020-04-22  Consumer Information Use and Disclaimer   This information is not specific medical advice and does not replace information you receive from your health care provider. This is only a brief summary of general information. It does NOT include all information about conditions, illnesses, injuries, tests, procedures, treatments, therapies, discharge instructions or life-style choices that may apply to you. You must talk with your health care provider for complete information about your health and treatment options. This information should not be used to decide whether or not to accept your health care providers advice, instructions or recommendations. Only your health care provider has the knowledge and training to provide advice that is right for you.  Copyright   Copyright © 2021 [employer] and its affiliates and/or licensors. All rights reserved.

## 2022-01-04 NOTE — OP NOTE
DATE OF SERVICE: 1/4/2022    PRE-OPERATIVE DIAGNOSIS: Left vocal fold immobility.    POST-OPERATIVE DIAGNOSIS: Left vocal fold paralysis.    PROCEDURE(S): Suspension microlaryngoscopy with left vocal fold injection augmentation with hyaluronic acid.    SURGEON: Yuan Mir M.D.    ASSISTANT: Ariel Pal M.D.    ANESTHESIA: General.    BLOOD LOSS: Less than 5 mL.    SPECIMENS: None.    COMPLICATIONS: None.    FINDINGS: Left vocal fold passively mobile. Augmented with 0.5 mL Restylane-L.    CONDITION: Stable.    INDICATIONS FOR PROCEDURE:  This is a lady with chronic left vocal fold paralysis following intubation for back surgery. She presents today for vocal fold injection augmentation. I discussed the risks, benefits and alternatives to surgery with the patient, as well as the expected postoperative course. Risks included but were not limited to pain; bleeding; infection; scarring; worsening of voice; recurrence; need for additional and/or more extensive procedures; oral/dental problems (pain, laceration, broken or missing teeth); jaw joint problems (pain, dislocation); and tongue problems (pain, laceration, numbness, weakness, taste disturbance). Any surgery on the larynx also carries with it the risks of airway obstruction necessitating tracheotomy. Also inherent in the procedure are the risks of general anesthesia, including but not limited to cardiovascular complications (heart attack, arrhythmia); pulmonary (respiratory failure); neurologic (stroke); and death. I gave the patient the opportunity to ask questions and I answered all of them. She wishes to proceed. Informed consent was obtained.    PROCEDURE IN DETAIL:   The patient was positively identified in the preoperative holding area, then was brought to the operating room and placed in the flat supine position. General endotracheal anesthesia was obtained using a 6-0 endotracheal tube which was secured to the left lower lip. The eyes were protected  with moist sponges. The teeth and/or upper alveolar ridge was protected using a reinforced mouthguard and/or moist sponges, as appropriate. A final timeout was performed for verification purposes. The Ossoff-Pilling laryngoscope was inserted into the oral cavity and was utilized to expose the larynx. The patient was suspended from the [place]. Magnified laryngeal endoscopy was carried out using a 0 degree Chappell adolfo telescope connected to a video monitor. Findings were as noted above. Photodocumentation was obtained.  Next, attention was turned to performing surgical intervention as indicated.   A vial of Restylane-L was loaded onto the laryngotracheal injector. Under maximal magnification, the needle was advanced into the deep left true vocal fold on axis with the arcuate line in the vicinity of the vocal process. Slow augmentation was performed, resulting in gradual increase in convexity/support of the free edge and trace overcorrection.  With this, the procedure was brought to completion. The larynx was topically anesthetized with 3 mL of 4% lidocaine. All equipment was removed. The patient was turned back over to the anesthesiology team for awakening and extubation, which were uneventful. The patient was escorted to the recovery room in good condition. The patient tolerated the procedure well without complications. All needle, sponge, and instrument counts were correct at the completion of the case.    ATTESTATION:  As the attending of record, I was present and participated in all portions of this procedure.

## 2022-01-05 NOTE — ANESTHESIA POSTPROCEDURE EVALUATION
Anesthesia Post Evaluation    Patient: Trudy R Dakin    Procedure(s) Performed: Procedure(s) (LRB):  Suspension microlaryngoscopy with left vocal fold injection augmentation - Restylane L (N/A)  LARYNGOSCOPY, WITH VOCAL CORD INJECTION, FOR AUGMENTATION    Final Anesthesia Type: general      Patient location during evaluation: PACU  Patient participation: Yes- Able to Participate  Level of consciousness: awake and alert  Post-procedure vital signs: reviewed and stable  Pain control: Pain has been treated.  Airway patency: patent    PONV status: PONV absent or treated.  Anesthetic complications: no      Cardiovascular status: hemodynamically stable  Respiratory status: spontaneous ventilation  Hydration status: euvolemic  Follow-up not needed.          Vitals Value Taken Time   /69 01/04/22 1517   Temp 36.5 °C (97.7 °F) 01/04/22 1437   Pulse 99 01/04/22 1520   Resp 20 01/04/22 1520   SpO2 91 % 01/04/22 1520   Vitals shown include unvalidated device data.      No case tracking events are documented in the log.      Pain/Jason Score: Jason Score: 9 (1/4/2022  3:15 PM)

## 2022-02-22 PROBLEM — Z74.09 IMPAIRED MOBILITY: Status: ACTIVE | Noted: 2022-02-22

## 2022-03-03 ENCOUNTER — OFFICE VISIT (OUTPATIENT)
Dept: OTOLARYNGOLOGY | Facility: CLINIC | Age: 77
End: 2022-03-03
Payer: MEDICARE

## 2022-03-03 VITALS — DIASTOLIC BLOOD PRESSURE: 73 MMHG | TEMPERATURE: 98 F | HEART RATE: 57 BPM | SYSTOLIC BLOOD PRESSURE: 108 MMHG

## 2022-03-03 DIAGNOSIS — R49.0 DYSPHONIA: Primary | ICD-10-CM

## 2022-03-03 DIAGNOSIS — J38.01 UNILATERAL COMPLETE VOCAL FOLD PARALYSIS: ICD-10-CM

## 2022-03-03 LAB
CTP QC/QA: YES
SARS-COV-2 RDRP RESP QL NAA+PROBE: NEGATIVE

## 2022-03-03 PROCEDURE — 1160F RVW MEDS BY RX/DR IN RCRD: CPT | Mod: CPTII,S$GLB,, | Performed by: OTOLARYNGOLOGY

## 2022-03-03 PROCEDURE — 99999 PR PBB SHADOW E&M-EST. PATIENT-LVL III: CPT | Mod: PBBFAC,,, | Performed by: OTOLARYNGOLOGY

## 2022-03-03 PROCEDURE — 3288F PR FALLS RISK ASSESSMENT DOCUMENTED: ICD-10-PCS | Mod: CPTII,S$GLB,, | Performed by: OTOLARYNGOLOGY

## 2022-03-03 PROCEDURE — 1100F PR PT FALLS ASSESS DOC 2+ FALLS/FALL W/INJURY/YR: ICD-10-PCS | Mod: CPTII,S$GLB,, | Performed by: OTOLARYNGOLOGY

## 2022-03-03 PROCEDURE — 31579 PR LARYNGOSCOPY, FLEX/RIGID TELESCOPIC, W/STROBOSCOPY: ICD-10-PCS | Mod: S$GLB,,, | Performed by: OTOLARYNGOLOGY

## 2022-03-03 PROCEDURE — 3078F PR MOST RECENT DIASTOLIC BLOOD PRESSURE < 80 MM HG: ICD-10-PCS | Mod: CPTII,S$GLB,, | Performed by: OTOLARYNGOLOGY

## 2022-03-03 PROCEDURE — 99999 PR PBB SHADOW E&M-EST. PATIENT-LVL III: ICD-10-PCS | Mod: PBBFAC,,, | Performed by: OTOLARYNGOLOGY

## 2022-03-03 PROCEDURE — 1159F PR MEDICATION LIST DOCUMENTED IN MEDICAL RECORD: ICD-10-PCS | Mod: CPTII,S$GLB,, | Performed by: OTOLARYNGOLOGY

## 2022-03-03 PROCEDURE — 1159F MED LIST DOCD IN RCRD: CPT | Mod: CPTII,S$GLB,, | Performed by: OTOLARYNGOLOGY

## 2022-03-03 PROCEDURE — 1126F AMNT PAIN NOTED NONE PRSNT: CPT | Mod: CPTII,S$GLB,, | Performed by: OTOLARYNGOLOGY

## 2022-03-03 PROCEDURE — 1126F PR PAIN SEVERITY QUANTIFIED, NO PAIN PRESENT: ICD-10-PCS | Mod: CPTII,S$GLB,, | Performed by: OTOLARYNGOLOGY

## 2022-03-03 PROCEDURE — 1160F PR REVIEW ALL MEDS BY PRESCRIBER/CLIN PHARMACIST DOCUMENTED: ICD-10-PCS | Mod: CPTII,S$GLB,, | Performed by: OTOLARYNGOLOGY

## 2022-03-03 PROCEDURE — 31579 LARYNGOSCOPY TELESCOPIC: CPT | Mod: S$GLB,,, | Performed by: OTOLARYNGOLOGY

## 2022-03-03 PROCEDURE — 3074F SYST BP LT 130 MM HG: CPT | Mod: CPTII,S$GLB,, | Performed by: OTOLARYNGOLOGY

## 2022-03-03 PROCEDURE — 3074F PR MOST RECENT SYSTOLIC BLOOD PRESSURE < 130 MM HG: ICD-10-PCS | Mod: CPTII,S$GLB,, | Performed by: OTOLARYNGOLOGY

## 2022-03-03 PROCEDURE — 3288F FALL RISK ASSESSMENT DOCD: CPT | Mod: CPTII,S$GLB,, | Performed by: OTOLARYNGOLOGY

## 2022-03-03 PROCEDURE — 1100F PTFALLS ASSESS-DOCD GE2>/YR: CPT | Mod: CPTII,S$GLB,, | Performed by: OTOLARYNGOLOGY

## 2022-03-03 PROCEDURE — 3078F DIAST BP <80 MM HG: CPT | Mod: CPTII,S$GLB,, | Performed by: OTOLARYNGOLOGY

## 2022-03-03 PROCEDURE — U0002 COVID-19 LAB TEST NON-CDC: HCPCS | Mod: QW,S$GLB,, | Performed by: OTOLARYNGOLOGY

## 2022-03-03 PROCEDURE — 99213 OFFICE O/P EST LOW 20 MIN: CPT | Mod: 25,S$GLB,, | Performed by: OTOLARYNGOLOGY

## 2022-03-03 PROCEDURE — 99213 PR OFFICE/OUTPT VISIT, EST, LEVL III, 20-29 MIN: ICD-10-PCS | Mod: 25,S$GLB,, | Performed by: OTOLARYNGOLOGY

## 2022-03-03 PROCEDURE — U0002: ICD-10-PCS | Mod: QW,S$GLB,, | Performed by: OTOLARYNGOLOGY

## 2022-03-03 NOTE — PROGRESS NOTES
[facility]  Department of Otorhinolaryngology and Communication Sciences    Subjective:      Trudy R Dakin is a 76 y.o. female who presents for follow-up. She has chronic left vocal fold paralysis following intubation for back surgery.    1/4/2022: SML left VF injection aug - HA    Her voice is better since the injection, but she does reported is somewhat inconsistent.  Nevertheless, she is very pleased with her progress.    The patient's medications, allergies, past medical, surgical, social and family histories were reviewed and updated as appropriate.    A detailed review of systems was obtained with pertinent positives as per the above HPI, and otherwise negative.      Objective:     /73   Pulse (!) 57   Temp 97.8 °F (36.6 °C)   LMP 01/01/1970      Constitutional: comfortable, well dressed  Psychiatric: appropriate affect  Respiratory: comfortably breathing, symmetric chest rise, no stridor  Voice:  Variable mild breathiness with roughness; marked interval improvements  Head: normocephalic  Eyes: conjunctivae and sclerae clear  Indirect laryngoscopy is limited due to gag    Procedure  Flexible Laryngeal Videostroboscopy (66005): Laryngeal videostroboscopy is indicated to assess laryngeal vibratory biomechanics and vocal fold oscillation, which cannot be assessed with a plain light examination. This was carried out transnasally with a distal chip videoendoscope. After verbal consent was obtained, the patient was positioned and the nose was topically decongested with 1% phenylephrine and topically anesthetized with 4% lidocaine. The endoscope was passed through the most patent nasal cavity and positioned to image the nasopharynx, larynx, and hypopharynx in detail. The following features were examined: nasopharyngeal, laryngeal, hypopharyngeal masses; velopharyngeal strength, closure, and symmetry of motion; vocal fold range and symmetry of motion; laryngeal mucosal edema, erythema, inflammation,  and hydration; salivary pooling; and gross laryngeal sensation. During phonation, the vocal folds were assessed for glottal closure; mucosal wave; vocal fold lesions; vibratory periodicity, amplitude, and phase symmetry; and vertical height match. The equipment was removed. The patient tolerated the procedure well without complication. All findings were normal except:  - mild superficial edema bilateral true vocal folds  - mild right vocal fold bowing  - complete closure, mild asymmetry of phase/amplitude mucosal wave, small posterior gap, mild vertical height mismatch  - phonatory supraglottic hyperfunction      Assessment:     Trudy R Dakin is a 76 y.o. female with chronic left vocal fold paralysis following intubation for back surgery.  She has made good progress following vocal fold injection augmentation.     Plan:     Reassurance was provided. She will follow up with me in about 4 months, or sooner if needed.    All questions were answered, and the patient is in agreement with the plan.    Yuan Mir M.D.  [facility]  Department of Otorhinolaryngology and Communication Sciences

## 2022-03-29 PROBLEM — Z74.09 IMPAIRED FUNCTIONAL MOBILITY, BALANCE, AND ENDURANCE: Status: RESOLVED | Noted: 2021-06-08 | Resolved: 2022-03-29

## 2022-12-09 ENCOUNTER — OFFICE VISIT (OUTPATIENT)
Dept: URGENT CARE | Facility: CLINIC | Age: 77
End: 2022-12-09
Payer: MEDICARE

## 2022-12-09 VITALS
SYSTOLIC BLOOD PRESSURE: 110 MMHG | RESPIRATION RATE: 16 BRPM | WEIGHT: 146 LBS | TEMPERATURE: 98 F | OXYGEN SATURATION: 95 % | BODY MASS INDEX: 27.56 KG/M2 | HEIGHT: 61 IN | DIASTOLIC BLOOD PRESSURE: 64 MMHG | HEART RATE: 60 BPM

## 2022-12-09 DIAGNOSIS — S83.91XA SPRAIN OF RIGHT KNEE, UNSPECIFIED LIGAMENT, INITIAL ENCOUNTER: Primary | ICD-10-CM
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
## 2022-12-09 DIAGNOSIS — M25.561 ACUTE PAIN OF RIGHT KNEE: ICD-10-CM

## 2022-12-09 PROCEDURE — 1125F AMNT PAIN NOTED PAIN PRSNT: CPT | Mod: CPTII,S$GLB,, | Performed by: NURSE PRACTITIONER

## 2022-12-09 PROCEDURE — 96372 THER/PROPH/DIAG INJ SC/IM: CPT | Mod: S$GLB,,, | Performed by: NURSE PRACTITIONER

## 2022-12-09 PROCEDURE — 99203 PR OFFICE/OUTPT VISIT, NEW, LEVL III, 30-44 MIN: ICD-10-PCS | Mod: 25,S$GLB,, | Performed by: NURSE PRACTITIONER

## 2022-12-09 PROCEDURE — 3074F SYST BP LT 130 MM HG: CPT | Mod: CPTII,S$GLB,, | Performed by: NURSE PRACTITIONER

## 2022-12-09 PROCEDURE — 73562 X-RAY EXAM OF KNEE 3: CPT | Mod: FY,RT,S$GLB, | Performed by: RADIOLOGY

## 2022-12-09 PROCEDURE — 3074F PR MOST RECENT SYSTOLIC BLOOD PRESSURE < 130 MM HG: ICD-10-PCS | Mod: CPTII,S$GLB,, | Performed by: NURSE PRACTITIONER

## 2022-12-09 PROCEDURE — 1160F RVW MEDS BY RX/DR IN RCRD: CPT | Mod: CPTII,S$GLB,, | Performed by: NURSE PRACTITIONER

## 2022-12-09 PROCEDURE — 73562 XR KNEE 3 VIEW RIGHT: ICD-10-PCS | Mod: FY,RT,S$GLB, | Performed by: RADIOLOGY

## 2022-12-09 PROCEDURE — 1160F PR REVIEW ALL MEDS BY PRESCRIBER/CLIN PHARMACIST DOCUMENTED: ICD-10-PCS | Mod: CPTII,S$GLB,, | Performed by: NURSE PRACTITIONER

## 2022-12-09 PROCEDURE — 1125F PR PAIN SEVERITY QUANTIFIED, PAIN PRESENT: ICD-10-PCS | Mod: CPTII,S$GLB,, | Performed by: NURSE PRACTITIONER

## 2022-12-09 PROCEDURE — 96372 PR INJECTION,THERAP/PROPH/DIAG2ST, IM OR SUBCUT: ICD-10-PCS | Mod: S$GLB,,, | Performed by: NURSE PRACTITIONER

## 2022-12-09 PROCEDURE — 3078F DIAST BP <80 MM HG: CPT | Mod: CPTII,S$GLB,, | Performed by: NURSE PRACTITIONER

## 2022-12-09 PROCEDURE — 1159F MED LIST DOCD IN RCRD: CPT | Mod: CPTII,S$GLB,, | Performed by: NURSE PRACTITIONER

## 2022-12-09 PROCEDURE — 3078F PR MOST RECENT DIASTOLIC BLOOD PRESSURE < 80 MM HG: ICD-10-PCS | Mod: CPTII,S$GLB,, | Performed by: NURSE PRACTITIONER

## 2022-12-09 PROCEDURE — 1159F PR MEDICATION LIST DOCUMENTED IN MEDICAL RECORD: ICD-10-PCS | Mod: CPTII,S$GLB,, | Performed by: NURSE PRACTITIONER

## 2022-12-09 PROCEDURE — 99203 OFFICE O/P NEW LOW 30 MIN: CPT | Mod: 25,S$GLB,, | Performed by: NURSE PRACTITIONER

## 2022-12-09 RX ORDER — KETOROLAC TROMETHAMINE 30 MG/ML
30 INJECTION, SOLUTION INTRAMUSCULAR; INTRAVENOUS
Status: COMPLETED | OUTPATIENT
Start: 2022-12-09 | End: 2022-12-09

## 2022-12-09 RX ADMIN — KETOROLAC TROMETHAMINE 30 MG: 30 INJECTION, SOLUTION INTRAMUSCULAR; INTRAVENOUS at 04:12

## 2022-12-09 NOTE — PATIENT INSTRUCTIONS
See additional patient Instructions provided    Rest and elevated affected limb   May apply ice x 15-20 minutes 3-4 times per day as needed for pain and swelling for the fist 72 hours after injury  Elevate as much as possible to aid in decreasing swelling and discomfort  Compression, wrap ace bandage during the day may also help with swelling, take off at night or during sleep  Tylenol or ibuprofen or aleve as needed for pain if not contraindicated  Voltaren or Dain matthew topical, use as directed on  packaging  Or may use Salon pas patches, also over the counter, use as directed on  packaging  Healing may take up to 4-6 weeks, follow up with PCP for no improvement  If symptoms worsen be sure to call your PCP for prompt assessment    Patient Instructions   - You must understand that you have received an Urgent Care treatment only and that you may be released before all of your medical problems are known or treated.   - You, the patient, will arrange for follow up care as instructed.   - If your condition worsens or fails to improve we recommend that you receive another evaluation at the ER immediately or contact your PCP to discuss your concerns or return here.     Advised on return/follow-up precautions. Advised on ER precautions. Answered all patient questions. Patient verbalized understanding and voiced agreement with current treatment plan.

## 2022-12-09 NOTE — PROGRESS NOTES
"Subjective:       Patient ID: Trudy R Dakin is a 77 y.o. female.    Vitals:  height is 5' 1" (1.549 m) and weight is 66.2 kg (146 lb). Her temperature is 98.2 °F (36.8 °C). Her blood pressure is 110/64 and her pulse is 60. Her respiration is 16 and oxygen saturation is 95%.     Chief Complaint: Knee Pain    Pt is 78 yo female present today for right knee pain after falling at home earlier today.  she slipped over a object and fell to her R knee. States difficulty with weight bearing. Pt has swelling in the right knee and has used icy/hot for relief.     Knee Pain   The incident occurred 6 to 12 hours ago. The injury mechanism was a fall. The pain is present in the right knee. The quality of the pain is described as aching. The pain is at a severity of 10/10. The pain is severe. The pain has been Constant since onset. Associated symptoms include an inability to bear weight. Pertinent negatives include no numbness. She reports no foreign bodies present. The symptoms are aggravated by movement and weight bearing. She has tried heat for the symptoms. The treatment provided no relief.     Musculoskeletal:  Positive for trauma, joint pain and joint swelling. Negative for muscle cramps and muscle ache.   Skin:  Negative for erythema.   Neurological:  Negative for numbness and tingling.     Objective:      Physical Exam   Constitutional:  Non-toxic appearance. She does not appear ill. No distress.   Cardiovascular: Normal rate.   Pulmonary/Chest: Effort normal. No respiratory distress.   Musculoskeletal: Normal range of motion.         General: Tenderness present. No deformity. Normal range of motion.      Right lower leg: No edema.      Left lower leg: No edema.      Comments: R knee with both passive and active ROM intact.  Painful active flexion and extension. Mild knee swelling, negative clicking with motion or floating patella  Sensation intact.    Skin: Skin is not diaphoretic and no rash. No bruising and No erythema "   Nursing note and vitals reviewed.      XR KNEE 3 VIEW RIGHT    Result Date: 12/9/2022  EXAMINATION: XR KNEE 3 VIEW RIGHT CLINICAL HISTORY: Unspecified fall, initial encounter TECHNIQUE: AP, lateral, and Merchant views of the right knee were performed. COMPARISON: None FINDINGS: Three views right knee. There is osteopenia.  There is medial compartmental narrowing.  There is meniscal calcification.  No acute displaced fracture or dislocation of the knee.  There is a moderate suprapatellar effusion.     1. No acute displaced fracture or dislocation of the knee noting moderate suprapatellar effusion. Electronically signed by: Mark Hobbs MD Date:    12/09/2022 Time:    16:27     Assessment:       1. Sprain of right knee, unspecified ligament, initial encounter    2. Injury due to fall, initial encounter    3. Acute pain of right knee          Plan:         Sprain of right knee, unspecified ligament, initial encounter  -     Ambulatory referral/consult to Orthopedics  -     ketorolac injection 30 mg  -     SPLINT FOR HOME USE    Injury due to fall, initial encounter  -     XR KNEE 3 VIEW RIGHT; Future; Expected date: 12/09/2022    Acute pain of right knee  -     ketorolac injection 30 mg                 Patient Instructions   See additional patient Instructions provided    Rest and elevated affected limb   May apply ice x 15-20 minutes 3-4 times per day as needed for pain and swelling for the fist 72 hours after injury  Elevate as much as possible to aid in decreasing swelling and discomfort  Compression, wrap ace bandage during the day may also help with swelling, take off at night or during sleep  Tylenol or ibuprofen or aleve as needed for pain if not contraindicated  Voltaren or Dain matthew topical, use as directed on  packaging  Or may use Salon pas patches, also over the counter, use as directed on  packaging  Healing may take up to 4-6 weeks, follow up with PCP for no improvement  If  symptoms worsen be sure to call your PCP for prompt assessment    Patient Instructions   - You must understand that you have received an Urgent Care treatment only and that you may be released before all of your medical problems are known or treated.   - You, the patient, will arrange for follow up care as instructed.   - If your condition worsens or fails to improve we recommend that you receive another evaluation at the ER immediately or contact your PCP to discuss your concerns or return here.     Advised on return/follow-up precautions. Advised on ER precautions. Answered all patient questions. Patient verbalized understanding and voiced agreement with current treatment plan.

## 2023-03-15 ENCOUNTER — TELEPHONE (OUTPATIENT)
Dept: FAMILY MEDICINE | Facility: CLINIC | Age: 78
End: 2023-03-15
Payer: MEDICARE

## 2023-03-15 NOTE — TELEPHONE ENCOUNTER
Spoke with patient daughter in law she will take patient to [facility] emergency in [place].

## 2023-03-15 NOTE — TELEPHONE ENCOUNTER
----- Message from Viridianaelia Smith sent at 3/15/2023  8:26 AM CDT -----  Type:  Same Day Appointment Request    Caller is requesting a same day appointment.  Caller declined first available appointment listed below.      Name of Caller:  pt daughter silverio   When is the first available appointment?  unknown   Symptoms:  est care/ pt fail. hit right side of face, its hurts,.. ear and neck area   Best Call Back Number:   [phone]    Additional Information:   requesting to be seen today please advise thank you

## 2023-04-12 ENCOUNTER — HOSPITAL ENCOUNTER (OUTPATIENT)
Dept: RADIOLOGY | Facility: HOSPITAL | Age: 78
Discharge: HOME | End: 2023-04-12
Attending: FAMILY MEDICINE
Payer: MEDICARE

## 2023-04-12 DIAGNOSIS — Z12.31 ENCOUNTER FOR SCREENING MAMMOGRAM FOR BREAST CANCER: ICD-10-CM

## 2023-04-12 PROCEDURE — 77067 SCR MAMMO BI INCL CAD: CPT | Mod: TC,PO

## 2023-04-12 PROCEDURE — 77067 MAMMO DIGITAL SCREENING BILAT WITH TOMO: ICD-10-PCS | Mod: 26,,, | Performed by: RADIOLOGY

## 2023-04-12 PROCEDURE — 77063 MAMMO DIGITAL SCREENING BILAT WITH TOMO: ICD-10-PCS | Mod: 26,,, | Performed by: RADIOLOGY

## 2023-04-12 PROCEDURE — 77067 SCR MAMMO BI INCL CAD: CPT | Mod: 26,,, | Performed by: RADIOLOGY

## 2023-04-12 PROCEDURE — 77063 BREAST TOMOSYNTHESIS BI: CPT | Mod: 26,,, | Performed by: RADIOLOGY

## 2023-05-01 ENCOUNTER — OFFICE VISIT (OUTPATIENT)
Dept: FAMILY MEDICINE | Facility: CLINIC | Age: 78
End: 2023-05-01
Payer: MEDICARE

## 2023-05-01 VITALS
TEMPERATURE: 98 F | SYSTOLIC BLOOD PRESSURE: 122 MMHG | WEIGHT: 128.75 LBS | BODY MASS INDEX: 23.69 KG/M2 | HEIGHT: 62 IN | DIASTOLIC BLOOD PRESSURE: 64 MMHG | OXYGEN SATURATION: 96 % | HEART RATE: 86 BPM

## 2023-05-01 DIAGNOSIS — N18.32 TYPE 2 DIABETES MELLITUS WITH STAGE 3B CHRONIC KIDNEY DISEASE, WITHOUT LONG-TERM CURRENT USE OF INSULIN: Chronic | ICD-10-CM

## 2023-05-01 DIAGNOSIS — I50.32 CHRONIC DIASTOLIC HEART FAILURE: Chronic | ICD-10-CM

## 2023-05-01 DIAGNOSIS — F32.A MILD DEPRESSION: Primary | ICD-10-CM

## 2023-05-01 DIAGNOSIS — I10 ESSENTIAL HYPERTENSION: Chronic | ICD-10-CM

## 2023-05-01 DIAGNOSIS — E11.22 TYPE 2 DIABETES MELLITUS WITH STAGE 3B CHRONIC KIDNEY DISEASE, WITHOUT LONG-TERM CURRENT USE OF INSULIN: Chronic | ICD-10-CM

## 2023-05-01 DIAGNOSIS — I25.10 CORONARY ARTERY DISEASE INVOLVING NATIVE CORONARY ARTERY OF NATIVE HEART WITHOUT ANGINA PECTORIS: Chronic | ICD-10-CM

## 2023-05-01 DIAGNOSIS — I48.0 PAROXYSMAL ATRIAL FIBRILLATION: Chronic | ICD-10-CM

## 2023-05-01 PROCEDURE — 1101F PT FALLS ASSESS-DOCD LE1/YR: CPT | Mod: CPTII,S$GLB,, | Performed by: NURSE PRACTITIONER

## 2023-05-01 PROCEDURE — 99204 OFFICE O/P NEW MOD 45 MIN: CPT | Mod: S$GLB,,, | Performed by: NURSE PRACTITIONER

## 2023-05-01 PROCEDURE — 1101F PR PT FALLS ASSESS DOC 0-1 FALLS W/OUT INJ PAST YR: ICD-10-PCS | Mod: CPTII,S$GLB,, | Performed by: NURSE PRACTITIONER

## 2023-05-01 PROCEDURE — 3288F FALL RISK ASSESSMENT DOCD: CPT | Mod: CPTII,S$GLB,, | Performed by: NURSE PRACTITIONER

## 2023-05-01 PROCEDURE — 3074F PR MOST RECENT SYSTOLIC BLOOD PRESSURE < 130 MM HG: ICD-10-PCS | Mod: CPTII,S$GLB,, | Performed by: NURSE PRACTITIONER

## 2023-05-01 PROCEDURE — 99999 PR PBB SHADOW E&M-EST. PATIENT-LVL V: ICD-10-PCS | Mod: PBBFAC,,, | Performed by: NURSE PRACTITIONER

## 2023-05-01 PROCEDURE — 3078F PR MOST RECENT DIASTOLIC BLOOD PRESSURE < 80 MM HG: ICD-10-PCS | Mod: CPTII,S$GLB,, | Performed by: NURSE PRACTITIONER

## 2023-05-01 PROCEDURE — 1126F PR PAIN SEVERITY QUANTIFIED, NO PAIN PRESENT: ICD-10-PCS | Mod: CPTII,S$GLB,, | Performed by: NURSE PRACTITIONER

## 2023-05-01 PROCEDURE — 99999 PR PBB SHADOW E&M-EST. PATIENT-LVL V: CPT | Mod: PBBFAC,,, | Performed by: NURSE PRACTITIONER

## 2023-05-01 PROCEDURE — 1159F MED LIST DOCD IN RCRD: CPT | Mod: CPTII,S$GLB,, | Performed by: NURSE PRACTITIONER

## 2023-05-01 PROCEDURE — 1126F AMNT PAIN NOTED NONE PRSNT: CPT | Mod: CPTII,S$GLB,, | Performed by: NURSE PRACTITIONER

## 2023-05-01 PROCEDURE — 99204 PR OFFICE/OUTPT VISIT, NEW, LEVL IV, 45-59 MIN: ICD-10-PCS | Mod: S$GLB,,, | Performed by: NURSE PRACTITIONER

## 2023-05-01 PROCEDURE — 3288F PR FALLS RISK ASSESSMENT DOCUMENTED: ICD-10-PCS | Mod: CPTII,S$GLB,, | Performed by: NURSE PRACTITIONER

## 2023-05-01 PROCEDURE — 3078F DIAST BP <80 MM HG: CPT | Mod: CPTII,S$GLB,, | Performed by: NURSE PRACTITIONER

## 2023-05-01 PROCEDURE — 1159F PR MEDICATION LIST DOCUMENTED IN MEDICAL RECORD: ICD-10-PCS | Mod: CPTII,S$GLB,, | Performed by: NURSE PRACTITIONER

## 2023-05-01 PROCEDURE — 3074F SYST BP LT 130 MM HG: CPT | Mod: CPTII,S$GLB,, | Performed by: NURSE PRACTITIONER

## 2023-05-01 RX ORDER — ESCITALOPRAM OXALATE 20 MG/1
20 TABLET ORAL DAILY
Qty: 90 TABLET | Refills: 3 | Status: SHIPPED | OUTPATIENT
Start: 2023-05-01 | End: 2023-09-21 | Stop reason: SDUPTHER

## 2023-05-01 NOTE — PROGRESS NOTES
2/2 cardiogenic pulmonary edema  Has improved with IV furosemide however symptomatic with minimal activity  I agree with Dr Jones that mitral valve needs to be replaced sooner rather than later  Continue with IV diuresis and supplemental O2 via low flow NC  Monitoring renal function  Is to transfer to [facility] for CTS evaluation as soon as bed available on telemetry floor     Subjective:       Patient ID: Trudy R Dakin is a 77 y.o. female.    Chief Complaint: Establish Care    HPI  New patient to our area, presents to establish    Previously seeing Dr Tim in [place]     recently passed. Moved here to live with son and daughter in law. Big transition  Grief, depression, anxiety--currently on Lexapro 10mg daily; prescribed xanax 0.25mg BID PRN #60 with 2 refills on 3/29/23. Reports depression uncontrolled--Crying spells at night.     HTN: controlled  PAF, HF, CAD: Dr Reynolds cardiologist. No longer on Eliquis due to risk of falls. Compliant with bumex; weighs self daily  HLD on statin  T2DM controlled A1C 6.2  CKD III stable    Vitals:    05/01/23 0823   BP: 122/64   Pulse: 86   Temp: 97.6 °F (36.4 °C)     Review of Systems   Constitutional:  Negative for fever.   Respiratory:  Negative for shortness of breath.    Cardiovascular:  Negative for leg swelling.   Psychiatric/Behavioral:  Positive for dysphoric mood.      Past Medical History:   Diagnosis Date    Diabetes     Diverticulitis     Hypertension      Objective:      Physical Exam  Constitutional:       General: She is not in acute distress.     Appearance: She is well-developed. She is not ill-appearing, toxic-appearing or diaphoretic.   HENT:      Right Ear: Hearing normal.      Left Ear: Hearing normal.   Cardiovascular:      Rate and Rhythm: Normal rate.      Heart sounds: Murmur heard.   Pulmonary:      Effort: No tachypnea or respiratory distress.   Musculoskeletal:      Right lower leg: No edema.      Left lower leg: No edema.   Skin:     Coloration: Skin is not pale.   Neurological:      Mental Status: She is alert and oriented to person, place, and time.   Psychiatric:         Mood and Affect: Mood is depressed.         Speech: Speech normal.         Behavior: Behavior normal.         Thought Content: Thought content normal.         Judgment: Judgment normal.       Assessment:       1. Depression    2. Paroxysmal  atrial fibrillation    3. CAD (coronary artery disease)    4. Essential hypertension    5. Type 2 diabetes mellitus with stage 3b chronic kidney disease, without long-term current use of insulin    6. Chronic diastolic heart failure        Plan:       Depression  -     EScitalopram oxalate (LEXAPRO) 20 MG tablet; Take 1 tablet (20 mg total) by mouth once daily.  Dispense: 90 tablet; Refill: 3    Paroxysmal atrial fibrillation    CAD (coronary artery disease)  -     Ambulatory referral/consult to Cardiology; Future; Expected date: 05/08/2023    Essential hypertension    Type 2 diabetes mellitus with stage 3b chronic kidney disease, without long-term current use of insulin    Chronic diastolic heart failure          FU 2-3 weeks for mood, establish with physician  Establish with cardiology  FU with me as needed      Medication List with Changes/Refills   Current Medications    ACETAMINOPHEN (TYLENOL) 650 MG TBSR    Take 1 tablet (650 mg total) by mouth every 8 (eight) hours.    ALBUTEROL (PROVENTIL/VENTOLIN HFA) 90 MCG/ACTUATION INHALER    Inhale 1-2 puffs into the lungs every 6 (six) hours as needed for Wheezing. Rescue    ALPRAZOLAM (XANAX) 0.25 MG TABLET    Take 1 tablet (0.25 mg total) by mouth 2 (two) times daily as needed for Anxiety.    AMITRIPTYLINE (ELAVIL) 25 MG TABLET    TAKE 1 TABLET(25 MG) BY MOUTH EVERY EVENING    ATORVASTATIN (LIPITOR) 80 MG TABLET    Take 1 tablet (80 mg total) by mouth once daily.    AZITHROMYCIN (Z-KATHY) 250 MG TABLET    Take 1 tablet (250 mg total) by mouth once daily. Take first 2 tablets together, then 1 every day until finished.    BUMETANIDE (BUMEX) 2 MG TABLET    Take 2 mg by mouth 2 (two) times daily.    CLOPIDOGREL (PLAVIX) 75 MG TABLET    Take 1 tablet (75 mg total) by mouth once daily.    CYANOCOBALAMIN 1,000 MCG/ML INJECTION    INJECT 1 ML INTO THE MUSCLE EVERY 14 DAYS    HYDROCODONE-ACETAMINOPHEN (NORCO) 5-325 MG PER TABLET    Take 1 tablet by mouth every 6 (six) hours as  needed for Pain.    METFORMIN (GLUCOPHAGE-XR) 500 MG ER 24HR TABLET    TAKE 2 TABLETS BY MOUTH TWICE DAILY    METOPROLOL SUCCINATE (TOPROL-XL) 25 MG 24 HR TABLET    TAKE 1 TABLET(25 MG) BY MOUTH EVERY DAY    NITROGLYCERIN (NITROSTAT) 0.4 MG SL TABLET    Place 1 tablet (0.4 mg total) under the tongue every 5 (five) minutes as needed for Chest pain.    NYSTATIN (MYCOSTATIN) CREAM    Apply topically 2 (two) times daily.    PREDNISONE (DELTASONE) 20 MG TABLET    Take 1 tablet (20 mg total) by mouth once daily.    PREGABALIN (LYRICA) 150 MG CAPSULE    TAKE ONE CAPSULE BY MOUTH TWICE DAILY    SPIRONOLACTONE (ALDACTONE) 25 MG TABLET    Take 1 tablet (25 mg total) by mouth once daily.   Changed and/or Refilled Medications    Modified Medication Previous Medication    ESCITALOPRAM OXALATE (LEXAPRO) 20 MG TABLET EScitalopram oxalate (LEXAPRO) 10 MG tablet       Take 1 tablet (20 mg total) by mouth once daily.    Take 1 tablet (10 mg total) by mouth once daily.

## 2023-05-04 ENCOUNTER — OFFICE VISIT (OUTPATIENT)
Dept: CARDIOLOGY | Facility: CLINIC | Age: 78
End: 2023-05-04
Payer: MEDICARE

## 2023-05-04 VITALS
WEIGHT: 128.75 LBS | SYSTOLIC BLOOD PRESSURE: 127 MMHG | BODY MASS INDEX: 23.69 KG/M2 | HEIGHT: 62 IN | HEART RATE: 102 BPM | DIASTOLIC BLOOD PRESSURE: 78 MMHG

## 2023-05-04 DIAGNOSIS — J38.01 PARALYSIS OF LEFT VOCAL CORD: Chronic | ICD-10-CM

## 2023-05-04 DIAGNOSIS — I35.0 AORTIC VALVE STENOSIS, ETIOLOGY OF CARDIAC VALVE DISEASE UNSPECIFIED: ICD-10-CM

## 2023-05-04 DIAGNOSIS — I25.10 CORONARY ARTERY DISEASE INVOLVING NATIVE CORONARY ARTERY OF NATIVE HEART WITHOUT ANGINA PECTORIS: Chronic | ICD-10-CM

## 2023-05-04 DIAGNOSIS — I10 ESSENTIAL HYPERTENSION: Chronic | ICD-10-CM

## 2023-05-04 DIAGNOSIS — R60.9 EDEMA, UNSPECIFIED TYPE: ICD-10-CM

## 2023-05-04 DIAGNOSIS — M54.16 LUMBAR RADICULOPATHY: Primary | Chronic | ICD-10-CM

## 2023-05-04 DIAGNOSIS — I50.32 CHRONIC DIASTOLIC HEART FAILURE: Chronic | ICD-10-CM

## 2023-05-04 DIAGNOSIS — R53.1 DECREASED STRENGTH: ICD-10-CM

## 2023-05-04 DIAGNOSIS — I48.0 PAROXYSMAL ATRIAL FIBRILLATION: Chronic | ICD-10-CM

## 2023-05-04 DIAGNOSIS — M48.062 SPINAL STENOSIS OF LUMBAR REGION WITH NEUROGENIC CLAUDICATION: Chronic | ICD-10-CM

## 2023-05-04 DIAGNOSIS — I34.0 MITRAL VALVE INSUFFICIENCY, UNSPECIFIED ETIOLOGY: ICD-10-CM

## 2023-05-04 PROCEDURE — 1159F PR MEDICATION LIST DOCUMENTED IN MEDICAL RECORD: ICD-10-PCS | Mod: CPTII,S$GLB,, | Performed by: INTERNAL MEDICINE

## 2023-05-04 PROCEDURE — 99204 PR OFFICE/OUTPT VISIT, NEW, LEVL IV, 45-59 MIN: ICD-10-PCS | Mod: S$GLB,,, | Performed by: INTERNAL MEDICINE

## 2023-05-04 PROCEDURE — 3288F FALL RISK ASSESSMENT DOCD: CPT | Mod: CPTII,S$GLB,, | Performed by: INTERNAL MEDICINE

## 2023-05-04 PROCEDURE — 3074F PR MOST RECENT SYSTOLIC BLOOD PRESSURE < 130 MM HG: ICD-10-PCS | Mod: CPTII,S$GLB,, | Performed by: INTERNAL MEDICINE

## 2023-05-04 PROCEDURE — 3078F PR MOST RECENT DIASTOLIC BLOOD PRESSURE < 80 MM HG: ICD-10-PCS | Mod: CPTII,S$GLB,, | Performed by: INTERNAL MEDICINE

## 2023-05-04 PROCEDURE — 93010 ELECTROCARDIOGRAM REPORT: CPT | Mod: S$GLB,,, | Performed by: INTERNAL MEDICINE

## 2023-05-04 PROCEDURE — 1126F PR PAIN SEVERITY QUANTIFIED, NO PAIN PRESENT: ICD-10-PCS | Mod: CPTII,S$GLB,, | Performed by: INTERNAL MEDICINE

## 2023-05-04 PROCEDURE — 1160F RVW MEDS BY RX/DR IN RCRD: CPT | Mod: CPTII,S$GLB,, | Performed by: INTERNAL MEDICINE

## 2023-05-04 PROCEDURE — 3288F PR FALLS RISK ASSESSMENT DOCUMENTED: ICD-10-PCS | Mod: CPTII,S$GLB,, | Performed by: INTERNAL MEDICINE

## 2023-05-04 PROCEDURE — 1159F MED LIST DOCD IN RCRD: CPT | Mod: CPTII,S$GLB,, | Performed by: INTERNAL MEDICINE

## 2023-05-04 PROCEDURE — 99999 PR PBB SHADOW E&M-EST. PATIENT-LVL IV: ICD-10-PCS | Mod: PBBFAC,,, | Performed by: INTERNAL MEDICINE

## 2023-05-04 PROCEDURE — 1101F PR PT FALLS ASSESS DOC 0-1 FALLS W/OUT INJ PAST YR: ICD-10-PCS | Mod: CPTII,S$GLB,, | Performed by: INTERNAL MEDICINE

## 2023-05-04 PROCEDURE — 1101F PT FALLS ASSESS-DOCD LE1/YR: CPT | Mod: CPTII,S$GLB,, | Performed by: INTERNAL MEDICINE

## 2023-05-04 PROCEDURE — 99999 PR PBB SHADOW E&M-EST. PATIENT-LVL IV: CPT | Mod: PBBFAC,,, | Performed by: INTERNAL MEDICINE

## 2023-05-04 PROCEDURE — 93005 ELECTROCARDIOGRAM TRACING: CPT | Mod: PO

## 2023-05-04 PROCEDURE — 3078F DIAST BP <80 MM HG: CPT | Mod: CPTII,S$GLB,, | Performed by: INTERNAL MEDICINE

## 2023-05-04 PROCEDURE — 1160F PR REVIEW ALL MEDS BY PRESCRIBER/CLIN PHARMACIST DOCUMENTED: ICD-10-PCS | Mod: CPTII,S$GLB,, | Performed by: INTERNAL MEDICINE

## 2023-05-04 PROCEDURE — 3074F SYST BP LT 130 MM HG: CPT | Mod: CPTII,S$GLB,, | Performed by: INTERNAL MEDICINE

## 2023-05-04 PROCEDURE — 1126F AMNT PAIN NOTED NONE PRSNT: CPT | Mod: CPTII,S$GLB,, | Performed by: INTERNAL MEDICINE

## 2023-05-04 PROCEDURE — 93010 EKG 12-LEAD: ICD-10-PCS | Mod: S$GLB,,, | Performed by: INTERNAL MEDICINE

## 2023-05-04 PROCEDURE — 99204 OFFICE O/P NEW MOD 45 MIN: CPT | Mod: S$GLB,,, | Performed by: INTERNAL MEDICINE

## 2023-05-04 NOTE — PROGRESS NOTES
Subjective:    Patient ID:  Trudy R Dakin is a 77 y.o. female patient here for evaluation Establish Care      History of Present Illness:  The patient cardiac evaluation.  History of valvular heart disease, moderate AS by last echo with preserved ejection fraction and at least moderate pulmonary artery systolic hypertension, 04/2021.  Denies dyspnea, angina chest pain arrhythmia.  No syncope/presyncope.    No history of CVA Ca.  No DVT PE.  No known chronic lung disease.  No chronic kidney disease.  No liver disease.      At present no cardiovascular or pulmonary complaints.      Review of patient's allergies indicates:  No Known Allergies    Past Medical History:   Diagnosis Date    Diabetes     Diverticulitis     Hypertension      Past Surgical History:   Procedure Laterality Date    ABDOMINAL SURGERY  2006    diverticulitis x3    ANTERIOR CERVICAL DISCECTOMY W/ FUSION N/A 8/30/2018    FUSION C6-7 Surgeon: Rickey Martin MD    APPENDECTOMY      CARPAL TUNNEL RELEASE      COLECTOMY  07/2020    EPIDURAL STEROID INJECTION INTO LUMBAR SPINE N/A 1/20/2021    Procedure: Injection-steroid-epidural-lumbar--L3-4;  Surgeon: Colleen Carvajal MD;  Location: [facility];  Service: Pain Management;  Laterality: N/A;    HYSTERECTOMY  1970    @25yrs of age    LARYNGOSCOPY N/A 1/4/2022    Procedure: Suspension microlaryngoscopy with left vocal fold injection augmentation - Restylane L;  Surgeon: Yuan Mir MD;  Location: [address];  Service: ENT;  Laterality: N/A;  Microscope, telescopes, tower, injector, Restylane-L    MANDIBLE FRACTURE SURGERY  1970    MICRODISCECTOMY OF SPINE Left 4/13/2021    Procedure: MICRODISCECTOMY, SPINE Left L3-4 far lateral Microdiscectomy;  Surgeon: Rickey Martin MD;  Location: [facility] OR;  Service: Neurosurgery;  Laterality: Left;  Procedure: Left L3-4 far lateral Microdiscectomy   Surgery Time: 1.5 hrs  LOS: 0-1  Anesthesia: General  Radiology: C-arm  SNS: EMG, SEP  Bed: [place] [address]  Poster  Position: Melissa Demarco w/ Globus confirmed 4/12/21 [place]    OOPHORECTOMY  1970    ROTATOR CUFF REPAIR Left 11/2016    TOTAL REDUCTION MAMMOPLASTY Bilateral 1990    TRANSFORAMINAL EPIDURAL INJECTION OF STEROID Left 12/23/2020    Procedure: Injection,steroid,epidural,transforaminal approach--Left L4 and L5;  Surgeon: Colleen Carvajal MD;  Location: [facility];  Service: Pain Management;  Laterality: Left;     Social History     Tobacco Use    Smoking status: Never    Smokeless tobacco: Never   Substance Use Topics    Alcohol use: No    Drug use: No        Review of Systems:    As noted in HPI in addition         REVIEW OF SYSTEMS  Review of Systems   Constitutional: Negative for decreased appetite, diaphoresis, night sweats, weight gain and weight loss.   HENT:  Negative for nosebleeds and odynophagia.    Eyes:  Negative for double vision and photophobia.   Cardiovascular:  Positive for palpitations. Negative for chest pain, claudication, cyanosis, dyspnea on exertion, irregular heartbeat, leg swelling, near-syncope, orthopnea, paroxysmal nocturnal dyspnea and syncope.   Respiratory:  Positive for shortness of breath. Negative for cough, hemoptysis and wheezing.    Hematologic/Lymphatic: Negative for adenopathy.   Skin:  Negative for flushing, skin cancer and suspicious lesions.   Musculoskeletal:  Negative for gout, myalgias and neck pain.   Gastrointestinal:  Negative for abdominal pain, heartburn, hematemesis and hematochezia.   Genitourinary:  Negative for bladder incontinence, hesitancy and nocturia.   Neurological:  Negative for focal weakness, headaches, light-headedness and paresthesias.   Psychiatric/Behavioral:  Negative for memory loss and substance abuse.      Objective:        Vitals:    05/04/23 1329   BP: 127/78   Pulse: 102       Lab Results   Component Value Date    WBC 7.48 04/12/2023    HGB 10.1 (L) 04/12/2023    HCT 31.9 (L) 04/12/2023     04/12/2023    CHOL 154 04/12/2023    TRIG  119 04/12/2023    HDL 29 (L) 04/12/2023    ALT 6 (L) 04/12/2023    AST 13 04/12/2023     04/12/2023    K 3.7 04/12/2023     04/12/2023    CREATININE 1.4 04/12/2023    BUN 25 (H) 04/12/2023    CO2 25 04/12/2023    TSH 1.28 02/04/2021    INR 1.0 03/31/2021    HGBA1C 6.2 (H) 04/12/2023    MICROALBUR 1.6 09/13/2022      CARDIOGRAM RESULTS  Results for orders placed during the hospital encounter of 04/13/21    Echo Color Flow Doppler? Yes    Interpretation Summary  · The left ventricle is normal in size with moderate concentric hypertrophy and normal systolic function.  · The estimated ejection fraction is 55%.  · Moderate mitral regurgitation.  · Grade II left ventricular diastolic dysfunction.  · There is moderate aortic valve stenosis.  · Aortic valve area is 1.06 cm2; peak velocity is 2.44 m/s; mean gradient is 17 mmHg.  · Moderate tricuspid regurgitation.  · Normal right ventricular size with normal right ventricular systolic function.  · The estimated PA systolic pressure is 66 mmHg.  · There is moderate to severe pulmonary hypertension.        CURRENT/PREVIOUS VISIT EKG  Results for orders placed or performed during the hospital encounter of 04/13/21   EKG 12-lead    Collection Time: 04/14/21  9:08 AM    Narrative    Test Reason : M51.16,    Vent. Rate : 074 BPM     Atrial Rate : 074 BPM     P-R Int : 142 ms          QRS Dur : 096 ms      QT Int : 430 ms       P-R-T Axes : 067 092 082 degrees     QTc Int : 477 ms    Sinus rhythm with occasional Premature ventricular complexes  Rightward axis  Borderline Abnormal ECG  When compared with ECG of 14-APR-2021 07:45,  Sinus rhythm has replaced Atrial fibrillation  Confirmed by Neeraj ALVAREZ MD, Patrick BENITEZ ([id number]) on 4/14/2021 5:04:08 PM    Referred By: AMANDA GATICA           Confirmed By:Patrick Pickard III, MD     No valid procedures specified.   No results found for this or any previous visit.    No valid procedures specified.    PHYSICAL  EXAM  CONSTITUTIONAL: Well built, well nourished in no apparent distress  NECK: no carotid bruit, no JVD  LUNGS: CTA  CHEST WALL: no tenderness,  HEART: regular rate and rhythm, S1-S2 mildly distant.  Grade 2/6 crescendo systolic murmur aortic area.  ABDOMEN: soft, non-tender; bowel sounds normal; no masses,  no organomegaly  EXTREMITIES: Extremities normal, no edema, no calf tenderness noted  VASCULAR EXAM: 2 PLUS UPPER AND +1 LOWER EXT PULSES  NEURO: AAO X 3, NO ACUTE FOCAL OR LATERALIZING FINDINGS    I HAVE REVIEWED :    The vital signs, nurses notes, and all the pertinent radiology and labs.         Current Outpatient Medications   Medication Instructions    acetaminophen (TYLENOL) 650 mg, Oral, Every 8 hours    albuterol (PROVENTIL/VENTOLIN HFA) 90 mcg/actuation inhaler 1-2 puffs, Inhalation, Every 6 hours PRN, Rescue    ALPRAZolam (XANAX) 0.25 mg, Oral, 2 times daily PRN    amitriptyline (ELAVIL) 25 MG tablet TAKE 1 TABLET(25 MG) BY MOUTH EVERY EVENING    atorvastatin (LIPITOR) 80 mg, Oral, Daily    azithromycin (Z-KATHY) 250 mg, Oral, Daily, Take first 2 tablets together, then 1 every day until finished.    bumetanide (BUMEX) 2 mg, Oral, 2 times daily    clopidogreL (PLAVIX) 75 mg, Oral, Daily    cyanocobalamin 1,000 mcg/mL injection INJECT 1 ML INTO THE MUSCLE EVERY 14 DAYS    EScitalopram oxalate (LEXAPRO) 20 mg, Oral, Daily    HYDROcodone-acetaminophen (NORCO) 5-325 mg per tablet 1 tablet, Oral, Every 6 hours PRN    metFORMIN (GLUCOPHAGE-XR) 500 MG ER 24hr tablet TAKE 2 TABLETS BY MOUTH TWICE DAILY    metoprolol succinate (TOPROL-XL) 25 MG 24 hr tablet TAKE 1 TABLET(25 MG) BY MOUTH EVERY DAY    nitroGLYCERIN (NITROSTAT) 0.4 mg, Sublingual, Every 5 min PRN    nystatin (MYCOSTATIN) cream Topical (Top), 2 times daily    predniSONE (DELTASONE) 20 mg, Oral, Daily    pregabalin (LYRICA) 150 MG capsule TAKE ONE CAPSULE BY MOUTH TWICE DAILY    spironolactone (ALDACTONE) 25 mg, Oral, Daily          Assessment:    Valvular heart disease, moderate AS.  Last echo 2021, moderate PA systolic hypertension noted at that time.  Negative nuclear study 2017.  Palpitations improved.  Recent labs stable, hemoglobin A1c 6.2 with an HDL of 35    Plan:   Update echo, carotid ultrasound.  Call results of normal.  Labs and follow-up primary care.  Six months.  Meds reviewed and reconciled.  Recommend no change.  Cardiac exam hemodynamics otherwise stable.        No follow-ups on file.

## 2023-05-05 ENCOUNTER — TELEPHONE (OUTPATIENT)
Dept: CARDIOLOGY | Facility: CLINIC | Age: 78
End: 2023-05-05
Payer: MEDICARE

## 2023-05-05 NOTE — TELEPHONE ENCOUNTER
----- Message from Rock Almanzar sent at 5/5/2023  1:55 PM CDT -----  Type: Needs Medical Advice  Who Called:  pt  Best Call Back Number: [phone]  Additional Information: pt is calling and stating the clinic needs to call and request the pt's records [phone] Dr. Watson fax number please call back to advise asap, thanks!

## 2023-05-14 ENCOUNTER — HOSPITAL ENCOUNTER (EMERGENCY)
Facility: HOSPITAL | Age: 78
Discharge: HOME | End: 2023-05-14
Attending: EMERGENCY MEDICINE
Payer: MEDICARE

## 2023-05-14 VITALS
RESPIRATION RATE: 18 BRPM | HEART RATE: 84 BPM | TEMPERATURE: 98 F | OXYGEN SATURATION: 95 % | SYSTOLIC BLOOD PRESSURE: 126 MMHG | BODY MASS INDEX: 23.74 KG/M2 | HEIGHT: 62 IN | DIASTOLIC BLOOD PRESSURE: 74 MMHG | WEIGHT: 129 LBS

## 2023-05-14 DIAGNOSIS — R06.02 SOB (SHORTNESS OF BREATH): ICD-10-CM

## 2023-05-14 LAB
ALBUMIN SERPL BCP-MCNC: 3.8 G/DL (ref 3.5–5.2)
ALP SERPL-CCNC: 97 U/L (ref 55–135)
ALT SERPL W/O P-5'-P-CCNC: 10 U/L (ref 10–44)
ANION GAP SERPL CALC-SCNC: 9 MMOL/L (ref 8–16)
AST SERPL-CCNC: 17 U/L (ref 10–40)
BASOPHILS # BLD AUTO: 0.06 K/UL (ref 0–0.2)
BASOPHILS NFR BLD: 0.9 % (ref 0–1.9)
BILIRUB SERPL-MCNC: 0.7 MG/DL (ref 0.1–1)
BNP SERPL-MCNC: 267 PG/ML (ref 0–99)
BUN SERPL-MCNC: 31 MG/DL (ref 8–23)
CALCIUM SERPL-MCNC: 9.1 MG/DL (ref 8.7–10.5)
CHLORIDE SERPL-SCNC: 102 MMOL/L (ref 95–110)
CO2 SERPL-SCNC: 27 MMOL/L (ref 23–29)
CREAT SERPL-MCNC: 1.3 MG/DL (ref 0.5–1.4)
DIFFERENTIAL METHOD: ABNORMAL
EOSINOPHIL # BLD AUTO: 0.3 K/UL (ref 0–0.5)
EOSINOPHIL NFR BLD: 4.1 % (ref 0–8)
ERYTHROCYTE [DISTWIDTH] IN BLOOD BY AUTOMATED COUNT: 14.5 % (ref 11.5–14.5)
EST. GFR  (NO RACE VARIABLE): 42.4 ML/MIN/1.73 M^2
GLUCOSE SERPL-MCNC: 136 MG/DL (ref 70–110)
HCT VFR BLD AUTO: 33.8 % (ref 37–48.5)
HGB BLD-MCNC: 10.7 G/DL (ref 12–16)
IMM GRANULOCYTES # BLD AUTO: 0.01 K/UL (ref 0–0.04)
IMM GRANULOCYTES NFR BLD AUTO: 0.2 % (ref 0–0.5)
INR PPP: 1 (ref 0.8–1.2)
LYMPHOCYTES # BLD AUTO: 1.3 K/UL (ref 1–4.8)
LYMPHOCYTES NFR BLD: 20 % (ref 18–48)
MCH RBC QN AUTO: 29.2 PG (ref 27–31)
MCHC RBC AUTO-ENTMCNC: 31.7 G/DL (ref 32–36)
MCV RBC AUTO: 92 FL (ref 82–98)
MONOCYTES # BLD AUTO: 0.3 K/UL (ref 0.3–1)
MONOCYTES NFR BLD: 4.8 % (ref 4–15)
NEUTROPHILS # BLD AUTO: 4.6 K/UL (ref 1.8–7.7)
NEUTROPHILS NFR BLD: 70 % (ref 38–73)
NRBC BLD-RTO: 0 /100 WBC
PLATELET # BLD AUTO: 264 K/UL (ref 150–450)
PMV BLD AUTO: 10.2 FL (ref 9.2–12.9)
POTASSIUM SERPL-SCNC: 3.5 MMOL/L (ref 3.5–5.1)
PROT SERPL-MCNC: 7.2 G/DL (ref 6–8.4)
PROTHROMBIN TIME: 10.9 SEC (ref 9–12.5)
RBC # BLD AUTO: 3.67 M/UL (ref 4–5.4)
SARS-COV-2 RDRP RESP QL NAA+PROBE: NEGATIVE
SODIUM SERPL-SCNC: 138 MMOL/L (ref 136–145)
TROPONIN I SERPL HS-MCNC: 24.6 PG/ML (ref 0–14.9)
TROPONIN I SERPL HS-MCNC: 28.5 PG/ML (ref 0–14.9)
WBC # BLD AUTO: 6.6 K/UL (ref 3.9–12.7)

## 2023-05-14 PROCEDURE — U0002 COVID-19 LAB TEST NON-CDC: HCPCS | Performed by: EMERGENCY MEDICINE

## 2023-05-14 PROCEDURE — 93010 EKG 12-LEAD: ICD-10-PCS | Mod: ,,, | Performed by: INTERNAL MEDICINE

## 2023-05-14 PROCEDURE — 84484 ASSAY OF TROPONIN QUANT: CPT | Mod: 91 | Performed by: EMERGENCY MEDICINE

## 2023-05-14 PROCEDURE — 96374 THER/PROPH/DIAG INJ IV PUSH: CPT

## 2023-05-14 PROCEDURE — 93005 ELECTROCARDIOGRAM TRACING: CPT | Performed by: INTERNAL MEDICINE

## 2023-05-14 PROCEDURE — 85025 COMPLETE CBC W/AUTO DIFF WBC: CPT | Performed by: EMERGENCY MEDICINE

## 2023-05-14 PROCEDURE — 80053 COMPREHEN METABOLIC PANEL: CPT | Performed by: EMERGENCY MEDICINE

## 2023-05-14 PROCEDURE — 85610 PROTHROMBIN TIME: CPT | Performed by: EMERGENCY MEDICINE

## 2023-05-14 PROCEDURE — 93010 ELECTROCARDIOGRAM REPORT: CPT | Mod: ,,, | Performed by: INTERNAL MEDICINE

## 2023-05-14 PROCEDURE — 99284 EMERGENCY DEPT VISIT MOD MDM: CPT | Mod: 25

## 2023-05-14 PROCEDURE — 83880 ASSAY OF NATRIURETIC PEPTIDE: CPT | Performed by: EMERGENCY MEDICINE

## 2023-05-14 PROCEDURE — 36415 COLL VENOUS BLD VENIPUNCTURE: CPT | Performed by: EMERGENCY MEDICINE

## 2023-05-14 PROCEDURE — 63600175 PHARM REV CODE 636 W HCPCS: Performed by: EMERGENCY MEDICINE

## 2023-05-14 RX ORDER — FUROSEMIDE 10 MG/ML
40 INJECTION INTRAMUSCULAR; INTRAVENOUS
Status: COMPLETED | OUTPATIENT
Start: 2023-05-14 | End: 2023-05-14

## 2023-05-14 RX ADMIN — FUROSEMIDE 40 MG: 10 INJECTION, SOLUTION INTRAMUSCULAR; INTRAVENOUS at 06:05

## 2023-05-14 NOTE — ED PROVIDER NOTES
Encounter Date: 2023       History     Chief Complaint   Patient presents with    Anxiety     Pt states she has a hx of CHF and that last night she was at her grandson's house and cried for the first time since her   a few months ago.  Pt states she has a feeling of uneasiness    Shortness of Breath     Patient with history of valvular heart disease.  Patient compliant with her medications including furosemide.  Patient's   approximally 5 months ago.  Patient was visiting family.  They started discussing recent death of her .  She felt anxious and short of breath when she laid flat.  No fever chills.  No pleurisy hemoptysis.  No chest pain.  Patient is feeling better now.    Review of patient's allergies indicates:  No Known Allergies  Past Medical History:   Diagnosis Date    Diabetes     Diverticulitis     Hypertension      Past Surgical History:   Procedure Laterality Date    ABDOMINAL SURGERY  2006    diverticulitis x3    ANTERIOR CERVICAL DISCECTOMY W/ FUSION N/A 2018    FUSION C6-7 Surgeon: Rickey Martin MD    APPENDECTOMY      CARPAL TUNNEL RELEASE      COLECTOMY  2020    EPIDURAL STEROID INJECTION INTO LUMBAR SPINE N/A 2021    Procedure: Injection-steroid-epidural-lumbar--L3-4;  Surgeon: Colleen Carvajal MD;  Location: [facility];  Service: Pain Management;  Laterality: N/A;    HYSTERECTOMY  1970    @25yrs of age    LARYNGOSCOPY N/A 2022    Procedure: Suspension microlaryngoscopy with left vocal fold injection augmentation - Restylane L;  Surgeon: Yuan Mir MD;  Location: [address];  Service: ENT;  Laterality: N/A;  Microscope, telescopes, tower, injector, Restylane-L    MANDIBLE FRACTURE SURGERY  1970    MICRODISCECTOMY OF SPINE Left 2021    Procedure: MICRODISCECTOMY, SPINE Left L3-4 far lateral Microdiscectomy;  Surgeon: Rickey Martin MD;  Location: [facility] OR;  Service: Neurosurgery;  Laterality: Left;  Procedure: Left L3-4 far  lateral Microdiscectomy   Surgery Time: 1.5 hrs  LOS: 0-1  Anesthesia: General  Radiology: C-arm  SNS: EMG, SEP  Bed: [place] [id number] Poster  Position: Melissa Demarco w/ Globus confirmed 4/12/21 [place]    OOPHORECTOMY  1970    ROTATOR CUFF REPAIR Left 11/2016    TOTAL REDUCTION MAMMOPLASTY Bilateral 1990    TRANSFORAMINAL EPIDURAL INJECTION OF STEROID Left 12/23/2020    Procedure: Injection,steroid,epidural,transforaminal approach--Left L4 and L5;  Surgeon: Colleen Carvajal MD;  Location: [facility];  Service: Pain Management;  Laterality: Left;     Family History   Problem Relation Age of Onset    Heart disease Father      Social History     Tobacco Use    Smoking status: Never    Smokeless tobacco: Never   Substance Use Topics    Alcohol use: No    Drug use: No     Review of Systems   Constitutional:  Negative for chills and fever.   HENT:  Negative for congestion.    Eyes:  Negative for visual disturbance.   Respiratory:  Positive for shortness of breath.    Cardiovascular:  Negative for chest pain and palpitations.   Gastrointestinal:  Negative for abdominal pain and vomiting.   Genitourinary:  Negative for dysuria.   Musculoskeletal:  Negative for joint swelling.   Neurological:  Negative for headaches.   Psychiatric/Behavioral:  Negative for confusion.      Physical Exam     Initial Vitals [05/14/23 0155]   BP Pulse Resp Temp SpO2   (!) 140/84 93 (!) 22 97.8 °F (36.6 °C) 95 %      MAP       --         Physical Exam    Nursing note and vitals reviewed.  Constitutional: She is not diaphoretic. No distress.   HENT:   Head: Normocephalic and atraumatic.   Eyes: Conjunctivae are normal.   Neck:   Normal range of motion.  Cardiovascular:  Normal rate.           Pulmonary/Chest: Breath sounds normal.   Abdominal: Abdomen is soft. There is no abdominal tenderness.   Musculoskeletal:         General: Normal range of motion.      Cervical back: Normal range of motion.      Comments: No significant peripheral edema      Neurological: She is alert.   No gross deficits   Skin: No rash noted.   Psychiatric: She has a normal mood and affect.       ED Course   Procedures  Labs Reviewed   CBC W/ AUTO DIFFERENTIAL - Abnormal; Notable for the following components:       Result Value    RBC 3.67 (*)     Hemoglobin 10.7 (*)     Hematocrit 33.8 (*)     MCHC 31.7 (*)     All other components within normal limits   COMPREHENSIVE METABOLIC PANEL - Abnormal; Notable for the following components:    Glucose 136 (*)     BUN 31 (*)     eGFR 42.4 (*)     All other components within normal limits   B-TYPE NATRIURETIC PEPTIDE - Abnormal; Notable for the following components:     (*)     All other components within normal limits   TROPONIN I HIGH SENSITIVITY - Abnormal; Notable for the following components:    Troponin I High Sensitivity 28.5 (*)     All other components within normal limits   TROPONIN I HIGH SENSITIVITY - Abnormal; Notable for the following components:    Troponin I High Sensitivity 24.6 (*)     All other components within normal limits   PROTIME-INR   TROPONIN I HIGH SENSITIVITY   SARS-COV-2 RNA AMPLIFICATION, QUAL          Imaging Results              X-Ray Chest AP Portable (In process)                      Medications   furosemide injection 40 mg (40 mg Intravenous Given 5/14/23 0637)     Medical Decision Making:   History:   Old Medical Records: I decided to obtain old medical records.  Old Records Summarized: records from clinic visits and records from previous admission(s).       <> Summary of Records: Moderate aortic stenosis and other valvular heart disease on previous echocardiogram.  Normal ejection fraction.  Independently Interpreted Test(s):   I have ordered and independently interpreted X-rays - see summary below.       <> Summary of X-Ray Reading(s): Hilar congestion with no pasquale pulmonary edema  I have ordered and independently interpreted EKG Reading(s) - see summary below       <> Summary of EKG  Reading(s): Normal sinus rhythm with occasional PVCs.  Ventricular rate of 87.  Nonspecific ST segment changes with no ST elevation  Clinical Tests:   Lab Tests: Reviewed  The following lab test(s) were unremarkable: CBC and CMP  Radiological Study: Reviewed  Medical Tests: Reviewed  ED Management:  Patient presents with increased shortness breath while lying flat.  There was no chest pain.  Serial troponins obtained that are stable.  There is no ST elevation.  Patient never had chest pain.  Patient does have slightly elevated BNP was hilar congestion.  Will give 1 dose of Lasix here.  Patient is stable for discharge home.  Currently room air O2 saturation 99%.           ED Course as of 05/14/23 0752   Sun May 14, 2023   0618 Troponin I High Sensitivity(!): 28.5 [EL]      ED Course User Index  [EL] Terence Anderson MD                 Clinical Impression:   Final diagnoses:  [R06.02] SOB (shortness of breath)        ED Disposition Condition    Discharge Stable          ED Prescriptions    None       Follow-up Information       Follow up With Specialties Details Why Contact Info Additional Information    [facility] - Emergency Dept Emergency Medicine  If symptoms worsen [address] [facility] [id number] [phone] 1st floor    Aaron Tim MD Family Medicine In 1 day  [address]  [address]  [facility] [address] [phone]                Terence Anderson MD  05/14/23 0752

## 2023-05-15 ENCOUNTER — CLINICAL SUPPORT (OUTPATIENT)
Dept: CARDIOLOGY | Facility: HOSPITAL | Age: 78
End: 2023-05-15
Attending: INTERNAL MEDICINE
Payer: MEDICARE

## 2023-05-15 VITALS
BODY MASS INDEX: 23.74 KG/M2 | WEIGHT: 129 LBS | HEIGHT: 62 IN | SYSTOLIC BLOOD PRESSURE: 126 MMHG | DIASTOLIC BLOOD PRESSURE: 74 MMHG

## 2023-05-15 DIAGNOSIS — J38.01 PARALYSIS OF LEFT VOCAL CORD: ICD-10-CM

## 2023-05-15 DIAGNOSIS — I10 ESSENTIAL HYPERTENSION: ICD-10-CM

## 2023-05-15 DIAGNOSIS — I50.32 CHRONIC DIASTOLIC HEART FAILURE: ICD-10-CM

## 2023-05-15 DIAGNOSIS — I25.10 CORONARY ARTERY DISEASE INVOLVING NATIVE CORONARY ARTERY OF NATIVE HEART WITHOUT ANGINA PECTORIS: Chronic | ICD-10-CM

## 2023-05-15 DIAGNOSIS — R60.9 EDEMA, UNSPECIFIED TYPE: ICD-10-CM

## 2023-05-15 DIAGNOSIS — I25.10 CORONARY ARTERY DISEASE INVOLVING NATIVE CORONARY ARTERY OF NATIVE HEART WITHOUT ANGINA PECTORIS: ICD-10-CM

## 2023-05-15 DIAGNOSIS — I35.0 AORTIC VALVE STENOSIS, ETIOLOGY OF CARDIAC VALVE DISEASE UNSPECIFIED: ICD-10-CM

## 2023-05-15 LAB
ASCENDING AORTA: 3.2 CM
AV INDEX (PROSTH): 0.48
AV MEAN GRADIENT: 10 MMHG
AV PEAK GRADIENT: 16 MMHG
AV REGURGITATION PRESSURE HALF TIME: 446.28 MS
AV VALVE AREA: 1.67 CM2
AV VELOCITY RATIO: 0.5
BSA FOR ECHO PROCEDURE: 1.6 M2
CV ECHO LV RWT: 0.39 CM
DOP CALC AO PEAK VEL: 2 M/S
DOP CALC AO VTI: 42.8 CM
DOP CALC LVOT AREA: 3.5 CM2
DOP CALC LVOT DIAMETER: 2.1 CM
DOP CALC LVOT PEAK VEL: 0.99 M/S
DOP CALC LVOT STROKE VOLUME: 71.66 CM3
DOP CALC MV VTI: 32.2 CM
DOP CALCLVOT PEAK VEL VTI: 20.7 CM
E WAVE DECELERATION TIME: 233.17 MSEC
E/A RATIO: 0.92
E/E' RATIO: 26.75 M/S
ECHO LV POSTERIOR WALL: 1.06 CM (ref 0.6–1.1)
EJECTION FRACTION: 55 %
FRACTIONAL SHORTENING: 34 % (ref 28–44)
INTERVENTRICULAR SEPTUM: 1 CM (ref 0.6–1.1)
IVRT: 85.63 MSEC
LA MAJOR: 6.06 CM
LA MINOR: 5.74 CM
LA WIDTH: 4.9 CM
LEFT ARM DIASTOLIC BLOOD PRESSURE: 74 MMHG
LEFT ARM SYSTOLIC BLOOD PRESSURE: 126 MMHG
LEFT ATRIUM SIZE: 4.53 CM
LEFT ATRIUM VOLUME INDEX: 70 ML/M2
LEFT ATRIUM VOLUME: 111.24 CM3
LEFT CBA DIAS: 20 CM/S
LEFT CBA SYS: 45 CM/S
LEFT CCA DIST DIAS: 16 CM/S
LEFT CCA DIST SYS: 46 CM/S
LEFT CCA MID DIAS: 15 CM/S
LEFT CCA MID SYS: 48 CM/S
LEFT CCA PROX DIAS: 13 CM/S
LEFT CCA PROX SYS: 50 CM/S
LEFT ECA DIAS: 12 CM/S
LEFT ECA SYS: 75 CM/S
LEFT ICA DIST DIAS: 41 CM/S
LEFT ICA DIST SYS: 111 CM/S
LEFT ICA MID DIAS: 78 CM/S
LEFT ICA MID SYS: 239 CM/S
LEFT ICA PROX DIAS: 35 CM/S
LEFT ICA PROX SYS: 86 CM/S
LEFT INTERNAL DIMENSION IN SYSTOLE: 3.6 CM (ref 2.1–4)
LEFT VENTRICLE DIASTOLIC VOLUME INDEX: 91.42 ML/M2
LEFT VENTRICLE DIASTOLIC VOLUME: 145.35 ML
LEFT VENTRICLE MASS INDEX: 138 G/M2
LEFT VENTRICLE SYSTOLIC VOLUME INDEX: 34.2 ML/M2
LEFT VENTRICLE SYSTOLIC VOLUME: 54.39 ML
LEFT VENTRICULAR INTERNAL DIMENSION IN DIASTOLE: 5.47 CM (ref 3.5–6)
LEFT VENTRICULAR MASS: 219.66 G
LEFT VERTEBRAL DIAS: 22 CM/S
LEFT VERTEBRAL SYS: 72 CM/S
LV LATERAL E/E' RATIO: 21.4 M/S
LV SEPTAL E/E' RATIO: 35.67 M/S
LVOT MG: 2.08 MMHG
LVOT MV: 0.68 CM/S
MV MEAN GRADIENT: 5 MMHG
MV PEAK A VEL: 1.16 M/S
MV PEAK E VEL: 1.07 M/S
MV PEAK GRADIENT: 8 MMHG
MV STENOSIS PRESSURE HALF TIME: 67.62 MS
MV VALVE AREA BY CONTINUITY EQUATION: 2.23 CM2
MV VALVE AREA P 1/2 METHOD: 3.25 CM2
OHS CV CAROTID RIGHT ICA EDV HIGHEST: 42
OHS CV CAROTID ULTRASOUND LEFT ICA/CCA RATIO: 5.2
OHS CV CAROTID ULTRASOUND RIGHT ICA/CCA RATIO: 2.3
OHS CV PV CAROTID LEFT HIGHEST CCA: 50
OHS CV PV CAROTID LEFT HIGHEST ICA: 239
OHS CV PV CAROTID RIGHT HIGHEST CCA: 62
OHS CV PV CAROTID RIGHT HIGHEST ICA: 101
OHS CV US CAROTID LEFT HIGHEST EDV: 78
PISA AR MAX VEL: 3.77 M/S
PISA MRMAX VEL: 7.05 M/S
PISA TR MAX VEL: 3.05 M/S
PULM VEIN S/D RATIO: 1.91
PV PEAK D VEL: 0.35 M/S
PV PEAK S VEL: 0.67 M/S
RA MAJOR: 4.3 CM
RA PRESSURE: 3 MMHG
RA WIDTH: 2.4 CM
RIGHT ARM DIASTOLIC BLOOD PRESSURE: 74 MMHG
RIGHT ARM SYSTOLIC BLOOD PRESSURE: 126 MMHG
RIGHT CBA DIAS: 14 CM/S
RIGHT CBA SYS: 44 CM/S
RIGHT CCA DIST DIAS: 16 CM/S
RIGHT CCA DIST SYS: 44 CM/S
RIGHT CCA MID DIAS: 15 CM/S
RIGHT CCA MID SYS: 52 CM/S
RIGHT CCA PROX DIAS: 15 CM/S
RIGHT CCA PROX SYS: 62 CM/S
RIGHT ECA DIAS: 9 CM/S
RIGHT ECA SYS: 77 CM/S
RIGHT ICA DIST DIAS: 42 CM/S
RIGHT ICA DIST SYS: 101 CM/S
RIGHT ICA MID DIAS: 37 CM/S
RIGHT ICA MID SYS: 84 CM/S
RIGHT ICA PROX DIAS: 30 CM/S
RIGHT ICA PROX SYS: 69 CM/S
RIGHT VENTRICULAR END-DIASTOLIC DIMENSION: 3.25 CM
RIGHT VENTRICULAR LENGTH IN DIASTOLE (APICAL 4-CHAMBER VIEW): 6.41 CM
RIGHT VERTEBRAL DIAS: 11 CM/S
RIGHT VERTEBRAL SYS: 38 CM/S
RV MID DIAMA: 1.8 CM
RV TISSUE DOPPLER FREE WALL SYSTOLIC VELOCITY 1 (APICAL 4 CHAMBER VIEW): 0.02 CM/S
SINUS: 2.91 CM
STJ: 2.98 CM
TDI LATERAL: 0.05 M/S
TDI SEPTAL: 0.03 M/S
TDI: 0.04 M/S
TR MAX PG: 37 MMHG
TRICUSPID ANNULAR PLANE SYSTOLIC EXCURSION: 1.99 CM
TV REST PULMONARY ARTERY PRESSURE: 40 MMHG

## 2023-05-15 PROCEDURE — 93880 EXTRACRANIAL BILAT STUDY: CPT | Mod: PO

## 2023-05-15 PROCEDURE — 93880 EXTRACRANIAL BILAT STUDY: CPT | Mod: 26,,, | Performed by: INTERNAL MEDICINE

## 2023-05-15 PROCEDURE — 93880 CV US DOPPLER CAROTID (CUPID ONLY): ICD-10-PCS | Mod: 26,,, | Performed by: INTERNAL MEDICINE

## 2023-05-15 PROCEDURE — 93306 TTE W/DOPPLER COMPLETE: CPT | Mod: 26,,, | Performed by: INTERNAL MEDICINE

## 2023-05-15 PROCEDURE — 93306 ECHO (CUPID ONLY): ICD-10-PCS | Mod: 26,,, | Performed by: INTERNAL MEDICINE

## 2023-05-15 PROCEDURE — 93306 TTE W/DOPPLER COMPLETE: CPT | Mod: PO

## 2023-06-01 ENCOUNTER — TELEPHONE (OUTPATIENT)
Dept: FAMILY MEDICINE | Facility: CLINIC | Age: 78
End: 2023-06-01
Payer: MEDICARE

## 2023-06-14 ENCOUNTER — OFFICE VISIT (OUTPATIENT)
Dept: FAMILY MEDICINE | Facility: CLINIC | Age: 78
End: 2023-06-14
Payer: MEDICARE

## 2023-06-14 VITALS
HEIGHT: 62 IN | HEART RATE: 75 BPM | OXYGEN SATURATION: 97 % | DIASTOLIC BLOOD PRESSURE: 68 MMHG | SYSTOLIC BLOOD PRESSURE: 116 MMHG | WEIGHT: 123.69 LBS | BODY MASS INDEX: 22.76 KG/M2

## 2023-06-14 DIAGNOSIS — E11.22 TYPE 2 DIABETES MELLITUS WITH STAGE 3B CHRONIC KIDNEY DISEASE, WITHOUT LONG-TERM CURRENT USE OF INSULIN: Primary | Chronic | ICD-10-CM

## 2023-06-14 DIAGNOSIS — F32.0 MAJOR DEPRESSIVE DISORDER, SINGLE EPISODE, MILD: ICD-10-CM

## 2023-06-14 DIAGNOSIS — M54.16 LUMBAR RADICULOPATHY: Chronic | ICD-10-CM

## 2023-06-14 DIAGNOSIS — I10 ESSENTIAL HYPERTENSION: Chronic | ICD-10-CM

## 2023-06-14 DIAGNOSIS — F11.20 UNCOMPLICATED OPIOID DEPENDENCE: ICD-10-CM

## 2023-06-14 DIAGNOSIS — I25.10 CORONARY ARTERY DISEASE INVOLVING NATIVE CORONARY ARTERY OF NATIVE HEART WITHOUT ANGINA PECTORIS: Chronic | ICD-10-CM

## 2023-06-14 DIAGNOSIS — I50.32 CHRONIC DIASTOLIC HEART FAILURE: Chronic | ICD-10-CM

## 2023-06-14 DIAGNOSIS — I48.0 PAROXYSMAL ATRIAL FIBRILLATION: Chronic | ICD-10-CM

## 2023-06-14 DIAGNOSIS — N18.32 TYPE 2 DIABETES MELLITUS WITH STAGE 3B CHRONIC KIDNEY DISEASE, WITHOUT LONG-TERM CURRENT USE OF INSULIN: Primary | Chronic | ICD-10-CM

## 2023-06-14 PROBLEM — M46.96 UNSPECIFIED INFLAMMATORY SPONDYLOPATHY, LUMBAR REGION: Status: RESOLVED | Noted: 2020-10-04 | Resolved: 2023-06-14

## 2023-06-14 PROCEDURE — 1126F AMNT PAIN NOTED NONE PRSNT: CPT | Mod: CPTII,S$GLB,, | Performed by: FAMILY MEDICINE

## 2023-06-14 PROCEDURE — 1159F MED LIST DOCD IN RCRD: CPT | Mod: CPTII,S$GLB,, | Performed by: FAMILY MEDICINE

## 2023-06-14 PROCEDURE — 1159F PR MEDICATION LIST DOCUMENTED IN MEDICAL RECORD: ICD-10-PCS | Mod: CPTII,S$GLB,, | Performed by: FAMILY MEDICINE

## 2023-06-14 PROCEDURE — 1101F PR PT FALLS ASSESS DOC 0-1 FALLS W/OUT INJ PAST YR: ICD-10-PCS | Mod: CPTII,S$GLB,, | Performed by: FAMILY MEDICINE

## 2023-06-14 PROCEDURE — 3074F SYST BP LT 130 MM HG: CPT | Mod: CPTII,S$GLB,, | Performed by: FAMILY MEDICINE

## 2023-06-14 PROCEDURE — 3288F FALL RISK ASSESSMENT DOCD: CPT | Mod: CPTII,S$GLB,, | Performed by: FAMILY MEDICINE

## 2023-06-14 PROCEDURE — 3074F PR MOST RECENT SYSTOLIC BLOOD PRESSURE < 130 MM HG: ICD-10-PCS | Mod: CPTII,S$GLB,, | Performed by: FAMILY MEDICINE

## 2023-06-14 PROCEDURE — 99214 OFFICE O/P EST MOD 30 MIN: CPT | Mod: S$GLB,,, | Performed by: FAMILY MEDICINE

## 2023-06-14 PROCEDURE — 3078F PR MOST RECENT DIASTOLIC BLOOD PRESSURE < 80 MM HG: ICD-10-PCS | Mod: CPTII,S$GLB,, | Performed by: FAMILY MEDICINE

## 2023-06-14 PROCEDURE — 99999 PR PBB SHADOW E&M-EST. PATIENT-LVL IV: ICD-10-PCS | Mod: PBBFAC,,, | Performed by: FAMILY MEDICINE

## 2023-06-14 PROCEDURE — 3288F PR FALLS RISK ASSESSMENT DOCUMENTED: ICD-10-PCS | Mod: CPTII,S$GLB,, | Performed by: FAMILY MEDICINE

## 2023-06-14 PROCEDURE — 99214 PR OFFICE/OUTPT VISIT, EST, LEVL IV, 30-39 MIN: ICD-10-PCS | Mod: S$GLB,,, | Performed by: FAMILY MEDICINE

## 2023-06-14 PROCEDURE — 3078F DIAST BP <80 MM HG: CPT | Mod: CPTII,S$GLB,, | Performed by: FAMILY MEDICINE

## 2023-06-14 PROCEDURE — 1160F PR REVIEW ALL MEDS BY PRESCRIBER/CLIN PHARMACIST DOCUMENTED: ICD-10-PCS | Mod: CPTII,S$GLB,, | Performed by: FAMILY MEDICINE

## 2023-06-14 PROCEDURE — 1101F PT FALLS ASSESS-DOCD LE1/YR: CPT | Mod: CPTII,S$GLB,, | Performed by: FAMILY MEDICINE

## 2023-06-14 PROCEDURE — 1160F RVW MEDS BY RX/DR IN RCRD: CPT | Mod: CPTII,S$GLB,, | Performed by: FAMILY MEDICINE

## 2023-06-14 PROCEDURE — 1126F PR PAIN SEVERITY QUANTIFIED, NO PAIN PRESENT: ICD-10-PCS | Mod: CPTII,S$GLB,, | Performed by: FAMILY MEDICINE

## 2023-06-14 PROCEDURE — 99999 PR PBB SHADOW E&M-EST. PATIENT-LVL IV: CPT | Mod: PBBFAC,,, | Performed by: FAMILY MEDICINE

## 2023-06-14 NOTE — PROGRESS NOTES
"Subjective:       Patient ID: Trudy R Dakin is a 77 y.o. female.    Chief Complaint: Establish Care    Here today to establish care with a new PCP.   She was seeing Dr. Tim.  She has been seen here once by LUZMARIA Dc.  Her  of 35 years recently passed away so she has moved from the [place] to live with her Son in [place].    Immunizations: Tdap and Pneumonia up to date, Shingrix discussed.  Last Lab work: 2023  Colon Ca screening: no longer recommended   Breast Ca Screening: MMG 4/2023  Cervical Ca Screening:  no longer recommended    Type II DM: She is on Metformin 500 mg 2 pills BID. HgA1c of 6.2 in 4/2023  HTN:  She is on Toprol and Spironolactone.  A.Fib/CAD/HF:  She is on Beta Blocker and plavix.  Also on Statin.  She is on Bumex.  Saw Cardiology in May  HLD:  she is on Lipitor  Chronic Pain:  She is on Norco which was prescribed by her old PCP.  She has been using it PRN (a few times per month) and is on Lyrica.   Insomnia:  She is on elavil 25 mg 1/2 tab at night to help her sleep  ELI/MDD:  She has been on Lexapro since the passing of her .  Overall she feels like the medication is working well for her.  Mood is a lot better. She has xanax which she has been taking once a day and is not sure why.    Review of Systems   Constitutional:  Negative for activity change, appetite change, fatigue and fever.   Respiratory:  Negative for cough, shortness of breath and wheezing.    Cardiovascular:  Negative for chest pain and palpitations.   Gastrointestinal:  Negative for abdominal pain, constipation, diarrhea and nausea.   Musculoskeletal:  Positive for back pain.   Skin:  Negative for pallor and rash.   Neurological:  Negative for dizziness, syncope, light-headedness and headaches.     Objective:      Vitals:    06/14/23 1255   BP: 116/68   Pulse: 75   SpO2: 97%   Weight: 56.1 kg (123 lb 10.9 oz)   Height: 5' 2" (1.575 m)      Physical Exam  Constitutional:       General: She is not in acute " distress.  Cardiovascular:      Rate and Rhythm: Normal rate and regular rhythm.      Heart sounds: Murmur heard.   Pulmonary:      Effort: Pulmonary effort is normal. No respiratory distress.      Breath sounds: Normal breath sounds. No wheezing, rhonchi or rales.   Skin:     General: Skin is warm and dry.   Neurological:      General: No focal deficit present.      Mental Status: She is alert.   Psychiatric:         Mood and Affect: Mood normal.         Behavior: Behavior normal.         Thought Content: Thought content normal.          Assessment:       1. Type 2 diabetes mellitus with stage 3b chronic kidney disease, without long-term current use of insulin    2. Essential hypertension    3. CAD (coronary artery disease)    4. Chronic diastolic heart failure    5. Paroxysmal atrial fibrillation    6. Lumbar radiculopathy    7. Major depressive disorder, single episode, mild    8. Uncomplicated opioid dependence        Plan:       Type 2 diabetes mellitus with stage 3b chronic kidney disease, without long-term current use of insulin  -     Comprehensive Metabolic Panel; Future; Expected date: 09/14/2023  -     Hemoglobin A1C; Future; Expected date: 09/14/2023  Well controlled.  Continue Metformin at this time.  Recheck HgA1c in about 3 months  Essential hypertension  -     Comprehensive Metabolic Panel; Future; Expected date: 09/14/2023  BP well controlled on Beta Blocker  CAD (coronary artery disease) / Chronic diastolic heart failure / Paroxysmal atrial fibrillation  Mgt per Cardiology.    Lumbar radiculopathy    We discussed her xanax and she agrees that she does not need it and will taper off at this time.  She understands that I do not routinely do chronic pain mgt.  If her narcotic use is minimal, we can continue it here.  If she uses a large amount, will refer to pain management.    F/u in about 3 months with lab work prior.      Medication List with Changes/Refills   Current Medications    ACETAMINOPHEN  (TYLENOL) 650 MG TBSR    Take 1 tablet (650 mg total) by mouth every 8 (eight) hours.    ALPRAZOLAM (XANAX) 0.25 MG TABLET    Take 1 tablet (0.25 mg total) by mouth 2 (two) times daily as needed for Anxiety.    AMITRIPTYLINE (ELAVIL) 25 MG TABLET    TAKE 1 TABLET(25 MG) BY MOUTH EVERY EVENING    ATORVASTATIN (LIPITOR) 80 MG TABLET    Take 1 tablet (80 mg total) by mouth once daily.    BUMETANIDE (BUMEX) 2 MG TABLET    Take 2 mg by mouth 2 (two) times daily.    CLOPIDOGREL (PLAVIX) 75 MG TABLET    Take 1 tablet (75 mg total) by mouth once daily.    CYANOCOBALAMIN 1,000 MCG/ML INJECTION    INJECT 1 ML INTO THE MUSCLE EVERY 14 DAYS    ESCITALOPRAM OXALATE (LEXAPRO) 20 MG TABLET    Take 1 tablet (20 mg total) by mouth once daily.    HYDROCODONE-ACETAMINOPHEN (NORCO) 5-325 MG PER TABLET    Take 1 tablet by mouth every 6 (six) hours as needed for Pain.    METFORMIN (GLUCOPHAGE-XR) 500 MG ER 24HR TABLET    TAKE 2 TABLETS BY MOUTH TWICE DAILY    METOPROLOL SUCCINATE (TOPROL-XL) 25 MG 24 HR TABLET    TAKE 1 TABLET(25 MG) BY MOUTH EVERY DAY    NITROGLYCERIN (NITROSTAT) 0.4 MG SL TABLET    Place 1 tablet (0.4 mg total) under the tongue every 5 (five) minutes as needed for Chest pain.    NYSTATIN (MYCOSTATIN) CREAM    Apply topically 2 (two) times daily.    PREDNISONE (DELTASONE) 20 MG TABLET    Take 1 tablet (20 mg total) by mouth once daily.    PREGABALIN (LYRICA) 150 MG CAPSULE    TAKE ONE CAPSULE BY MOUTH TWICE DAILY    SPIRONOLACTONE (ALDACTONE) 25 MG TABLET    Take 1 tablet (25 mg total) by mouth once daily.   Discontinued Medications    ALBUTEROL (PROVENTIL/VENTOLIN HFA) 90 MCG/ACTUATION INHALER    Inhale 1-2 puffs into the lungs every 6 (six) hours as needed for Wheezing. Rescue    AZITHROMYCIN (Z-KATHY) 250 MG TABLET    Take 1 tablet (250 mg total) by mouth once daily. Take first 2 tablets together, then 1 every day until finished.

## 2023-06-28 ENCOUNTER — TELEPHONE (OUTPATIENT)
Dept: FAMILY MEDICINE | Facility: CLINIC | Age: 78
End: 2023-06-28
Payer: MEDICARE

## 2023-06-28 RX ORDER — BUSPIRONE HYDROCHLORIDE 10 MG/1
10 TABLET ORAL 2 TIMES DAILY
Qty: 60 TABLET | Refills: 5 | Status: SHIPPED | OUTPATIENT
Start: 2023-06-28 | End: 2023-09-21 | Stop reason: SDUPTHER

## 2023-06-28 RX ORDER — HYDROXYZINE HYDROCHLORIDE 25 MG/1
25 TABLET, FILM COATED ORAL 2 TIMES DAILY PRN
Qty: 30 TABLET | Refills: 0 | Status: ON HOLD | OUTPATIENT
Start: 2023-06-28 | End: 2023-10-04 | Stop reason: HOSPADM

## 2023-06-28 NOTE — TELEPHONE ENCOUNTER
Called patient, poor service, difficulty hearing her    She reports uncontrolled anxiety/panic attacks. Recall I saw her 5/1/23--increased Lexapro to 20mg daily for depression. She was previously prescribed xanax 0.25mg BID from outside provider.     She saw Dr Patel here to establish 6/14/23--discussed xanax during that visit and decided to wean.     She is tearful on the phone, feels she is having a panic attack, requesting xanax refill. Advised I cannot fill that medication. She believes she is still taking Lexapro    Advised I will send hydroxyzine--educated on safety, no driving.     Nurse: please schedule in office visit for evaluation of anxiety/depression. Please go over meds again with her to make sure she understands:    Buspar twice daily  Hydroxyzine as needed (panic attack)--sedating med--no driving

## 2023-06-28 NOTE — TELEPHONE ENCOUNTER
----- Message from Jas Moran sent at 6/28/2023 10:39 AM CDT -----  Contact: self  Type: Needs Medical Advice  Who Called:  Patient    Best Call Back Number: [phone] or [phone]    Additional Information: Pt states she would like to speak with office has some questions.Please call back

## 2023-06-28 NOTE — TELEPHONE ENCOUNTER
----- Message from Jose David Ward sent at 6/28/2023  7:54 AM CDT -----  Type: Needs Medical Advice  Who Called:  Patient  Symptoms (please be specific):  Depression, anxiety  How long has patient had these symptoms:  Since loss of     Pharmacy name and phone #:    [facility] [address] [address] [address]  Phone: [phone] Fax: [phone]      Best Call Back Number: [phone]  Additional Information: Please call to advise

## 2023-06-29 DIAGNOSIS — I50.32 CHRONIC DIASTOLIC HEART FAILURE: Primary | Chronic | ICD-10-CM

## 2023-06-29 NOTE — TELEPHONE ENCOUNTER
----- Message from Juan J Man sent at 6/29/2023  8:58 AM CDT -----  Regarding: chest pains at night  Type:  Needs Medical Advice    Who Called: Pt  Symptoms (please be specific):     \Pharmacy name and phone #:    [facility] - [facility] [address]  [address] [address] [address]  Phone: [phone] Fax: [phone]      Would the patient rather a call back or a response via [facility]? Call back  Best Call Back Number: [phone]     Additional Information: Sts she would like a call back to talk about issues.  Sts that when she lays down at night she is having chest pains but its only when she lays down at night if she sits up or walks around its fine.  She sts she has congestive heart failure and has her worried.  Sts she also has a cold right now.  Please advise -- Thank you

## 2023-06-29 NOTE — TELEPHONE ENCOUNTER
Please advise: pt states she feels fine during the daytime but at night when she lays down she can't breathe. She has been taking a nitro at night and says it helps. Happening every night.  She was on vacation recently and she had some leg swelling then. She also caught a cold while on vacation and is quite congested

## 2023-06-30 ENCOUNTER — HOSPITAL ENCOUNTER (EMERGENCY)
Facility: HOSPITAL | Age: 78
Discharge: HOME | End: 2023-07-01
Attending: EMERGENCY MEDICINE
Payer: MEDICARE

## 2023-06-30 DIAGNOSIS — R07.9 CHEST PAIN: ICD-10-CM

## 2023-06-30 DIAGNOSIS — I50.9 ACUTE ON CHRONIC CONGESTIVE HEART FAILURE, UNSPECIFIED HEART FAILURE TYPE: Primary | ICD-10-CM

## 2023-06-30 LAB
ALBUMIN SERPL BCP-MCNC: 4.1 G/DL (ref 3.5–5.2)
ALP SERPL-CCNC: 82 U/L (ref 55–135)
ALT SERPL W/O P-5'-P-CCNC: 14 U/L (ref 10–44)
ANION GAP SERPL CALC-SCNC: 10 MMOL/L (ref 8–16)
AST SERPL-CCNC: 18 U/L (ref 10–40)
BASOPHILS # BLD AUTO: 0.05 K/UL (ref 0–0.2)
BASOPHILS NFR BLD: 0.8 % (ref 0–1.9)
BILIRUB SERPL-MCNC: 1.1 MG/DL (ref 0.1–1)
BNP SERPL-MCNC: 679 PG/ML (ref 0–99)
BUN SERPL-MCNC: 29 MG/DL (ref 8–23)
CALCIUM SERPL-MCNC: 9.1 MG/DL (ref 8.7–10.5)
CHLORIDE SERPL-SCNC: 105 MMOL/L (ref 95–110)
CO2 SERPL-SCNC: 25 MMOL/L (ref 23–29)
CREAT SERPL-MCNC: 1.6 MG/DL (ref 0.5–1.4)
DIFFERENTIAL METHOD: ABNORMAL
EOSINOPHIL # BLD AUTO: 0.2 K/UL (ref 0–0.5)
EOSINOPHIL NFR BLD: 3.2 % (ref 0–8)
ERYTHROCYTE [DISTWIDTH] IN BLOOD BY AUTOMATED COUNT: 14.7 % (ref 11.5–14.5)
EST. GFR  (NO RACE VARIABLE): 33 ML/MIN/1.73 M^2
GLUCOSE SERPL-MCNC: 114 MG/DL (ref 70–110)
HCT VFR BLD AUTO: 30.2 % (ref 37–48.5)
HGB BLD-MCNC: 9.7 G/DL (ref 12–16)
IMM GRANULOCYTES # BLD AUTO: 0.01 K/UL (ref 0–0.04)
IMM GRANULOCYTES NFR BLD AUTO: 0.2 % (ref 0–0.5)
LYMPHOCYTES # BLD AUTO: 1.3 K/UL (ref 1–4.8)
LYMPHOCYTES NFR BLD: 21.3 % (ref 18–48)
MAGNESIUM SERPL-MCNC: 1.6 MG/DL (ref 1.6–2.6)
MCH RBC QN AUTO: 28.5 PG (ref 27–31)
MCHC RBC AUTO-ENTMCNC: 32.1 G/DL (ref 32–36)
MCV RBC AUTO: 89 FL (ref 82–98)
MONOCYTES # BLD AUTO: 0.4 K/UL (ref 0.3–1)
MONOCYTES NFR BLD: 6.6 % (ref 4–15)
NEUTROPHILS # BLD AUTO: 4.3 K/UL (ref 1.8–7.7)
NEUTROPHILS NFR BLD: 67.9 % (ref 38–73)
NRBC BLD-RTO: 0 /100 WBC
PLATELET # BLD AUTO: 242 K/UL (ref 150–450)
PMV BLD AUTO: 10.3 FL (ref 9.2–12.9)
POTASSIUM SERPL-SCNC: 3.5 MMOL/L (ref 3.5–5.1)
PROT SERPL-MCNC: 7.4 G/DL (ref 6–8.4)
RBC # BLD AUTO: 3.4 M/UL (ref 4–5.4)
SODIUM SERPL-SCNC: 140 MMOL/L (ref 136–145)
TROPONIN I SERPL HS-MCNC: 15.9 PG/ML (ref 0–14.9)
WBC # BLD AUTO: 6.25 K/UL (ref 3.9–12.7)

## 2023-06-30 PROCEDURE — 83735 ASSAY OF MAGNESIUM: CPT | Performed by: EMERGENCY MEDICINE

## 2023-06-30 PROCEDURE — 84484 ASSAY OF TROPONIN QUANT: CPT | Performed by: EMERGENCY MEDICINE

## 2023-06-30 PROCEDURE — 93005 ELECTROCARDIOGRAM TRACING: CPT | Performed by: INTERNAL MEDICINE

## 2023-06-30 PROCEDURE — 93010 EKG 12-LEAD: ICD-10-PCS | Mod: ,,, | Performed by: INTERNAL MEDICINE

## 2023-06-30 PROCEDURE — 83880 ASSAY OF NATRIURETIC PEPTIDE: CPT | Performed by: EMERGENCY MEDICINE

## 2023-06-30 PROCEDURE — 93010 ELECTROCARDIOGRAM REPORT: CPT | Mod: ,,, | Performed by: INTERNAL MEDICINE

## 2023-06-30 PROCEDURE — 80053 COMPREHEN METABOLIC PANEL: CPT | Performed by: EMERGENCY MEDICINE

## 2023-06-30 PROCEDURE — 85025 COMPLETE CBC W/AUTO DIFF WBC: CPT | Performed by: EMERGENCY MEDICINE

## 2023-06-30 PROCEDURE — 99285 EMERGENCY DEPT VISIT HI MDM: CPT | Mod: 25

## 2023-06-30 RX ORDER — BUMETANIDE 2 MG/1
2 TABLET ORAL 2 TIMES DAILY
Qty: 180 TABLET | Refills: 3 | Status: ON HOLD | OUTPATIENT
Start: 2023-06-30 | End: 2023-11-09 | Stop reason: HOSPADM

## 2023-07-01 VITALS
TEMPERATURE: 98 F | SYSTOLIC BLOOD PRESSURE: 105 MMHG | WEIGHT: 119 LBS | BODY MASS INDEX: 21.9 KG/M2 | RESPIRATION RATE: 22 BRPM | DIASTOLIC BLOOD PRESSURE: 59 MMHG | OXYGEN SATURATION: 97 % | HEIGHT: 62 IN | HEART RATE: 75 BPM

## 2023-07-01 LAB — TROPONIN I SERPL HS-MCNC: 15.5 PG/ML (ref 0–14.9)

## 2023-07-01 PROCEDURE — 25000003 PHARM REV CODE 250: Performed by: EMERGENCY MEDICINE

## 2023-07-01 PROCEDURE — 96374 THER/PROPH/DIAG INJ IV PUSH: CPT

## 2023-07-01 PROCEDURE — 84484 ASSAY OF TROPONIN QUANT: CPT | Performed by: EMERGENCY MEDICINE

## 2023-07-01 RX ORDER — BUMETANIDE 0.25 MG/ML
2 INJECTION INTRAMUSCULAR; INTRAVENOUS
Status: COMPLETED | OUTPATIENT
Start: 2023-07-01 | End: 2023-07-01

## 2023-07-01 RX ORDER — METHOCARBAMOL 500 MG/1
500 TABLET, FILM COATED ORAL
Status: COMPLETED | OUTPATIENT
Start: 2023-07-01 | End: 2023-07-01

## 2023-07-01 RX ADMIN — METHOCARBAMOL 500 MG: 500 TABLET ORAL at 12:07

## 2023-07-01 RX ADMIN — BUMETANIDE 2 MG: 0.25 INJECTION INTRAMUSCULAR; INTRAVENOUS at 01:07

## 2023-07-01 NOTE — DISCHARGE INSTRUCTIONS
Please follow-up with your primary care physician and cardiologist as soon as possible this week for re-evaluation.  Please return to the emergency room for any acutely worsening symptoms or concerns.

## 2023-07-01 NOTE — ED PROVIDER NOTES
Encounter Date: 6/30/2023       History     Chief Complaint   Patient presents with    Shortness of Breath     Pt states she has been feeling short of breath  the last few days and tonight she feels like she can't walk without getting short of breath.  Pt took three SL Nitro prior to arrival     Pt w/ PMHx DM, HTN, CAD, chronic diastolic heart failure w/ EF 55% 5/15/23, paroxysmal, Depression presents to ED with gradually worsening shortness of breath that has occurred over several days.  Patient states it is worse with lying flat.  She reports mild increased lower extremity edema.  She felt some chest tightness earlier this evening and took nitroglycerin with some improvement.  She also notes that her  passed away 4 months ago and she has been very anxious and depressed since this.  She is unsure if that may be contributing to her shortness of breath.  She has had several ED and clinic visits related to similar symptoms.  She reports compliance with all medications.  Her PCP prescribed BuSpar twice daily and hydroxyzine for as needed use 2 days ago.  She took hydroxyzine for the first time yesterday and did not like the way it made her feel.  She was previously on Xanax but states she did not tolerate this well due to sedation and falling.        Review of patient's allergies indicates:  No Known Allergies  Past Medical History:   Diagnosis Date    Diabetes     Diverticulitis     Hypertension      Past Surgical History:   Procedure Laterality Date    ABDOMINAL SURGERY  2006    diverticulitis x3    ANTERIOR CERVICAL DISCECTOMY W/ FUSION N/A 8/30/2018    FUSION C6-7 Surgeon: Rickey Martin MD    APPENDECTOMY      CARPAL TUNNEL RELEASE      COLECTOMY  07/2020    EPIDURAL STEROID INJECTION INTO LUMBAR SPINE N/A 1/20/2021    Procedure: Injection-steroid-epidural-lumbar--L3-4;  Surgeon: Colleen Carvajal MD;  Location: [facility]IN [facility];  Service: Pain Management;  Laterality: N/A;    HYSTERECTOMY  1970    @25yrs of  age    LARYNGOSCOPY N/A 1/4/2022    Procedure: Suspension microlaryngoscopy with left vocal fold injection augmentation - Restylane L;  Surgeon: Yuan Mir MD;  Location: [address];  Service: ENT;  Laterality: N/A;  Microscope, telescopes, tower, injector, Restylane-L    MANDIBLE FRACTURE SURGERY  1970    MICRODISCECTOMY OF SPINE Left 4/13/2021    Procedure: MICRODISCECTOMY, SPINE Left L3-4 far lateral Microdiscectomy;  Surgeon: Rickey Martin MD;  Location: [facility] OR;  Service: Neurosurgery;  Laterality: Left;  Procedure: Left L3-4 far lateral Microdiscectomy   Surgery Time: 1.5 hrs  LOS: 0-1  Anesthesia: General  Radiology: C-arm  SNS: EMG, SEP  Bed: [place] [address] Poster  Position: Melissa Demarco w/ Globus confirmed 4/12/21 [place]    OOPHORECTOMY  1970    ROTATOR CUFF REPAIR Left 11/2016    TOTAL REDUCTION MAMMOPLASTY Bilateral 1990    TRANSFORAMINAL EPIDURAL INJECTION OF STEROID Left 12/23/2020    Procedure: Injection,steroid,epidural,transforaminal approach--Left L4 and L5;  Surgeon: Colleen Carvajal MD;  Location: [facility]IN [facility];  Service: Pain Management;  Laterality: Left;     Family History   Problem Relation Age of Onset    Heart disease Father      Social History     Tobacco Use    Smoking status: Never    Smokeless tobacco: Never   Substance Use Topics    Alcohol use: No    Drug use: No     Review of Systems   Constitutional:  Negative for fever.   HENT:  Negative for sore throat.    Respiratory:  Positive for shortness of breath.    Cardiovascular:  Positive for chest pain and leg swelling.   Gastrointestinal:  Negative for nausea.   Genitourinary:  Negative for dysuria.   Musculoskeletal:  Negative for back pain.   Skin:  Negative for rash.   Neurological:  Negative for weakness.   Hematological:  Does not bruise/bleed easily.     Physical Exam     Initial Vitals [06/30/23 2225]   BP Pulse Resp Temp SpO2   119/81 81 (!) 22 97.8 °F (36.6 °C) 98 %      MAP       --         Physical Exam    Nursing  note and vitals reviewed.  Constitutional: She appears well-developed and well-nourished. No distress.   HENT:   Head: Normocephalic and atraumatic.   Mouth/Throat: Oropharynx is clear and moist.   Eyes: EOM are normal. Pupils are equal, round, and reactive to light.   Neck: Neck supple.   Normal range of motion.  Cardiovascular:  Normal rate and regular rhythm.           Pulmonary/Chest: Breath sounds normal. No respiratory distress.   Abdominal: Abdomen is soft. There is no abdominal tenderness. There is no rebound and no guarding.   Musculoskeletal:         General: Edema (BLE edema, symmetric) present. No tenderness. Normal range of motion.      Cervical back: Normal range of motion and neck supple.     Neurological: She is alert and oriented to person, place, and time. She has normal strength. No cranial nerve deficit. GCS score is 15. GCS eye subscore is 4. GCS verbal subscore is 5. GCS motor subscore is 6.   Skin: Skin is warm and dry. Capillary refill takes less than 2 seconds. No rash noted.   Psychiatric: She has a normal mood and affect. Thought content normal.       ED Course   Procedures  Labs Reviewed   CBC W/ AUTO DIFFERENTIAL - Abnormal; Notable for the following components:       Result Value    RBC 3.40 (*)     Hemoglobin 9.7 (*)     Hematocrit 30.2 (*)     RDW 14.7 (*)     All other components within normal limits   COMPREHENSIVE METABOLIC PANEL - Abnormal; Notable for the following components:    Glucose 114 (*)     BUN 29 (*)     Creatinine 1.6 (*)     Total Bilirubin 1.1 (*)     eGFR 33.0 (*)     All other components within normal limits   TROPONIN I HIGH SENSITIVITY - Abnormal; Notable for the following components:    Troponin I High Sensitivity 15.9 (*)     All other components within normal limits   TROPONIN I HIGH SENSITIVITY - Abnormal; Notable for the following components:    Troponin I High Sensitivity 15.5 (*)     All other components within normal limits   B-TYPE NATRIURETIC PEPTIDE -  Abnormal; Notable for the following components:     (*)     All other components within normal limits   MAGNESIUM     EKG Readings: (Independently Interpreted)   NSR, HR 84, occasional PVCs, no STEMI, no significant change from prior   ECG Results              EKG 12-lead (In process)  Result time 07/01/23 05:32:00      In process by Interface, Lab In [facility] (07/01/23 05:32:00)                   Narrative:    Test Reason : R07.9,    Vent. Rate : 084 BPM     Atrial Rate : 084 BPM     P-R Int : 180 ms          QRS Dur : 096 ms      QT Int : 412 ms       P-R-T Axes : 029 072 072 degrees     QTc Int : 486 ms    Sinus rhythm with occasional Premature ventricular complexes  Otherwise normal ECG  When compared with ECG of 20-JUN-2023 10:52,  The axis Shifted left  Nonspecific T wave abnormality no longer evident in Lateral leads    Referred By: [email or web address]   SELF           Confirmed By:                                   Imaging Results              X-Ray Chest AP Portable (Final result)  Result time 07/01/23 08:53:51      Final result by Papito Hankins MD (07/01/23 08:53:51)                   Impression:      1. Blunting of lateral right costophrenic angle either reflecting tiny right pleural effusion or pleuroparenchymal scarring, unchanged.  2. Borderline cardiomegaly.      Electronically signed by: Papito Hankins MD  Date:    07/01/2023  Time:    08:53               Narrative:    EXAMINATION:  XR CHEST AP PORTABLE    CLINICAL HISTORY:  Chest Pain;    FINDINGS:  Portable chest at 2304 compared with 06/20/2023 shows cardiac silhouette size at the upper limits of normal with coronary artery stent suggested.  Mediastinal contours are normal.    Blunting of lateral right costophrenic angle persists, and lungs are otherwise clear.  Pulmonary vasculature is normal.    Cervical spine surgical hardware is evident.  Superior migration of right humeral head suggest chronic right rotator cuff deficiency with associated right  glenohumeral joint osteoarthrosis, unchanged.  Bone island in left proximal humerus noted.                                    X-Rays:   Independently Interpreted Readings:   Other Readings:  Chest x-ray independently reviewed by me.  Agree with radiology read of tiny right pleural effusion and cardiomegaly.  Medications   methocarbamoL tablet 500 mg (500 mg Oral Given 7/1/23 0032)   bumetanide injection 2 mg (2 mg Intravenous Given 7/1/23 0142)     Medical Decision Making:   History:   Old Records Summarized: records from clinic visits and records from previous admission(s).       <> Summary of Records: As noted in HPI  Differential Diagnosis:   ACS, CHF, anxiety, pneumonia, PE    Clinical Tests:   Lab Tests: Ordered and Reviewed       <> Summary of Lab: CBC, CMP, HS Trop, BNP, Mg  Radiological Study: Ordered and Reviewed  Medical Tests: Ordered and ReviewedPatient presents to ED as above.  Vitals are stable.  She appears in no respiratory distress at present.  Her chest x-ray has a tiny right pleural effusion and cardiomegaly.  EKG is sinus rhythm.  All labs reviewed by me and significant for normal WBC count, stable hemoglobin, chronically elevated BNP, high sensitive troponin elevated but below recent priors.  The patient was given a dose of IV Bumex here and Robaxin for restless legs and reports improvement.  She was offered admission but declines which I feel is reasonable since she is on RA and not in distress.  She will continue her current regimen and follow-up with her cardiologist this week.  ED return precautions discussed and provided.                         Clinical Impression:   Final diagnoses:  [R07.9] Chest pain  [I50.9] Acute on chronic congestive heart failure, unspecified heart failure type (Primary)        ED Disposition Condition    Discharge Stable          ED Prescriptions    None       Follow-up Information       Follow up With Specialties Details Why Contact Info    Camilo Gomez MD  Cardiology, Interventional Cardiology Schedule an appointment as soon as possible for a visit   [address] [facility] [address] [place] [address]  [phone]      Camilo Patel MD Family Medicine Schedule an appointment as soon as possible for a visit   [address] [facility] [place] [address]  [phone]               Paris Stevenson MD  07/02/23 0353

## 2023-07-03 ENCOUNTER — TELEPHONE (OUTPATIENT)
Dept: CARDIOLOGY | Facility: CLINIC | Age: 78
End: 2023-07-03
Payer: MEDICARE

## 2023-07-03 NOTE — TELEPHONE ENCOUNTER
----- Message from Silas Hassan sent at 7/1/2023  7:41 AM CDT -----  Type:  Sooner Appointment Request    Caller is requesting a sooner appointment.  Caller declined first available appointment listed below.  Caller will not accept being placed on the waitlist and is requesting a message be sent to doctor.    Name of Caller:  Pt  When is the first available appointment?  7/26  Symptoms:  hospital f/u/congestive heart failure  Best Call Back Number:  [phone]  Additional Information:  pt went to the ER 6/30, pl call bk to advise thanks

## 2023-07-06 PROBLEM — Z71.89 ADVANCE CARE PLANNING: Status: ACTIVE | Noted: 2023-07-06

## 2023-07-06 PROBLEM — I50.33 ACUTE ON CHRONIC DIASTOLIC HEART FAILURE: Status: ACTIVE | Noted: 2020-11-05

## 2023-07-06 PROBLEM — R79.89 ELEVATED D-DIMER: Status: ACTIVE | Noted: 2023-07-06

## 2023-07-06 PROBLEM — E87.6 HYPOKALEMIA: Status: ACTIVE | Noted: 2023-07-06

## 2023-07-07 ENCOUNTER — TELEPHONE (OUTPATIENT)
Dept: CARDIOLOGY | Facility: CLINIC | Age: 78
End: 2023-07-07

## 2023-07-07 PROBLEM — R91.8 LUNG NODULES: Status: ACTIVE | Noted: 2023-07-07

## 2023-07-07 NOTE — TELEPHONE ENCOUNTER
----- Message from Cynthia Rowan sent at 7/7/2023  2:01 PM CDT -----  Regarding: advice  Contact: Lisa  Type: Needs Medical Advice  Who Called:  Lisa with [facility]   Symptoms (please be specific):    How long has patient had these symptoms:    Pharmacy name and phone #:    Best Call Back Number: [phone] (home)     Additional Information: Lisa stated that patient is being discharged today. Patient needs appointment rescheduled to be seen between one to two weeks. Please contact patient to advise. Thanks!

## 2023-08-01 ENCOUNTER — OFFICE VISIT (OUTPATIENT)
Dept: CARDIOLOGY | Facility: CLINIC | Age: 78
End: 2023-08-01
Payer: MEDICARE

## 2023-08-01 VITALS
WEIGHT: 122.81 LBS | HEIGHT: 62 IN | HEART RATE: 85 BPM | DIASTOLIC BLOOD PRESSURE: 81 MMHG | BODY MASS INDEX: 22.6 KG/M2 | SYSTOLIC BLOOD PRESSURE: 126 MMHG

## 2023-08-01 DIAGNOSIS — I10 ESSENTIAL HYPERTENSION: Chronic | ICD-10-CM

## 2023-08-01 DIAGNOSIS — I34.0 MITRAL VALVE INSUFFICIENCY, UNSPECIFIED ETIOLOGY: ICD-10-CM

## 2023-08-01 DIAGNOSIS — I50.33 ACUTE ON CHRONIC DIASTOLIC HEART FAILURE: Primary | ICD-10-CM

## 2023-08-01 DIAGNOSIS — I25.10 CORONARY ARTERY DISEASE INVOLVING NATIVE CORONARY ARTERY OF NATIVE HEART WITHOUT ANGINA PECTORIS: Chronic | ICD-10-CM

## 2023-08-01 DIAGNOSIS — I48.0 PAROXYSMAL ATRIAL FIBRILLATION: Chronic | ICD-10-CM

## 2023-08-01 DIAGNOSIS — E78.00 HIGH CHOLESTEROL: ICD-10-CM

## 2023-08-01 DIAGNOSIS — I35.0 AORTIC VALVE STENOSIS, ETIOLOGY OF CARDIAC VALVE DISEASE UNSPECIFIED: ICD-10-CM

## 2023-08-01 PROCEDURE — 99215 PR OFFICE/OUTPT VISIT, EST, LEVL V, 40-54 MIN: ICD-10-PCS | Mod: S$GLB,,,

## 2023-08-01 PROCEDURE — 3288F FALL RISK ASSESSMENT DOCD: CPT | Mod: CPTII,S$GLB,,

## 2023-08-01 PROCEDURE — 3074F SYST BP LT 130 MM HG: CPT | Mod: CPTII,S$GLB,,

## 2023-08-01 PROCEDURE — 3079F DIAST BP 80-89 MM HG: CPT | Mod: CPTII,S$GLB,,

## 2023-08-01 PROCEDURE — 3288F PR FALLS RISK ASSESSMENT DOCUMENTED: ICD-10-PCS | Mod: CPTII,S$GLB,,

## 2023-08-01 PROCEDURE — 99215 OFFICE O/P EST HI 40 MIN: CPT | Mod: S$GLB,,,

## 2023-08-01 PROCEDURE — 1111F PR DISCHARGE MEDS RECONCILED W/ CURRENT OUTPATIENT MED LIST: ICD-10-PCS | Mod: CPTII,S$GLB,,

## 2023-08-01 PROCEDURE — 3074F PR MOST RECENT SYSTOLIC BLOOD PRESSURE < 130 MM HG: ICD-10-PCS | Mod: CPTII,S$GLB,,

## 2023-08-01 PROCEDURE — 1159F MED LIST DOCD IN RCRD: CPT | Mod: CPTII,S$GLB,,

## 2023-08-01 PROCEDURE — 1126F AMNT PAIN NOTED NONE PRSNT: CPT | Mod: CPTII,S$GLB,,

## 2023-08-01 PROCEDURE — 1101F PT FALLS ASSESS-DOCD LE1/YR: CPT | Mod: CPTII,S$GLB,,

## 2023-08-01 PROCEDURE — 99999 PR PBB SHADOW E&M-EST. PATIENT-LVL IV: ICD-10-PCS | Mod: PBBFAC,,,

## 2023-08-01 PROCEDURE — 1111F DSCHRG MED/CURRENT MED MERGE: CPT | Mod: CPTII,S$GLB,,

## 2023-08-01 PROCEDURE — 99999 PR PBB SHADOW E&M-EST. PATIENT-LVL IV: CPT | Mod: PBBFAC,,,

## 2023-08-01 PROCEDURE — 1101F PR PT FALLS ASSESS DOC 0-1 FALLS W/OUT INJ PAST YR: ICD-10-PCS | Mod: CPTII,S$GLB,,

## 2023-08-01 PROCEDURE — 3079F PR MOST RECENT DIASTOLIC BLOOD PRESSURE 80-89 MM HG: ICD-10-PCS | Mod: CPTII,S$GLB,,

## 2023-08-01 PROCEDURE — 1159F PR MEDICATION LIST DOCUMENTED IN MEDICAL RECORD: ICD-10-PCS | Mod: CPTII,S$GLB,,

## 2023-08-01 PROCEDURE — 1126F PR PAIN SEVERITY QUANTIFIED, NO PAIN PRESENT: ICD-10-PCS | Mod: CPTII,S$GLB,,

## 2023-08-01 RX ORDER — METOLAZONE 2.5 MG/1
2.5 TABLET ORAL DAILY
Qty: 30 TABLET | Refills: 11 | Status: ON HOLD | OUTPATIENT
Start: 2023-08-01 | End: 2023-10-04 | Stop reason: SDUPTHER

## 2023-08-01 NOTE — ASSESSMENT & PLAN NOTE
[place] [facility]   Mod severe per [place]   Will refer to Dr. More at [facility] for structural eval, poss mitral clip   ?etiology of her severe ALEGRE -- will also send to pulmonology for their input

## 2023-08-01 NOTE — PROGRESS NOTES
Subjective:    Patient ID:  Trudy R Dakin is a 77 y.o. female patient here for evaluation Follow-up    History of Present Illness:     Mrs. Elaine is a 77 year old F who follows with Dr. Gomez here today for a hospital follow up. She has been to [facility] ED 4x int he past few months for SOB. She has also been admitted by [facility] for the same and underwent L&RHC as below. She continues with severe ALEGRE, with talking, with walking from the exam room to the front desks (10 steps). This is despite bumex 2 mg TID (failed lasix) and PCI to the LAD. There has been query as to wether her mitral valve is the cause of her symptoms.       [facility] Records:   LHC: PCI to the mid/distal LAD x2, otherwise normal coronaries   RHC: RA   PA 6/5  RV 41 EDP 6   EDP 12   /58   CO 6.21   3+ MR (severe)       Most Recent Echocardiogram Results  Results for orders placed during the hospital encounter of 07/05/23    Echo Saline Bubble? No    Interpretation Summary  · Concentric hypertrophy and normal systolic function.  · The estimated ejection fraction is 60%.  · Normal left ventricular diastolic function.  · Normal right ventricular size with normal right ventricular systolic function.  · Moderate mitral regurgitation.  · Mild tricuspid regurgitation.    5/15/2023   The left ventricle is normal in size with eccentric hypertrophy and normal systolic function.  The estimated ejection fraction is 55%.  Grade II left ventricular diastolic dysfunction.  Normal right ventricular size with normal right ventricular systolic function.  Severe left atrial enlargement.  There is mild aortic valve stenosis.  Aortic valve area is 1.67 cm2; peak velocity is 2.0 m/s; mean gradient is 10 mmHg.  Mild aortic regurgitation.  Moderate-to-severe mitral regurgitation.  Mild to moderate tricuspid regurgitation.  Normal central venous pressure (3 mmHg).  The estimated PA systolic pressure is 40 mmHg.  Trivial to small circumferential  pericardial effusion. No echocardiographic evidence of tamponade physiology appreciated.      Other Most Recent Cardiology Results  Results for orders placed during the hospital encounter of 07/05/23    CARDIAC MONITORING STRIPS      REVIEW OF SYSTEMS: As noted in HPI   CARDIOVASCULAR: No recent chest pain, palpitations, arm/neck/jaw pain, or edema.  RESPIRATORY: +SOB. No recent fever, cough.  : No blood in the urine  GI: No reflux, nausea, vomiting, or blood in stool.   MUSCULOSKELETAL: No falls.   NEURO: No headaches, syncope, or dizziness.  EYES: No sudden changes in vision.     Past Medical History:   Diagnosis Date    Diabetes     Diverticulitis     Hypertension      Past Surgical History:   Procedure Laterality Date    ABDOMINAL SURGERY  2006    diverticulitis x3    ANTERIOR CERVICAL DISCECTOMY W/ FUSION N/A 8/30/2018    FUSION C6-7 Surgeon: Rickey Martin MD    APPENDECTOMY      CARPAL TUNNEL RELEASE      COLECTOMY  07/2020    EPIDURAL STEROID INJECTION INTO LUMBAR SPINE N/A 1/20/2021    Procedure: Injection-steroid-epidural-lumbar--L3-4;  Surgeon: Colleen Carvajal MD;  [facility];  Service: Pain Management;  Laterality: N/A;    HYSTERECTOMY  1970    @25yrs of age    LARYNGOSCOPY N/A 1/4/2022    Procedure: Suspension microlaryngoscopy with left vocal fold injection augmentation - Restylane L;  Surgeon: Yuan Mir MD;  Location: [address];  Service: ENT;  Laterality: N/A;  Microscope, telescopes, tower, injector, Restylane-L    MANDIBLE FRACTURE SURGERY  1970    MICRODISCECTOMY OF SPINE Left 4/13/2021    Procedure: MICRODISCECTOMY, SPINE Left L3-4 far lateral Microdiscectomy;  Surgeon: Rickey Martin MD;  Location: [facility] OR;  Service: Neurosurgery;  Laterality: Left;  Procedure: Left L3-4 far lateral Microdiscectomy   Surgery Time: 1.5 hrs  LOS: 0-1  Anesthesia: General  Radiology: C-arm  SNS: EMG, SEP  Bed: [place] [address] Poster  Position: Melissa Demarco w/ Globus confirmed 4/12/21  [place]    OOPHORECTOMY  1970    ROTATOR CUFF REPAIR Left 11/2016    TOTAL REDUCTION MAMMOPLASTY Bilateral 1990    TRANSFORAMINAL EPIDURAL INJECTION OF STEROID Left 12/23/2020    Procedure: Injection,steroid,epidural,transforaminal approach--Left L4 and L5;  Surgeon: Colleen Carvajal MD;  Location: [facility];  Service: Pain Management;  Laterality: Left;     Social History     Tobacco Use    Smoking status: Never    Smokeless tobacco: Never   Substance Use Topics    Alcohol use: No    Drug use: No         Objective      Vitals:    08/01/23 1326   BP: 126/81   Pulse: 85       LAST EKG  Results for orders placed or performed during the hospital encounter of 07/31/23   EKG 12-lead    Collection Time: 07/31/23  6:21 AM    Narrative    Test Reason : R06.02,    Vent. Rate : 078 BPM     Atrial Rate : 078 BPM     P-R Int : 182 ms          QRS Dur : 098 ms      QT Int : 432 ms       P-R-T Axes : 028 137 098 degrees     QTc Int : 492 ms    Normal sinus rhythm  Incomplete right bundle branch block  Left posterior fascicular block  Nonspecific T wave abnormality  Abnormal ECG  When compared with ECG of 05-JUL-2023 16:42,  Premature atrial complexes are no longer Present  Incomplete right bundle branch block is now Present  Confirmed by Brian GUERRA, El ELLER ([id number]) on 7/31/2023 5:03:41 PM    Referred By: [email or web address]   SELF           Confirmed By:El Torres MD     LIPIDS - LAST 2   Lab Results   Component Value Date    CHOL 154 04/12/2023    CHOL 136 09/13/2022    HDL 29 (L) 04/12/2023    HDL 35 (L) 09/13/2022    LDLCALC 101.2 04/12/2023    LDLCALC 73 09/13/2022    TRIG 119 04/12/2023    TRIG 186 (H) 09/13/2022    CHOLHDL 18.8 (L) 04/12/2023    CHOLHDL 3.9 09/13/2022     CARDIAC PROFILE - LAST 2  Lab Results   Component Value Date     (H) 06/30/2023     (H) 05/14/2023    TROPONINI 0.018 07/31/2023    TROPONINI 0.019 07/05/2023      CBC - LAST 2  Lab Results   Component Value Date    WBC 6.58 07/31/2023    WBC 5.11  07/07/2023    RBC 3.53 (L) 07/31/2023    RBC 3.63 (L) 07/07/2023    HGB 9.8 (L) 07/31/2023    HGB 10.1 (L) 07/07/2023    HCT 31.0 (L) 07/31/2023    HCT 32.0 (L) 07/07/2023     07/31/2023     07/07/2023     Lab Results   Component Value Date    INR 1.0 05/14/2023    INR 1.0 03/31/2021    APTT 28.3 03/31/2021    APTT 29.2 09/13/2012     CHEMISTRY - LAST 2  Lab Results   Component Value Date     07/31/2023     07/07/2023    K 4.8 07/31/2023    K 3.9 07/07/2023     07/31/2023     07/07/2023    CO2 23 07/31/2023    CO2 30 07/07/2023    ANIONGAP 10 07/31/2023    ANIONGAP 11 07/07/2023    BUN 27 (H) 07/31/2023    BUN 29 (H) 07/07/2023    CREATININE 1.39 07/31/2023    CREATININE 1.39 07/07/2023     (H) 07/31/2023     (H) 07/07/2023    CALCIUM 8.9 07/31/2023    CALCIUM 9.5 07/07/2023    PH 7.395 04/14/2021    PH 7.329 (L) 04/14/2021    MG 2.0 07/07/2023    MG 1.7 07/06/2023    ALBUMIN 4.3 07/31/2023    ALBUMIN 4.5 07/07/2023    PROT 7.3 07/31/2023    PROT 7.7 07/07/2023    ALKPHOS 55 07/31/2023    ALKPHOS 69 07/07/2023    ALT 18 07/31/2023    ALT 14 07/07/2023    AST 36 07/31/2023    AST 31 07/07/2023    BILITOT 1.0 07/31/2023    BILITOT 1.0 07/07/2023      ENDOCRINE - LAST 2  Lab Results   Component Value Date    HGBA1C 6.2 (H) 04/12/2023    HGBA1C 7.0 (H) 09/13/2022    TSH 1.28 02/04/2021    TSH 2.249 06/19/2018        PHYSICAL EXAM  CONSTITUTIONAL: Well built, well nourished in no apparent distress  NECK: no carotid bruit, no JVD  LUNGS: CTA  CHEST WALL: no tenderness  HEART: regular rate and rhythm, grade 4/6 systolic murmur to the axilla   ABDOMEN: soft, non-tender; bowel sounds normal; no masses,  no organomegaly  EXTREMITIES: Extremities normal, no edema, no calf tenderness noted  NEURO: AAO X 3    I HAVE REVIEWED :    The vital signs, most recent cardiac testing, and most recent pertinent non-cardiology provider notes.    Current Outpatient Medications   Medication  Instructions    acetaminophen (TYLENOL) 650 mg, Oral, Every 8 hours    amitriptyline (ELAVIL) 25 MG tablet TAKE 1 TABLET(25 MG) BY MOUTH EVERY EVENING    atorvastatin (LIPITOR) 80 mg, Oral, Daily    bumetanide (BUMEX) 2 mg, Oral, 2 times daily    busPIRone (BUSPAR) 10 mg, Oral, 2 times daily    clopidogreL (PLAVIX) 75 mg, Oral, Daily    cyanocobalamin 1,000 mcg/mL injection INJECT 1 ML INTO THE MUSCLE EVERY 14 DAYS    EScitalopram oxalate (LEXAPRO) 20 mg, Oral, Daily    hydrOXYzine HCL (ATARAX) 25 mg, Oral, 2 times daily PRN    metFORMIN (GLUCOPHAGE-XR) 500 MG ER 24hr tablet TAKE 2 TABLETS BY MOUTH TWICE DAILY    metOLazone (ZAROXOLYN) 2.5 mg, Oral, Daily    metoprolol succinate (TOPROL-XL) 25 MG 24 hr tablet TAKE 1 TABLET(25 MG) BY MOUTH EVERY DAY    nitroGLYCERIN (NITROSTAT) 0.4 mg, Sublingual, Every 5 min PRN    nystatin (MYCOSTATIN) cream Topical (Top), 2 times daily    predniSONE (DELTASONE) 20 mg, Oral, Daily    pregabalin (LYRICA) 150 MG capsule TAKE ONE CAPSULE BY MOUTH TWICE DAILY      Assessment & Plan     Mitral valve insufficiency  [place] [facility]   Mod severe per echo   Will refer to Dr. More at [facility] for structural eval, poss mitral clip   ?etiology of her severe ALEGRE -- will also send to pulmonology for their input     Acute on chronic diastolic heart failure  Try to add 3d of metolozone and check labs Friday   Continue Bumex 2mg BID   Had no response to lasix     High cholesterol  Continue statin     Aortic stenosis  Mild to moderate on most recent echo     Paroxysmal atrial fibrillation  Eliquis discontinue due to fall risks   Continue toprol 25 mg daily     Essential hypertension  BP ok   Continue metoprolol   Continue bumex   Adding metolzone to hopefully aid in some volume control   Low Na diet     CAD (coronary artery disease)  Continue Plavix and statin         F/u 8 week Pedone Naomi Peters, PA-C [facility]   Office: [phone]

## 2023-08-01 NOTE — ASSESSMENT & PLAN NOTE
Try to add 3d of metolozone and check labs Friday   Continue Bumex 2mg BID   Had no response to lasix

## 2023-08-01 NOTE — ASSESSMENT & PLAN NOTE
BP ok   Continue metoprolol   Continue bumex   Adding metolzone to hopefully aid in some volume control   Low Na diet

## 2023-08-02 DIAGNOSIS — I34.0 MITRAL VALVE INSUFFICIENCY, UNSPECIFIED ETIOLOGY: Primary | ICD-10-CM

## 2023-08-03 ENCOUNTER — TELEPHONE (OUTPATIENT)
Dept: CARDIOLOGY | Facility: CLINIC | Age: 78
End: 2023-08-03
Payer: MEDICARE

## 2023-08-03 NOTE — TELEPHONE ENCOUNTER
----- Message from Rock Almanzar sent at 8/3/2023  1:20 PM CDT -----  Type: Needs Medical Advice  Who Called:  Stephanie FERNANDEZ   Best Call Back Number: [phone]    Additional Information: pt's daughter in law ia asking to be advised in regards to pt needing to be amy in main campus and wants someone to clarify is she is supposed to amy appts or if provider is going to get pt amy please call campos edmond to advise asap, thanks!

## 2023-08-04 ENCOUNTER — LAB VISIT (OUTPATIENT)
Dept: LAB | Facility: HOSPITAL | Age: 78
End: 2023-08-04
Payer: MEDICARE

## 2023-08-04 DIAGNOSIS — I34.0 MITRAL VALVE INSUFFICIENCY, UNSPECIFIED ETIOLOGY: ICD-10-CM

## 2023-08-04 DIAGNOSIS — I50.33 ACUTE ON CHRONIC DIASTOLIC HEART FAILURE: ICD-10-CM

## 2023-08-04 LAB
ALBUMIN SERPL BCP-MCNC: 3.8 G/DL (ref 3.5–5.2)
ALP SERPL-CCNC: 71 U/L (ref 55–135)
ALT SERPL W/O P-5'-P-CCNC: 5 U/L (ref 10–44)
ANION GAP SERPL CALC-SCNC: 15 MMOL/L (ref 8–16)
AST SERPL-CCNC: 16 U/L (ref 10–40)
BILIRUB SERPL-MCNC: 0.7 MG/DL (ref 0.1–1)
BNP SERPL-MCNC: 411 PG/ML (ref 0–99)
BUN SERPL-MCNC: 37 MG/DL (ref 8–23)
CALCIUM SERPL-MCNC: 9.4 MG/DL (ref 8.7–10.5)
CHLORIDE SERPL-SCNC: 102 MMOL/L (ref 95–110)
CO2 SERPL-SCNC: 23 MMOL/L (ref 23–29)
CREAT SERPL-MCNC: 1.7 MG/DL (ref 0.5–1.4)
EST. GFR  (NO RACE VARIABLE): 30.7 ML/MIN/1.73 M^2
GLUCOSE SERPL-MCNC: 110 MG/DL (ref 70–110)
POTASSIUM SERPL-SCNC: 3.4 MMOL/L (ref 3.5–5.1)
PROT SERPL-MCNC: 7 G/DL (ref 6–8.4)
SODIUM SERPL-SCNC: 140 MMOL/L (ref 136–145)

## 2023-08-04 PROCEDURE — 80053 COMPREHEN METABOLIC PANEL: CPT

## 2023-08-04 PROCEDURE — 83880 ASSAY OF NATRIURETIC PEPTIDE: CPT

## 2023-08-04 PROCEDURE — 36415 COLL VENOUS BLD VENIPUNCTURE: CPT | Mod: PO

## 2023-08-08 ENCOUNTER — TELEPHONE (OUTPATIENT)
Dept: CARDIOLOGY | Facility: CLINIC | Age: 78
End: 2023-08-08
Payer: MEDICARE

## 2023-08-08 NOTE — TELEPHONE ENCOUNTER
"Spoke with pt's daughter in law and advised per Lety "She can continue holding the medication as long as hse is feeling fine. However, while waiting to see Dr. More should she feel acutely short of breath again, she should take the metolzone for 1-2 days at a time."  Daughter JOHN  "

## 2023-08-08 NOTE — TELEPHONE ENCOUNTER
----- Message from Lucy Ortiz sent at 8/8/2023  9:22 AM CDT -----  Contact: self  Type: Needs Medical Advice  Who Called: Pt daughter in law  Best Call Back Number: [phone]  Additional Information: Pt daughter in law Ms. Brown  would like a call back for the pt. Plz call her at [phone] wants to talk to a nurse. Thanks

## 2023-08-08 NOTE — TELEPHONE ENCOUNTER
"Spoke with pt's daughter in law. States that pt took metolazone for 3 days prior to labs and has not taken it since. Daughter in law states pt is "feeling fine" and wants to know if she is to continue taking medication. Please advise.  "

## 2023-08-16 ENCOUNTER — TELEPHONE (OUTPATIENT)
Dept: CARDIOLOGY | Facility: CLINIC | Age: 78
End: 2023-08-16

## 2023-08-16 NOTE — TELEPHONE ENCOUNTER
SUBJECTIVE:   Trudy R Dakin is a 77 y.o. female who complains of an injury causing low back pain *** {gen duration:725693} ago. The pain is positional with bending or lifting, {w-w/o:581065} radiation down the legs. Mechanism of injury: ***. Symptoms have been {gen onset/course:562375} since that time. Prior history of back problems: {back pain prior hx:693578}. There {is/are:931614} numbness in the legs.    OBJECTIVE:  Vital signs as noted above. Patient appears to be in mild to moderate pain, antalgic gait noted. Lumbosacral spine area reveals no local tenderness or mass. Painful and reduced LS ROM noted. Straight leg raise is {pos_ne} at *** degrees on {gen laterality:826859}. DTR's, motor strength and sensation normal, including heel and toe gait.  Peripheral pulses are palpable. Lumbar spine X-Ray: {xr findings:522388}.     ASSESSMENT:   {back pain dx:674667::lumbar strain}    PLAN:  For acute pain, rest, intermittent application of cold packs (later, may switch to heat, but do not sleep on heating pad), analgesics and muscle relaxants are recommended. Discussed longer term treatment plan of prn NSAID's and discussed a home back care exercise program with flexion exercise routine. Proper lifting with avoidance of heavy lifting discussed. Consider Physical Therapy and XRay studies if not improving. Call or return to clinic prn if these symptoms worsen or fail to improve as anticipated. In system.

## 2023-09-12 ENCOUNTER — PATIENT MESSAGE (OUTPATIENT)
Dept: CARDIOLOGY | Facility: CLINIC | Age: 78
End: 2023-09-12

## 2023-09-12 ENCOUNTER — OFFICE VISIT (OUTPATIENT)
Dept: CARDIOLOGY | Facility: CLINIC | Age: 78
End: 2023-09-12
Payer: MEDICARE

## 2023-09-12 ENCOUNTER — HOSPITAL ENCOUNTER (OUTPATIENT)
Dept: CARDIOLOGY | Facility: HOSPITAL | Age: 78
Discharge: HOME | End: 2023-09-12
Attending: INTERNAL MEDICINE
Payer: MEDICARE

## 2023-09-12 ENCOUNTER — DOCUMENTATION ONLY (OUTPATIENT)
Dept: CARDIOLOGY | Facility: CLINIC | Age: 78
End: 2023-09-12
Payer: MEDICARE

## 2023-09-12 VITALS
SYSTOLIC BLOOD PRESSURE: 132 MMHG | DIASTOLIC BLOOD PRESSURE: 80 MMHG | HEIGHT: 62 IN | WEIGHT: 122 LBS | HEART RATE: 67 BPM | BODY MASS INDEX: 22.45 KG/M2

## 2023-09-12 VITALS
BODY MASS INDEX: 20.82 KG/M2 | OXYGEN SATURATION: 92 % | DIASTOLIC BLOOD PRESSURE: 61 MMHG | HEIGHT: 63 IN | HEART RATE: 71 BPM | WEIGHT: 117.5 LBS | SYSTOLIC BLOOD PRESSURE: 128 MMHG

## 2023-09-12 DIAGNOSIS — I50.43 ACUTE ON CHRONIC COMBINED SYSTOLIC (CONGESTIVE) AND DIASTOLIC (CONGESTIVE) HEART FAILURE: ICD-10-CM

## 2023-09-12 DIAGNOSIS — I34.0 MITRAL VALVE INSUFFICIENCY, UNSPECIFIED ETIOLOGY: ICD-10-CM

## 2023-09-12 DIAGNOSIS — I50.33 ACUTE ON CHRONIC DIASTOLIC HEART FAILURE: ICD-10-CM

## 2023-09-12 DIAGNOSIS — I34.0 MITRAL VALVE INSUFFICIENCY, UNSPECIFIED ETIOLOGY: Primary | ICD-10-CM

## 2023-09-12 LAB
ASCENDING AORTA: 3 CM
AV INDEX (PROSTH): 0.45
AV MEAN GRADIENT: 9 MMHG
AV PEAK GRADIENT: 14 MMHG
AV VALVE AREA BY VELOCITY RATIO: 1.75 CM²
AV VALVE AREA: 1.51 CM²
AV VELOCITY RATIO: 0.52
BSA FOR ECHO PROCEDURE: 1.56 M2
CV ECHO LV RWT: 0.38 CM
DOP CALC AO PEAK VEL: 1.84 M/S
DOP CALC AO VTI: 41 CM
DOP CALC LVOT AREA: 3.4 CM2
DOP CALC LVOT DIAMETER: 2.07 CM
DOP CALC LVOT PEAK VEL: 0.96 M/S
DOP CALC LVOT STROKE VOLUME: 61.79 CM3
DOP CALC MV VTI: 23.78 CM
DOP CALCLVOT PEAK VEL VTI: 18.37 CM
E WAVE DECELERATION TIME: 296.56 MSEC
E/A RATIO: 1.72
E/E' RATIO: 23.2 M/S
ECHO LV POSTERIOR WALL: 1.02 CM (ref 0.6–1.1)
FRACTIONAL SHORTENING: 31 % (ref 28–44)
GLOBAL LONGITUIDAL STRAIN: 20 %
INTERVENTRICULAR SEPTUM: 0.73 CM (ref 0.6–1.1)
IVRT: 74.22 MSEC
LA MAJOR: 6.35 CM
LA MINOR: 6.52 CM
LA WIDTH: 4.89 CM
LEFT ATRIUM SIZE: 4.6 CM
LEFT ATRIUM VOLUME INDEX MOD: 54.2 ML/M2
LEFT ATRIUM VOLUME INDEX: 79.4 ML/M2
LEFT ATRIUM VOLUME MOD: 84.01 CM3
LEFT ATRIUM VOLUME: 123.02 CM3
LEFT INTERNAL DIMENSION IN SYSTOLE: 3.67 CM (ref 2.1–4)
LEFT VENTRICLE DIASTOLIC VOLUME INDEX: 72.49 ML/M2
LEFT VENTRICLE DIASTOLIC VOLUME: 112.36 ML
LEFT VENTRICLE MASS INDEX: 109 G/M2
LEFT VENTRICLE SYSTOLIC VOLUME INDEX: 36.8 ML/M2
LEFT VENTRICLE SYSTOLIC VOLUME: 57.03 ML
LEFT VENTRICULAR INTERNAL DIMENSION IN DIASTOLE: 5.3 CM (ref 3.5–6)
LEFT VENTRICULAR MASS: 168.27 G
LV LATERAL E/E' RATIO: 21.75 M/S
LV SEPTAL E/E' RATIO: 24.86 M/S
MV A" WAVE DURATION": 6.85 MSEC
MV MEAN GRADIENT: 1 MMHG
MV PEAK A VEL: 1.01 M/S
MV PEAK E VEL: 1.74 M/S
MV PEAK GRADIENT: 3 MMHG
MV STENOSIS PRESSURE HALF TIME: 86 MS
MV VALVE AREA BY CONTINUITY EQUATION: 2.6 CM2
MV VALVE AREA P 1/2 METHOD: 2.56 CM2
OHS LV EJECTION FRACTION SIMPSONS BIPLANE MOD: 58 %
PISA MRMAX VEL: 0.06 M/S
PISA TR MAX VEL: 4.09 M/S
PULM VEIN S/D RATIO: 1.4
PV PEAK D VEL: 0.6 M/S
PV PEAK S VEL: 0.84 M/S
RA MAJOR: 4.97 CM
RA PRESSURE ESTIMATED: 3 MMHG
RA WIDTH: 2.6 CM
RIGHT VENTRICULAR END-DIASTOLIC DIMENSION: 2.82 CM
RV TB RVSP: 7 MMHG
SINUS: 2.78 CM
STJ: 2.24 CM
TDI LATERAL: 0.08 M/S
TDI SEPTAL: 0.07 M/S
TDI: 0.08 M/S
TR MAX PG: 67 MMHG
TRICUSPID ANNULAR PLANE SYSTOLIC EXCURSION: 2.36 CM
TV REST PULMONARY ARTERY PRESSURE: 70 MMHG
Z-SCORE OF LEFT VENTRICULAR DIMENSION IN END DIASTOLE: 1.67
Z-SCORE OF LEFT VENTRICULAR DIMENSION IN END SYSTOLE: 2.18

## 2023-09-12 PROCEDURE — 1160F RVW MEDS BY RX/DR IN RCRD: CPT | Mod: HCNC,CPTII,S$PBB, | Performed by: INTERNAL MEDICINE

## 2023-09-12 PROCEDURE — 3074F SYST BP LT 130 MM HG: CPT | Mod: HCNC,CPTII,S$PBB, | Performed by: INTERNAL MEDICINE

## 2023-09-12 PROCEDURE — 93306 ECHO (CUPID ONLY): ICD-10-PCS | Mod: 26,HCNC,, | Performed by: INTERNAL MEDICINE

## 2023-09-12 PROCEDURE — 99999 PR PBB SHADOW E&M-EST. PATIENT-LVL V: ICD-10-PCS | Mod: PBBFAC,HCNC,, | Performed by: INTERNAL MEDICINE

## 2023-09-12 PROCEDURE — 3078F PR MOST RECENT DIASTOLIC BLOOD PRESSURE < 80 MM HG: ICD-10-PCS | Mod: HCNC,CPTII,S$PBB, | Performed by: INTERNAL MEDICINE

## 2023-09-12 PROCEDURE — 93306 TTE W/DOPPLER COMPLETE: CPT | Mod: HCNC

## 2023-09-12 PROCEDURE — 1126F AMNT PAIN NOTED NONE PRSNT: CPT | Mod: HCNC,CPTII,S$PBB, | Performed by: INTERNAL MEDICINE

## 2023-09-12 PROCEDURE — 93306 TTE W/DOPPLER COMPLETE: CPT | Mod: 26,HCNC,, | Performed by: INTERNAL MEDICINE

## 2023-09-12 PROCEDURE — 1126F PR PAIN SEVERITY QUANTIFIED, NO PAIN PRESENT: ICD-10-PCS | Mod: HCNC,CPTII,S$PBB, | Performed by: INTERNAL MEDICINE

## 2023-09-12 PROCEDURE — 99999 PR PBB SHADOW E&M-EST. PATIENT-LVL V: CPT | Mod: PBBFAC,HCNC,, | Performed by: INTERNAL MEDICINE

## 2023-09-12 PROCEDURE — 1159F PR MEDICATION LIST DOCUMENTED IN MEDICAL RECORD: ICD-10-PCS | Mod: HCNC,CPTII,S$PBB, | Performed by: INTERNAL MEDICINE

## 2023-09-12 PROCEDURE — 99214 PR OFFICE/OUTPT VISIT, EST, LEVL IV, 30-39 MIN: ICD-10-PCS | Mod: HCNC,S$PBB,, | Performed by: INTERNAL MEDICINE

## 2023-09-12 PROCEDURE — 1160F PR REVIEW ALL MEDS BY PRESCRIBER/CLIN PHARMACIST DOCUMENTED: ICD-10-PCS | Mod: HCNC,CPTII,S$PBB, | Performed by: INTERNAL MEDICINE

## 2023-09-12 PROCEDURE — 3078F DIAST BP <80 MM HG: CPT | Mod: HCNC,CPTII,S$PBB, | Performed by: INTERNAL MEDICINE

## 2023-09-12 PROCEDURE — 1159F MED LIST DOCD IN RCRD: CPT | Mod: HCNC,CPTII,S$PBB, | Performed by: INTERNAL MEDICINE

## 2023-09-12 PROCEDURE — 99214 OFFICE O/P EST MOD 30 MIN: CPT | Mod: HCNC,S$PBB,, | Performed by: INTERNAL MEDICINE

## 2023-09-12 PROCEDURE — 3074F PR MOST RECENT SYSTOLIC BLOOD PRESSURE < 130 MM HG: ICD-10-PCS | Mod: HCNC,CPTII,S$PBB, | Performed by: INTERNAL MEDICINE

## 2023-09-12 NOTE — PROGRESS NOTES
You have been scheduled for a procedure in the echo lab on Wednesday, November 1, 2023.     Please report to the Cardiology Waiting Area on the Third floor of the hospital and check in at 12:00 PM.     You will then be taken to the SSCU (Short Stay Cardiac Unit) and prepared for your procedure. Please be aware that this is not the time of your procedure but the time you are to arrive. The procedures are scheduled on an hourly basis; however, emergency cases take precedence over all other cases.     You may not have anything to eat or drink after midnight the night before your test. You may take your regular morning medications with water.    The procedure will take 1-2 hours to perform. After the procedure, you will return to SSCU on the third floor of the hospital. Your family may remain in the room with you during this time.     You will be discharged home that same afternoon. You must have someone to drive you home.  Your doctor will determine, based on your progress, the time of your discharge. The results of your procedure will be discussed with you before you are discharged. Any further testing or procedures will be scheduled for you either before you leave or you will be called with these appointments.       If you should have any questions, concerns, or need to change the date of your procedure, please call  Kimberly BALLARD at [phone]      Special Instructions:    Continue to take your current medications.        THE ABOVE INSTRUCTIONS WERE GIVEN TO THE PATIENT VERBALLY AND THEY VERBALIZED UNDERSTANDING.  THEY DO NOT REQUIRE ANY SPECIAL NEEDS AND DO NOT HAVE ANY LEARNING BARRIERS.          Directions for Reporting to Cardiology Waiting Area in the Hospital  If you park in the Parking Garage:  Take elevators to the1st floor of the parking garage.  Continue past the gift shop, coffee shop, and piano.  Take a right and go to the gold elevators. (Elevator B)  Take the elevator to the 3rd floor.  Follow the  arrow on the sign on the wall that says Cath Lab Registration/EP Lab Registration.  Follow the long hallway all the way around until you come to a big open area.  This is the registration area.  Check in at Reception Desk.    OR    If family is dropping you off:  Have them drop you off at the front of the Hospital under the green overhang.  Enter through the doors and take a right.  Take the E elevators to the 3rd floor Cardiology Waiting Area.  Check in at the Reception Desk in the waiting room.

## 2023-09-12 NOTE — PROGRESS NOTES
PCP - Camilo Patel MD  Subjective:   Patient ID:  Trudy R Dakin is a 78 y.o. female who presents for evaluation of Mitral Regurgitation     HPI:     Trudy R Dakin is a 78 y.o. female referred by Dr Lety Mcwilliams  for evaluation of severe MR (NYHA Class III  sx). Patient reports that she was admitted with acute shortness of breath, she decompensated and had to be admitted to an intensive care unit, during that admission she had LAD PCI, other coronary arteries were reported to be normal per notes   She has been having dyspnea with minimal exertion, if she walks across the room she gets short of breath, some days she has orthopnea and PND     Mitral Valve Disease Etiology: Other/Indeterminate    The patient has undergone the following MitraClip work-up:   PORTIA is pending   [facility] Hx of LAD PCI, PET scan is pending                           CT Surgery risk assessment: is pending   PFTs: FEV1 not indicated   Comorbidities: coronary artery disease. CKD III-IV   Labs: Hgb 9.8, Cr 1.7,      History:     Past Medical History:   Diagnosis Date    Diabetes     Diverticulitis     Hypertension      Past Surgical History:   Procedure Laterality Date    ABDOMINAL SURGERY  2006    diverticulitis x3    ANTERIOR CERVICAL DISCECTOMY W/ FUSION N/A 8/30/2018    FUSION C6-7 Surgeon: Rickey Martin MD    APPENDECTOMY      CARPAL TUNNEL RELEASE      COLECTOMY  07/2020    EPIDURAL STEROID INJECTION INTO LUMBAR SPINE N/A 1/20/2021    Procedure: Injection-steroid-epidural-lumbar--L3-4;  Surgeon: Colleen Carvajal MD;  [facility];  Service: Pain Management;  Laterality: N/A;    HYSTERECTOMY  1970    @25yrs of age    LARYNGOSCOPY N/A 1/4/2022    Procedure: Suspension microlaryngoscopy with left vocal fold injection augmentation - Restylane L;  Surgeon: Yuan Mir MD;  Location: [address];  Service: ENT;  Laterality: N/A;  Microscope, telescopes, tower, injector, Restylane-L    MANDIBLE FRACTURE SURGERY   1970    MICRODISCECTOMY OF SPINE Left 4/13/2021    Procedure: MICRODISCECTOMY, SPINE Left L3-4 far lateral Microdiscectomy;  Surgeon: Rickey Martin MD;  Location: [facility] OR;  Service: Neurosurgery;  Laterality: Left;  Procedure: Left L3-4 far lateral Microdiscectomy   Surgery Time: 1.5 hrs  LOS: 0-1  Anesthesia: General  Radiology: C-arm  SNS: EMG, SEP  Bed: [place] [address] Poster  Position: Melissa  Dav w/ Globus confirmed 4/12/21 [place]    OOPHORECTOMY  1970    ROTATOR CUFF REPAIR Left 11/2016    TOTAL REDUCTION MAMMOPLASTY Bilateral 1990    TRANSFORAMINAL EPIDURAL INJECTION OF STEROID Left 12/23/2020    Procedure: Injection,steroid,epidural,transforaminal approach--Left L4 and L5;  Surgeon: Colleen Carvajal MD;  Location: [facility];  Service: Pain Management;  Laterality: Left;     Social History     Tobacco Use    Smoking status: Never    Smokeless tobacco: Never   Substance Use Topics    Alcohol use: No     Family History   Problem Relation Age of Onset    Heart disease Father        Meds:   Review of patient's allergies indicates:  No Known Allergies    Current Outpatient Medications:     amitriptyline (ELAVIL) 25 MG tablet, TAKE 1 TABLET(25 MG) BY MOUTH EVERY EVENING (Patient taking differently: Take 25 mg by mouth every evening.), Disp: 90 tablet, Rfl: 3    atorvastatin (LIPITOR) 80 MG tablet, Take 1 tablet (80 mg total) by mouth once daily., Disp: 90 tablet, Rfl: 3    bumetanide (BUMEX) 2 MG tablet, Take 1 tablet (2 mg total) by mouth 2 (two) times daily., Disp: 180 tablet, Rfl: 3    busPIRone (BUSPAR) 10 MG tablet, Take 1 tablet (10 mg total) by mouth 2 (two) times daily., Disp: 60 tablet, Rfl: 5    clopidogreL (PLAVIX) 75 mg tablet, Take 1 tablet (75 mg total) by mouth once daily., Disp: 90 tablet, Rfl: 3    cyanocobalamin 1,000 mcg/mL injection, INJECT 1 ML INTO THE MUSCLE EVERY 14 DAYS (Patient taking differently: Inject 1,000 mcg into the muscle every 30 days.), Disp: 10 mL, Rfl: 1    EScitalopram oxalate  "(LEXAPRO) 20 MG tablet, Take 1 tablet (20 mg total) by mouth once daily., Disp: 90 tablet, Rfl: 3    hydrOXYzine HCL (ATARAX) 25 MG tablet, Take 1 tablet (25 mg total) by mouth 2 (two) times daily as needed for Anxiety., Disp: 30 tablet, Rfl: 0    metFORMIN (GLUCOPHAGE-XR) 500 MG ER 24hr tablet, TAKE 2 TABLETS BY MOUTH TWICE DAILY (Patient taking differently: Take 1,000 mg by mouth 2 (two) times daily with meals.), Disp: 360 tablet, Rfl: 3    metOLazone (ZAROXOLYN) 2.5 MG tablet, Take 1 tablet (2.5 mg total) by mouth once daily., Disp: 30 tablet, Rfl: 11    metoprolol succinate (TOPROL-XL) 25 MG 24 hr tablet, TAKE 1 TABLET(25 MG) BY MOUTH EVERY DAY (Patient taking differently: Take 25 mg by mouth once daily.), Disp: 90 tablet, Rfl: 3    nystatin (MYCOSTATIN) cream, Apply topically 2 (two) times daily., Disp: 15 g, Rfl: 1    pregabalin (LYRICA) 150 MG capsule, TAKE ONE CAPSULE BY MOUTH TWICE DAILY (Patient taking differently: Take 150 mg by mouth 2 (two) times daily.), Disp: 60 capsule, Rfl: 5    acetaminophen (TYLENOL) 650 MG TbSR, Take 1 tablet (650 mg total) by mouth every 8 (eight) hours. (Patient not taking: Reported on 9/12/2023), Disp: , Rfl: 0    nitroGLYCERIN (NITROSTAT) 0.4 MG SL tablet, Place 1 tablet (0.4 mg total) under the tongue every 5 (five) minutes as needed for Chest pain. (Patient not taking: Reported on 9/12/2023), Disp: 25 tablet, Rfl: 5    predniSONE (DELTASONE) 20 MG tablet, Take 1 tablet (20 mg total) by mouth once daily. (Patient not taking: Reported on 9/12/2023), Disp: 5 tablet, Rfl: 0      ROS 14 systems were reviewed, negative except above     Objective:   /61 (BP Location: Right arm, Patient Position: Sitting, BP Method: Large (Automatic))   Pulse 71   Ht 5' 3" (1.6 m)   Wt 53.3 kg (117 lb 8.1 oz)   LMP 01/01/1970   SpO2 (!) 92%   BMI 20.82 kg/m²   Physical Exam  Gen awake and alert  Neck supple  Heart RRR, II/VI holosystolic murmur best heard at the apex  Lungs crackles at " the left base  Abdomen soft non tender  Ext no edema   Labs:     Lab Results   Component Value Date     08/04/2023    K 3.4 (L) 08/04/2023     08/04/2023    CO2 23 08/04/2023    BUN 37 (H) 08/04/2023    CREATININE 1.7 (H) 08/04/2023    ANIONGAP 15 08/04/2023     Lab Results   Component Value Date    HGBA1C 6.2 (H) 04/12/2023     Lab Results   Component Value Date     (H) 08/04/2023     (H) 06/30/2023     (H) 05/14/2023       Lab Results   Component Value Date    WBC 6.58 07/31/2023    HGB 9.8 (L) 07/31/2023    HCT 31.0 (L) 07/31/2023     07/31/2023    GRAN 4.8 07/31/2023    GRAN 72.7 07/31/2023     Lab Results   Component Value Date    CHOL 154 04/12/2023    HDL 29 (L) 04/12/2023    LDLCALC 101.2 04/12/2023    TRIG 119 04/12/2023       Lab Results   Component Value Date     08/04/2023    K 3.4 (L) 08/04/2023     08/04/2023    CO2 23 08/04/2023    BUN 37 (H) 08/04/2023    CREATININE 1.7 (H) 08/04/2023    ANIONGAP 15 08/04/2023     Lab Results   Component Value Date    HGBA1C 6.2 (H) 04/12/2023     Lab Results   Component Value Date     (H) 08/04/2023     (H) 06/30/2023     (H) 05/14/2023    Lab Results   Component Value Date    WBC 6.58 07/31/2023    HGB 9.8 (L) 07/31/2023    HCT 31.0 (L) 07/31/2023     07/31/2023    GRAN 4.8 07/31/2023    GRAN 72.7 07/31/2023     Lab Results   Component Value Date    CHOL 154 04/12/2023    HDL 29 (L) 04/12/2023    LDLCALC 101.2 04/12/2023    TRIG 119 04/12/2023                Cardiovascular Imaging:       Echo:   EF   Date Value Ref Range Status   07/07/2023 60 % Final   05/15/2023 55 % Final   04/14/2021 55 % Final         Left Ventricle: The left ventricle is normal in size. Normal wall thickness. Normal wall motion. There is normal systolic function with a visually estimated ejection fraction of 55 - 60%. Biplane (2D) method of discs ejection fraction is 58%. Global longitudinal strain is -20.0%.  There is normal diastolic function.    Right Ventricle: Normal right ventricular cavity size. Wall thickness is normal. Right ventricle wall motion  is normal. Systolic function is normal.    Aortic Valve: The aortic valve is a trileaflet valve. There is moderate aortic valve sclerosis. There is moderate annular calcification present. Mildly restricted motion. There is low flow mild stenosis. Aortic valve area by VTI is 1.51 cm². Aortic valve peak velocity is 1.84 m/s. Mean gradient is 9 mmHg. The dimensionless index is 0.45.    Mitral Valve: There is moderate annular dilation present. There is severe mitral annular calcification present. There is moderate to severe regurgitation eccentric posteriorly directed. posterior leaflet is restricted causing mild over riding anterior leaflet and MR, Estimated Rvol is 48 ml.    Pulmonary Artery: The estimated pulmonary artery systolic pressure is 70 mmHg.    IVC/SVC: Normal venous pressure at 3 mmHg.       Assessment & Plan:   Severe mitral regurgitation  Severe MR  Etiology probably due to restricted calcified posterior leaflet   Needs PORTIA to better assess the Mitral Valve and see if candidate for a Mitral Clip   Would do a PET stress to assess for ischemic disease   Also would refer to cardiothoracic surgery to assess candidacy to mitral repair or mitral replacement   Continue Plavix 75 mg daily            Signed:  Christiano Diego MD  Interventional fellow     I have seen the patient, reviewed the Fellow's history and physical, assessment and plan. I have personally interviewed and examined the patient and agree with the findings.     DAPHNIE More MD

## 2023-09-12 NOTE — ASSESSMENT & PLAN NOTE
Severe MR  Etiology probably due to restricted calcified posterior leaflet   Needs PORTIA to better assess the Mitral Valve and see if candidate for a Mitral Clip   Would do a PET stress to assess for ischemic disease   Also would refer to cardiothoracic surgery to assess candidacy to mitral repair or mitral replacement   Continue Plavix 75 mg daily

## 2023-09-14 ENCOUNTER — LAB VISIT (OUTPATIENT)
Dept: LAB | Facility: HOSPITAL | Age: 78
End: 2023-09-14
Attending: FAMILY MEDICINE
Payer: MEDICARE

## 2023-09-14 DIAGNOSIS — N18.32 TYPE 2 DIABETES MELLITUS WITH STAGE 3B CHRONIC KIDNEY DISEASE, WITHOUT LONG-TERM CURRENT USE OF INSULIN: Chronic | ICD-10-CM

## 2023-09-14 DIAGNOSIS — E11.22 TYPE 2 DIABETES MELLITUS WITH STAGE 3B CHRONIC KIDNEY DISEASE, WITHOUT LONG-TERM CURRENT USE OF INSULIN: Chronic | ICD-10-CM

## 2023-09-14 DIAGNOSIS — I10 ESSENTIAL HYPERTENSION: Chronic | ICD-10-CM

## 2023-09-14 LAB
ALBUMIN SERPL BCP-MCNC: 3.8 G/DL (ref 3.5–5.2)
ALP SERPL-CCNC: 87 U/L (ref 55–135)
ALT SERPL W/O P-5'-P-CCNC: 7 U/L (ref 10–44)
ANION GAP SERPL CALC-SCNC: 16 MMOL/L (ref 8–16)
AST SERPL-CCNC: 18 U/L (ref 10–40)
BILIRUB SERPL-MCNC: 0.4 MG/DL (ref 0.1–1)
BUN SERPL-MCNC: 35 MG/DL (ref 8–23)
CALCIUM SERPL-MCNC: 9.5 MG/DL (ref 8.7–10.5)
CHLORIDE SERPL-SCNC: 98 MMOL/L (ref 95–110)
CO2 SERPL-SCNC: 26 MMOL/L (ref 23–29)
CREAT SERPL-MCNC: 1.8 MG/DL (ref 0.5–1.4)
EST. GFR  (NO RACE VARIABLE): 28.5 ML/MIN/1.73 M^2
ESTIMATED AVG GLUCOSE: 123 MG/DL (ref 68–131)
GLUCOSE SERPL-MCNC: 116 MG/DL (ref 70–110)
HBA1C MFR BLD: 5.9 % (ref 4–5.6)
POTASSIUM SERPL-SCNC: 3.3 MMOL/L (ref 3.5–5.1)
PROT SERPL-MCNC: 7.4 G/DL (ref 6–8.4)
SODIUM SERPL-SCNC: 140 MMOL/L (ref 136–145)

## 2023-09-14 PROCEDURE — 83036 HEMOGLOBIN GLYCOSYLATED A1C: CPT | Mod: HCNC | Performed by: FAMILY MEDICINE

## 2023-09-14 PROCEDURE — 80053 COMPREHEN METABOLIC PANEL: CPT | Mod: HCNC | Performed by: FAMILY MEDICINE

## 2023-09-14 PROCEDURE — 36415 COLL VENOUS BLD VENIPUNCTURE: CPT | Mod: HCNC,PO | Performed by: FAMILY MEDICINE

## 2023-09-20 DIAGNOSIS — Z78.0 MENOPAUSE: ICD-10-CM

## 2023-09-21 ENCOUNTER — OFFICE VISIT (OUTPATIENT)
Dept: FAMILY MEDICINE | Facility: CLINIC | Age: 78
End: 2023-09-21
Payer: MEDICARE

## 2023-09-21 VITALS
BODY MASS INDEX: 21.09 KG/M2 | DIASTOLIC BLOOD PRESSURE: 68 MMHG | RESPIRATION RATE: 16 BRPM | WEIGHT: 119.06 LBS | SYSTOLIC BLOOD PRESSURE: 108 MMHG | HEART RATE: 88 BPM

## 2023-09-21 DIAGNOSIS — E78.00 HIGH CHOLESTEROL: ICD-10-CM

## 2023-09-21 DIAGNOSIS — E11.22 TYPE 2 DIABETES MELLITUS WITH STAGE 3B CHRONIC KIDNEY DISEASE, WITHOUT LONG-TERM CURRENT USE OF INSULIN: Primary | Chronic | ICD-10-CM

## 2023-09-21 DIAGNOSIS — Z11.59 NEED FOR HEPATITIS C SCREENING TEST: ICD-10-CM

## 2023-09-21 DIAGNOSIS — I10 ESSENTIAL HYPERTENSION: Chronic | ICD-10-CM

## 2023-09-21 DIAGNOSIS — M54.16 LUMBAR RADICULOPATHY: Chronic | ICD-10-CM

## 2023-09-21 DIAGNOSIS — G47.00 INSOMNIA, UNSPECIFIED TYPE: ICD-10-CM

## 2023-09-21 DIAGNOSIS — I48.0 PAROXYSMAL ATRIAL FIBRILLATION: Chronic | ICD-10-CM

## 2023-09-21 DIAGNOSIS — M20.40 HAMMER TOE, UNSPECIFIED LATERALITY: ICD-10-CM

## 2023-09-21 DIAGNOSIS — I25.10 CORONARY ARTERY DISEASE INVOLVING NATIVE CORONARY ARTERY OF NATIVE HEART WITHOUT ANGINA PECTORIS: Chronic | ICD-10-CM

## 2023-09-21 DIAGNOSIS — N18.32 TYPE 2 DIABETES MELLITUS WITH STAGE 3B CHRONIC KIDNEY DISEASE, WITHOUT LONG-TERM CURRENT USE OF INSULIN: Primary | Chronic | ICD-10-CM

## 2023-09-21 DIAGNOSIS — F41.1 GAD (GENERALIZED ANXIETY DISORDER): ICD-10-CM

## 2023-09-21 DIAGNOSIS — F32.A MILD DEPRESSION: ICD-10-CM

## 2023-09-21 DIAGNOSIS — F32.0 MAJOR DEPRESSIVE DISORDER, SINGLE EPISODE, MILD: ICD-10-CM

## 2023-09-21 PROBLEM — Z71.89 ADVANCE CARE PLANNING: Status: RESOLVED | Noted: 2023-07-06 | Resolved: 2023-09-21

## 2023-09-21 PROCEDURE — 3078F PR MOST RECENT DIASTOLIC BLOOD PRESSURE < 80 MM HG: ICD-10-PCS | Mod: CPTII,S$GLB,, | Performed by: FAMILY MEDICINE

## 2023-09-21 PROCEDURE — 3074F PR MOST RECENT SYSTOLIC BLOOD PRESSURE < 130 MM HG: ICD-10-PCS | Mod: CPTII,S$GLB,, | Performed by: FAMILY MEDICINE

## 2023-09-21 PROCEDURE — 99999 PR PBB SHADOW E&M-EST. PATIENT-LVL IV: CPT | Mod: PBBFAC,,, | Performed by: FAMILY MEDICINE

## 2023-09-21 PROCEDURE — 90694 VACC AIIV4 NO PRSRV 0.5ML IM: CPT | Mod: S$GLB,,, | Performed by: FAMILY MEDICINE

## 2023-09-21 PROCEDURE — 3074F SYST BP LT 130 MM HG: CPT | Mod: CPTII,S$GLB,, | Performed by: FAMILY MEDICINE

## 2023-09-21 PROCEDURE — 3078F DIAST BP <80 MM HG: CPT | Mod: CPTII,S$GLB,, | Performed by: FAMILY MEDICINE

## 2023-09-21 PROCEDURE — 3288F FALL RISK ASSESSMENT DOCD: CPT | Mod: CPTII,S$GLB,, | Performed by: FAMILY MEDICINE

## 2023-09-21 PROCEDURE — 99999 PR PBB SHADOW E&M-EST. PATIENT-LVL IV: ICD-10-PCS | Mod: PBBFAC,,, | Performed by: FAMILY MEDICINE

## 2023-09-21 PROCEDURE — 99214 OFFICE O/P EST MOD 30 MIN: CPT | Mod: S$GLB,,, | Performed by: FAMILY MEDICINE

## 2023-09-21 PROCEDURE — 1160F PR REVIEW ALL MEDS BY PRESCRIBER/CLIN PHARMACIST DOCUMENTED: ICD-10-PCS | Mod: CPTII,S$GLB,, | Performed by: FAMILY MEDICINE

## 2023-09-21 PROCEDURE — 1126F PR PAIN SEVERITY QUANTIFIED, NO PAIN PRESENT: ICD-10-PCS | Mod: CPTII,S$GLB,, | Performed by: FAMILY MEDICINE

## 2023-09-21 PROCEDURE — 90694 FLU VACCINE - QUADRIVALENT - ADJUVANTED: ICD-10-PCS | Mod: S$GLB,,, | Performed by: FAMILY MEDICINE

## 2023-09-21 PROCEDURE — 1101F PR PT FALLS ASSESS DOC 0-1 FALLS W/OUT INJ PAST YR: ICD-10-PCS | Mod: CPTII,S$GLB,, | Performed by: FAMILY MEDICINE

## 2023-09-21 PROCEDURE — 1160F RVW MEDS BY RX/DR IN RCRD: CPT | Mod: CPTII,S$GLB,, | Performed by: FAMILY MEDICINE

## 2023-09-21 PROCEDURE — 1159F PR MEDICATION LIST DOCUMENTED IN MEDICAL RECORD: ICD-10-PCS | Mod: CPTII,S$GLB,, | Performed by: FAMILY MEDICINE

## 2023-09-21 PROCEDURE — 1126F AMNT PAIN NOTED NONE PRSNT: CPT | Mod: CPTII,S$GLB,, | Performed by: FAMILY MEDICINE

## 2023-09-21 PROCEDURE — 1159F MED LIST DOCD IN RCRD: CPT | Mod: CPTII,S$GLB,, | Performed by: FAMILY MEDICINE

## 2023-09-21 PROCEDURE — G0008 ADMIN INFLUENZA VIRUS VAC: HCPCS | Mod: S$GLB,,, | Performed by: FAMILY MEDICINE

## 2023-09-21 PROCEDURE — 3288F PR FALLS RISK ASSESSMENT DOCUMENTED: ICD-10-PCS | Mod: CPTII,S$GLB,, | Performed by: FAMILY MEDICINE

## 2023-09-21 PROCEDURE — 99214 PR OFFICE/OUTPT VISIT, EST, LEVL IV, 30-39 MIN: ICD-10-PCS | Mod: S$GLB,,, | Performed by: FAMILY MEDICINE

## 2023-09-21 PROCEDURE — G0008 FLU VACCINE - QUADRIVALENT - ADJUVANTED: ICD-10-PCS | Mod: S$GLB,,, | Performed by: FAMILY MEDICINE

## 2023-09-21 PROCEDURE — 1101F PT FALLS ASSESS-DOCD LE1/YR: CPT | Mod: CPTII,S$GLB,, | Performed by: FAMILY MEDICINE

## 2023-09-21 RX ORDER — BUSPIRONE HYDROCHLORIDE 10 MG/1
10 TABLET ORAL 2 TIMES DAILY
Qty: 180 TABLET | Refills: 3 | Status: ON HOLD | OUTPATIENT
Start: 2023-09-21 | End: 2023-10-04 | Stop reason: HOSPADM

## 2023-09-21 RX ORDER — AMITRIPTYLINE HYDROCHLORIDE 25 MG/1
12.5 TABLET, FILM COATED ORAL NIGHTLY
Qty: 45 TABLET | Refills: 3 | Status: ON HOLD | OUTPATIENT
Start: 2023-09-21 | End: 2023-10-03

## 2023-09-21 RX ORDER — HYDROCODONE BITARTRATE AND ACETAMINOPHEN 5; 325 MG/1; MG/1
1 TABLET ORAL EVERY 6 HOURS PRN
Qty: 60 TABLET | Refills: 0 | Status: SHIPPED | OUTPATIENT
Start: 2023-09-21 | End: 2023-10-16

## 2023-09-21 RX ORDER — PREGABALIN 150 MG/1
150 CAPSULE ORAL 2 TIMES DAILY
Qty: 180 CAPSULE | Refills: 1 | Status: ON HOLD | OUTPATIENT
Start: 2023-09-21 | End: 2023-10-04 | Stop reason: SDUPTHER

## 2023-09-21 RX ORDER — ESCITALOPRAM OXALATE 20 MG/1
20 TABLET ORAL DAILY
Qty: 90 TABLET | Refills: 3 | Status: SHIPPED | OUTPATIENT
Start: 2023-09-21 | End: 2024-03-28 | Stop reason: SDUPTHER

## 2023-09-21 RX ORDER — ATORVASTATIN CALCIUM 80 MG/1
80 TABLET, FILM COATED ORAL DAILY
Qty: 90 TABLET | Refills: 3 | Status: SHIPPED | OUTPATIENT
Start: 2023-09-21

## 2023-09-21 RX ORDER — METFORMIN HYDROCHLORIDE 500 MG/1
500 TABLET, EXTENDED RELEASE ORAL DAILY
Qty: 90 TABLET | Refills: 3 | Status: ON HOLD | OUTPATIENT
Start: 2023-09-21 | End: 2023-10-04 | Stop reason: HOSPADM

## 2023-09-21 RX ORDER — METOPROLOL SUCCINATE 25 MG/1
25 TABLET, EXTENDED RELEASE ORAL DAILY
Qty: 90 TABLET | Refills: 3 | Status: ON HOLD | OUTPATIENT
Start: 2023-09-21 | End: 2023-11-09 | Stop reason: HOSPADM

## 2023-09-21 NOTE — PROGRESS NOTES
Subjective:       Patient ID: Trudy R Dakin is a 78 y.o. female.    Chief Complaint: Follow-up    Here today to follow up on chronic medical conditions.     Type II DM: She is on Metformin 500 mg 2 pills BID. HgA1c of 5.9 in 9/2023  HTN:  She is on Toprol and Spironolactone.  Her BP is controlled  A.Fib/CAD/HF:  She is on Beta Blocker and plavix.  Also on Statin.  She is on Bumex.  Saw Cardiology in May  HLD:  she is on Lipitor.  Cholesterol to goal in April 2023  Chronic Pain:  She has been on Norco (PRN) which was prescribed by her old PCP.  Last filled in May 2023 #60.  She is on Lyrica.   Insomnia:  She is on elavil 25 mg 1/2 tab at night to help her sleep  ELI/MDD:  She has been on Lexapro since the passing of her .  Overall she feels like the medication is working well for her.  Mood is a lot better. She has xanax which she has been taking once a day and is not sure why.  Xanax last filled in March       Follow-up  Pertinent negatives include no abdominal pain, chest pain, coughing, fatigue, fever, headaches, nausea or rash.     Review of Systems   Constitutional:  Negative for activity change, appetite change, fatigue and fever.   Respiratory:  Negative for cough, shortness of breath and wheezing.    Cardiovascular:  Negative for chest pain and palpitations.   Gastrointestinal:  Negative for abdominal pain, constipation, diarrhea and nausea.   Skin:  Negative for pallor and rash.   Neurological:  Negative for dizziness, syncope, light-headedness and headaches.   Psychiatric/Behavioral:  The patient is not nervous/anxious.        Objective:      Vitals:    09/21/23 0827   BP: 108/68   Pulse: 88   Resp: 16   Weight: 54 kg (119 lb 0.8 oz)      Physical Exam  Constitutional:       General: She is not in acute distress.  Cardiovascular:      Rate and Rhythm: Normal rate and regular rhythm.      Heart sounds: Murmur heard.   Pulmonary:      Effort: Pulmonary effort is normal. No respiratory distress.       Breath sounds: Normal breath sounds. No wheezing, rhonchi or rales.   Musculoskeletal:      Comments: Hammer toe B   Skin:     General: Skin is warm and dry.   Neurological:      General: No focal deficit present.      Mental Status: She is alert.   Psychiatric:         Mood and Affect: Mood normal.         Behavior: Behavior normal.         Thought Content: Thought content normal.         Results for orders placed or performed in visit on 09/14/23   Comprehensive Metabolic Panel   Result Value Ref Range    Sodium 140 136 - 145 mmol/L    Potassium 3.3 (L) 3.5 - 5.1 mmol/L    Chloride 98 95 - 110 mmol/L    CO2 26 23 - 29 mmol/L    Glucose 116 (H) 70 - 110 mg/dL    BUN 35 (H) 8 - 23 mg/dL    Creatinine 1.8 (H) 0.5 - 1.4 mg/dL    Calcium 9.5 8.7 - 10.5 mg/dL    Total Protein 7.4 6.0 - 8.4 g/dL    Albumin 3.8 3.5 - 5.2 g/dL    Total Bilirubin 0.4 0.1 - 1.0 mg/dL    Alkaline Phosphatase 87 55 - 135 U/L    AST 18 10 - 40 U/L    ALT 7 (L) 10 - 44 U/L    eGFR 28.5 (A) >60 mL/min/1.73 m^2    Anion Gap 16 8 - 16 mmol/L   Hemoglobin A1C   Result Value Ref Range    Hemoglobin A1C 5.9 (H) 4.0 - 5.6 %    Estimated Avg Glucose 123 68 - 131 mg/dL      Assessment:       1. Type 2 diabetes mellitus with stage 3b chronic kidney disease, without long-term current use of insulin    2. Essential hypertension    3. Major depressive disorder, single episode, mild    4. CAD (coronary artery disease)    5. Paroxysmal atrial fibrillation    6. High cholesterol    7. Insomnia, unspecified type    8. ELI (generalized anxiety disorder)    9. Depression    10. Lumbar radiculopathy    11. Hammer toe, unspecified laterality        Plan:       Type 2 diabetes mellitus with stage 3b chronic kidney disease, without long-term current use of insulin  -     metFORMIN (GLUCOPHAGE-XR) 500 MG ER 24hr tablet; Take 1 tablet (500 mg total) by mouth once daily.  Dispense: 90 tablet; Refill: 3  -     Ambulatory referral/consult to Podiatry; Future; Expected  date: 09/28/2023  -     Ambulatory referral/consult to Optometry; Future; Expected date: 09/28/2023  Diabetes is very well controlled, will try her on a lower dose of Metformin.  Essential hypertension  -     metoprolol succinate (TOPROL-XL) 25 MG 24 hr tablet; Take 1 tablet (25 mg total) by mouth once daily.  Dispense: 90 tablet; Refill: 3  Continue current BP medication    CAD (coronary artery disease) /Paroxysmal atrial fibrillation  Stable.  Continue current medication per Cardiology  High cholesterol  -     atorvastatin (LIPITOR) 80 MG tablet; Take 1 tablet (80 mg total) by mouth once daily.  Dispense: 90 tablet; Refill: 3  Continue Liptior  Insomnia, unspecified type  -     amitriptyline (ELAVIL) 25 MG tablet; Take 0.5 tablets (12.5 mg total) by mouth every evening.  Dispense: 45 tablet; Refill: 3    ELI (generalized anxiety disorder)  -     busPIRone (BUSPAR) 10 MG tablet; Take 1 tablet (10 mg total) by mouth 2 (two) times daily.  Dispense: 180 tablet; Refill: 3    Depression  -     EScitalopram oxalate (LEXAPRO) 20 MG tablet; Take 1 tablet (20 mg total) by mouth once daily.  Dispense: 90 tablet; Refill: 3  Continue Lexapro  Lumbar radiculopathy  -     pregabalin (LYRICA) 150 MG capsule; Take 1 capsule (150 mg total) by mouth 2 (two) times daily.  Dispense: 180 capsule; Refill: 1  -     HYDROcodone-acetaminophen (NORCO) 5-325 mg per tablet; Take 1 tablet by mouth every 6 (six) hours as needed for Pain.  Dispense: 60 tablet; Refill: 0  Continue daily Lyrica with PRN Norco.  Hammer toe, unspecified laterality  -     Ambulatory referral/consult to Podiatry; Future; Expected date: 09/28/2023          Medication List with Changes/Refills   Current Medications    ACETAMINOPHEN (TYLENOL) 650 MG TBSR    Take 1 tablet (650 mg total) by mouth every 8 (eight) hours.    BUMETANIDE (BUMEX) 2 MG TABLET    Take 1 tablet (2 mg total) by mouth 2 (two) times daily.    CYANOCOBALAMIN 1,000 MCG/ML INJECTION    INJECT 1 ML INTO  THE MUSCLE EVERY 14 DAYS    HYDROXYZINE HCL (ATARAX) 25 MG TABLET    Take 1 tablet (25 mg total) by mouth 2 (two) times daily as needed for Anxiety.    METOLAZONE (ZAROXOLYN) 2.5 MG TABLET    Take 1 tablet (2.5 mg total) by mouth once daily.    NITROGLYCERIN (NITROSTAT) 0.4 MG SL TABLET    Place 1 tablet (0.4 mg total) under the tongue every 5 (five) minutes as needed for Chest pain.    NYSTATIN (MYCOSTATIN) CREAM    Apply topically 2 (two) times daily.   Changed and/or Refilled Medications    Modified Medication Previous Medication    AMITRIPTYLINE (ELAVIL) 25 MG TABLET amitriptyline (ELAVIL) 25 MG tablet       Take 0.5 tablets (12.5 mg total) by mouth every evening.    TAKE 1 TABLET(25 MG) BY MOUTH EVERY EVENING    ATORVASTATIN (LIPITOR) 80 MG TABLET atorvastatin (LIPITOR) 80 MG tablet       Take 1 tablet (80 mg total) by mouth once daily.    Take 1 tablet (80 mg total) by mouth once daily.    BUSPIRONE (BUSPAR) 10 MG TABLET busPIRone (BUSPAR) 10 MG tablet       Take 1 tablet (10 mg total) by mouth 2 (two) times daily.    Take 1 tablet (10 mg total) by mouth 2 (two) times daily.    ESCITALOPRAM OXALATE (LEXAPRO) 20 MG TABLET EScitalopram oxalate (LEXAPRO) 20 MG tablet       Take 1 tablet (20 mg total) by mouth once daily.    Take 1 tablet (20 mg total) by mouth once daily.    HYDROCODONE-ACETAMINOPHEN (NORCO) 5-325 MG PER TABLET HYDROcodone-acetaminophen (NORCO) 5-325 mg per tablet       Take 1 tablet by mouth every 6 (six) hours as needed for Pain.    Take 1 tablet by mouth every 6 (six) hours as needed for Pain.    METFORMIN (GLUCOPHAGE-XR) 500 MG ER 24HR TABLET metFORMIN (GLUCOPHAGE-XR) 500 MG ER 24hr tablet       Take 1 tablet (500 mg total) by mouth once daily.    TAKE 2 TABLETS BY MOUTH TWICE DAILY    METOPROLOL SUCCINATE (TOPROL-XL) 25 MG 24 HR TABLET metoprolol succinate (TOPROL-XL) 25 MG 24 hr tablet       Take 1 tablet (25 mg total) by mouth once daily.    TAKE 1 TABLET(25 MG) BY MOUTH EVERY DAY     PREGABALIN (LYRICA) 150 MG CAPSULE pregabalin (LYRICA) 150 MG capsule       Take 1 capsule (150 mg total) by mouth 2 (two) times daily.    TAKE ONE CAPSULE BY MOUTH TWICE DAILY   Discontinued Medications    CLOPIDOGREL (PLAVIX) 75 MG TABLET    Take 1 tablet (75 mg total) by mouth once daily.    PREDNISONE (DELTASONE) 20 MG TABLET    Take 1 tablet (20 mg total) by mouth once daily.

## 2023-10-02 ENCOUNTER — OFFICE VISIT (OUTPATIENT)
Dept: CARDIOLOGY | Facility: CLINIC | Age: 78
End: 2023-10-02
Payer: MEDICARE

## 2023-10-02 VITALS
HEIGHT: 63 IN | BODY MASS INDEX: 21.33 KG/M2 | HEART RATE: 66 BPM | DIASTOLIC BLOOD PRESSURE: 62 MMHG | SYSTOLIC BLOOD PRESSURE: 117 MMHG | WEIGHT: 120.38 LBS

## 2023-10-02 DIAGNOSIS — I34.0 SEVERE MITRAL REGURGITATION: ICD-10-CM

## 2023-10-02 DIAGNOSIS — I25.10 CORONARY ARTERY DISEASE INVOLVING NATIVE CORONARY ARTERY OF NATIVE HEART WITHOUT ANGINA PECTORIS: Chronic | ICD-10-CM

## 2023-10-02 DIAGNOSIS — N18.32 TYPE 2 DIABETES MELLITUS WITH STAGE 3B CHRONIC KIDNEY DISEASE, WITHOUT LONG-TERM CURRENT USE OF INSULIN: Chronic | ICD-10-CM

## 2023-10-02 DIAGNOSIS — E78.00 HIGH CHOLESTEROL: ICD-10-CM

## 2023-10-02 DIAGNOSIS — I48.0 PAROXYSMAL ATRIAL FIBRILLATION: Chronic | ICD-10-CM

## 2023-10-02 DIAGNOSIS — I35.0 NONRHEUMATIC AORTIC VALVE STENOSIS: ICD-10-CM

## 2023-10-02 DIAGNOSIS — I10 ESSENTIAL HYPERTENSION: Primary | Chronic | ICD-10-CM

## 2023-10-02 DIAGNOSIS — E11.22 TYPE 2 DIABETES MELLITUS WITH STAGE 3B CHRONIC KIDNEY DISEASE, WITHOUT LONG-TERM CURRENT USE OF INSULIN: Chronic | ICD-10-CM

## 2023-10-02 DIAGNOSIS — I50.33 ACUTE ON CHRONIC DIASTOLIC HEART FAILURE: ICD-10-CM

## 2023-10-02 PROBLEM — F05 ACUTE CONFUSIONAL STATE: Status: ACTIVE | Noted: 2023-10-02

## 2023-10-02 PROBLEM — Z71.89 ACP (ADVANCE CARE PLANNING): Status: ACTIVE | Noted: 2023-10-02

## 2023-10-02 PROCEDURE — 1101F PR PT FALLS ASSESS DOC 0-1 FALLS W/OUT INJ PAST YR: ICD-10-PCS | Mod: CPTII,S$GLB,, | Performed by: INTERNAL MEDICINE

## 2023-10-02 PROCEDURE — 1126F PR PAIN SEVERITY QUANTIFIED, NO PAIN PRESENT: ICD-10-PCS | Mod: CPTII,S$GLB,, | Performed by: INTERNAL MEDICINE

## 2023-10-02 PROCEDURE — 3288F PR FALLS RISK ASSESSMENT DOCUMENTED: ICD-10-PCS | Mod: CPTII,S$GLB,, | Performed by: INTERNAL MEDICINE

## 2023-10-02 PROCEDURE — 1159F MED LIST DOCD IN RCRD: CPT | Mod: CPTII,S$GLB,, | Performed by: INTERNAL MEDICINE

## 2023-10-02 PROCEDURE — 3078F DIAST BP <80 MM HG: CPT | Mod: CPTII,S$GLB,, | Performed by: INTERNAL MEDICINE

## 2023-10-02 PROCEDURE — 3074F PR MOST RECENT SYSTOLIC BLOOD PRESSURE < 130 MM HG: ICD-10-PCS | Mod: CPTII,S$GLB,, | Performed by: INTERNAL MEDICINE

## 2023-10-02 PROCEDURE — 3288F FALL RISK ASSESSMENT DOCD: CPT | Mod: CPTII,S$GLB,, | Performed by: INTERNAL MEDICINE

## 2023-10-02 PROCEDURE — 1159F PR MEDICATION LIST DOCUMENTED IN MEDICAL RECORD: ICD-10-PCS | Mod: CPTII,S$GLB,, | Performed by: INTERNAL MEDICINE

## 2023-10-02 PROCEDURE — 3074F SYST BP LT 130 MM HG: CPT | Mod: CPTII,S$GLB,, | Performed by: INTERNAL MEDICINE

## 2023-10-02 PROCEDURE — 99999 PR PBB SHADOW E&M-EST. PATIENT-LVL III: ICD-10-PCS | Mod: PBBFAC,,, | Performed by: INTERNAL MEDICINE

## 2023-10-02 PROCEDURE — 1101F PT FALLS ASSESS-DOCD LE1/YR: CPT | Mod: CPTII,S$GLB,, | Performed by: INTERNAL MEDICINE

## 2023-10-02 PROCEDURE — 99214 PR OFFICE/OUTPT VISIT, EST, LEVL IV, 30-39 MIN: ICD-10-PCS | Mod: S$GLB,,, | Performed by: INTERNAL MEDICINE

## 2023-10-02 PROCEDURE — 99999 PR PBB SHADOW E&M-EST. PATIENT-LVL III: CPT | Mod: PBBFAC,,, | Performed by: INTERNAL MEDICINE

## 2023-10-02 PROCEDURE — 99214 OFFICE O/P EST MOD 30 MIN: CPT | Mod: S$GLB,,, | Performed by: INTERNAL MEDICINE

## 2023-10-02 PROCEDURE — 3078F PR MOST RECENT DIASTOLIC BLOOD PRESSURE < 80 MM HG: ICD-10-PCS | Mod: CPTII,S$GLB,, | Performed by: INTERNAL MEDICINE

## 2023-10-02 PROCEDURE — 1126F AMNT PAIN NOTED NONE PRSNT: CPT | Mod: CPTII,S$GLB,, | Performed by: INTERNAL MEDICINE

## 2023-10-02 RX ORDER — CLOPIDOGREL BISULFATE 75 MG/1
75 TABLET ORAL DAILY
COMMUNITY
End: 2023-10-23

## 2023-10-02 NOTE — PROGRESS NOTES
Subjective:    Patient ID:  Trudy R Dakin is a 78 y.o. female patient here for evaluation Follow-up      History of Present Illness:  Cardiology follow-up, valvular heart disease coronary disease.  PCI stent LAD, [facility].  remainder coronary vasculature normal.  Echo follow-up with mild /moderate AS, moderate severe MR.  Patient currently to undergo evaluation via PORTIA and cardiac PET scan.  Problems with ongoing dyspnea, recurrent congestive heart failure.   Patient under evaluation per intervention/structural heart Disease at [facility], surgical workup possible in progress.    Concomitant problems of PAF on Eliquis, chronic kidney disease.  GFR 28.5          Review of patient's allergies indicates:  No Known Allergies    Past Medical History:   Diagnosis Date    Diabetes     Diverticulitis     Hypertension      Past Surgical History:   Procedure Laterality Date    ABDOMINAL SURGERY  2006    diverticulitis x3    ANTERIOR CERVICAL DISCECTOMY W/ FUSION N/A 8/30/2018    FUSION C6-7 Surgeon: Rickey Martin MD    APPENDECTOMY      CARPAL TUNNEL RELEASE      COLECTOMY  07/2020    EPIDURAL STEROID INJECTION INTO LUMBAR SPINE N/A 1/20/2021    Procedure: Injection-steroid-epidural-lumbar--L3-4;  Surgeon: Colleen Carvajal MD;  Location: [facility] PAIN MGT;  Service: Pain Management;  Laterality: N/A;    HYSTERECTOMY  1970    @25yrs of age    LARYNGOSCOPY N/A 1/4/2022    Procedure: Suspension microlaryngoscopy with left vocal fold injection augmentation - Restylane L;  Surgeon: Yuan Mir MD;  Location: [address];  Service: ENT;  Laterality: N/A;  Microscope, telescopes, tower, injector, Restylane-L    MANDIBLE FRACTURE SURGERY  1970    MICRODISCECTOMY OF SPINE Left 4/13/2021    Procedure: MICRODISCECTOMY, SPINE Left L3-4 far lateral Microdiscectomy;  Surgeon: Rickey Martin MD;  Location: [facility] OR;  Service: Neurosurgery;  Laterality: Left;  Procedure: Left L3-4 far lateral Microdiscectomy    Surgery Time: 1.5 hrs  LOS: 0-1  Anesthesia: General  Radiology: C-arm  SNS: EMG, SEP  Bed: [place] [address] Poster  Position: Melissa Demarco w/ Globus confirmed 4/12/21 [place]    OOPHORECTOMY  1970    ROTATOR CUFF REPAIR Left 11/2016    TOTAL REDUCTION MAMMOPLASTY Bilateral 1990    TRANSFORAMINAL EPIDURAL INJECTION OF STEROID Left 12/23/2020    Procedure: Injection,steroid,epidural,transforaminal approach--Left L4 and L5;  Surgeon: Colleen Carvajal MD;  Location: [facility];  Service: Pain Management;  Laterality: Left;     Social History     Tobacco Use    Smoking status: Never    Smokeless tobacco: Never   Substance Use Topics    Alcohol use: No    Drug use: No        Review of Systems:    As noted in HPI in addition      REVIEW OF SYSTEMS  Review of Systems   Constitutional: Negative for decreased appetite, diaphoresis, night sweats, weight gain and weight loss.   HENT:  Negative for nosebleeds and odynophagia.    Eyes:  Negative for double vision and photophobia.   Cardiovascular:  Positive for dyspnea on exertion. Negative for chest pain, claudication, cyanosis, irregular heartbeat, leg swelling, near-syncope, orthopnea, palpitations, paroxysmal nocturnal dyspnea and syncope.   Respiratory:  Positive for shortness of breath. Negative for cough, hemoptysis and wheezing.    Hematologic/Lymphatic: Negative for adenopathy.   Skin:  Negative for flushing, skin cancer and suspicious lesions.   Musculoskeletal:  Negative for gout, myalgias and neck pain.   Gastrointestinal:  Negative for abdominal pain, heartburn, hematemesis and hematochezia.   Genitourinary:  Negative for bladder incontinence, hesitancy and nocturia.   Neurological:  Negative for focal weakness, headaches, light-headedness and paresthesias.   Psychiatric/Behavioral:  Negative for memory loss and substance abuse.               Objective:        Vitals:    10/02/23 1027   BP: 117/62   Pulse: 66       Lab Results   Component Value Date    WBC 6.58  07/31/2023    HGB 9.8 (L) 07/31/2023    HCT 31.0 (L) 07/31/2023     07/31/2023    CHOL 154 04/12/2023    TRIG 119 04/12/2023    HDL 29 (L) 04/12/2023    ALT 7 (L) 09/14/2023    AST 18 09/14/2023     09/14/2023    K 3.3 (L) 09/14/2023    CL 98 09/14/2023    CREATININE 1.8 (H) 09/14/2023    BUN 35 (H) 09/14/2023    CO2 26 09/14/2023    TSH 1.28 02/04/2021    INR 1.0 05/14/2023    HGBA1C 5.9 (H) 09/14/2023    MICROALBUR 1.6 09/13/2022        ECHOCARDIOGRAM RESULTS  Results for orders placed during the hospital encounter of 09/12/23    Echo Saline Bubble? No    Interpretation Summary    Left Ventricle: The left ventricle is normal in size. Normal wall thickness. Normal wall motion. There is normal systolic function with a visually estimated ejection fraction of 55 - 60%. Biplane (2D) method of discs ejection fraction is 58%. Global longitudinal strain is -20.0%. There is normal diastolic function.    Right Ventricle: Normal right ventricular cavity size. Wall thickness is normal. Right ventricle wall motion  is normal. Systolic function is normal.    Aortic Valve: The aortic valve is a trileaflet valve. There is moderate aortic valve sclerosis. There is moderate annular calcification present. Mildly restricted motion. There is low flow mild stenosis. Aortic valve area by VTI is 1.51 cm². Aortic valve peak velocity is 1.84 m/s. Mean gradient is 9 mmHg. The dimensionless index is 0.45.    Mitral Valve: There is moderate annular dilation present. There is severe mitral annular calcification present. There is moderate to severe regurgitation eccentric posteriorly directed. posterior leaflet is restricted causing mild over riding anterior leaflet and MR, Estimated Rvol is 48 ml.    Pulmonary Artery: The estimated pulmonary artery systolic pressure is 70 mmHg.    IVC/SVC: Normal venous pressure at 3 mmHg.        CURRENT/PREVIOUS VISIT EKG  Results for orders placed or performed during the hospital encounter of  07/31/23   EKG 12-lead    Collection Time: 07/31/23  6:21 AM    Narrative    Test Reason : R06.02,    Vent. Rate : 078 BPM     Atrial Rate : 078 BPM     P-R Int : 182 ms          QRS Dur : 098 ms      QT Int : 432 ms       P-R-T Axes : 028 137 098 degrees     QTc Int : 492 ms    Normal sinus rhythm  Incomplete right bundle branch block  Left posterior fascicular block  Nonspecific T wave abnormality  Abnormal ECG  When compared with ECG of 05-JUL-2023 16:42,  Premature atrial complexes are no longer Present  Incomplete right bundle branch block is now Present  Confirmed by Brian GUERRA, El ELLER ([id number]) on 7/31/2023 5:03:41 PM    Referred By: [email or web address]   SELF           Confirmed By:El Torres MD     No valid procedures specified.   No results found for this or any previous visit.    No valid procedures specified.    PHYSICAL EXAM  CONSTITUTIONAL:no apparent distress  NECK: no carotid bruit, no JVD  LUNGS: CTA  CHEST WALL: no tenderness,  HEART: regular rate , decreased S1-S2.  Grade 2/6 crescendo decrescendo murmur aortic area.  Grade 2/6 crescendo murmur systolic apex.  Displaced PMI.  ABDOMEN: soft, non-tender; bowel sounds normal; no masses,  no organomegaly  EXTREMITIES: Extremities normal, no edema, no calf tenderness noted  NEURO: AAO X 3    I HAVE REVIEWED :    The vital signs, nurses notes, and all the pertinent radiology and labs.         Current Outpatient Medications   Medication Instructions    acetaminophen (TYLENOL) 650 mg, Oral, Every 8 hours    amitriptyline (ELAVIL) 12.5 mg, Oral, Nightly    atorvastatin (LIPITOR) 80 mg, Oral, Daily    bumetanide (BUMEX) 2 mg, Oral, 2 times daily    busPIRone (BUSPAR) 10 mg, Oral, 2 times daily    cyanocobalamin 1,000 mcg/mL injection INJECT 1 ML INTO THE MUSCLE EVERY 14 DAYS    EScitalopram oxalate (LEXAPRO) 20 mg, Oral, Daily    HYDROcodone-acetaminophen (NORCO) 5-325 mg per tablet 1 tablet, Oral, Every 6 hours PRN    hydrOXYzine HCL (ATARAX) 25 mg, Oral, 2 times  daily PRN    metFORMIN (GLUCOPHAGE-XR) 500 mg, Oral, Daily    metOLazone (ZAROXOLYN) 2.5 mg, Oral, Daily    metoprolol succinate (TOPROL-XL) 25 mg, Oral, Daily    nitroGLYCERIN (NITROSTAT) 0.4 mg, Sublingual, Every 5 min PRN    nystatin (MYCOSTATIN) cream Topical (Top), 2 times daily    pregabalin (LYRICA) 150 mg, Oral, 2 times daily          Assessment:   CAD.  PCI stent LAD [facility], no other high-grade disease noted.  Valvular heart disease with moderate severe AI MR.  Evaluation progress via PORTIA in follow-up PET scan at [facility] to determine further treatment of valvular heart issues as a cause for progressive shortness of breath, CHF.    PAF, no oral anticoagulation due to fall risk.      Chronic kidney disease.  Stage IIIB.  GFR 28.5    Hypertension    Plan:   Workup as per [facility], follow-up PORTIA, cardiac PET, CTS surgical evaluation.  Labs follow-up primary care.  Obtain records from [facility], call pharmacy to review meds, not sure why patient is not on dual antiplatelet therapy.        No follow-ups on file.

## 2023-10-03 ENCOUNTER — ANESTHESIA EVENT (OUTPATIENT)
Dept: MEDSURG UNIT | Facility: HOSPITAL | Age: 78
End: 2023-10-03
Payer: MEDICARE

## 2023-10-04 ENCOUNTER — TELEPHONE (OUTPATIENT)
Dept: NEUROLOGY | Facility: CLINIC | Age: 78
End: 2023-10-04
Payer: MEDICARE

## 2023-10-04 ENCOUNTER — ANESTHESIA (OUTPATIENT)
Dept: MEDSURG UNIT | Facility: HOSPITAL | Age: 78
End: 2023-10-04
Payer: MEDICARE

## 2023-10-04 PROBLEM — E55.9 VITAMIN D DEFICIENCY: Status: ACTIVE | Noted: 2023-10-04

## 2023-10-04 PROBLEM — N18.32 STAGE 3B CHRONIC KIDNEY DISEASE: Status: ACTIVE | Noted: 2023-10-04

## 2023-10-04 PROBLEM — G92.9 ENCEPHALOPATHY, TOXIC: Status: ACTIVE | Noted: 2023-10-04

## 2023-10-04 PROBLEM — Z71.89 ACP (ADVANCE CARE PLANNING): Status: RESOLVED | Noted: 2023-10-02 | Resolved: 2023-10-04

## 2023-10-04 NOTE — TELEPHONE ENCOUNTER
Spoke to the pt's daughter, appt scheduled on 10/10/23 at 1400 with Rolnada Georges.  Date, time and location discussed.

## 2023-10-05 ENCOUNTER — TELEPHONE (OUTPATIENT)
Dept: CARDIOTHORACIC SURGERY | Facility: CLINIC | Age: 78
End: 2023-10-05
Payer: MEDICARE

## 2023-10-05 PROCEDURE — G0180 MD CERTIFICATION HHA PATIENT: HCPCS | Mod: ,,, | Performed by: FAMILY MEDICINE

## 2023-10-05 PROCEDURE — G0180 PR HOME HEALTH MD CERTIFICATION: ICD-10-PCS | Mod: ,,, | Performed by: FAMILY MEDICINE

## 2023-10-05 NOTE — TELEPHONE ENCOUNTER
Called pt and spoke with daughter in law, Stephanie. Updated her on new appointment time. No other questions at this time.    Julie Haase RN  CTS Nurse Navigator [phone]

## 2023-10-05 NOTE — TELEPHONE ENCOUNTER
----- Message from Di Deluna sent at 10/5/2023  8:51 AM CDT -----  Regarding: resched appt  Contact: @  [phone]  Pt daughter in law is calling to reschedule appointment on from 10/4...no available dates Pleaes call and adv @  [phone]

## 2023-10-09 ENCOUNTER — TELEPHONE (OUTPATIENT)
Dept: NEUROLOGY | Facility: CLINIC | Age: 78
End: 2023-10-09
Payer: MEDICARE

## 2023-10-09 ENCOUNTER — PATIENT MESSAGE (OUTPATIENT)
Dept: CARDIOLOGY | Facility: CLINIC | Age: 78
End: 2023-10-09
Payer: MEDICARE

## 2023-10-09 NOTE — TELEPHONE ENCOUNTER
Called patient's daughter in law to confirm appointment. Spoke with patient's daughter in law and confirmed appointment date and time. Clinic location was given. Patient's daughter in law v/u.

## 2023-10-10 ENCOUNTER — OFFICE VISIT (OUTPATIENT)
Dept: NEUROLOGY | Facility: CLINIC | Age: 78
End: 2023-10-10
Payer: MEDICARE

## 2023-10-10 ENCOUNTER — OUTPATIENT CASE MANAGEMENT (OUTPATIENT)
Dept: ADMINISTRATIVE | Facility: OTHER | Age: 78
End: 2023-10-10
Payer: MEDICARE

## 2023-10-10 VITALS
SYSTOLIC BLOOD PRESSURE: 112 MMHG | HEART RATE: 68 BPM | BODY MASS INDEX: 22.71 KG/M2 | WEIGHT: 123.44 LBS | DIASTOLIC BLOOD PRESSURE: 63 MMHG | HEIGHT: 62 IN

## 2023-10-10 DIAGNOSIS — Z09 HOSPITAL DISCHARGE FOLLOW-UP: ICD-10-CM

## 2023-10-10 PROCEDURE — 1159F PR MEDICATION LIST DOCUMENTED IN MEDICAL RECORD: ICD-10-PCS | Mod: HCNC,CPTII,S$GLB, | Performed by: NURSE PRACTITIONER

## 2023-10-10 PROCEDURE — 3074F SYST BP LT 130 MM HG: CPT | Mod: HCNC,CPTII,S$GLB, | Performed by: NURSE PRACTITIONER

## 2023-10-10 PROCEDURE — 1101F PR PT FALLS ASSESS DOC 0-1 FALLS W/OUT INJ PAST YR: ICD-10-PCS | Mod: HCNC,CPTII,S$GLB, | Performed by: NURSE PRACTITIONER

## 2023-10-10 PROCEDURE — 3078F DIAST BP <80 MM HG: CPT | Mod: HCNC,CPTII,S$GLB, | Performed by: NURSE PRACTITIONER

## 2023-10-10 PROCEDURE — 1160F RVW MEDS BY RX/DR IN RCRD: CPT | Mod: HCNC,CPTII,S$GLB, | Performed by: NURSE PRACTITIONER

## 2023-10-10 PROCEDURE — 1125F AMNT PAIN NOTED PAIN PRSNT: CPT | Mod: HCNC,CPTII,S$GLB, | Performed by: NURSE PRACTITIONER

## 2023-10-10 PROCEDURE — 3288F FALL RISK ASSESSMENT DOCD: CPT | Mod: HCNC,CPTII,S$GLB, | Performed by: NURSE PRACTITIONER

## 2023-10-10 PROCEDURE — 3288F PR FALLS RISK ASSESSMENT DOCUMENTED: ICD-10-PCS | Mod: HCNC,CPTII,S$GLB, | Performed by: NURSE PRACTITIONER

## 2023-10-10 PROCEDURE — 1159F MED LIST DOCD IN RCRD: CPT | Mod: HCNC,CPTII,S$GLB, | Performed by: NURSE PRACTITIONER

## 2023-10-10 PROCEDURE — 99214 OFFICE O/P EST MOD 30 MIN: CPT | Mod: HCNC,S$GLB,, | Performed by: NURSE PRACTITIONER

## 2023-10-10 PROCEDURE — 1101F PT FALLS ASSESS-DOCD LE1/YR: CPT | Mod: HCNC,CPTII,S$GLB, | Performed by: NURSE PRACTITIONER

## 2023-10-10 PROCEDURE — 99214 PR OFFICE/OUTPT VISIT, EST, LEVL IV, 30-39 MIN: ICD-10-PCS | Mod: HCNC,S$GLB,, | Performed by: NURSE PRACTITIONER

## 2023-10-10 PROCEDURE — 1125F PR PAIN SEVERITY QUANTIFIED, PAIN PRESENT: ICD-10-PCS | Mod: HCNC,CPTII,S$GLB, | Performed by: NURSE PRACTITIONER

## 2023-10-10 PROCEDURE — 99999 PR PBB SHADOW E&M-EST. PATIENT-LVL IV: ICD-10-PCS | Mod: PBBFAC,HCNC,, | Performed by: NURSE PRACTITIONER

## 2023-10-10 PROCEDURE — 1160F PR REVIEW ALL MEDS BY PRESCRIBER/CLIN PHARMACIST DOCUMENTED: ICD-10-PCS | Mod: HCNC,CPTII,S$GLB, | Performed by: NURSE PRACTITIONER

## 2023-10-10 PROCEDURE — 3078F PR MOST RECENT DIASTOLIC BLOOD PRESSURE < 80 MM HG: ICD-10-PCS | Mod: HCNC,CPTII,S$GLB, | Performed by: NURSE PRACTITIONER

## 2023-10-10 PROCEDURE — 99999 PR PBB SHADOW E&M-EST. PATIENT-LVL IV: CPT | Mod: PBBFAC,HCNC,, | Performed by: NURSE PRACTITIONER

## 2023-10-10 PROCEDURE — 3074F PR MOST RECENT SYSTOLIC BLOOD PRESSURE < 130 MM HG: ICD-10-PCS | Mod: HCNC,CPTII,S$GLB, | Performed by: NURSE PRACTITIONER

## 2023-10-10 NOTE — PROGRESS NOTES
"NEUROLOGY  Outpatient Consultation Visit     [facility]  [address] [facility] [address], [place] [address]  [phone] (office) / [phone] (fax)    Patient Name:  Trudy R Dakin  :  1945  MR #:  [id number]  Acct #:  [id number]    Date of Neurology Consult: 10/10/2023  Name of Provider: ADAL Jacob    Other Physicians:  Camilo Patel MD (Primary Care Physician); Camilo Patel MD (Referring)      Chief Complaint: Twitching      History of Present Illness (HPI):  Trudy R Dakin is a right handed 78 y.o. female with a PMHX of DM, Diverticulitis, HTN, strokes    Patient presents today following a recent hospital visit. Since discharge she has been feeling well but dizziness persists intermittently and mainly when bending over. Twitching stopped while inpatient after medications adjusted.  Elavil was discontinued and Buspar was reduced. She will be following up with her PCP later this month.         D/C Summary 10/4/2023  "78-year-old lady with HTN/HLD, CAD maintained on Plavix, being evaluated for TAVR for severe AS, paroxysmal AFib however not on any anticoagulation, and history of TIA/CVA with no residue.  Was brought to the emergency room for evaluation of symptoms that started yesterday, sarted with twitching of her extremities with episodes of confusion, difficulty completing conversation, difficulty ambulating secondary to the twitching with unsteadiness, had complained of headaches diffuse with dizziness.This morning drove herself to Dr. Gomez  office, visit was uneventful, driving back home patient back to into a ditch and noted by family to be more confused agitated with significant to itching-complained of headache and an episode of vomiting.-had 2 episodes of episode of urinary incontinence[no dysuria increased frequency or hesitancy]  On arrival here vital signs are stable, lab workup essentially unremarkable CT scan of the head is negative.  Per ER physician neuro " "examination with twitching with unsteadiness, had no other focal deficit.  Neurology was consulted discussed about possibility of medication side effects .patient on BuSpar, Lexapro, Elavil and Atarax-however with no recent change in dosing-hospital Medicine as to admit patient with neurology evaluation in a.m.     History obtained from reviewing records talking to patient with family by bedside, no previous episodes of seizure denies any substance abuse[elicits /prescribed] no history of tremors has had no other systemic symptoms  No fevers night sweats chills-     Hospital Course:   PT and OT concerned regarding patient's ambulation, she continues to be a fall risk therefore discharge is being held.  Seen by Neurology, cognitive and speech issues suspect to be secondary to serotonin overload from BuSpar, amitriptyline and Lexapro. Amitriptyline discontinued and buspar dose reduced. Speech improved as well as mobility. Patient is stable to discharge with home health. Metformin contraindicated with current level of renal function therefore discontinued as well."            Past Medical, Surgical, Family & Social History:   Past Medical History:   Diagnosis Date    Diabetes     Diverticulitis     Hypertension      Past Surgical History:   Procedure Laterality Date    ABDOMINAL SURGERY  2006    diverticulitis x3    ANTERIOR CERVICAL DISCECTOMY W/ FUSION N/A 8/30/2018    FUSION C6-7 Surgeon: Rickey Martin MD    APPENDECTOMY      CARPAL TUNNEL RELEASE      COLECTOMY  07/2020    EPIDURAL STEROID INJECTION INTO LUMBAR SPINE N/A 1/20/2021    Procedure: Injection-steroid-epidural-lumbar--L3-4;  Surgeon: Colleen Carvajal MD;  [facility];  Service: Pain Management;  Laterality: N/A;    HYSTERECTOMY  1970    @25yrs of age    LARYNGOSCOPY N/A 1/4/2022    Procedure: Suspension microlaryngoscopy with left vocal fold injection augmentation - Restylane L;  Surgeon: Yuan Mir MD;  [facility];  " Service: ENT;  Laterality: N/A;  Microscope, telescopes, tower, injector, Restylane-L    MANDIBLE FRACTURE SURGERY  1970    MICRODISCECTOMY OF SPINE Left 4/13/2021    Procedure: MICRODISCECTOMY, SPINE Left L3-4 far lateral Microdiscectomy;  Surgeon: Rickey Martin MD;  Location: [facility] OR;  Service: Neurosurgery;  Laterality: Left;  Procedure: Left L3-4 far lateral Microdiscectomy   Surgery Time: 1.5 hrs  LOS: 0-1  Anesthesia: General  Radiology: C-arm  SNS: EMG, SEP  Bed: [place] [id number] Poster  Position: angelica MouraDav w/ Globus confirmed 4/12/21 MN    OOPHORECTOMY  1970    ROTATOR CUFF REPAIR Left 11/2016    TOTAL REDUCTION MAMMOPLASTY Bilateral 1990    TRANSFORAMINAL EPIDURAL INJECTION OF STEROID Left 12/23/2020    Procedure: Injection,steroid,epidural,transforaminal approach--Left L4 and L5;  Surgeon: Colleen Carvajal MD;  Location: [facility] MGT;  Service: Pain Management;  Laterality: Left;     Family History   Problem Relation Age of Onset    Heart disease Father      Alcohol use:  reports no history of alcohol use.   (Of note, 0.6 oz = 1 beer or 6 oz = 10 beers).  Tobacco use:  reports that she has never smoked. She has never used smokeless tobacco.  Street drug use:  reports no history of drug use.  Allergies: Patient has no known allergies..    Home Medications:     Current Outpatient Medications:     atorvastatin (LIPITOR) 80 MG tablet, Take 1 tablet (80 mg total) by mouth once daily., Disp: 90 tablet, Rfl: 3    bumetanide (BUMEX) 2 MG tablet, Take 1 tablet (2 mg total) by mouth 2 (two) times daily., Disp: 180 tablet, Rfl: 3    busPIRone (BUSPAR) 5 MG Tab, Take half tablet (0.5mg) by mouth twice a day as needed for anxiety, Disp: 30 tablet, Rfl: 0    clopidogreL (PLAVIX) 75 mg tablet, Take 75 mg by mouth once daily., Disp: , Rfl:     cyanocobalamin 1,000 mcg/mL injection, INJECT 1 ML INTO THE MUSCLE EVERY 14 DAYS (Patient taking differently: Inject 1,000 mcg into the muscle every 30 days.), Disp: 10 mL, Rfl:  "1    ergocalciferol (ERGOCALCIFEROL) 50,000 unit Cap, Take 1 capsule (50,000 Units total) by mouth every 7 days. After completion of therapy will need maintenance dose. for 8 doses, Disp: 4 capsule, Rfl: 1    EScitalopram oxalate (LEXAPRO) 20 MG tablet, Take 1 tablet (20 mg total) by mouth once daily., Disp: 90 tablet, Rfl: 3    HYDROcodone-acetaminophen (NORCO) 5-325 mg per tablet, Take 1 tablet by mouth every 6 (six) hours as needed for Pain., Disp: 60 tablet, Rfl: 0    metOLazone (ZAROXOLYN) 2.5 MG tablet, Take 1 tablet (2.5 mg total) by mouth daily as needed., Disp: 30 tablet, Rfl: 11    metoprolol succinate (TOPROL-XL) 25 MG 24 hr tablet, Take 1 tablet (25 mg total) by mouth once daily., Disp: 90 tablet, Rfl: 3    nitroGLYCERIN (NITROSTAT) 0.4 MG SL tablet, Place 1 tablet (0.4 mg total) under the tongue every 5 (five) minutes as needed for Chest pain., Disp: 25 tablet, Rfl: 5    nystatin (MYCOSTATIN) cream, Apply topically 2 (two) times daily., Disp: 15 g, Rfl: 1    pregabalin (LYRICA) 75 MG capsule, Take 1 capsule (75 mg total) by mouth 2 (two) times daily., Disp: 180 capsule, Rfl: 1    acetaminophen (TYLENOL) 650 MG TbSR, Take 1 tablet (650 mg total) by mouth every 8 (eight) hours. (Patient not taking: Reported on 10/10/2023), Disp: , Rfl: 0    Physical Examination:  /63 (BP Location: Right arm, Patient Position: Sitting, BP Method: Medium (Automatic))   Pulse 68   Ht 5' 2" (1.575 m)   Wt 56 kg (123 lb 7.3 oz)   LMP 01/01/1970   BMI 22.58 kg/m²     GENERAL:  General appearance: Well, non-toxic appearing.  No apparent distress.  Neck: supple.  .    MENTAL STATUS:  Alertness, attention span & concentration: normal.  Language: normal.  Orientation to self, place & time:  normal.  Memory, recent & remote: normal.  Fund of knowledge: normal.      SPEECH:  Clear and fluent.  Follows complex commands.      CRANIAL NERVES:  Cranial Nerves II-XII were examined.  II - Visual fields: normal.  III, IV, VI: " PERRL, EOMI, No ptosis, No nystagmus.  V - Facial sensation: normal.  VII - Face symmetry & mobility: left facial asymmetry (chronic)  VIII - Hearing: normal  IX, X - Palate: mobile & midline.  XI - Shoulder shrug: normal.  XII - Tongue protrusion: normal.        GROSS MOTOR:  Gait & station: non focal  Tone: normal.  Abnormal movements: none.  Finger-nose: normal.  Rapid alternating movements: normal.  Pronator drift: normal      MUSCLE STRENGTH:       RIGHT    LEFT   5 Deltoids 5   5 Biceps 5   5 Triceps 5   5 Forearm.Pr. 5        4+ Iliopsoas flex    4+   5 Hip Abduct 5   5 Hip Adduct 5   5 Quads 5   5 Hams 5   5 Dorsiflex 5   5 Plantar Flex 5     REFLEXES:    RIGHT Reflex   LEFT   1 Biceps 1   1 Brachiorad. 1        2 Patellar 2       Diagnostic Data Reviewed:   I have personally reviewed provider notes, labs and imaging made available to me today.     Imaging:    CT Head Without Contrast 10/2023    Narrative  EXAMINATION:  CT HEAD WITHOUT CONTRAST    CLINICAL HISTORY:  Dizziness, persistent/recurrent, cardiac or vascular cause suspected;Mental status change, unknown cause;    TECHNIQUE:  Low dose axial CT images obtained throughout the head without intravenous contrast. Sagittal and coronal reconstructions were performed.    COMPARISON:  CT 03/15/2023    FINDINGS:  Intracranial compartment:    Prominence of ventricles and sulci compatible with mild generalized cerebral volume loss.  No hydrocephalus.  No extra-axial blood or fluid collections.    Stable small area of encephalomalacia in the right occipital lobe.  Remote lacunar type infarct in the right thalamus.  Mild patchy hypoattenuation of the supratentorial white matter most commonly reflects chronic microvascular ischemic change.  No acute parenchymal hemorrhage or evolving major vascular distribution infarct.  No intracranial mass effect or midline shift.    Skull/extracranial contents (limited evaluation): No fracture.  Postsurgical changes of the left  mandibular condyle.  The right mandibular condyle is anteriorly positioned relative to the mandibular fossa, new compared to CT 03/15/2023.  Lobular mucosal thickening within the right ethmoid air cells.  Mastoid air cells and remaining paranasal sinuses are essentially clear.  Postsurgical changes of the bilateral globes.    Impression  1. No acute intracranial abnormality.  2. The right mandibular condyle is anteriorly positioned relative to the mandibular fossa, new compared to CT 03/15/2023.    Cardiac:    Labs:  Lab Results   Component Value Date    WBC 9.02 10/02/2023    HGB 11.4 (L) 10/02/2023    HCT 35.1 (L) 10/02/2023     10/02/2023    MCV 88 10/02/2023    RDW 15.1 (H) 10/02/2023     Lab Results   Component Value Date     10/04/2023    K 3.7 10/04/2023     10/04/2023    CO2 27 10/04/2023    BUN 42 (H) 10/04/2023    CREATININE 1.51 (H) 10/04/2023     (H) 10/04/2023    CALCIUM 7.8 (L) 10/04/2023    MG 1.3 (L) 10/02/2023    PHOS 4.8 (H) 04/14/2021     Lab Results   Component Value Date    PROT 8.4 10/02/2023    ALBUMIN 4.6 10/02/2023    BILITOT 0.8 10/02/2023    AST 33 10/02/2023    ALKPHOS 85 10/02/2023    ALT 18 10/02/2023     Lab Results   Component Value Date    INR 1.0 05/14/2023     Lab Results   Component Value Date    CHOL 154 04/12/2023    HDL 29 (L) 04/12/2023    LDLCALC 101.2 04/12/2023    TRIG 119 04/12/2023    CHOLHDL 18.8 (L) 04/12/2023     Lab Results   Component Value Date    HGBA1C 6.1 (H) 10/03/2023      Lab Results   Component Value Date    RPZOTAXL45 606 10/02/2023     Lab Results   Component Value Date    FOLATE 3.6 (L) 04/15/2021     Lab Results   Component Value Date    TSH 1.28 02/04/2021               Assessment and Plan:  Trudy R Dakin is a 78 y.o. female.          Problem List Items Addressed This Visit          Other    Hospital discharge follow-up    Current Assessment & Plan     Patient is a 79 y/o female that presents following a recent hospital stay.    She presented with twitching, headaches, confusion, dizziness and unsteadiness  CTH neg acute; chronic infarcts  Pt reported to be on Buspar, Lexapro, Elavil and Atarax.  Elavil was discontinued and Buspar dose was lowered while inpatient with nearly complete resolution of symptoms.    - likely the result of serotonin syndrome  Neuro exam non focal   Question underlying neurocognitive disorder  No need for further neuro w/u  Recommend pt follow up with PCP regarding future medication changes.                       Important to note, also  has a past medical history of Diabetes, Diverticulitis, and Hypertension.            The patient will return to clinic as needed        All questions were answered and patient is comfortable with the plan.       Thank you very much for the opportunity to assist in this patient's care.    If you have any questions or concerns, please do not hesitate to contact me at any time.    Sincerely,     ADAL Jacob  [facility] [place]         I spent a total of 34 minutes on the day of the visit.This includes face to face time and non-face to face time preparing to see the patient (eg, review of tests), Obtaining and/or reviewing separately obtained history, Documenting clinical information in the electronic or other health record, Independently interpreting resultsand communicating results to the patient/family/caregiver, or Care coordination.

## 2023-10-10 NOTE — LETTER
Trudy Dakin  [address] [facility] [address]    Dear Trudy Dakin,     Welcome to [facility] Outpatient Care Management Program. We are here to assist patients with multiple long-term (chronic) conditions who often need more personalized healthcare.    It was a pleasure talking with you today. My name is Mary Jane Peralta RN. I look forward to working with you as your Care Manager. I will be contacting you by telephone routinely to help coordinate care and resolve issues.    My goal is to help you function at the healthiest and highest level possible. You can contact me directly at [phone] .    As an [facility] patient with [employer], some of the services we provide, at no cost to you, include:      Development of an individualized care plan with a Registered Nurse    Connection with a    Assistance from a Community Health Worker   Connection with available resources and services     Coordinate communication among your care team members    Provide coaching and education   Help you understand your doctor's treatment plan   Help you obtain information about your insurance coverage.    All services provided by [facility]s Outpatient Care Managers and other care team members are coordinated with and communicated to your primary care team.      As part of your enrollment, you will be receiving education materials and more information about these services in your [facility] account, by phone, or through the mail. If you do not wish to participate or receive information, you can Opt Out by contacting our office at [phone].      Sincerely,        Mary Jane Peralta RN  [facility]   Outpatient Care Management

## 2023-10-10 NOTE — ASSESSMENT & PLAN NOTE
Patient is a 79 y/o female that presents following a recent hospital stay.   She presented with twitching, headaches, confusion, dizziness and unsteadiness  CTH neg acute; chronic infarcts  Pt reported to be on Buspar, Lexapro, Elavil and Atarax.  Elavil was discontinued and Buspar dose was lowered while inpatient with nearly complete resolution of symptoms.    - likely the result of serotonin syndrome  Neuro exam non focal   Question underlying neurocognitive disorder  No need for further neuro w/u  Recommend pt follow up with PCP regarding future medication changes.

## 2023-10-11 ENCOUNTER — OFFICE VISIT (OUTPATIENT)
Dept: CARDIOTHORACIC SURGERY | Facility: CLINIC | Age: 78
End: 2023-10-11
Payer: MEDICARE

## 2023-10-11 VITALS
WEIGHT: 123.38 LBS | HEIGHT: 62 IN | BODY MASS INDEX: 22.7 KG/M2 | DIASTOLIC BLOOD PRESSURE: 58 MMHG | HEART RATE: 67 BPM | SYSTOLIC BLOOD PRESSURE: 139 MMHG

## 2023-10-11 DIAGNOSIS — I34.0 MITRAL VALVE INSUFFICIENCY, UNSPECIFIED ETIOLOGY: ICD-10-CM

## 2023-10-11 DIAGNOSIS — I35.0 NONRHEUMATIC AORTIC VALVE STENOSIS: Primary | ICD-10-CM

## 2023-10-11 PROCEDURE — 1159F PR MEDICATION LIST DOCUMENTED IN MEDICAL RECORD: ICD-10-PCS | Mod: HCNC,CPTII,S$GLB, | Performed by: THORACIC SURGERY (CARDIOTHORACIC VASCULAR SURGERY)

## 2023-10-11 PROCEDURE — 99999 PR PBB SHADOW E&M-EST. PATIENT-LVL IV: ICD-10-PCS | Mod: PBBFAC,HCNC,, | Performed by: THORACIC SURGERY (CARDIOTHORACIC VASCULAR SURGERY)

## 2023-10-11 PROCEDURE — 3075F PR MOST RECENT SYSTOLIC BLOOD PRESS GE 130-139MM HG: ICD-10-PCS | Mod: HCNC,CPTII,S$GLB, | Performed by: THORACIC SURGERY (CARDIOTHORACIC VASCULAR SURGERY)

## 2023-10-11 PROCEDURE — 1126F PR PAIN SEVERITY QUANTIFIED, NO PAIN PRESENT: ICD-10-PCS | Mod: HCNC,CPTII,S$GLB, | Performed by: THORACIC SURGERY (CARDIOTHORACIC VASCULAR SURGERY)

## 2023-10-11 PROCEDURE — 99205 PR OFFICE/OUTPT VISIT, NEW, LEVL V, 60-74 MIN: ICD-10-PCS | Mod: HCNC,S$GLB,, | Performed by: THORACIC SURGERY (CARDIOTHORACIC VASCULAR SURGERY)

## 2023-10-11 PROCEDURE — 1159F MED LIST DOCD IN RCRD: CPT | Mod: HCNC,CPTII,S$GLB, | Performed by: THORACIC SURGERY (CARDIOTHORACIC VASCULAR SURGERY)

## 2023-10-11 PROCEDURE — 1126F AMNT PAIN NOTED NONE PRSNT: CPT | Mod: HCNC,CPTII,S$GLB, | Performed by: THORACIC SURGERY (CARDIOTHORACIC VASCULAR SURGERY)

## 2023-10-11 PROCEDURE — 99999 PR PBB SHADOW E&M-EST. PATIENT-LVL IV: CPT | Mod: PBBFAC,HCNC,, | Performed by: THORACIC SURGERY (CARDIOTHORACIC VASCULAR SURGERY)

## 2023-10-11 PROCEDURE — 3078F DIAST BP <80 MM HG: CPT | Mod: HCNC,CPTII,S$GLB, | Performed by: THORACIC SURGERY (CARDIOTHORACIC VASCULAR SURGERY)

## 2023-10-11 PROCEDURE — 99205 OFFICE O/P NEW HI 60 MIN: CPT | Mod: HCNC,S$GLB,, | Performed by: THORACIC SURGERY (CARDIOTHORACIC VASCULAR SURGERY)

## 2023-10-11 PROCEDURE — 3075F SYST BP GE 130 - 139MM HG: CPT | Mod: HCNC,CPTII,S$GLB, | Performed by: THORACIC SURGERY (CARDIOTHORACIC VASCULAR SURGERY)

## 2023-10-11 PROCEDURE — 3078F PR MOST RECENT DIASTOLIC BLOOD PRESSURE < 80 MM HG: ICD-10-PCS | Mod: HCNC,CPTII,S$GLB, | Performed by: THORACIC SURGERY (CARDIOTHORACIC VASCULAR SURGERY)

## 2023-10-11 NOTE — PROGRESS NOTES
Subjective:      Patient ID: Trudy R Dakin is a 78 y.o. female.    Chief Complaint: No chief complaint on file.      HPI:  Trudy R Dakin is a 78 y.o. female who presents as an initial for severe aortic stenosis. She has had multiple recent heart failure admission recently and is seen by Dr. Gomez.  She underwent a cardiac work up and has been found to have severe aortic stenosis.  He most recent ECHO reveals moderate aortic valve sclerosis. There is moderate annular calcification present. Mildly restricted motion. There is low flow mild stenosis. Aortic valve area by VTI is 1.51 cm². Aortic valve peak velocity is 1.84 m/s. Mean gradient is 9 mmHg. The dimensionless index is 0.45.  She also has severe mitral regurgitation. She complains of life limiting dyspnea.    Family and social history reviewed    Review of patient's allergies indicates:  No Known Allergies  Past Medical History:   Diagnosis Date    Diabetes     Diverticulitis     Hypertension      Past Surgical History:   Procedure Laterality Date    ABDOMINAL SURGERY  2006    diverticulitis x3    ANTERIOR CERVICAL DISCECTOMY W/ FUSION N/A 8/30/2018    FUSION C6-7 Surgeon: Rickey Martin MD    APPENDECTOMY      CARPAL TUNNEL RELEASE      COLECTOMY  07/2020    EPIDURAL STEROID INJECTION INTO LUMBAR SPINE N/A 1/20/2021    Procedure: Injection-steroid-epidural-lumbar--L3-4;  Surgeon: Colleen Carvajal MD;  Location: [facility];  Service: Pain Management;  Laterality: N/A;    HYSTERECTOMY  1970    @25yrs of age    LARYNGOSCOPY N/A 1/4/2022    Procedure: Suspension microlaryngoscopy with left vocal fold injection augmentation - Restylane L;  Surgeon: Yuan Mir MD;  Location: [address];  Service: ENT;  Laterality: N/A;  Microscope, telescopes, tower, injector, Restylane-L    MANDIBLE FRACTURE SURGERY  1970    MICRODISCECTOMY OF SPINE Left 4/13/2021    Procedure: MICRODISCECTOMY, SPINE Left L3-4 far lateral Microdiscectomy;  Surgeon: Rickey DOMINIQUE  MD Mario;  Location: [facility];  Service: Neurosurgery;  Laterality: Left;  Procedure: Left L3-4 far lateral Microdiscectomy   Surgery Time: 1.5 hrs  LOS: 0-1  Anesthesia: General  Radiology: C-arm  SNS: EMG, SEP  Bed: [place] [id number] Poster  Position: Melissa Demarco w/ Globus confirmed 4/12/21 [place]    OOPHORECTOMY  1970    ROTATOR CUFF REPAIR Left 11/2016    TOTAL REDUCTION MAMMOPLASTY Bilateral 1990    TRANSFORAMINAL EPIDURAL INJECTION OF STEROID Left 12/23/2020    Procedure: Injection,steroid,epidural,transforaminal approach--Left L4 and L5;  Surgeon: Colleen Carvajal MD;  Location: [facility];  Service: Pain Management;  Laterality: Left;     Family History       Problem Relation (Age of Onset)    Heart disease Father          Social History     Socioeconomic History    Marital status:    Tobacco Use    Smoking status: Never    Smokeless tobacco: Never   Substance and Sexual Activity    Alcohol use: No    Drug use: No    Sexual activity: Never     Social Determinants of Health     Financial Resource Strain: Low Risk  (10/2/2023)    Overall Financial Resource Strain (CARDIA)     Difficulty of Paying Living Expenses: Not hard at all   Food Insecurity: No Food Insecurity (10/2/2023)    Hunger Vital Sign     Worried About Running Out of Food in the Last Year: Never true     Ran Out of Food in the Last Year: Never true   Transportation Needs: No Transportation Needs (10/2/2023)    PRAPARE - Transportation     Lack of Transportation (Medical): No     Lack of Transportation (Non-Medical): No   Physical Activity: Inactive (10/2/2023)    Exercise Vital Sign     Days of Exercise per Week: 0 days     Minutes of Exercise per Session: 0 min   Stress: No Stress Concern Present (10/2/2023)    [employer] [place] [employer] - Occupational Stress Questionnaire     Feeling of Stress : Only a little   Social Connections: Unknown (10/2/2023)    Social Connection and Isolation Panel [NHANES]     Frequency of Communication  with Friends and Family: Twice a week     Attends Buddhist Services: 1 to 4 times per year     Active Member of Clubs or Organizations: No     Attends Club or Organization Meetings: Never     Marital Status:    Housing Stability: Low Risk  (10/2/2023)    Housing Stability Vital Sign     Unable to Pay for Housing in the Last Year: No     Number of Places Lived in the Last Year: 1     Unstable Housing in the Last Year: No       Current Outpatient Medications:     acetaminophen (TYLENOL) 650 MG TbSR, Take 1 tablet (650 mg total) by mouth every 8 (eight) hours., Disp: , Rfl: 0    atorvastatin (LIPITOR) 80 MG tablet, Take 1 tablet (80 mg total) by mouth once daily., Disp: 90 tablet, Rfl: 3    bumetanide (BUMEX) 2 MG tablet, Take 1 tablet (2 mg total) by mouth 2 (two) times daily., Disp: 180 tablet, Rfl: 3    busPIRone (BUSPAR) 5 MG Tab, Take half tablet (0.5mg) by mouth twice a day as needed for anxiety, Disp: 30 tablet, Rfl: 0    clopidogreL (PLAVIX) 75 mg tablet, Take 75 mg by mouth once daily., Disp: , Rfl:     cyanocobalamin 1,000 mcg/mL injection, INJECT 1 ML INTO THE MUSCLE EVERY 14 DAYS (Patient taking differently: Inject 1,000 mcg into the muscle every 30 days.), Disp: 10 mL, Rfl: 1    ergocalciferol (ERGOCALCIFEROL) 50,000 unit Cap, Take 1 capsule (50,000 Units total) by mouth every 7 days. After completion of therapy will need maintenance dose. for 8 doses, Disp: 4 capsule, Rfl: 1    EScitalopram oxalate (LEXAPRO) 20 MG tablet, Take 1 tablet (20 mg total) by mouth once daily., Disp: 90 tablet, Rfl: 3    metOLazone (ZAROXOLYN) 2.5 MG tablet, Take 1 tablet (2.5 mg total) by mouth daily as needed., Disp: 30 tablet, Rfl: 11    metoprolol succinate (TOPROL-XL) 25 MG 24 hr tablet, Take 1 tablet (25 mg total) by mouth once daily., Disp: 90 tablet, Rfl: 3    nitroGLYCERIN (NITROSTAT) 0.4 MG SL tablet, Place 1 tablet (0.4 mg total) under the tongue every 5 (five) minutes as needed for Chest pain., Disp: 25  tablet, Rfl: 5    nystatin (MYCOSTATIN) cream, Apply topically 2 (two) times daily., Disp: 15 g, Rfl: 1    pregabalin (LYRICA) 75 MG capsule, Take 1 capsule (75 mg total) by mouth 2 (two) times daily., Disp: 180 capsule, Rfl: 1    HYDROcodone-acetaminophen (NORCO) 5-325 mg per tablet, Take 1 tablet by mouth every 6 (six) hours as needed for Pain., Disp: 60 tablet, Rfl: 0   Current medications Reviewed    Review of Systems   Constitutional:  Negative for activity change, appetite change, fatigue and fever.   HENT:  Negative for nosebleeds.    Respiratory:  Negative for cough and shortness of breath.    Cardiovascular:  Negative for chest pain, palpitations and leg swelling.   Gastrointestinal:  Negative for abdominal distention, abdominal pain and nausea.   Genitourinary:  Negative for frequency.   Musculoskeletal:  Negative for arthralgias and myalgias.   Skin:  Negative for rash.   Neurological:  Negative for dizziness and numbness.   Hematological:  Does not bruise/bleed easily.     Objective:   Physical Exam  HENT:      Head: Normocephalic and atraumatic.   Eyes:      Extraocular Movements: Extraocular movements intact.   Cardiovascular:      Rate and Rhythm: Normal rate and regular rhythm.   Pulmonary:      Effort: Pulmonary effort is normal.   Abdominal:      General: Abdomen is flat.      Palpations: Abdomen is soft.   Musculoskeletal:         General: Normal range of motion.      Cervical back: Normal range of motion.   Skin:     General: Skin is warm and dry.      Capillary Refill: Capillary refill takes less than 2 seconds.   Neurological:      General: No focal deficit present.       Diagnostic Results: Reviewed   ECHO:    Left Ventricle: The left ventricle is normal in size. Normal wall thickness. Normal wall motion. There is normal systolic function with a visually estimated ejection fraction of 55 - 60%. Biplane (2D) method of discs ejection fraction is 58%. Global longitudinal strain is -20.0%. There  is normal diastolic function.    Right Ventricle: Normal right ventricular cavity size. Wall thickness is normal. Right ventricle wall motion  is normal. Systolic function is normal.    Aortic Valve: The aortic valve is a trileaflet valve. There is moderate aortic valve sclerosis. There is moderate annular calcification present. Mildly restricted motion. There is low flow mild stenosis. Aortic valve area by VTI is 1.51 cm². Aortic valve peak velocity is 1.84 m/s. Mean gradient is 9 mmHg. The dimensionless index is 0.45.    Mitral Valve: There is moderate annular dilation present. There is severe mitral annular calcification present. There is moderate to severe regurgitation eccentric posteriorly directed. posterior leaflet is restricted causing mild over riding anterior leaflet and MR, Estimated Rvol is 48 ml.    Pulmonary Artery: The estimated pulmonary artery systolic pressure is 70 mmHg.    IVC/SVC: Normal venous pressure at 3 mmHg.    CT Scan:  Impression:  1. Small to moderate-sized dependently layering right pleural effusion is seen.  2. Small nodules in the lungs appear grossly stable in size compared to the previous CT from 11/23/2021.  Follow-up with CT in 6 months to ensure continued stability is recommended.  3. Borderline enlarged right paratracheal lymph node appears grossly stable in size compared to the prior CT.  There is a borderline enlarged pretracheal lymph node which is not apparent on the prior study.  Recommend attention on follow-up.  4. Cardiomegaly is seen.  Atherosclerosis is noted.  5. Additional findings and details as above.  6. The findings are concordant with nighthawk.  Assessment:   Aortic Valve Stenosis   Plan:     I have seen the patient and reviewed the nurse practitioner's note above. I have personally interviewed and examined the patient at bedside and agree with the findings.     78 y.o. female with multiple comorbidities presented with symptomatic severe aortic stenosis.  Deemed inoperable for surgical aortic valve replacement (SAVR) due to subjective frailty (uses walker) and comorbidities.  Appropriate candidate for TAVR evaluation.  We explained the patient's condition, pathology and prognosis of severe aortic stenosis, treatment options including medical therapy, SAVR and TAVR, details of SAVR and the TAVR procedure, risks and benefits of SAVR and TAVR including myocardial infarction, stroke, pacemaker, bleeding, infection, acute renal injury, conversion to open surgery, need for emergency mechanical circulatory support, and potential complications and recovery course with patient and family, and also durability of surgical vs transcatheter valve and options for reintervention in the future.  Patient and family understood and agreed to proceed. All questions answered.    We spent >70 minutes reviewing, examining and evaluating this patient, including reviewing relevant diagnostic studies, and 50% of which were spent on patient counseling including the patient's condition, diagnosis, prognosis, expected recovery after SAVR vs TAVR, follow-up plan and next steps.      Guillermo Cook MD  Cardiothoracic Surgery  [facility]

## 2023-10-12 ENCOUNTER — DOCUMENT SCAN (OUTPATIENT)
Dept: HOME HEALTH SERVICES | Facility: HOSPITAL | Age: 78
End: 2023-10-12
Payer: MEDICARE

## 2023-10-16 ENCOUNTER — OFFICE VISIT (OUTPATIENT)
Dept: FAMILY MEDICINE | Facility: CLINIC | Age: 78
End: 2023-10-16
Payer: MEDICARE

## 2023-10-16 ENCOUNTER — OUTPATIENT CASE MANAGEMENT (OUTPATIENT)
Dept: ADMINISTRATIVE | Facility: OTHER | Age: 78
End: 2023-10-16
Payer: MEDICARE

## 2023-10-16 ENCOUNTER — LAB VISIT (OUTPATIENT)
Dept: LAB | Facility: HOSPITAL | Age: 78
End: 2023-10-16
Attending: NURSE PRACTITIONER
Payer: MEDICARE

## 2023-10-16 VITALS
OXYGEN SATURATION: 96 % | BODY MASS INDEX: 22.8 KG/M2 | HEART RATE: 72 BPM | DIASTOLIC BLOOD PRESSURE: 58 MMHG | WEIGHT: 123.88 LBS | SYSTOLIC BLOOD PRESSURE: 130 MMHG | HEIGHT: 62 IN

## 2023-10-16 DIAGNOSIS — G44.52 NEW DAILY PERSISTENT HEADACHE: ICD-10-CM

## 2023-10-16 DIAGNOSIS — F32.0 MAJOR DEPRESSIVE DISORDER, SINGLE EPISODE, MILD: ICD-10-CM

## 2023-10-16 DIAGNOSIS — R53.1 DECREASED STRENGTH: ICD-10-CM

## 2023-10-16 DIAGNOSIS — I10 ESSENTIAL HYPERTENSION: Primary | Chronic | ICD-10-CM

## 2023-10-16 DIAGNOSIS — I10 ESSENTIAL HYPERTENSION: Chronic | ICD-10-CM

## 2023-10-16 DIAGNOSIS — F41.1 GAD (GENERALIZED ANXIETY DISORDER): ICD-10-CM

## 2023-10-16 LAB
ALBUMIN SERPL BCP-MCNC: 3.5 G/DL (ref 3.5–5.2)
ALP SERPL-CCNC: 97 U/L (ref 55–135)
ALT SERPL W/O P-5'-P-CCNC: 10 U/L (ref 10–44)
ANION GAP SERPL CALC-SCNC: 12 MMOL/L (ref 8–16)
AST SERPL-CCNC: 14 U/L (ref 10–40)
BILIRUB SERPL-MCNC: 0.8 MG/DL (ref 0.1–1)
BUN SERPL-MCNC: 32 MG/DL (ref 8–23)
CALCIUM SERPL-MCNC: 9.5 MG/DL (ref 8.7–10.5)
CHLORIDE SERPL-SCNC: 102 MMOL/L (ref 95–110)
CO2 SERPL-SCNC: 27 MMOL/L (ref 23–29)
CREAT SERPL-MCNC: 1.6 MG/DL (ref 0.5–1.4)
EST. GFR  (NO RACE VARIABLE): 32.8 ML/MIN/1.73 M^2
GLUCOSE SERPL-MCNC: 148 MG/DL (ref 70–110)
POTASSIUM SERPL-SCNC: 3.4 MMOL/L (ref 3.5–5.1)
PROT SERPL-MCNC: 7.5 G/DL (ref 6–8.4)
SODIUM SERPL-SCNC: 141 MMOL/L (ref 136–145)

## 2023-10-16 PROCEDURE — 3288F FALL RISK ASSESSMENT DOCD: CPT | Mod: HCNC,CPTII,S$GLB, | Performed by: NURSE PRACTITIONER

## 2023-10-16 PROCEDURE — 1160F PR REVIEW ALL MEDS BY PRESCRIBER/CLIN PHARMACIST DOCUMENTED: ICD-10-PCS | Mod: HCNC,CPTII,S$GLB, | Performed by: NURSE PRACTITIONER

## 2023-10-16 PROCEDURE — 1126F PR PAIN SEVERITY QUANTIFIED, NO PAIN PRESENT: ICD-10-PCS | Mod: HCNC,CPTII,S$GLB, | Performed by: NURSE PRACTITIONER

## 2023-10-16 PROCEDURE — 99214 PR OFFICE/OUTPT VISIT, EST, LEVL IV, 30-39 MIN: ICD-10-PCS | Mod: HCNC,S$GLB,, | Performed by: NURSE PRACTITIONER

## 2023-10-16 PROCEDURE — 36415 COLL VENOUS BLD VENIPUNCTURE: CPT | Mod: HCNC,PO | Performed by: NURSE PRACTITIONER

## 2023-10-16 PROCEDURE — 99214 OFFICE O/P EST MOD 30 MIN: CPT | Mod: HCNC,S$GLB,, | Performed by: NURSE PRACTITIONER

## 2023-10-16 PROCEDURE — 99999 PR PBB SHADOW E&M-EST. PATIENT-LVL IV: CPT | Mod: PBBFAC,HCNC,, | Performed by: NURSE PRACTITIONER

## 2023-10-16 PROCEDURE — 3288F PR FALLS RISK ASSESSMENT DOCUMENTED: ICD-10-PCS | Mod: HCNC,CPTII,S$GLB, | Performed by: NURSE PRACTITIONER

## 2023-10-16 PROCEDURE — 1126F AMNT PAIN NOTED NONE PRSNT: CPT | Mod: HCNC,CPTII,S$GLB, | Performed by: NURSE PRACTITIONER

## 2023-10-16 PROCEDURE — 1101F PT FALLS ASSESS-DOCD LE1/YR: CPT | Mod: HCNC,CPTII,S$GLB, | Performed by: NURSE PRACTITIONER

## 2023-10-16 PROCEDURE — 1101F PR PT FALLS ASSESS DOC 0-1 FALLS W/OUT INJ PAST YR: ICD-10-PCS | Mod: HCNC,CPTII,S$GLB, | Performed by: NURSE PRACTITIONER

## 2023-10-16 PROCEDURE — 1159F PR MEDICATION LIST DOCUMENTED IN MEDICAL RECORD: ICD-10-PCS | Mod: HCNC,CPTII,S$GLB, | Performed by: NURSE PRACTITIONER

## 2023-10-16 PROCEDURE — 99999 PR PBB SHADOW E&M-EST. PATIENT-LVL IV: ICD-10-PCS | Mod: PBBFAC,HCNC,, | Performed by: NURSE PRACTITIONER

## 2023-10-16 PROCEDURE — 3075F SYST BP GE 130 - 139MM HG: CPT | Mod: HCNC,CPTII,S$GLB, | Performed by: NURSE PRACTITIONER

## 2023-10-16 PROCEDURE — 3078F PR MOST RECENT DIASTOLIC BLOOD PRESSURE < 80 MM HG: ICD-10-PCS | Mod: HCNC,CPTII,S$GLB, | Performed by: NURSE PRACTITIONER

## 2023-10-16 PROCEDURE — 80053 COMPREHEN METABOLIC PANEL: CPT | Mod: HCNC | Performed by: NURSE PRACTITIONER

## 2023-10-16 PROCEDURE — 3075F PR MOST RECENT SYSTOLIC BLOOD PRESS GE 130-139MM HG: ICD-10-PCS | Mod: HCNC,CPTII,S$GLB, | Performed by: NURSE PRACTITIONER

## 2023-10-16 PROCEDURE — 1160F RVW MEDS BY RX/DR IN RCRD: CPT | Mod: HCNC,CPTII,S$GLB, | Performed by: NURSE PRACTITIONER

## 2023-10-16 PROCEDURE — 3078F DIAST BP <80 MM HG: CPT | Mod: HCNC,CPTII,S$GLB, | Performed by: NURSE PRACTITIONER

## 2023-10-16 PROCEDURE — 1159F MED LIST DOCD IN RCRD: CPT | Mod: HCNC,CPTII,S$GLB, | Performed by: NURSE PRACTITIONER

## 2023-10-16 NOTE — LETTER
Trudy Dakin  [address]  [place] [address]      Dear Trudy Dakin,     I am writing from the Outpatient Complex Care Management Department at [facility].  I received a referral from Mary Jane Peralta RN to contact you regarding any needs you may have. I was unable to reach you by phone. Please contact me for assistance.     I can be reached at [phone] Monday thru Friday from 8:00am to 4:30pm.      Additionally, [facility] also has a program with a nurse available 24/7 to answer questions or provide medical advice.  [facility] can be reached at [phone].      Sincerely,        Sunita Wolff LCSW  Outpatient Care Management

## 2023-10-16 NOTE — LETTER
Trudy Dakin  [address] [facility] [address]    Dear Trudy Dakin,     Welcome to [facility] Outpatient Care Management Program. We are here to assist patients with multiple long-term (chronic) conditions who often need more personalized healthcare.    It was a pleasure talking with you today. My name is Sunita Wolff LCSW. I look forward to working with you as your Care Manager. I will be contacting you by telephone routinely to help coordinate care and resolve issues.    My goal is to help you function at the healthiest and highest level possible. You can contact me directly at [phone].    As an [facility] patient with [employer], some of the services we provide, at no cost to you, include:     Development of an individualized care plan with a Registered Nurse   Connection with a   Assistance from a Community Health Worker  Connection with available resources and services    Coordinate communication among your care team members   Provide coaching and education  Help you understand your doctor's treatment plan  Help you obtain information about your insurance coverage.    All services provided by [facility]s Outpatient Care Managers and other care team members are coordinated with and communicated to your primary care team.      As part of your enrollment, you will be receiving education materials and more information about these services in your [facility] account, by phone, or through the mail. If you do not wish to participate or receive information, you can Opt Out by contacting our office at [phone].      Sincerely,        Sunita Wolff LCSW  [facility]   Outpatient Care Management

## 2023-10-18 ENCOUNTER — PATIENT MESSAGE (OUTPATIENT)
Dept: FAMILY MEDICINE | Facility: CLINIC | Age: 78
End: 2023-10-18
Payer: MEDICARE

## 2023-10-18 ENCOUNTER — TELEPHONE (OUTPATIENT)
Dept: FAMILY MEDICINE | Facility: CLINIC | Age: 78
End: 2023-10-18
Payer: MEDICARE

## 2023-10-18 DIAGNOSIS — N18.32 STAGE 3B CHRONIC KIDNEY DISEASE: Primary | ICD-10-CM

## 2023-10-18 NOTE — TELEPHONE ENCOUNTER
----- Message from Manju Troncoso NP sent at 10/18/2023 10:39 AM CDT -----  Potassium is a little low, creatinine remains stable but elevated, GFR low. Does she see a kidney specialist?.

## 2023-10-18 NOTE — PROGRESS NOTES
Outpatient Care Management   - High Risk Patient Assessment    Patient: Trudy R Dakin  MRN:  [id number]  Date of Service:  10/18/2023  Completed by:  Sunita Wolff LCSW  Referral Date: 10/03/2023    Reason for Visit   Patient presents with    Other    Social Work Assessment - High Risk     10/18/2023  Brief Summary:  [facility] for the following patient identified psycho-social needs, help at home . Care plan was created in collaboration with patient/caregiver input.  completed the SDOH questionnaire. SW will follow up in one week or PRN.

## 2023-10-19 ENCOUNTER — DOCUMENT SCAN (OUTPATIENT)
Dept: HOME HEALTH SERVICES | Facility: HOSPITAL | Age: 78
End: 2023-10-19
Payer: MEDICARE

## 2023-10-19 NOTE — TELEPHONE ENCOUNTER
Spoke with patient daughter. She have scheduled follow up with Dr Patel.  Also scheduled an appointment with Jarrett Casiano.

## 2023-10-19 NOTE — TELEPHONE ENCOUNTER
Make sure she has a follow up with her pcp within 2-3 weeks so he can recheck her kidney function. Also please forward labs to her cardiologist as they are prescribing her diuretic.

## 2023-10-22 DIAGNOSIS — I67.82 CEREBRAL ISCHEMIA: Primary | ICD-10-CM

## 2023-10-23 RX ORDER — CLOPIDOGREL BISULFATE 75 MG/1
75 TABLET ORAL
Qty: 90 TABLET | Refills: 3 | Status: SHIPPED | OUTPATIENT
Start: 2023-10-23

## 2023-10-25 ENCOUNTER — OUTPATIENT CASE MANAGEMENT (OUTPATIENT)
Dept: ADMINISTRATIVE | Facility: OTHER | Age: 78
End: 2023-10-25
Payer: MEDICARE

## 2023-10-25 NOTE — PROGRESS NOTES
Outpatient Care Management   - Care Plan Follow Up    Patient: Trudy R Dakin  MRN:  [id number]  Date of Service:  10/25/2023  Completed by:  Sunita Wolff LCSW  Referral Date: 10/03/2023    Reason for Visit   Patient presents with    [facility] SW Follow Up Call    Case Closure     10/25/2023  Brief Summary: SW telephoned pt for follow up and spoke with the caregiver. SW and caregiver discussed case closure as pt has no additional needs. Caregiver is in agreement with case closure. SW will close the case.     Complex Care Plan     Care plan was discussed and completed today with input from patient and/or caregiver.    Patient Instructions     No follow-ups on file.

## 2023-10-30 ENCOUNTER — OFFICE VISIT (OUTPATIENT)
Dept: PODIATRY | Facility: CLINIC | Age: 78
End: 2023-10-30
Attending: FAMILY MEDICINE
Payer: MEDICARE

## 2023-10-30 ENCOUNTER — OFFICE VISIT (OUTPATIENT)
Dept: OPTOMETRY | Facility: CLINIC | Age: 78
End: 2023-10-30
Payer: MEDICARE

## 2023-10-30 ENCOUNTER — TELEPHONE (OUTPATIENT)
Dept: CARDIOLOGY | Facility: HOSPITAL | Age: 78
End: 2023-10-30
Payer: MEDICARE

## 2023-10-30 ENCOUNTER — HOSPITAL ENCOUNTER (OUTPATIENT)
Dept: RADIOLOGY | Facility: HOSPITAL | Age: 78
Discharge: HOME | End: 2023-10-30
Attending: FAMILY MEDICINE
Payer: MEDICARE

## 2023-10-30 VITALS — BODY MASS INDEX: 22.63 KG/M2 | WEIGHT: 123 LBS | HEIGHT: 62 IN

## 2023-10-30 DIAGNOSIS — N18.32 TYPE 2 DIABETES MELLITUS WITH STAGE 3B CHRONIC KIDNEY DISEASE, WITHOUT LONG-TERM CURRENT USE OF INSULIN: Chronic | ICD-10-CM

## 2023-10-30 DIAGNOSIS — Z96.1 PSEUDOPHAKIA OF BOTH EYES: ICD-10-CM

## 2023-10-30 DIAGNOSIS — H52.13 MYOPIA WITH ASTIGMATISM AND PRESBYOPIA, BILATERAL: ICD-10-CM

## 2023-10-30 DIAGNOSIS — E11.22 TYPE 2 DIABETES MELLITUS WITH STAGE 3B CHRONIC KIDNEY DISEASE, WITHOUT LONG-TERM CURRENT USE OF INSULIN: Chronic | ICD-10-CM

## 2023-10-30 DIAGNOSIS — M20.40 HAMMER TOE, UNSPECIFIED LATERALITY: ICD-10-CM

## 2023-10-30 DIAGNOSIS — Z78.0 MENOPAUSE: ICD-10-CM

## 2023-10-30 DIAGNOSIS — H52.4 MYOPIA WITH ASTIGMATISM AND PRESBYOPIA, BILATERAL: ICD-10-CM

## 2023-10-30 DIAGNOSIS — H52.203 MYOPIA WITH ASTIGMATISM AND PRESBYOPIA, BILATERAL: ICD-10-CM

## 2023-10-30 DIAGNOSIS — H04.123 DRY EYE SYNDROME OF BILATERAL LACRIMAL GLANDS: ICD-10-CM

## 2023-10-30 DIAGNOSIS — E11.9 TYPE 2 DIABETES MELLITUS WITHOUT RETINOPATHY: Primary | ICD-10-CM

## 2023-10-30 PROCEDURE — 1101F PR PT FALLS ASSESS DOC 0-1 FALLS W/OUT INJ PAST YR: ICD-10-PCS | Mod: HCNC,CPTII,S$GLB, | Performed by: STUDENT IN AN ORGANIZED HEALTH CARE EDUCATION/TRAINING PROGRAM

## 2023-10-30 PROCEDURE — 1101F PT FALLS ASSESS-DOCD LE1/YR: CPT | Mod: HCNC,CPTII,S$GLB, | Performed by: STUDENT IN AN ORGANIZED HEALTH CARE EDUCATION/TRAINING PROGRAM

## 2023-10-30 PROCEDURE — 92015 DETERMINE REFRACTIVE STATE: CPT | Mod: HCNC,S$GLB,,

## 2023-10-30 PROCEDURE — 99999 PR PBB SHADOW E&M-EST. PATIENT-LVL III: CPT | Mod: PBBFAC,HCNC,, | Performed by: STUDENT IN AN ORGANIZED HEALTH CARE EDUCATION/TRAINING PROGRAM

## 2023-10-30 PROCEDURE — 99203 PR OFFICE/OUTPT VISIT, NEW, LEVL III, 30-44 MIN: ICD-10-PCS | Mod: HCNC,S$GLB,, | Performed by: STUDENT IN AN ORGANIZED HEALTH CARE EDUCATION/TRAINING PROGRAM

## 2023-10-30 PROCEDURE — 92015 PR REFRACTION: ICD-10-PCS | Mod: HCNC,S$GLB,,

## 2023-10-30 PROCEDURE — 1159F MED LIST DOCD IN RCRD: CPT | Mod: HCNC,CPTII,S$GLB,

## 2023-10-30 PROCEDURE — 3288F FALL RISK ASSESSMENT DOCD: CPT | Mod: HCNC,CPTII,S$GLB, | Performed by: STUDENT IN AN ORGANIZED HEALTH CARE EDUCATION/TRAINING PROGRAM

## 2023-10-30 PROCEDURE — 3288F PR FALLS RISK ASSESSMENT DOCUMENTED: ICD-10-PCS | Mod: HCNC,CPTII,S$GLB,

## 2023-10-30 PROCEDURE — 1157F PR ADVANCE CARE PLAN OR EQUIV PRESENT IN MEDICAL RECORD: ICD-10-PCS | Mod: HCNC,CPTII,S$GLB, | Performed by: STUDENT IN AN ORGANIZED HEALTH CARE EDUCATION/TRAINING PROGRAM

## 2023-10-30 PROCEDURE — 1160F PR REVIEW ALL MEDS BY PRESCRIBER/CLIN PHARMACIST DOCUMENTED: ICD-10-PCS | Mod: HCNC,CPTII,S$GLB, | Performed by: STUDENT IN AN ORGANIZED HEALTH CARE EDUCATION/TRAINING PROGRAM

## 2023-10-30 PROCEDURE — 92004 PR EYE EXAM, NEW PATIENT,COMPREHESV: ICD-10-PCS | Mod: HCNC,S$GLB,,

## 2023-10-30 PROCEDURE — 1159F PR MEDICATION LIST DOCUMENTED IN MEDICAL RECORD: ICD-10-PCS | Mod: HCNC,CPTII,S$GLB, | Performed by: STUDENT IN AN ORGANIZED HEALTH CARE EDUCATION/TRAINING PROGRAM

## 2023-10-30 PROCEDURE — 3288F FALL RISK ASSESSMENT DOCD: CPT | Mod: HCNC,CPTII,S$GLB,

## 2023-10-30 PROCEDURE — 1157F ADVNC CARE PLAN IN RCRD: CPT | Mod: HCNC,CPTII,S$GLB, | Performed by: STUDENT IN AN ORGANIZED HEALTH CARE EDUCATION/TRAINING PROGRAM

## 2023-10-30 PROCEDURE — 1126F PR PAIN SEVERITY QUANTIFIED, NO PAIN PRESENT: ICD-10-PCS | Mod: HCNC,CPTII,S$GLB,

## 2023-10-30 PROCEDURE — 99999 PR PBB SHADOW E&M-EST. PATIENT-LVL III: ICD-10-PCS | Mod: PBBFAC,HCNC,, | Performed by: STUDENT IN AN ORGANIZED HEALTH CARE EDUCATION/TRAINING PROGRAM

## 2023-10-30 PROCEDURE — 1159F MED LIST DOCD IN RCRD: CPT | Mod: HCNC,CPTII,S$GLB, | Performed by: STUDENT IN AN ORGANIZED HEALTH CARE EDUCATION/TRAINING PROGRAM

## 2023-10-30 PROCEDURE — 1126F AMNT PAIN NOTED NONE PRSNT: CPT | Mod: HCNC,CPTII,S$GLB,

## 2023-10-30 PROCEDURE — 1159F PR MEDICATION LIST DOCUMENTED IN MEDICAL RECORD: ICD-10-PCS | Mod: HCNC,CPTII,S$GLB,

## 2023-10-30 PROCEDURE — 99999 PR PBB SHADOW E&M-EST. PATIENT-LVL III: ICD-10-PCS | Mod: PBBFAC,HCNC,,

## 2023-10-30 PROCEDURE — 1160F RVW MEDS BY RX/DR IN RCRD: CPT | Mod: HCNC,CPTII,S$GLB, | Performed by: STUDENT IN AN ORGANIZED HEALTH CARE EDUCATION/TRAINING PROGRAM

## 2023-10-30 PROCEDURE — 77080 DXA BONE DENSITY AXIAL SKELETON 1 OR MORE SITES: ICD-10-PCS | Mod: 26,HCNC,, | Performed by: RADIOLOGY

## 2023-10-30 PROCEDURE — 92004 COMPRE OPH EXAM NEW PT 1/>: CPT | Mod: HCNC,S$GLB,,

## 2023-10-30 PROCEDURE — 99999 PR PBB SHADOW E&M-EST. PATIENT-LVL III: CPT | Mod: PBBFAC,HCNC,,

## 2023-10-30 PROCEDURE — 1101F PT FALLS ASSESS-DOCD LE1/YR: CPT | Mod: HCNC,CPTII,S$GLB,

## 2023-10-30 PROCEDURE — 3288F PR FALLS RISK ASSESSMENT DOCUMENTED: ICD-10-PCS | Mod: HCNC,CPTII,S$GLB, | Performed by: STUDENT IN AN ORGANIZED HEALTH CARE EDUCATION/TRAINING PROGRAM

## 2023-10-30 PROCEDURE — 1101F PR PT FALLS ASSESS DOC 0-1 FALLS W/OUT INJ PAST YR: ICD-10-PCS | Mod: HCNC,CPTII,S$GLB,

## 2023-10-30 PROCEDURE — 77080 DXA BONE DENSITY AXIAL: CPT | Mod: TC,HCNC,PO

## 2023-10-30 PROCEDURE — 1125F PR PAIN SEVERITY QUANTIFIED, PAIN PRESENT: ICD-10-PCS | Mod: HCNC,CPTII,S$GLB, | Performed by: STUDENT IN AN ORGANIZED HEALTH CARE EDUCATION/TRAINING PROGRAM

## 2023-10-30 PROCEDURE — 99203 OFFICE O/P NEW LOW 30 MIN: CPT | Mod: HCNC,S$GLB,, | Performed by: STUDENT IN AN ORGANIZED HEALTH CARE EDUCATION/TRAINING PROGRAM

## 2023-10-30 PROCEDURE — 1125F AMNT PAIN NOTED PAIN PRSNT: CPT | Mod: HCNC,CPTII,S$GLB, | Performed by: STUDENT IN AN ORGANIZED HEALTH CARE EDUCATION/TRAINING PROGRAM

## 2023-10-30 PROCEDURE — 77080 DXA BONE DENSITY AXIAL: CPT | Mod: 26,HCNC,, | Performed by: RADIOLOGY

## 2023-10-30 NOTE — PROGRESS NOTES
HPI    New pt here for diabetic exam. Last exam- 3 years    Pt sts DVA could be stronger, reading stable. Pt denies   flashes/floaters/pain. Pt denies use of gtt. Pt sts DM under control.    Hemoglobin A1C       Date                     Value               Ref Range             Status                10/03/2023               6.1 (H)             0.0 - 5.6 %           Final                  09/14/2023               5.9 (H)             4.0 - 5.6 %           Final                 04/12/2023               6.2 (H)             4.0 - 5.6 %           Final            Last edited by Aleksandra Jain, OD on 10/30/2023  8:33 AM.            Assessment /Plan     For exam results, see Encounter Report.    Type 2 diabetes mellitus without retinopathy    Type 2 diabetes mellitus with stage 3b chronic kidney disease, without long-term current use of insulin  -     Ambulatory referral/consult to Optometry    Dry eye syndrome of bilateral lacrimal glands    Pseudophakia of both eyes    Myopia with astigmatism and presbyopia, bilateral      1-2. No diabetic retinopathy or macular edema evident on today's exam OD, OS. Ed pt on importance of maintaining strict BS control due to potential for visual fluctuations and/or vision loss. Monitor with yearly dilated exam.    3. 2+ interpalpebral SPK with corresponding reduction in BCVA OS. Trace SPK OD. Pt largely asymptomatic. Ed pt on findings and on nature/chronicity of dry eye. Gave OTC artificial tear recommendations and advised pt to use BID-TID every day. Ed pt to RTC if symptoms worsen or fail to resolve.    4. PCIOL OU. Stable. Monitor yearly for changes.    5. Discussed spectacle options with pt and released final spec rx. Ed pt on change in rx and adaptation.      RTC: 1 year for comprehensive exam or sooner prn

## 2023-10-31 ENCOUNTER — OUTPATIENT CASE MANAGEMENT (OUTPATIENT)
Dept: ADMINISTRATIVE | Facility: OTHER | Age: 78
End: 2023-10-31
Payer: MEDICARE

## 2023-10-31 NOTE — PROGRESS NOTES
Outpatient Care Management  Plan of Care Follow Up Visit    Patient: Trudy R Dakin  MRN: [id number]  Date of Service: 10/31/2023  Completed by: Mary Jane Peralta RN  Referral Date: 10/03/2023    Reason for Visit   Patient presents with    [facility] RN Follow Up Call       Brief Summary: Phone contact made with pt's dtr today. We discussed pt's care plan. Will continue to follow.

## 2023-11-01 ENCOUNTER — HOSPITAL ENCOUNTER (OUTPATIENT)
Dept: CARDIOLOGY | Facility: HOSPITAL | Age: 78
Discharge: HOME | End: 2023-11-01
Attending: INTERNAL MEDICINE
Payer: MEDICARE

## 2023-11-01 ENCOUNTER — HOSPITAL ENCOUNTER (OUTPATIENT)
Dept: CARDIOLOGY | Facility: HOSPITAL | Age: 78
Discharge: HOME | End: 2023-11-01
Attending: INTERNAL MEDICINE | Admitting: INTERNAL MEDICINE
Payer: MEDICARE

## 2023-11-01 ENCOUNTER — HOSPITAL ENCOUNTER (OUTPATIENT)
Facility: HOSPITAL | Age: 78
Discharge: HOME | End: 2023-11-01
Attending: INTERNAL MEDICINE | Admitting: INTERNAL MEDICINE
Payer: MEDICARE

## 2023-11-01 VITALS
HEIGHT: 62 IN | DIASTOLIC BLOOD PRESSURE: 63 MMHG | TEMPERATURE: 97 F | RESPIRATION RATE: 22 BRPM | HEART RATE: 65 BPM | SYSTOLIC BLOOD PRESSURE: 116 MMHG | OXYGEN SATURATION: 94 % | WEIGHT: 123 LBS | BODY MASS INDEX: 22.63 KG/M2

## 2023-11-01 VITALS
WEIGHT: 123 LBS | DIASTOLIC BLOOD PRESSURE: 75 MMHG | SYSTOLIC BLOOD PRESSURE: 124 MMHG | BODY MASS INDEX: 22.63 KG/M2 | HEART RATE: 77 BPM | HEIGHT: 62 IN

## 2023-11-01 VITALS
BODY MASS INDEX: 22.63 KG/M2 | DIASTOLIC BLOOD PRESSURE: 78 MMHG | HEIGHT: 62 IN | HEART RATE: 44 BPM | WEIGHT: 123 LBS | SYSTOLIC BLOOD PRESSURE: 134 MMHG

## 2023-11-01 DIAGNOSIS — I34.0 MITRAL VALVE INSUFFICIENCY, UNSPECIFIED ETIOLOGY: ICD-10-CM

## 2023-11-01 DIAGNOSIS — I34.0 SEVERE MITRAL REGURGITATION: Primary | ICD-10-CM

## 2023-11-01 DIAGNOSIS — I50.43 ACUTE ON CHRONIC COMBINED SYSTOLIC (CONGESTIVE) AND DIASTOLIC (CONGESTIVE) HEART FAILURE: ICD-10-CM

## 2023-11-01 LAB
ANION GAP SERPL CALC-SCNC: 14 MMOL/L (ref 8–16)
BUN SERPL-MCNC: 37 MG/DL (ref 8–23)
CALCIUM SERPL-MCNC: 9.4 MG/DL (ref 8.7–10.5)
CFR FLOW - ANTERIOR: 1.95
CFR FLOW - INFERIOR: 1.97
CFR FLOW - LATERAL: 1.79
CFR FLOW - MAX: 2.48
CFR FLOW - MIN: 1.2
CFR FLOW - SEPTAL: 1.73
CFR FLOW - WHOLE HEART: 1.86
CHLORIDE SERPL-SCNC: 108 MMOL/L (ref 95–110)
CO2 SERPL-SCNC: 23 MMOL/L (ref 23–29)
CREAT SERPL-MCNC: 1.6 MG/DL (ref 0.5–1.4)
CV PHARM DOSE: 0.4 MG
CV STRESS BASE HR: 77 BPM
DIASTOLIC BLOOD PRESSURE: 75 MMHG
EJECTION FRACTION- HIGH: 59 %
END DIASTOLIC INDEX-HIGH: 155 ML/M2
END DIASTOLIC INDEX-LOW: 91 ML/M2
END SYSTOLIC INDEX-HIGH: 78 ML/M2
END SYSTOLIC INDEX-LOW: 40 ML/M2
ERYTHROCYTE [DISTWIDTH] IN BLOOD BY AUTOMATED COUNT: 16.6 % (ref 11.5–14.5)
EST. GFR  (NO RACE VARIABLE): 32.8 ML/MIN/1.73 M^2
GLUCOSE SERPL-MCNC: 116 MG/DL (ref 70–110)
HCT VFR BLD AUTO: 31.1 % (ref 37–48.5)
HGB BLD-MCNC: 9.5 G/DL (ref 12–16)
INR PPP: 1.1 (ref 0.8–1.2)
MCH RBC QN AUTO: 27.4 PG (ref 27–31)
MCHC RBC AUTO-ENTMCNC: 30.5 G/DL (ref 32–36)
MCV RBC AUTO: 90 FL (ref 82–98)
NUC REST DIASTOLIC VOLUME INDEX: 120
NUC REST EJECTION FRACTION: 60
NUC REST SYSTOLIC VOLUME INDEX: 48
OHS CV CPX 85 PERCENT MAX PREDICTED HEART RATE MALE: 121
OHS CV CPX MAX PREDICTED HEART RATE: 142
OHS CV CPX PATIENT IS FEMALE: 1
OHS CV CPX PATIENT IS MALE: 0
OHS CV CPX PEAK DIASTOLIC BLOOD PRESSURE: 66 MMHG
OHS CV CPX PEAK HEAR RATE: 81 BPM
OHS CV CPX PEAK RATE PRESSURE PRODUCT: 9801
OHS CV CPX PEAK SYSTOLIC BLOOD PRESSURE: 121 MMHG
OHS CV CPX PERCENT MAX PREDICTED HEART RATE ACHIEVED: 59
OHS CV CPX RATE PRESSURE PRODUCT PRESENTING: 9548
PLATELET # BLD AUTO: 268 K/UL (ref 150–450)
PMV BLD AUTO: 10.9 FL (ref 9.2–12.9)
POCT GLUCOSE: 103 MG/DL (ref 70–110)
POCT GLUCOSE: 118 MG/DL (ref 70–110)
POTASSIUM SERPL-SCNC: 3.5 MMOL/L (ref 3.5–5.1)
PROTHROMBIN TIME: 11.6 SEC (ref 9–12.5)
RBC # BLD AUTO: 3.47 M/UL (ref 4–5.4)
REST FLOW - ANTERIOR: 0.86 CC/MIN/G
REST FLOW - INFERIOR: 0.87 CC/MIN/G
REST FLOW - LATERAL: 1.13 CC/MIN/G
REST FLOW - MAX: 1.43 CC/MIN/G
REST FLOW - MIN: 0.54 CC/MIN/G
REST FLOW - SEPTAL: 0.92 CC/MIN/G
REST FLOW - WHOLE HEART: 0.95 CC/MIN/G
RETIRED EF AND QEF - SEE NOTES: 47 %
SODIUM SERPL-SCNC: 145 MMOL/L (ref 136–145)
STRESS FLOW - ANTERIOR: 1.86 CC/MIN/G
STRESS FLOW - INFERIOR: 1.7 CC/MIN/G
STRESS FLOW - LATERAL: 2.01 CC/MIN/G
STRESS FLOW - MAX: 2.44 CC/MIN/G
STRESS FLOW - MIN: 0.82 CC/MIN/G
STRESS FLOW - SEPTAL: 1.6 CC/MIN/G
STRESS FLOW - WHOLE HEART: 1.79 CC/MIN/G
SYSTOLIC BLOOD PRESSURE: 124 MMHG
WBC # BLD AUTO: 6.52 K/UL (ref 3.9–12.7)

## 2023-11-01 PROCEDURE — 37000009 HC ANESTHESIA EA ADD 15 MINS: Mod: HCNC

## 2023-11-01 PROCEDURE — 93325 DOPPLER ECHO COLOR FLOW MAPG: CPT | Mod: HCNC

## 2023-11-01 PROCEDURE — 78431 MYOCRD IMG PET RST&STRS CT: CPT | Mod: HCNC

## 2023-11-01 PROCEDURE — 63600175 PHARM REV CODE 636 W HCPCS: Mod: HCNC | Performed by: NURSE ANESTHETIST, CERTIFIED REGISTERED

## 2023-11-01 PROCEDURE — 93005 ELECTROCARDIOGRAM TRACING: CPT | Mod: HCNC

## 2023-11-01 PROCEDURE — 63600175 PHARM REV CODE 636 W HCPCS: Mod: HCNC | Performed by: INTERNAL MEDICINE

## 2023-11-01 PROCEDURE — D9220A PRA ANESTHESIA: ICD-10-PCS | Mod: HCNC,ANES,, | Performed by: STUDENT IN AN ORGANIZED HEALTH CARE EDUCATION/TRAINING PROGRAM

## 2023-11-01 PROCEDURE — 93320 DOPPLER ECHO COMPLETE: CPT | Mod: 26,HCNC,, | Performed by: INTERNAL MEDICINE

## 2023-11-01 PROCEDURE — 78431 MYOCRD IMG PET RST&STRS CT: CPT | Mod: 26,HCNC,, | Performed by: INTERNAL MEDICINE

## 2023-11-01 PROCEDURE — 93325 DOPPLER ECHO COLOR FLOW MAPG: CPT | Mod: 26,HCNC,, | Performed by: INTERNAL MEDICINE

## 2023-11-01 PROCEDURE — 80048 BASIC METABOLIC PNL TOTAL CA: CPT | Mod: HCNC | Performed by: STUDENT IN AN ORGANIZED HEALTH CARE EDUCATION/TRAINING PROGRAM

## 2023-11-01 PROCEDURE — 93018 CV STRESS TEST I&R ONLY: CPT | Mod: HCNC,,, | Performed by: INTERNAL MEDICINE

## 2023-11-01 PROCEDURE — 93018 CARDIAC PET SCAN STRESS (CUPID ONLY): ICD-10-PCS | Mod: HCNC,,, | Performed by: INTERNAL MEDICINE

## 2023-11-01 PROCEDURE — 93312 ECHO TRANSESOPHAGEAL: CPT | Mod: 26,HCNC,, | Performed by: INTERNAL MEDICINE

## 2023-11-01 PROCEDURE — 78434 AQMBF PET REST & RX STRESS: CPT | Mod: HCNC

## 2023-11-01 PROCEDURE — 93312 TRANSESOPHAGEAL ECHO (TEE) (CUPID ONLY): ICD-10-PCS | Mod: 26,HCNC,, | Performed by: INTERNAL MEDICINE

## 2023-11-01 PROCEDURE — 93325 TRANSESOPHAGEAL ECHO (TEE) (CUPID ONLY): ICD-10-PCS | Mod: 26,HCNC,, | Performed by: INTERNAL MEDICINE

## 2023-11-01 PROCEDURE — D9220A PRA ANESTHESIA: Mod: HCNC,CRNA,, | Performed by: NURSE ANESTHETIST, CERTIFIED REGISTERED

## 2023-11-01 PROCEDURE — 78434 CARDIAC PET SCAN STRESS (CUPID ONLY): ICD-10-PCS | Mod: 26,HCNC,, | Performed by: INTERNAL MEDICINE

## 2023-11-01 PROCEDURE — 85027 COMPLETE CBC AUTOMATED: CPT | Mod: HCNC | Performed by: STUDENT IN AN ORGANIZED HEALTH CARE EDUCATION/TRAINING PROGRAM

## 2023-11-01 PROCEDURE — 93016 CV STRESS TEST SUPVJ ONLY: CPT | Mod: HCNC,,, | Performed by: INTERNAL MEDICINE

## 2023-11-01 PROCEDURE — 25000003 PHARM REV CODE 250: Mod: HCNC | Performed by: NURSE ANESTHETIST, CERTIFIED REGISTERED

## 2023-11-01 PROCEDURE — 37000008 HC ANESTHESIA 1ST 15 MINUTES: Mod: HCNC

## 2023-11-01 PROCEDURE — 78434 AQMBF PET REST & RX STRESS: CPT | Mod: 26,HCNC,, | Performed by: INTERNAL MEDICINE

## 2023-11-01 PROCEDURE — D9220A PRA ANESTHESIA: ICD-10-PCS | Mod: HCNC,CRNA,, | Performed by: NURSE ANESTHETIST, CERTIFIED REGISTERED

## 2023-11-01 PROCEDURE — 93010 EKG 12-LEAD: ICD-10-PCS | Mod: HCNC,,, | Performed by: INTERNAL MEDICINE

## 2023-11-01 PROCEDURE — 93312 ECHO TRANSESOPHAGEAL: CPT | Mod: HCNC

## 2023-11-01 PROCEDURE — 93010 ELECTROCARDIOGRAM REPORT: CPT | Mod: HCNC,,, | Performed by: INTERNAL MEDICINE

## 2023-11-01 PROCEDURE — 85610 PROTHROMBIN TIME: CPT | Mod: HCNC | Performed by: STUDENT IN AN ORGANIZED HEALTH CARE EDUCATION/TRAINING PROGRAM

## 2023-11-01 PROCEDURE — 93320 TRANSESOPHAGEAL ECHO (TEE) (CUPID ONLY): ICD-10-PCS | Mod: 26,HCNC,, | Performed by: INTERNAL MEDICINE

## 2023-11-01 PROCEDURE — 78431 CARDIAC PET SCAN STRESS (CUPID ONLY): ICD-10-PCS | Mod: 26,HCNC,, | Performed by: INTERNAL MEDICINE

## 2023-11-01 PROCEDURE — 82962 GLUCOSE BLOOD TEST: CPT | Mod: HCNC

## 2023-11-01 PROCEDURE — 93016 CARDIAC PET SCAN STRESS (CUPID ONLY): ICD-10-PCS | Mod: HCNC,,, | Performed by: INTERNAL MEDICINE

## 2023-11-01 PROCEDURE — D9220A PRA ANESTHESIA: Mod: HCNC,ANES,, | Performed by: STUDENT IN AN ORGANIZED HEALTH CARE EDUCATION/TRAINING PROGRAM

## 2023-11-01 RX ORDER — DEXMEDETOMIDINE HYDROCHLORIDE 100 UG/ML
INJECTION, SOLUTION INTRAVENOUS
Status: DISCONTINUED | OUTPATIENT
Start: 2023-11-01 | End: 2023-11-01

## 2023-11-01 RX ORDER — SODIUM CHLORIDE 0.9 % (FLUSH) 0.9 %
3 SYRINGE (ML) INJECTION EVERY 4 HOURS PRN
Status: DISCONTINUED | OUTPATIENT
Start: 2023-11-01 | End: 2023-11-01 | Stop reason: HOSPADM

## 2023-11-01 RX ORDER — HALOPERIDOL 5 MG/ML
0.5 INJECTION INTRAMUSCULAR EVERY 10 MIN PRN
Status: DISCONTINUED | OUTPATIENT
Start: 2023-11-01 | End: 2024-02-23

## 2023-11-01 RX ORDER — REGADENOSON 0.08 MG/ML
0.4 INJECTION, SOLUTION INTRAVENOUS
Status: COMPLETED | OUTPATIENT
Start: 2023-11-01 | End: 2023-11-01

## 2023-11-01 RX ORDER — SODIUM CHLORIDE 0.9 % (FLUSH) 0.9 %
10 SYRINGE (ML) INJECTION
Status: DISCONTINUED | OUTPATIENT
Start: 2023-11-01 | End: 2023-11-01 | Stop reason: HOSPADM

## 2023-11-01 RX ORDER — KETAMINE HYDROCHLORIDE 100 MG/ML
INJECTION, SOLUTION INTRAMUSCULAR; INTRAVENOUS
Status: DISCONTINUED | OUTPATIENT
Start: 2023-11-01 | End: 2023-11-01

## 2023-11-01 RX ORDER — MIDAZOLAM HYDROCHLORIDE 1 MG/ML
INJECTION, SOLUTION INTRAMUSCULAR; INTRAVENOUS
Status: DISCONTINUED | OUTPATIENT
Start: 2023-11-01 | End: 2023-11-01

## 2023-11-01 RX ORDER — LIDOCAINE HYDROCHLORIDE 40 MG/ML
SOLUTION TOPICAL
Status: DISCONTINUED | OUTPATIENT
Start: 2023-11-01 | End: 2023-11-01

## 2023-11-01 RX ORDER — PROCHLORPERAZINE EDISYLATE 5 MG/ML
5 INJECTION INTRAMUSCULAR; INTRAVENOUS EVERY 30 MIN PRN
Status: DISCONTINUED | OUTPATIENT
Start: 2023-11-01 | End: 2023-11-08

## 2023-11-01 RX ORDER — PROPOFOL 10 MG/ML
VIAL (ML) INTRAVENOUS
Status: DISCONTINUED | OUTPATIENT
Start: 2023-11-01 | End: 2023-11-01

## 2023-11-01 RX ORDER — AMINOPHYLLINE 25 MG/ML
75 INJECTION, SOLUTION INTRAVENOUS ONCE
Status: COMPLETED | OUTPATIENT
Start: 2023-11-01 | End: 2023-11-01

## 2023-11-01 RX ORDER — SODIUM CHLORIDE 0.9 % (FLUSH) 0.9 %
3 SYRINGE (ML) INJECTION
Status: DISCONTINUED | OUTPATIENT
Start: 2023-11-01 | End: 2024-02-23

## 2023-11-01 RX ORDER — HYDROMORPHONE HYDROCHLORIDE 1 MG/ML
0.2 INJECTION, SOLUTION INTRAMUSCULAR; INTRAVENOUS; SUBCUTANEOUS EVERY 5 MIN PRN
Status: ACTIVE | OUTPATIENT
Start: 2023-11-01

## 2023-11-01 RX ORDER — ETOMIDATE 2 MG/ML
INJECTION INTRAVENOUS
Status: DISCONTINUED | OUTPATIENT
Start: 2023-11-01 | End: 2023-11-01

## 2023-11-01 RX ADMIN — PROPOFOL 5 MG: 10 INJECTION, EMULSION INTRAVENOUS at 01:11

## 2023-11-01 RX ADMIN — KETAMINE HYDROCHLORIDE 10 MG: 100 INJECTION INTRAMUSCULAR; INTRAVENOUS at 01:11

## 2023-11-01 RX ADMIN — DEXMEDETOMIDINE 16 MCG: 100 INJECTION, SOLUTION, CONCENTRATE INTRAVENOUS at 01:11

## 2023-11-01 RX ADMIN — LIDOCAINE HYDROCHLORIDE 4 ML: 40 SOLUTION ORAL at 01:11

## 2023-11-01 RX ADMIN — MIDAZOLAM 1 MG: 1 INJECTION INTRAMUSCULAR; INTRAVENOUS at 01:11

## 2023-11-01 RX ADMIN — AMINOPHYLLINE 75 MG: 25 INJECTION, SOLUTION INTRAVENOUS at 09:11

## 2023-11-01 RX ADMIN — SODIUM CHLORIDE: 0.9 INJECTION, SOLUTION INTRAVENOUS at 01:11

## 2023-11-01 RX ADMIN — REGADENOSON 0.4 MG: 0.08 INJECTION, SOLUTION INTRAVENOUS at 09:11

## 2023-11-01 RX ADMIN — ETOMIDATE 4 MG: 2 INJECTION, SOLUTION INTRAVENOUS at 01:11

## 2023-11-01 RX ADMIN — PROPOFOL 10 MG: 10 INJECTION, EMULSION INTRAVENOUS at 01:11

## 2023-11-01 RX ADMIN — LIDOCAINE HYDROCHLORIDE 15 ML: 40 SOLUTION ORAL at 01:11

## 2023-11-01 RX ADMIN — ETOMIDATE 6 MG: 2 INJECTION, SOLUTION INTRAVENOUS at 01:11

## 2023-11-01 NOTE — TRANSFER OF CARE
"Anesthesia Transfer of Care Note    Patient: Trudy R Dakin    Procedure(s) Performed: Procedure(s) (LRB):  ECHOCARDIOGRAM, TRANSESOPHAGEAL (N/A)    Patient location: PACU    Anesthesia Type: general    Transport from OR: Transported from OR on 2-3 L/min O2 by NC with adequate spontaneous ventilation    Post pain: adequate analgesia    Post assessment: no apparent anesthetic complications and tolerated procedure well    Post vital signs: stable    Level of consciousness: awake and alert    Nausea/Vomiting: no nausea/vomiting    Complications: none    Transfer of care protocol was followed      Last vitals: Visit Vitals  /63 (BP Location: Left arm, Patient Position: Lying)   Pulse 72   Temp 36.5 °C (97.7 °F) (Temporal)   Resp 18   Ht 5' 2" (1.575 m)   Wt 55.8 kg (123 lb)   LMP 01/01/1970   SpO2 (!) 94%   Breastfeeding No   BMI 22.50 kg/m²     "

## 2023-11-01 NOTE — ANESTHESIA PREPROCEDURE EVALUATION
11/01/2023  Trudy R Dakin is a 78 y.o., female.  [facility]  Anesthesia Pre-Operative Evaluation       Patient Name: Trudy R Dakin  YOB: 1945  MRN: [id number]  [facility]: [id number]      Code Status: Full Code   Date of Procedure: 11/1/2023  Anesthesia: Monitor Anesthesia Care Procedure: Procedure(s) (LRB):  ECHOCARDIOGRAM, TRANSESOPHAGEAL (N/A)  Pre-Operative Diagnosis: Mitral valve insufficiency, unspecified etiology [I34.0]  Proceduralist: Surgeon(s) and Role:     * Provider, [facility] Diagnostic - Primary        SUBJECTIVE:   Trudy R Dakin is a 78 y.o. female who is here today for Procedure(s) (LRB):  ECHOCARDIOGRAM, TRANSESOPHAGEAL (N/A).   No notes on file    Anticoagulants   Medication Route Frequency       she has a current medication list which includes the following long-term medication(s): atorvastatin, bumetanide, buspirone, clopidogrel, escitalopram oxalate, metoprolol succinate, pregabalin, metolazone, nitroglycerin, and nystatin.   ALLERGIES:   Review of patient's allergies indicates:  No Known Allergies  LDA:          Lines/Drains/Airways       Peripheral Intravenous Line  Duration                  Peripheral IV - Single Lumen 11/01/23 1150 20 G Left Antecubital <1 day                    History:   There are no hospital problems to display for this patient.    Patient Active Problem List   Diagnosis    Essential hypertension    Aortic stenosis    Cervical spondylosis with myelopathy    Arthropathy of lumbar facet joint    Chronic constipation    Edema    Insomnia    Lumbar radiculopathy    Lumbar spinal stenosis    Opioid dependence    Osteoarthritis of knee    Senile purpura    Vitamin B12 deficiency    High cholesterol    Diabetes mellitus with neuropathy    Hx of adenomatous colonic polyps    Paroxysmal atrial fibrillation    Cerebral ischemia    CAD (coronary artery  disease)    Acute on chronic diastolic heart failure    Depression    Severe mitral regurgitation    Lumbar disc herniation with radiculopathy    Hypervolemia    Decreased strength in legs    Paralysis of left vocal cord    Type 2 diabetes mellitus with stage 3b chronic kidney disease, without long-term current use of insulin    Impaired mobility    Major depressive disorder, single episode, mild    Hypokalemia    Elevated d-dimer    Lung nodules    Hammer toe    Acute confusional state    Encephalopathy, toxic    Stage 3b chronic kidney disease    Vitamin D deficiency    Hospital discharge follow-up     Medical History   Past Medical History:   Diagnosis Date    CHF (congestive heart failure)     Diabetes     Diverticulitis     Hypertension     Renal disorder      Surgical History:    has a past surgical history that includes Appendectomy; Carpal tunnel release; Abdominal surgery (2006); Mandible fracture surgery (1970); Rotator cuff repair (Left, 11/2016); Oophorectomy (1970); Anterior cervical discectomy w/ fusion (N/A, 8/30/2018); Total Reduction Mammoplasty (Bilateral, 1990); Hysterectomy (1970); Colectomy (07/2020); Transforaminal epidural injection of steroid (Left, 12/23/2020); Epidural steroid injection into lumbar spine (N/A, 1/20/2021); Microdiscectomy of spine (Left, 4/13/2021); and Laryngoscopy (N/A, 1/4/2022).   Social History:    reports that she has never smoked. She has never used smokeless tobacco. She reports that she does not drink alcohol and does not use drugs.     OBJECTIVE:     Vital Signs (Most Recent):  Temp: 36.5 °C (97.7 °F) (11/01/23 1153)  Pulse: 72 (11/01/23 1153)  Resp: 18 (11/01/23 1153)  BP: 114/63 (11/01/23 1154)  SpO2: (!) 94 % (11/01/23 1153) Vital Signs Range (Last 24H):  Temp:  [36.5 °C (97.7 °F)]   Pulse:  [72-77]   Resp:  [18]   BP: (114-124)/(63-75)   SpO2:  [94 %]      Last 3 Vitals:       10/30/2023     9:39 AM 11/1/2023     9:28 AM 11/1/2023    11:53 AM   Vitals - 1 value  "per visit   SYSTOLIC  124 120   DIASTOLIC  75 64   Pulse  77 72   Temp   36.5 °C (97.7 °F)   Resp   18   SPO2   94 %   Weight (lb) 123 123 123   Weight (kg) 55.792 55.792 55.792   Height 5' 2" (1.575 m) 5' 2" (1.575 m) 5' 2" (1.575 m)   BMI (Calculated) 22.5 22.5 22.5   Pain Score Ten       Body mass index is 22.5 kg/m².   Wt Readings from Last 4 Encounters:   11/01/23 55.8 kg (123 lb)   11/01/23 55.8 kg (123 lb)   10/30/23 55.8 kg (123 lb)   10/29/23 55.8 kg (123 lb)     Significant Labs:  Lab Results   Component Value Date    WBC 6.87 10/29/2023    HGB 9.5 (L) 10/29/2023    HCT 30.6 (L) 10/29/2023     10/29/2023     10/29/2023    K 3.5 10/29/2023     10/29/2023    CREATININE 1.61 (H) 10/29/2023    BUN 42 (H) 10/29/2023    CO2 23 10/29/2023    GLU 87 10/29/2023    CALCIUM 9.0 10/29/2023    MG 1.3 (L) 10/02/2023    PHOS 4.8 (H) 04/14/2021    ALKPHOS 87 10/29/2023    ALT 17 10/29/2023    AST 34 10/29/2023    ALBUMIN 4.2 10/29/2023    INR 1.0 05/14/2023    APTT 28.3 03/31/2021    HGBA1C 6.1 (H) 10/03/2023    MICROALBUR 1.6 09/13/2022    TROPONINI 0.017 10/29/2023     (H) 08/04/2023    NTPROBNP 89411 (H) 10/29/2023     Patient's last menstrual period was 01/01/1970.      EKG:   Results for orders placed or performed during the hospital encounter of 10/29/23   EKG 12-lead    Collection Time: 10/29/23  2:08 PM    Narrative    Test Reason : R06.02,    Vent. Rate : 064 BPM     Atrial Rate : 064 BPM     P-R Int : 172 ms          QRS Dur : 096 ms      QT Int : 458 ms       P-R-T Axes : 054 130 102 degrees     QTc Int : 472 ms    Normal sinus rhythm  Incomplete right bundle branch block  Right axis deviation  Nonspecific T wave abnormality  Abnormal ECG      Confirmed by Enrrique Multani MD ([id number]) on 10/31/2023 9:10:02 AM    Referred By: [email or web address]   SELF           Confirmed By:Enrrique Multani MD       TTE:  Results for orders placed or performed during the hospital encounter of 09/12/23   Echo Saline " Bubble? No   Result Value Ref Range    Lopez's Biplane MOD Ejection Fraction 58 %    GLS 20.0 %    Narrative      Left Ventricle: The left ventricle is normal in size. Normal wall   thickness. Normal wall motion. There is normal systolic function with a visually estimated ejection fraction of 55 - 60%. Biplane (2D) method of discs ejection fraction is 58%. Global longitudinal strain is -20.0%.   There is normal diastolic function.    Right Ventricle: Normal right ventricular cavity size. Wall thickness   is normal. Right ventricle wall motion  is normal. Systolic function is   normal.    Aortic Valve: The aortic valve is a trileaflet valve. There is moderate   aortic valve sclerosis. There is moderate annular calcification present.   Mildly restricted motion. There is low flow mild stenosis. Aortic valve   area by VTI is 1.51 cm². Aortic valve peak velocity is 1.84 m/s. Mean   gradient is 9 mmHg. The dimensionless index is 0.45.    Mitral Valve: There is moderate annular dilation present. There is   severe mitral annular calcification present. There is moderate to severe   regurgitation eccentric posteriorly directed. posterior leaflet is   restricted causing mild over riding anterior leaflet and MR, Estimated   Rvol is 48 ml.    Pulmonary Artery: The estimated pulmonary artery systolic pressure is   70 mmHg.    IVC/SVC: Normal venous pressure at 3 mmHg.       EF   Date Value Ref Range Status   07/07/2023 60 % Final   05/15/2023 55 % Final     Nuc Rest EF   Date Value Ref Range Status   11/01/2023 60        Results for orders placed or performed during the hospital encounter of 06/19/18   2D echo with color flow doppler   Result Value Ref Range    EF + QEF 60 55 - 65    Mitral Valve Regurgitation MILD     Diastolic Dysfunction Yes (A)     Aortic Valve Regurgitation TRIVIAL     Mitral Valve Mobility NORMAL          ASSESSMENT/PLAN:         Pre-op Assessment    I have reviewed the Patient Summary Reports.     I have  reviewed the Nursing Notes. I have reviewed the NPO Status.   I have reviewed the Medications.     Review of Systems  Cardiovascular:     Hypertension   CAD       CHF           Coronary Artery Disease:               Congestive Heart Failure (CHF)                Hypertension     Atrial Fibrillation     Renal/:  Chronic Renal Disease        Kidney Function/Disease             Musculoskeletal:  Arthritis        Arthritis          Neurological:    Neuromuscular Disease,           Arthritis                         Neuromuscular Disease   Endocrine:  Diabetes    Diabetes                      Psych:  Psychiatric History                  Physical Exam  General: Well nourished, Cooperative and Alert    Airway:  Mallampati: III / II  Mouth Opening: Normal  TM Distance: Normal  Tongue: Normal  Neck ROM: Normal ROM    Dental:  Intact    Chest/Lungs:  Normal Respiratory Rate    Heart:  Rate: Normal        Anesthesia Plan  Type of Anesthesia, risks & benefits discussed:    Anesthesia Type: Gen Natural Airway, MAC  Intra-op Monitoring Plan: Standard ASA Monitors  Post Op Pain Control Plan: IV/PO Opioids PRN  Induction:  IV  Informed Consent: Informed consent signed with the Patient and all parties understand the risks and agree with anesthesia plan.  All questions answered.   ASA Score: 3  Day of Surgery Review of History & Physical: H&P Update referred to the surgeon/provider.    Ready For Surgery From Anesthesia Perspective.     .

## 2023-11-01 NOTE — PLAN OF CARE
Received pt to floor from home accompanied by daughter in law.  AAO x 4. Denies pain or discomfort. Respirations even and unlabored. No distress noted. Pt stable.  Admit assessment complete. IV x 1 placed.  Pt oriented to room and call bell placed within reach.  Will continue to monitor.

## 2023-11-01 NOTE — H&P
[facility]  Cardiology  PORTIA History & Physical      Trudy R Dakin  YOB: 1945  Medical Record Number:  [id number]  Attending Physician:  Kevin More MD   Date of Admission: 11/1/2023       Hospital Day:  0  Current Principal Problem:  Mitral regurgitation      History     Cc: PORTIA for mitral valve evaluation    HPI  Last OV with Dr. More on 9/12/23.   78 y.o. 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 She was referred to Dr. More for evaluation of severe MR (NYHA Class III  sx). She has had several admissions/hospital observations for HF symptoms. Patient reports that she was recently admitted at  with acute shortness of breath, she decompensated and had to be admitted to an intensive care unit, during that admission she had LAD PCI, other coronary arteries were reported to be normal per notes. She has been having dyspnea with minimal exertion, if she walks across the room she gets short of breath, some days she has orthopnea and PND.  She was also recently evaluated by Dr. Cook for severe aortic stenosis, and was deemed inoperable for surgical aortic valve replacement (SAVR) due to subjective frailty (uses walker) and comorbidities, but appropriate candidate for TAVR evaluation.  3 days ago, she presented to the ED c/o headache and SOB when lying flat for 2 weeks. Her NT-proBNP at that time was 11,000 (previously 1041-5638 in June/July.) She was discharged home a few hours later.    Today, feeling ok, daughter at bedside.  Had a PET stress test this morning.    Anticoagulant/antiplatelets: plavix  ECG: NSR, rate 77  Platelet count: 299    History of stroke:  TIA 4-5 years ago  Dysphagia or odynophagia:  no  Liver Disease, esophageal disease, or known varices:  no  Upper GI Bleeding:  no  Snoring:  no   Sleep Apnea:   no  Prior neck surgery or radiation:  no  History of anesthetic difficulties:  no  Family history of anesthetic difficulties:  no  Last oral intake: last pm   Able to move neck in all directions:  yes  GLP-1 Use: no        Medications - Outpatient  Prior to Admission medications    Medication Sig Start Date End Date Taking? Authorizing Provider   acetaminophen (TYLENOL) 650 MG TbSR Take 1 tablet (650 mg total) by mouth every 8 (eight) hours. 1/4/22   Ariel Pal MD   atorvastatin (LIPITOR) 80 MG tablet Take 1 tablet (80 mg total) by mouth once daily. 9/21/23   Camilo Patel MD   bumetanide (BUMEX) 2 MG tablet Take 1 tablet (2 mg total) by mouth 2 (two) times daily. 6/30/23   Camilo Gomez MD   busPIRone (BUSPAR) 5 MG Tab Take half tablet (0.5mg) by mouth twice a day as needed for anxiety 10/4/23   Melissa Cooper MD   clopidogreL (PLAVIX) 75 mg tablet TAKE ONE TABLET BY MOUTH EVERY DAY 10/23/23   Camilo Patel MD   cyanocobalamin 1,000 mcg/mL injection INJECT 1 ML INTO THE MUSCLE EVERY 14 DAYS  Patient taking differently: Inject 1,000 mcg into the muscle every 30 days. 8/7/22   Aaron Tim MD   ergocalciferol (ERGOCALCIFEROL) 50,000 unit Cap Take 1 capsule (50,000 Units total) by mouth every 7 days. After completion of therapy will need maintenance dose. for 8 doses 10/4/23 11/23/23  Melissa Cooper MD   EScitalopram oxalate (LEXAPRO) 20 MG tablet Take 1 tablet (20 mg total) by mouth once daily. 9/21/23 9/15/24  Camilo Patel MD   metOLazone (ZAROXOLYN) 2.5 MG tablet Take 1 tablet (2.5 mg total) by mouth daily as needed. 10/4/23 10/3/24  Melissa Cooper MD   metoprolol succinate (TOPROL-XL) 25 MG 24 hr tablet Take 1 tablet (25 mg total) by mouth once daily. 9/21/23 9/20/24  Camilo Patel MD   nitroGLYCERIN (NITROSTAT) 0.4 MG SL tablet Place 1 tablet (0.4 mg total) under the tongue every 5 (five) minutes as needed for Chest pain. 3/28/23   Aaron Tim,  MD   nystatin (MYCOSTATIN) cream Apply topically 2 (two) times daily. 9/13/22   Aaron Tim MD   pregabalin (LYRICA) 75 MG capsule Take 1 capsule (75 mg total) by mouth 2 (two) times daily. 10/4/23 10/3/24  Melissa Cooper MD         Medications - Current  Scheduled Meds:  Continuous Infusions:  PRN Meds:.      Allergies  Review of patient's allergies indicates:  No Known Allergies      Past Medical History  Past Medical History:   Diagnosis Date    CHF (congestive heart failure)     Diabetes     Diverticulitis     Hypertension     Renal disorder          Past Surgical History  Past Surgical History:   Procedure Laterality Date    ABDOMINAL SURGERY  2006    diverticulitis x3    ANTERIOR CERVICAL DISCECTOMY W/ FUSION N/A 8/30/2018    FUSION C6-7 Surgeon: Rickey Martin MD    APPENDECTOMY      CARPAL TUNNEL RELEASE      COLECTOMY  07/2020    EPIDURAL STEROID INJECTION INTO LUMBAR SPINE N/A 1/20/2021    Procedure: Injection-steroid-epidural-lumbar--L3-4;  Surgeon: Colleen Carvajal MD;  [facility];  Service: Pain Management;  Laterality: N/A;    HYSTERECTOMY  1970    @25yrs of age    LARYNGOSCOPY N/A 1/4/2022    Procedure: Suspension microlaryngoscopy with left vocal fold injection augmentation - Restylane L;  Surgeon: Yuan Mir MD;  Location: [address];  Service: ENT;  Laterality: N/A;  Microscope, telescopes, tower, injector, Restylane-L    MANDIBLE FRACTURE SURGERY  1970    MICRODISCECTOMY OF SPINE Left 4/13/2021    Procedure: MICRODISCECTOMY, SPINE Left L3-4 far lateral Microdiscectomy;  Surgeon: Rickey Martin MD;  [facility];  Service: Neurosurgery;  Laterality: Left;  Procedure: Left L3-4 far lateral Microdiscectomy   Surgery Time: 1.5 hrs  LOS: 0-1  Anesthesia: General  Radiology: C-arm  SNS: EMG, SEP  Bed: [place] [id number] Poster  Position: Melissa Demarco w/ Globus confirmed 4/12/21 [place]    OOPHORECTOMY  1970    ROTATOR CUFF REPAIR Left 11/2016    TOTAL REDUCTION  MAMMOPLASTY Bilateral 1990    TRANSFORAMINAL EPIDURAL INJECTION OF STEROID Left 12/23/2020    Procedure: Injection,steroid,epidural,transforaminal approach--Left L4 and L5;  Surgeon: Colleen Carvajal MD;  Location: [facility];  Service: Pain Management;  Laterality: Left;         Social History  Social History     Socioeconomic History    Marital status:    Tobacco Use    Smoking status: Never    Smokeless tobacco: Never   Substance and Sexual Activity    Alcohol use: No    Drug use: No    Sexual activity: Never     Social Determinants of Health     Financial Resource Strain: High Risk (10/18/2023)    Overall Financial Resource Strain (CARDIA)     Difficulty of Paying Living Expenses: Hard   Food Insecurity: Food Insecurity Present (10/18/2023)    Hunger Vital Sign     Worried About Running Out of Food in the Last Year: Often true     Ran Out of Food in the Last Year: Never true   Transportation Needs: No Transportation Needs (10/18/2023)    PRAPARE - Transportation     Lack of Transportation (Medical): No     Lack of Transportation (Non-Medical): No   Physical Activity: Inactive (10/18/2023)    Exercise Vital Sign     Days of Exercise per Week: 0 days     Minutes of Exercise per Session: 0 min   Stress: No Stress Concern Present (10/18/2023)    [employer] - Occupational Stress Questionnaire     Feeling of Stress : Only a little   Social Connections: Socially Isolated (10/18/2023)    Social Connection and Isolation Panel [NHANES]     Frequency of Communication with Friends and Family: More than three times a week     Frequency of Social Gatherings with Friends and Family: Once a week     Attends Mosque Services: Never     Active Member of Clubs or Organizations: No     Attends Club or Organization Meetings: Never     Marital Status:    Housing Stability: High Risk (10/18/2023)    Housing Stability Vital Sign     Unable to Pay for Housing in the Last Year: Yes      "Number of Places Lived in the Last Year: 2     Unstable Housing in the Last Year: No         ROS  Review of Systems   Constitutional: Negative for chills.   HENT: Negative.     Eyes: Negative.    Cardiovascular:  Negative for chest pain, dyspnea on exertion and palpitations.   Respiratory: Negative.  Negative for shortness of breath and sleep disturbances due to breathing.    Endocrine: Negative.    Musculoskeletal: Negative.    Gastrointestinal:  Negative for hematemesis, melena, nausea and vomiting.   Genitourinary: Negative.    Neurological: Negative.    Psychiatric/Behavioral: Negative.  Negative for altered mental status.    Allergic/Immunologic: Negative.    Physical Examination         Vital Signs  24 Hour VS Range    Temp:  [97.7 °F (36.5 °C)]   Pulse:  [72-77]   Resp:  [18]   BP: (114-124)/(63-75)   SpO2:  [94 %]   No intake or output data in the 24 hours ending 11/01/23 1158      Physical Exam:   Physical Exam  Constitutional:       General: She is not in acute distress.     Appearance: Normal appearance.   HENT:      Head: Normocephalic.      Mouth/Throat:      Mouth: Mucous membranes are moist.   Cardiovascular:      Rate and Rhythm: Normal rate and regular rhythm.      Heart sounds: Murmur heard.   Pulmonary:      Effort: Pulmonary effort is normal.      Breath sounds: Normal breath sounds.   Abdominal:      Palpations: Abdomen is soft.   Musculoskeletal:      Right lower leg: No edema.      Left lower leg: No edema.   Skin:     General: Skin is warm and dry.   Neurological:      Mental Status: She is alert and oriented to person, place, and time.               Data       Recent Labs   Lab 10/29/23  1423   WBC 6.87   HGB 9.5*   HCT 30.6*           No results for input(s): "PROTIME", "INR" in the last 168 hours.     Recent Labs   Lab 10/29/23  1423      K 3.5      CO2 23   BUN 42*   CREATININE 1.61*   ANIONGAP 13*   CALCIUM 9.0        Recent Labs   Lab 10/29/23  1423   PROT 7.8 " "  ALBUMIN 4.2   BILITOT 0.9   ALKPHOS 87   AST 34   ALT 17        Recent Labs   Lab 10/29/23  1423   TROPONINI 0.017        BNP (pg/mL)   Date Value   08/04/2023 411 (H)   06/30/2023 679 (H)   05/14/2023 267 (H)   04/14/2021 390 (H)   05/09/2017 91       No results for input(s): "LABBLOO" in the last 168 hours.         Assessment & Plan     #Severe mitral regurgitation  - proceed with PORTIA      TTE 9/12/23:    Left Ventricle: The left ventricle is normal in size. Normal wall thickness. Normal wall motion. There is normal systolic function with a visually estimated ejection fraction of 55 - 60%. Biplane (2D) method of discs ejection fraction is 58%. Global longitudinal strain is -20.0%. There is normal diastolic function.    Right Ventricle: Normal right ventricular cavity size. Wall thickness is normal. Right ventricle wall motion  is normal. Systolic function is normal.    Aortic Valve: The aortic valve is a trileaflet valve. There is moderate aortic valve sclerosis. There is moderate annular calcification present. Mildly restricted motion. There is low flow mild stenosis. Aortic valve area by VTI is 1.51 cm². Aortic valve peak velocity is 1.84 m/s. Mean gradient is 9 mmHg. The dimensionless index is 0.45.    Mitral Valve: There is moderate annular dilation present. There is severe mitral annular calcification present. There is moderate to severe regurgitation eccentric posteriorly directed. Posterior leaflet is restricted causing mild over riding anterior leaflet and MR, Estimated Rvol is 48 ml.    Pulmonary Artery: The estimated pulmonary artery systolic pressure is 70 mmHg.    IVC/SVC: Normal venous pressure at 3 mmHg.       TTE 7/7/23:  Concentric hypertrophy and normal systolic function.  The estimated ejection fraction is 60%.  Normal left ventricular diastolic function.  Normal right ventricular size with normal right ventricular systolic function.  Moderate mitral regurgitation.  Mild tricuspid " regurgitation.      -No absolute contraindications of esophageal stricture, tumor, perforation, laceration,or diverticulum and/or active GI bleed.  -The risks, benefits & alternatives of the procedure were explained to the patient.  -The risks of transesophageal echo include but are not limited to:  Dental trauma, esophageal trauma/perforation, bleeding, laryngospasm/brochospasm, aspiration, sore throat/hoarseness, & dislodgement of the endotracheal tube/nasogastric tube (where applicable).  -The risks of moderate sedation include hypotension, respiratory depression, arrhythmias, bronchospasm, & death.  -Informed consent was obtained. The patient is agreeable to proceed with the procedure and all questions and concerns addressed.    Case was discussed with an attending physician in echocardiography lab prior to procedure.    Ani Lopes PA-C  [facility]

## 2023-11-01 NOTE — PLAN OF CARE
Received report from ELIO Castro. Patient s/p PORTIA, AAOx3. VSS, no c/o pain or discomfort at this time, resp even and unlabored. Post procedure protocol reviewed with patient and patient's family. Understanding verbalized. Family members at bedside. Nurse call bell within reach. Will continue to monitor per post procedure protocol.

## 2023-11-01 NOTE — NURSING TRANSFER
Nursing Transfer Note      11/1/2023   2:55 PM    Reason patient is being transferred: pt released from anesth by madhavi    Transfer To: [facility]    Transfer via stretcher    Transfer with     Transported by MODE Mendes RN    Telemetry:     Medicines sent: none    Any special needs or follow-up needed:     Patient belongings transferred with patient: No    Chart send with patient: Yes    Notified: daughter    Patient reassessed at: 1430 11/1/23    Upon arrival to floor: patient oriented to room, call bell in reach, and bed in lowest position

## 2023-11-01 NOTE — DISCHARGE SUMMARY
[facility]  Cardiology  Discharge Summary      Patient Name: Trudy R Dakin  MRN: [id number]  Admission Date: 11/1/2023  Hospital Length of Stay: 0 days  Discharge Date and Time:  11/01/2023 3:20 PM  Attending Physician: Kevin More MD    Discharging Provider: Ani Lopes PA-C  Primary Care Physician: Camilo Patel MD    HPI:   Last OV with Dr. More on 9/12/23.  78 y.o. 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 She was referred to Dr. More for evaluation of severe MR (NYHA Class III  sx). She has had several admissions/hospital observations for HF symptoms. Patient reports that she was recently admitted at  with acute shortness of breath, she decompensated and had to be admitted to an intensive care unit, during that admission she had LAD PCI, other coronary arteries were reported to be normal per notes. She has been having dyspnea with minimal exertion, if she walks across the room she gets short of breath, some days she has orthopnea and PND.  She was also recently evaluated by Dr. Cook for severe aortic stenosis, and was deemed inoperable for surgical aortic valve replacement (SAVR) due to subjective frailty (uses walker) and comorbidities, but appropriate candidate for TAVR evaluation.  3 days ago, she presented to the ED c/o headache and SOB when lying flat for 2 weeks. Her NT-proBNP at that time was 11,000 (previously 1754-1727 in June/July.) She was discharged home a few hours later.     Today, feeling ok, daughter at bedside.  Had a PET stress test this morning.    Procedure(s) (LRB):  ECHOCARDIOGRAM, TRANSESOPHAGEAL (N/A)     Indwelling Lines/Drains at time of discharge:  Lines/Drains/Airways       None                   Hospital Course:  Successful PORTIA. Patient tolerated the procedure without any acute complications. Plan to continue  all home medications as prescribed. Instructed to follow up with Dr. More outpatient.   Patient was assessed at bedside prior to discharge, they reported feeling well and denied chest discomfort, shortness of breath, palpitations, lightheadedness, or any other acute symptoms. Discharge instructions were discussed with patient and all questions were answered. Patient was discharged home in stable condition.    Goals of Care Treatment Preferences:  Code Status: Full Code      Significant Diagnostic Studies: PORTIA - final report pending    Pending Diagnostic Studies:       Procedure Component Value Units Date/Time    Basic metabolic panel [[id number]] Collected: 11/01/23 1157    Order Status: Sent Lab Status: In process Updated: 11/01/23 1249    Specimen: Blood     Protime-INR [[id number]] Collected: 11/01/23 1157    Order Status: Sent Lab Status: In process Updated: 11/01/23 1249    Specimen: Blood     Transesophageal echo (PORTIA) [[id number]]     Order Status: Sent Lab Status: No result             There are no hospital problems to display for this patient.    No new Assessment & Plan notes have been filed under this hospital service since the last note was generated.  Service: Cardiology      Discharged Condition: stable    Disposition: Home or Self Care    Follow Up: Dr. More outpatient    Patient Instructions:   No discharge procedures on file.  Medications:  Reconciled Home Medications:      Medication List        CHANGE how you take these medications      cyanocobalamin 1,000 mcg/mL injection  INJECT 1 ML INTO THE MUSCLE EVERY 14 DAYS  What changed: when to take this            CONTINUE taking these medications      acetaminophen 650 MG Tbsr  Commonly known as: TYLENOL  Take 1 tablet (650 mg total) by mouth every 8 (eight) hours.     atorvastatin 80 MG tablet  Commonly known as: LIPITOR  Take 1 tablet (80 mg total) by mouth once daily.     bumetanide 2 MG tablet  Commonly known as: BUMEX  Take 1 tablet (2 mg  total) by mouth 2 (two) times daily.     busPIRone 5 MG Tab  Commonly known as: BUSPAR  Take half tablet (0.5mg) by mouth twice a day as needed for anxiety     clopidogreL 75 mg tablet  Commonly known as: PLAVIX  TAKE ONE TABLET BY MOUTH EVERY DAY     ergocalciferol 50,000 unit Cap  Commonly known as: ERGOCALCIFEROL  Take 1 capsule (50,000 Units total) by mouth every 7 days. After completion of therapy will need maintenance dose. for 8 doses     EScitalopram oxalate 20 MG tablet  Commonly known as: LEXAPRO  Take 1 tablet (20 mg total) by mouth once daily.     metOLazone 2.5 MG tablet  Commonly known as: ZAROXOLYN  Take 1 tablet (2.5 mg total) by mouth daily as needed.     metoprolol succinate 25 MG 24 hr tablet  Commonly known as: TOPROL-XL  Take 1 tablet (25 mg total) by mouth once daily.     nitroGLYCERIN 0.4 MG SL tablet  Commonly known as: NITROSTAT  Place 1 tablet (0.4 mg total) under the tongue every 5 (five) minutes as needed for Chest pain.     nystatin cream  Commonly known as: MYCOSTATIN  Apply topically 2 (two) times daily.     pregabalin 75 MG capsule  Commonly known as: LYRICA  Take 1 capsule (75 mg total) by mouth 2 (two) times daily.              Time spent on the discharge of patient: 35 minutes    Ani Lopes PA-C  Cardiology  [facility]

## 2023-11-01 NOTE — DISCHARGE INSTRUCTIONS
Medications:  -Continue to take your home medications as listed on your medication list after you are discharged. No changes are being made today.    Follow up: in clinic with Dr. More as scheduled.    Diet  -You may resume oral intake after you are discharged, as long you have no swallowing difficulties.    Because you have received sedation for this procedure:  -Limit activity for the remainder of the day.  -Do not smoke for at least 6 hours and until you are fully awake and alert.  -Do not drink alcoholic beverage for 24 hours.  -Do not drive for 24 hours.  -Defer important decision making until the following day.     Go to the Emergency Department if you develop:   -Bleeding  -Weakness or numbness  -Visual, gait or speech disturbance  -New chest pain, palpitations, shortness of breath, rapid heart beat, or fainting  -Fever

## 2023-11-01 NOTE — HOSPITAL COURSE
Successful PORTIA. Patient tolerated the procedure without any acute complications. Plan to continue all home medications as prescribed. Instructed to follow up with Dr. More outpatient.   Patient was assessed at bedside prior to discharge, they reported feeling well and denied chest discomfort, shortness of breath, palpitations, lightheadedness, or any other acute symptoms. Discharge instructions were discussed with patient and all questions were answered. Patient was discharged home in stable condition.

## 2023-11-02 NOTE — ANESTHESIA POSTPROCEDURE EVALUATION
Anesthesia Post Evaluation    Patient: Trudy R Dakin    Procedure(s) Performed: Procedure(s) (LRB):  ECHOCARDIOGRAM, TRANSESOPHAGEAL (N/A)    Final Anesthesia Type: general      Patient location during evaluation: PACU  Patient participation: Yes- Able to Participate  Level of consciousness: awake and alert  Post-procedure vital signs: reviewed and stable  Pain management: adequate  Airway patency: patent    PONV status at discharge: No PONV  Anesthetic complications: no      Cardiovascular status: blood pressure returned to baseline  Respiratory status: unassisted  Hydration status: euvolemic  Follow-up not needed.          Vitals Value Taken Time   /63 11/01/23 1515   Temp 36.1 °C (97 °F) 11/01/23 1500   Pulse 65 11/01/23 1515   Resp 22 11/01/23 1500   SpO2 94 % 11/01/23 1500         No case tracking events are documented in the log.      Pain/Jason Score: No data recorded

## 2023-11-05 ENCOUNTER — TELEPHONE (OUTPATIENT)
Dept: FAMILY MEDICINE | Facility: CLINIC | Age: 78
End: 2023-11-05
Payer: MEDICARE

## 2023-11-05 DIAGNOSIS — B37.2 YEAST DERMATITIS: Primary | ICD-10-CM

## 2023-11-05 DIAGNOSIS — L30.4 INTERTRIGO: ICD-10-CM

## 2023-11-05 NOTE — TELEPHONE ENCOUNTER
----- Message from Gabriella Castro sent at 11/3/2023  3:29 PM CDT -----  Contact: [facility]  Type:  Needs Medical Advice    Who Called: [facility]    Symptoms (please be specific):   headache for 3 weeks and tylenol is not helping, can she get something for it. No allergies    Yeast rash on groin, can she get some cream for that    Went to ED on 10/29 [facility], does have fluid on lungs.       Pharmacy name and phone #:    [facility] [id number] [place] [id number]  Phone: [phone] Fax: [phone]      Would the patient rather a call back or a response via [facility]? Call back    Best Call Back Number: [phone]    Additional Information: please advise  Thanks

## 2023-11-06 ENCOUNTER — DOCUMENTATION ONLY (OUTPATIENT)
Dept: CARDIAC CATH/INVASIVE PROCEDURES | Facility: HOSPITAL | Age: 78
End: 2023-11-06
Payer: MEDICARE

## 2023-11-06 RX ORDER — NYSTATIN 100000 U/G
CREAM TOPICAL 2 TIMES DAILY
Qty: 15 G | Refills: 1 | Status: SHIPPED | OUTPATIENT
Start: 2023-11-06 | End: 2024-02-23

## 2023-11-06 NOTE — PROGRESS NOTES
Trudy R Dakin is a 78 y.o. female referred by Dr Lety Mcwilliams for evaluation of severe MR (NYHA Class III sx).    Mitral Valve Disease Etiology: Other/Indeterminate (tethering posterior leaflet)    The patient has undergone the following MitraClip work-up:   PORTIA (Date 11/1/2023): Severe mitral insufficiency, MVA needs  cm2, MG 3 mmHg, EOA needs  cm2, EF= 50-55%.   LHC: Deferred for PET stress (11/1/2023) that showed no significant perfusion abnormalities   STS: 8%   Frailty: 2/4   CT Surgery risk assessment: inoperable, per Dr. Cook due to frailty  PFTs: Deferred.  Comorbidities: coronary artery disease. CKD III-IV   Labs: Hgb 9.5, Cr 1.6,     Transcatheter [id number] Repair   Valve: Mitral-Clip  ECMO:  On Call  AIDA access: RTFV  Post op destination: ICU  Antibiotics given: (to be done during procedure)  Heart failure on admission?

## 2023-11-06 NOTE — TELEPHONE ENCOUNTER
"Nystatin cream sent to pharmacy.    Per hospital f/u note (in ER for headache) "continue neurology follow up".  She needs to see the neurologist about her headache.  "

## 2023-11-07 ENCOUNTER — PATIENT MESSAGE (OUTPATIENT)
Dept: CARDIOLOGY | Facility: CLINIC | Age: 78
End: 2023-11-07
Payer: MEDICARE

## 2023-11-07 ENCOUNTER — DOCUMENTATION ONLY (OUTPATIENT)
Dept: CARDIOLOGY | Facility: CLINIC | Age: 78
End: 2023-11-07
Payer: MEDICARE

## 2023-11-07 ENCOUNTER — TELEPHONE (OUTPATIENT)
Dept: CARDIOTHORACIC SURGERY | Facility: CLINIC | Age: 78
End: 2023-11-07
Payer: MEDICARE

## 2023-11-07 DIAGNOSIS — I34.0 MITRAL VALVE INSUFFICIENCY, UNSPECIFIED ETIOLOGY: Primary | ICD-10-CM

## 2023-11-07 PROBLEM — R55 SYNCOPE: Status: ACTIVE | Noted: 2023-11-07

## 2023-11-07 PROBLEM — I27.20 PULMONARY HTN: Status: ACTIVE | Noted: 2023-11-07

## 2023-11-07 PROBLEM — I38 VALVULAR HEART DISEASE: Status: ACTIVE | Noted: 2023-11-07

## 2023-11-07 NOTE — PROGRESS NOTES
You have been scheduled for your Mitraclip procedure on Wednesday, December 20, 2023.  Please report to the Cardiology Waiting Area on the Third floor of the hospital and check in at 6 AM. You will then be taken to the SSCU (Short Stay Cardiac Unit) and prepared for your procedure. Please be aware that this is not the time of your procedure but the time that you are to arrive.     Preperations for your procedure:  Shower with Dial soap the night before and the morning of your procedure.  No solid foods 8 hours prior to your procedure.  You may have clear liquids until the time of your arrival in SSCU which should be 2 hours prior to your procedure.  You are encouraged to drink at least 8 ounces prior to admission.  Patients with gastric Emptying issues should be fasting for 6- 8 hours prior to the procedure.  Clear liquids include water, black coffee, clear juices, and performance drinks - no pulp or milk.    Heart failure or dialysis patients will be limited to 8 ounces (1 cup) of clear liquids up until 2 hours of the procedure.  You may take your regular morning medications         with as much water as necessary.   4.  If you are on Pradaxa, Eliquis, or coumadin you can hold 3 days prior to the procedure.    Bring your cane and/or walker with you to the hospital.  Bring CPAP/BiPAP, or oxygen if you are on oxygen at home.  Call the office for any signs of infection ( fever, cough, pneumonia, urinary tract, etc.) We cannot implant a device in an infected patient.    The entire procedure may take up to three hours. After the procedure, you will return to your room on the third floor where you will be monitored closely for the next several hours.  Your length of stay in the hospital will be determined by your physician. You may be discharged the same day if your physician is satisfied with your progress.  YOu must have someone to drive you home.    Follow up After Your Procedure  It is required that you return to  [facility] for follow up in 1month and in 1 year with an echo, lab work, and a visit with your physician. You can follow up with your regular Cardiologist regarding any other heart issues.     If you should have any questions, concerns, or need to change the date of your procedure, please call "ELIO Harris @ [phone]            Special Instructions:    Continue your current medications.    Ok to continue to take your Plavix until your procedure date.    THE ABOVE INSTRUCTIONS WERE GIVEN TO THE PATIENT VERBALLY AND THEY VERBALIZED UNDERSTANDING.  THEY DO NOT REQUIRE ANY SPECIAL NEEDS AND DO NOT HAVE ANY LEARNING BARRIERS.          Directions for Reporting to Cardiology Waiting Area in the Hospital  If you park in the Parking Garage:  Take elevators to the1st floor of the parking garage.  Continue past the gift shop, coffee shop, and piano.  Take a right and go to the gold elevators. (Elevator B)  Take the elevator to the 3rd floor.  Follow the arrow on the sign on the wall that says Cath Lab Registration/EP Lab Registration.  Follow the long hallway all the way around until you come to a big open area.  This is the registration area.  Check in at Reception Desk.    OR    If family is dropping you off:  Have them drop you off at the front of the Hospital under the green overhang.  Enter through the doors and take a right.  Take the E elevators to the 3rd floor Cardiology Waiting Area.  Check in at the Reception Desk in the waiting room.            "

## 2023-11-07 NOTE — TELEPHONE ENCOUNTER
Forwarded message to DEEPIKA Germain  ----- Message from Gabriella Castro sent at 11/6/2023  3:40 PM CST -----  Contact: Stephanie (care taker)  Type:  Needs Medical Advice    Who Called: Stephanie     Would the patient rather a call back or a response via [facility]? Call back    Best Call Back Number: [phone]    Additional Information: wants to know when the surgery is going to take place.    Please call to advise  Thanks

## 2023-11-08 PROBLEM — R42 VERTIGO: Status: ACTIVE | Noted: 2023-11-07

## 2023-11-08 PROBLEM — R55 SYNCOPE: Status: ACTIVE | Noted: 2023-11-08

## 2023-11-09 PROBLEM — R55 SYNCOPE: Status: RESOLVED | Noted: 2023-11-08 | Resolved: 2023-11-09

## 2023-11-10 ENCOUNTER — TELEPHONE (OUTPATIENT)
Dept: FAMILY MEDICINE | Facility: CLINIC | Age: 78
End: 2023-11-10
Payer: MEDICARE

## 2023-11-10 ENCOUNTER — OUTPATIENT CASE MANAGEMENT (OUTPATIENT)
Dept: ADMINISTRATIVE | Facility: OTHER | Age: 78
End: 2023-11-10
Payer: MEDICARE

## 2023-11-10 NOTE — PROGRESS NOTES
Outpatient Care Management  Plan of Care Follow Up Visit    Patient: Trudy R Dakin  MRN: [id number]  Date of Service: 11/10/2023  Completed by: Mary Jane Peralta RN  Referral Date: 10/03/2023    Reason for Visit   Patient presents with    OPCM Post Discharge       Brief Summary: Phone contact made with the pt's daughter today. Sent message to PCP for nausea issue. Pt has f/u's scheduled.

## 2023-11-10 NOTE — TELEPHONE ENCOUNTER
----- Message from Mary Jane Peralta RN sent at 11/10/2023  2:15 PM CST -----  Good afternoon,     I spoke with this patient's daughter today. She was discharged from the hospital. The patient is having a lot of nausea. She is also still feeling pretty fatigued and weak. Would it be possible to sent in something to help with the nausea? She is due to follow up with your office next week.       Thank you for your assistance,   Mary Jane Peralta RN  Outpatient Complex Care Management  [phone]

## 2023-11-14 ENCOUNTER — TELEPHONE (OUTPATIENT)
Dept: CARDIOLOGY | Facility: CLINIC | Age: 78
End: 2023-11-14
Payer: MEDICARE

## 2023-11-14 ENCOUNTER — TELEPHONE (OUTPATIENT)
Dept: FAMILY MEDICINE | Facility: CLINIC | Age: 78
End: 2023-11-14
Payer: MEDICARE

## 2023-11-14 DIAGNOSIS — R42 VERTIGO: Primary | ICD-10-CM

## 2023-11-14 RX ORDER — MECLIZINE HYDROCHLORIDE 25 MG/1
25 TABLET ORAL 3 TIMES DAILY PRN
Qty: 60 TABLET | Refills: 0 | Status: SHIPPED | OUTPATIENT
Start: 2023-11-14 | End: 2023-12-11

## 2023-11-16 ENCOUNTER — OFFICE VISIT (OUTPATIENT)
Dept: FAMILY MEDICINE | Facility: CLINIC | Age: 78
End: 2023-11-16
Payer: MEDICARE

## 2023-11-16 VITALS
WEIGHT: 130.06 LBS | BODY MASS INDEX: 23.93 KG/M2 | HEART RATE: 56 BPM | SYSTOLIC BLOOD PRESSURE: 138 MMHG | TEMPERATURE: 98 F | DIASTOLIC BLOOD PRESSURE: 70 MMHG | HEIGHT: 62 IN

## 2023-11-16 DIAGNOSIS — R42 VERTIGO: Primary | ICD-10-CM

## 2023-11-16 PROCEDURE — 3075F PR MOST RECENT SYSTOLIC BLOOD PRESS GE 130-139MM HG: ICD-10-PCS | Mod: HCNC,CPTII,S$GLB, | Performed by: FAMILY MEDICINE

## 2023-11-16 PROCEDURE — 3288F FALL RISK ASSESSMENT DOCD: CPT | Mod: HCNC,CPTII,S$GLB, | Performed by: FAMILY MEDICINE

## 2023-11-16 PROCEDURE — 1157F PR ADVANCE CARE PLAN OR EQUIV PRESENT IN MEDICAL RECORD: ICD-10-PCS | Mod: HCNC,CPTII,S$GLB, | Performed by: FAMILY MEDICINE

## 2023-11-16 PROCEDURE — 3078F PR MOST RECENT DIASTOLIC BLOOD PRESSURE < 80 MM HG: ICD-10-PCS | Mod: HCNC,CPTII,S$GLB, | Performed by: FAMILY MEDICINE

## 2023-11-16 PROCEDURE — 3288F PR FALLS RISK ASSESSMENT DOCUMENTED: ICD-10-PCS | Mod: HCNC,CPTII,S$GLB, | Performed by: FAMILY MEDICINE

## 2023-11-16 PROCEDURE — 1126F AMNT PAIN NOTED NONE PRSNT: CPT | Mod: HCNC,CPTII,S$GLB, | Performed by: FAMILY MEDICINE

## 2023-11-16 PROCEDURE — 1126F PR PAIN SEVERITY QUANTIFIED, NO PAIN PRESENT: ICD-10-PCS | Mod: HCNC,CPTII,S$GLB, | Performed by: FAMILY MEDICINE

## 2023-11-16 PROCEDURE — 99999 PR PBB SHADOW E&M-EST. PATIENT-LVL III: ICD-10-PCS | Mod: PBBFAC,HCNC,, | Performed by: FAMILY MEDICINE

## 2023-11-16 PROCEDURE — 1101F PR PT FALLS ASSESS DOC 0-1 FALLS W/OUT INJ PAST YR: ICD-10-PCS | Mod: HCNC,CPTII,S$GLB, | Performed by: FAMILY MEDICINE

## 2023-11-16 PROCEDURE — 1159F PR MEDICATION LIST DOCUMENTED IN MEDICAL RECORD: ICD-10-PCS | Mod: HCNC,CPTII,S$GLB, | Performed by: FAMILY MEDICINE

## 2023-11-16 PROCEDURE — 99999 PR PBB SHADOW E&M-EST. PATIENT-LVL III: CPT | Mod: PBBFAC,HCNC,, | Performed by: FAMILY MEDICINE

## 2023-11-16 PROCEDURE — 1159F MED LIST DOCD IN RCRD: CPT | Mod: HCNC,CPTII,S$GLB, | Performed by: FAMILY MEDICINE

## 2023-11-16 PROCEDURE — 1157F ADVNC CARE PLAN IN RCRD: CPT | Mod: HCNC,CPTII,S$GLB, | Performed by: FAMILY MEDICINE

## 2023-11-16 PROCEDURE — 1160F PR REVIEW ALL MEDS BY PRESCRIBER/CLIN PHARMACIST DOCUMENTED: ICD-10-PCS | Mod: HCNC,CPTII,S$GLB, | Performed by: FAMILY MEDICINE

## 2023-11-16 PROCEDURE — 1160F RVW MEDS BY RX/DR IN RCRD: CPT | Mod: HCNC,CPTII,S$GLB, | Performed by: FAMILY MEDICINE

## 2023-11-16 PROCEDURE — 1111F PR DISCHARGE MEDS RECONCILED W/ CURRENT OUTPATIENT MED LIST: ICD-10-PCS | Mod: HCNC,CPTII,S$GLB, | Performed by: FAMILY MEDICINE

## 2023-11-16 PROCEDURE — 3078F DIAST BP <80 MM HG: CPT | Mod: HCNC,CPTII,S$GLB, | Performed by: FAMILY MEDICINE

## 2023-11-16 PROCEDURE — 3075F SYST BP GE 130 - 139MM HG: CPT | Mod: HCNC,CPTII,S$GLB, | Performed by: FAMILY MEDICINE

## 2023-11-16 PROCEDURE — 1101F PT FALLS ASSESS-DOCD LE1/YR: CPT | Mod: HCNC,CPTII,S$GLB, | Performed by: FAMILY MEDICINE

## 2023-11-16 PROCEDURE — 99495 TCM SERVICES (MODERATE COMPLEXITY): ICD-10-PCS | Mod: HCNC,S$GLB,, | Performed by: FAMILY MEDICINE

## 2023-11-16 PROCEDURE — 99495 TRANSJ CARE MGMT MOD F2F 14D: CPT | Mod: HCNC,S$GLB,, | Performed by: FAMILY MEDICINE

## 2023-11-16 PROCEDURE — 1111F DSCHRG MED/CURRENT MED MERGE: CPT | Mod: HCNC,CPTII,S$GLB, | Performed by: FAMILY MEDICINE

## 2023-11-16 NOTE — PROGRESS NOTES
"Subjective:       Patient ID: Trudy R Dakin is a 78 y.o. female.    Chief Complaint: Hospital Follow Up (Pt. Reports being diagnosed w/ vertigo )    Here today for a hosptial f/u visit.  She was admitted to [facility] on 11/7.  She was discharged home on 11/9.  She was treated with steroids for vertigo.  Discharged home.  Has an appt with Neurology.  She does not have Antivert.  I sent a prescription on the 14th and it has not been picked up.    Transitional Care Note    Family and/or Caretaker present at visit?  Yes.  Diagnostic tests reviewed/disposition: No diagnosic tests pending after this hospitalization.  Disease/illness education: vertigo  Home health/community services discussion/referrals: Patient has home health established at [facility].   Establishment or re-establishment of referral orders for community resources: No other necessary community resources.   Discussion with other health care providers: No discussion with other health care providers necessary.       Review of Systems   Constitutional:  Negative for activity change, appetite change, fatigue and fever.   Respiratory:  Negative for cough, shortness of breath and wheezing.    Cardiovascular:  Negative for chest pain and palpitations.   Gastrointestinal:  Negative for abdominal pain, constipation, diarrhea and nausea.   Skin:  Negative for pallor and rash.   Neurological:  Positive for dizziness.   Psychiatric/Behavioral:  The patient is not nervous/anxious.        Objective:      Vitals:    11/16/23 1622   BP: 138/70   Pulse: (!) 56   Temp: 97.7 °F (36.5 °C)   TempSrc: Oral   Weight: 59 kg (130 lb 1.1 oz)   Height: 5' 2" (1.575 m)      Physical Exam  Constitutional:       General: She is not in acute distress.  Cardiovascular:      Rate and Rhythm: Normal rate and regular rhythm.      Heart sounds: Murmur heard.   Pulmonary:      Effort: Pulmonary effort is normal. No respiratory distress.      Breath sounds: Normal breath sounds. No wheezing, " rhonchi or rales.   Skin:     General: Skin is warm and dry.   Neurological:      General: No focal deficit present.      Mental Status: She is alert.   Psychiatric:         Mood and Affect: Mood normal.         Behavior: Behavior normal.         Thought Content: Thought content normal.            Assessment:       1. Vertigo        Plan:       Vertigo    Discussed her hospital work up, neuro consult and current symptoms.  This still sounds like vertigo and I suggested she start the Antivert at this time.      Medication List with Changes/Refills   Current Medications    ACETAMINOPHEN (TYLENOL) 650 MG TBSR    Take 1 tablet (650 mg total) by mouth every 8 (eight) hours.    ATORVASTATIN (LIPITOR) 80 MG TABLET    Take 1 tablet (80 mg total) by mouth once daily.    BUSPIRONE (BUSPAR) 5 MG TAB    Take half tablet (0.5mg) by mouth twice a day as needed for anxiety    CLOPIDOGREL (PLAVIX) 75 MG TABLET    TAKE ONE TABLET BY MOUTH EVERY DAY    CYANOCOBALAMIN 1,000 MCG/ML INJECTION    INJECT 1 ML INTO THE MUSCLE EVERY 14 DAYS    ERGOCALCIFEROL (ERGOCALCIFEROL) 50,000 UNIT CAP    Take 1 capsule (50,000 Units total) by mouth every 7 days. After completion of therapy will need maintenance dose. for 8 doses    ESCITALOPRAM OXALATE (LEXAPRO) 20 MG TABLET    Take 1 tablet (20 mg total) by mouth once daily.    MECLIZINE (ANTIVERT) 25 MG TABLET    Take 1 tablet (25 mg total) by mouth 3 (three) times daily as needed for Dizziness.    NITROGLYCERIN (NITROSTAT) 0.4 MG SL TABLET    Place 1 tablet (0.4 mg total) under the tongue every 5 (five) minutes as needed for Chest pain.    NYSTATIN (MYCOSTATIN) CREAM    Apply topically 2 (two) times daily.    PREDNISONE (DELTASONE) 10 MG TABLET    Take 3 tablets (30 mg total) by mouth once daily for 5 days, THEN 2 tablets (20 mg total) once daily for 7 days, THEN 1 tablet (10 mg total) once daily for 7 days.    PREGABALIN (LYRICA) 75 MG CAPSULE    Take 1 capsule (75 mg total) by mouth 2 (two) times  daily.

## 2023-11-17 ENCOUNTER — OUTPATIENT CASE MANAGEMENT (OUTPATIENT)
Dept: ADMINISTRATIVE | Facility: OTHER | Age: 78
End: 2023-11-17
Payer: MEDICARE

## 2023-11-17 NOTE — PROGRESS NOTES
Outpatient Care Management  Plan of Care Follow Up Visit    Patient: Trudy R Dakin  MRN: [id number]  Date of Service: 11/17/2023  Completed by: Mary Jane Peralta RN  Referral Date: 10/03/2023    Reason for Visit   Patient presents with    OPCM RN Follow Up Call       Brief Summary: Phone contact made with pt's daughter today. We discussed Ms. Elaine's care plan. No new needs identified. Will continue to follow.

## 2023-11-20 ENCOUNTER — DOCUMENT SCAN (OUTPATIENT)
Dept: HOME HEALTH SERVICES | Facility: HOSPITAL | Age: 78
End: 2023-11-20
Payer: MEDICARE

## 2023-11-21 ENCOUNTER — EXTERNAL HOME HEALTH (OUTPATIENT)
Dept: HOME HEALTH SERVICES | Facility: HOSPITAL | Age: 78
End: 2023-11-21
Payer: MEDICARE

## 2023-11-22 LAB
BSA FOR ECHO PROCEDURE: 1.56 M2
HR MV ECHO: 67 BPM
MV MEAN GRADIENT: 3 MMHG
PISA TR MAX VEL: 4 M/S
TR MAX PG: 64 MMHG

## 2023-11-26 NOTE — PROGRESS NOTES
Subjective:      Patient ID: Trudy R Dakin is a 78 y.o. female.    Chief Complaint: Nail Problem    Ms. Dakin presents today with complaints of hammertoes and changes to her nails.     Review of Systems   Skin:  Positive for nail changes.   Musculoskeletal:         Hammertoes   All other systems reviewed and are negative.          Objective:      Physical Exam  Cardiovascular:      Pulses:           Dorsalis pedis pulses are 1+ on the right side and 1+ on the left side.        Posterior tibial pulses are 1+ on the right side and 1+ on the left side.   Feet:      Right foot:      Skin integrity: Callus present.      Toenail Condition: Right toenails are abnormally thick.      Left foot:      Skin integrity: Callus present.      Toenail Condition: Left toenails are abnormally thick.      Comments: Thickened nails with some slight discoloration. No ingrowing.     Hammertoes b/l. Without pain. They're semi-reducible.               Assessment:       Encounter Diagnoses   Name Primary?    Type 2 diabetes mellitus with stage 3b chronic kidney disease, without long-term current use of insulin     Hammer toe, unspecified laterality          Plan:       [place] was seen today for nail problem.    Diagnoses and all orders for this visit:    Type 2 diabetes mellitus with stage 3b chronic kidney disease, without long-term current use of insulin  -     Ambulatory referral/consult to Podiatry    Hammer toe, unspecified laterality  -     Ambulatory referral/consult to Podiatry      I counseled the patient on her conditions, their implications and medical management.    Recommend wearing shoes with a wider and deeper toe box to accommodate foot deformities and nails. I recommend that she apply spacers between toes as needed for offloading.     Follow up as needed.    Derrick Sánchez DPM

## 2023-11-27 ENCOUNTER — DOCUMENTATION ONLY (OUTPATIENT)
Dept: CARDIAC CATH/INVASIVE PROCEDURES | Facility: HOSPITAL | Age: 78
End: 2023-11-27
Payer: MEDICARE

## 2023-11-27 NOTE — PROGRESS NOTES
Trudy R Dakin is a 78 y.o. female referred by Dr Lety Mcwilliams for evaluation of severe MR (NYHA Class III sx).     Mitral Valve Disease Etiology: Other/Indeterminate (tethering posterior leaflet)     The patient has undergone the following MitraClip work-up:   PORTIA (Date 11/1/2023): Severe MR, MG 3 mmHg, ERO 0.5 cm2, EF= 50-55%.   End systolic dimensions 3.67 (TTE on 9/12/23)  LHC: Deferred for PET stress (11/1/2023) that showed no significant perfusion abnormalities   STS: 8%   Frailty: 2/4   CT Surgery risk assessment: inoperable, per Dr. Cook due to frailty  PFTs: Deferred.  Comorbidities: coronary artery disease. CKD III-IV   Labs: Hgb 9.5, Cr 1.6,      Transcatheter [id number] Repair   Valve: Mitral-Clip  ECMO:  On Call  AIDA access: RTFV  Post op destination: ICU  Antibiotics given: (to be done during procedure)  Heart failure on admission?

## 2023-11-29 DIAGNOSIS — I34.0 MITRAL VALVE INSUFFICIENCY, UNSPECIFIED ETIOLOGY: Primary | ICD-10-CM

## 2023-11-30 ENCOUNTER — DOCUMENT SCAN (OUTPATIENT)
Dept: HOME HEALTH SERVICES | Facility: HOSPITAL | Age: 78
End: 2023-11-30
Payer: MEDICARE

## 2023-12-04 PROCEDURE — G0179 MD RECERTIFICATION HHA PT: HCPCS | Mod: ,,, | Performed by: FAMILY MEDICINE

## 2023-12-04 PROCEDURE — G0179 PR HOME HEALTH MD RECERTIFICATION: ICD-10-PCS | Mod: ,,, | Performed by: FAMILY MEDICINE

## 2023-12-06 ENCOUNTER — OUTPATIENT CASE MANAGEMENT (OUTPATIENT)
Dept: ADMINISTRATIVE | Facility: OTHER | Age: 78
End: 2023-12-06
Payer: MEDICARE

## 2023-12-10 NOTE — PROGRESS NOTES
"                                                                                       [facility] Follow up.        Attending Physician: Kevin Russo MD.  The patient's last visit with me was on Visit date not found.         HPI.  78 F w/ PMHx of DM2, CAD s/p Stents, HF from valvular heart disease, Pulm HTN, Severe Mitral Valve Regurgitation, Aortic Valve Stenosis and Diverticulitis s/p Abdominal Surgery (remote) presented today for consultation as part of evaluation for mitral clip.    She has been dealing with congestion/volume overload for months. She has had hospitalizations for hypervolemia, diuresed but then asked to stop her diuretics, concerned for her kidney function. She gets congested again. Lately, due to severe edema and symptoms, her family started diuretics on their own. Patient improved clinically per their report. However, she continues to have congestive symptoms, orthopnea, pnd, leg edema, abdominal distention and decreased appetite. They recently "invested" in a hospital bed, and now she sleeps at a 45 angle.      Review of Systems   Constitutional:  Positive for activity change, appetite change, fatigue and unexpected weight change. Negative for chills, diaphoresis and fever.   HENT:  Negative for nasal congestion, rhinorrhea and sore throat.    Eyes:  Negative for visual disturbance.   Respiratory:  Positive for cough and shortness of breath. Negative for apnea, choking and chest tightness.    Cardiovascular:  Positive for leg swelling. Negative for chest pain.   Gastrointestinal:  Positive for abdominal distention. Negative for abdominal pain, diarrhea, nausea and vomiting.   Genitourinary:  Negative for difficulty urinating, dysuria and hematuria.   Integumentary:  Negative for rash.   Neurological:  Negative for seizures, syncope and light-headedness.   Psychiatric/Behavioral:  Negative for agitation and hallucinations.         Past Medical " History:   Diagnosis Date    CHF (congestive heart failure)     Diabetes     Diverticulitis     Hypertension     Renal disorder         Past Surgical History:   Procedure Laterality Date    ABDOMINAL SURGERY  2006    diverticulitis x3    ANTERIOR CERVICAL DISCECTOMY W/ FUSION N/A 8/30/2018    FUSION C6-7 Surgeon: Rickey Martin MD    APPENDECTOMY      CARPAL TUNNEL RELEASE      COLECTOMY  07/2020    EPIDURAL STEROID INJECTION INTO LUMBAR SPINE N/A 1/20/2021    Procedure: Injection-steroid-epidural-lumbar--L3-4;  Surgeon: Colleen Carvajal MD;  Location: [facility];  Service: Pain Management;  Laterality: N/A;    HYSTERECTOMY  1970    @25yrs of age    LARYNGOSCOPY N/A 1/4/2022    Procedure: Suspension microlaryngoscopy with left vocal fold injection augmentation - Restylane L;  Surgeon: Yuan Mir MD;  Location: [address];  Service: ENT;  Laterality: N/A;  Microscope, telescopes, tower, injector, Restylane-L    MANDIBLE FRACTURE SURGERY  1970    MICRODISCECTOMY OF SPINE Left 4/13/2021    Procedure: MICRODISCECTOMY, SPINE Left L3-4 far lateral Microdiscectomy;  Surgeon: Rickey Martin MD;  Location: [facility];  Service: Neurosurgery;  Laterality: Left;  Procedure: Left L3-4 far lateral Microdiscectomy   Surgery Time: 1.5 hrs  LOS: 0-1  Anesthesia: General  Radiology: C-arm  SNS: EMG, SEP  Bed: [place] [address] Poster  Position: Melissa Demarco w/ Globus confirmed 4/12/21 [place]    OOPHORECTOMY  1970    ROTATOR CUFF REPAIR Left 11/2016    TOTAL REDUCTION MAMMOPLASTY Bilateral 1990    TRANSESOPHAGEAL ECHOCARDIOGRAPHY N/A 11/1/2023    Procedure: ECHOCARDIOGRAM, TRANSESOPHAGEAL;  Surgeon: Provider, [facility];  Location: [facility];  Service: Cardiology;  Laterality: N/A;    TRANSFORAMINAL EPIDURAL INJECTION OF STEROID Left 12/23/2020    Procedure: Injection,steroid,epidural,transforaminal approach--Left L4 and L5;  Surgeon: Colleen Carvajal MD;  Location: [facility];  Service: Pain Management;  Laterality:  "Left;        Family History   Problem Relation Age of Onset    Heart disease Father     Glaucoma Neg Hx     Macular degeneration Neg Hx         Review of patient's allergies indicates:  No Known Allergies     Current Outpatient Medications   Medication Instructions    acetaminophen (TYLENOL) 650 mg, Oral, Every 8 hours    atorvastatin (LIPITOR) 80 mg, Oral, Daily    busPIRone (BUSPAR) 5 MG Tab Take half tablet (0.5mg) by mouth twice a day as needed for anxiety    clopidogreL (PLAVIX) 75 mg, Oral    cyanocobalamin 1,000 mcg/mL injection INJECT 1 ML INTO THE MUSCLE EVERY 14 DAYS    EScitalopram oxalate (LEXAPRO) 20 mg, Oral, Daily    meclizine (ANTIVERT) 25 mg, Oral, 3 times daily PRN    nitroGLYCERIN (NITROSTAT) 0.4 mg, Sublingual, Every 5 min PRN    nystatin (MYCOSTATIN) cream Topical (Top), 2 times daily    pregabalin (LYRICA) 75 mg, Oral, 2 times daily        There were no vitals filed for this visit.     Wt Readings from Last 3 Encounters:   11/16/23 59 kg (130 lb 1.1 oz)   11/09/23 56 kg (123 lb 7.3 oz)   11/01/23 55.8 kg (123 lb)     Temp Readings from Last 3 Encounters:   11/16/23 97.7 °F (36.5 °C) (Oral)   11/09/23 98.3 °F (36.8 °C)   11/01/23 97 °F (36.1 °C)     BP Readings from Last 3 Encounters:   11/16/23 138/70   11/09/23 123/64   11/01/23 134/78     Pulse Readings from Last 3 Encounters:   11/16/23 (!) 56   11/09/23 78   11/01/23 (!) 44        There is no height or weight on file to calculate BMI. Estimated body surface area is 1.61 meters squared as calculated from the following:    Height as of 11/16/23: 5' 2" (1.575 m).    Weight as of 11/16/23: 59 kg (130 lb 1.1 oz).     Physical Exam  Constitutional:       Appearance: She is well-developed.   HENT:      Head: Normocephalic and atraumatic.      Right Ear: External ear normal.      Left Ear: External ear normal.   Eyes:      Conjunctiva/sclera: Conjunctivae normal.      Pupils: Pupils are equal, round, and reactive to light.   Neck:      Vascular: " Hepatojugular reflux and JVD present.      Comments: JVP 18 cmH20  Cardiovascular:      Rate and Rhythm: Normal rate and regular rhythm.      Pulses: Intact distal pulses.      Heart sounds: S1 normal and S2 normal. No murmur heard.     No friction rub. No gallop.   Pulmonary:      Effort: Pulmonary effort is normal.      Breath sounds: Normal breath sounds.   Abdominal:      General: Bowel sounds are normal. There is no distension.      Palpations: Abdomen is soft.      Tenderness: There is no abdominal tenderness. There is no guarding or rebound.   Musculoskeletal:      Cervical back: Normal range of motion and neck supple.      Right lower leg: Edema present.      Left lower leg: Edema present.   Neurological:      Mental Status: She is alert and oriented to person, place, and time.          Lab Results   Component Value Date     (H) 08/04/2023     11/09/2023    K 4.3 11/09/2023    MG 2.2 11/09/2023     11/09/2023    CO2 27 11/09/2023    BUN 35 (H) 11/09/2023    CREATININE 1.73 (H) 11/09/2023     (H) 11/09/2023    HGBA1C 6.1 (H) 10/03/2023    AST 31 11/07/2023    ALT 18 11/07/2023    ALBUMIN 3.8 11/08/2023    PROT 7.8 11/07/2023    BILITOT 0.6 11/07/2023    WBC 4.30 11/09/2023    HGB 8.7 (L) 11/09/2023    HCT 28.4 (L) 11/09/2023     11/09/2023    INR 1.1 11/01/2023    TSH 0.885 11/07/2023    CHOL 154 04/12/2023    HDL 29 (L) 04/12/2023    LDLCALC 101.2 04/12/2023    TRIG 119 04/12/2023         Results for orders placed during the hospital encounter of 09/12/23    Echo Saline Bubble? No    Interpretation Summary    Left Ventricle: The left ventricle is normal in size. Normal wall thickness. Normal wall motion. There is normal systolic function with a visually estimated ejection fraction of 55 - 60%. Biplane (2D) method of discs ejection fraction is 58%. Global longitudinal strain is -20.0%. There is normal diastolic function.    Right Ventricle: Normal right ventricular cavity  size. Wall thickness is normal. Right ventricle wall motion  is normal. Systolic function is normal.    Aortic Valve: The aortic valve is a trileaflet valve. There is moderate aortic valve sclerosis. There is moderate annular calcification present. Mildly restricted motion. There is low flow mild stenosis. Aortic valve area by VTI is 1.51 cm². Aortic valve peak velocity is 1.84 m/s. Mean gradient is 9 mmHg. The dimensionless index is 0.45.    Mitral Valve: There is moderate annular dilation present. There is severe mitral annular calcification present. There is moderate to severe regurgitation eccentric posteriorly directed. posterior leaflet is restricted causing mild over riding anterior leaflet and MR, Estimated Rvol is 48 ml.    Pulmonary Artery: The estimated pulmonary artery systolic pressure is 70 mmHg.    IVC/SVC: Normal venous pressure at 3 mmHg.        No results found for this or any previous visit.         Assessment and Plan:  Mitral valve insufficiency, unspecified etiology  -     Ambulatory referral/consult to Adult Advanced Heart Failure  -     Basic Metabolic Panel; Future; Expected date: 12/11/2023  -     BNP; Future; Expected date: 12/11/2023  -     CBC Auto Differential; Future; Expected date: 02/08/2024  -     BNP; Future; Expected date: 02/08/2024  -     Comprehensive Metabolic Panel; Future; Expected date: 02/08/2024  -     Basic Metabolic Panel; Future; Expected date: 12/14/2023  -     BNP; Future; Expected date: 12/14/2023    Valvular heart disease    Severe mitral regurgitation    Pulmonary HTN    CAD (coronary artery disease)    Acute on chronic diastolic heart failure    Other orders  -     bumetanide (BUMEX) 2 MG tablet; Take 1.5 tablets (3 mg total) by mouth 2 (two) times daily.  Dispense: 120 tablet; Refill: 5          Valvular heart disease. Severe mitral regurgitation from a restricted posterior mitral valve leaflet. This is in the setting of known CAD, previous PCI. Ischemic heart  disease.  HF with normal EF and severe congestion. Result of her valvular heart disease.   She is currently severely congested despite of taking bumex 2 mg BID.  I recommend increasing her diuretic regimen and aggressively decongesting her before planned intervention. Because of the nature of her mitral regurgitation, I agree and do recommend valve intervention, as effects of diuretic therapy will not be long lasting or expected to reduce significantly the severity of the regurgitant volume in the long term (severe tethering of the posterior mitral valve leaftlet).  Plan:  -baseline blood tests ordered for today.  -Increase bumex from 2 mg twice a day to 3 mg twice a day.  -We will call patient later today regarding potassium result and if any need to start taking potassium.  -patient was asked to keep daily record of her weight.  -F/u blood test Thursday am.  -patient was asked to call us Thursday afternoon to report weight, symptoms and to discuss results/plan.    F/u in 2 months with labs.      Addendum:  Labs resulted after visit.  Potassium 3.3  I called patient to let her know result. I am prescribing potassium 20 meq BID. She will  prescription today. F/u lab Thursday.

## 2023-12-11 ENCOUNTER — OFFICE VISIT (OUTPATIENT)
Dept: TRANSPLANT | Facility: CLINIC | Age: 78
End: 2023-12-11
Payer: MEDICARE

## 2023-12-11 ENCOUNTER — LAB VISIT (OUTPATIENT)
Dept: LAB | Facility: HOSPITAL | Age: 78
End: 2023-12-11
Attending: INTERNAL MEDICINE
Payer: MEDICARE

## 2023-12-11 VITALS
HEIGHT: 62 IN | BODY MASS INDEX: 22.55 KG/M2 | HEART RATE: 77 BPM | SYSTOLIC BLOOD PRESSURE: 137 MMHG | DIASTOLIC BLOOD PRESSURE: 65 MMHG | WEIGHT: 122.56 LBS

## 2023-12-11 DIAGNOSIS — I38 VALVULAR HEART DISEASE: ICD-10-CM

## 2023-12-11 DIAGNOSIS — I27.20 PULMONARY HTN: ICD-10-CM

## 2023-12-11 DIAGNOSIS — I34.0 MITRAL VALVE INSUFFICIENCY, UNSPECIFIED ETIOLOGY: ICD-10-CM

## 2023-12-11 DIAGNOSIS — I34.0 MITRAL VALVE INSUFFICIENCY, UNSPECIFIED ETIOLOGY: Primary | ICD-10-CM

## 2023-12-11 DIAGNOSIS — I50.33 ACUTE ON CHRONIC DIASTOLIC HEART FAILURE: ICD-10-CM

## 2023-12-11 DIAGNOSIS — I34.0 SEVERE MITRAL REGURGITATION: ICD-10-CM

## 2023-12-11 DIAGNOSIS — I25.10 CORONARY ARTERY DISEASE INVOLVING NATIVE CORONARY ARTERY OF NATIVE HEART WITHOUT ANGINA PECTORIS: Chronic | ICD-10-CM

## 2023-12-11 LAB
ANION GAP SERPL CALC-SCNC: 13 MMOL/L (ref 8–16)
BNP SERPL-MCNC: 350 PG/ML (ref 0–99)
BUN SERPL-MCNC: 36 MG/DL (ref 8–23)
CALCIUM SERPL-MCNC: 9.9 MG/DL (ref 8.7–10.5)
CHLORIDE SERPL-SCNC: 105 MMOL/L (ref 95–110)
CO2 SERPL-SCNC: 28 MMOL/L (ref 23–29)
CREAT SERPL-MCNC: 1.5 MG/DL (ref 0.5–1.4)
EST. GFR  (NO RACE VARIABLE): 35.4 ML/MIN/1.73 M^2
GLUCOSE SERPL-MCNC: 97 MG/DL (ref 70–110)
POTASSIUM SERPL-SCNC: 3.3 MMOL/L (ref 3.5–5.1)
SODIUM SERPL-SCNC: 146 MMOL/L (ref 136–145)

## 2023-12-11 PROCEDURE — 99215 OFFICE O/P EST HI 40 MIN: CPT | Mod: HCNC,S$GLB,, | Performed by: INTERNAL MEDICINE

## 2023-12-11 PROCEDURE — 1157F PR ADVANCE CARE PLAN OR EQUIV PRESENT IN MEDICAL RECORD: ICD-10-PCS | Mod: HCNC,CPTII,S$GLB, | Performed by: INTERNAL MEDICINE

## 2023-12-11 PROCEDURE — 3078F PR MOST RECENT DIASTOLIC BLOOD PRESSURE < 80 MM HG: ICD-10-PCS | Mod: HCNC,CPTII,S$GLB, | Performed by: INTERNAL MEDICINE

## 2023-12-11 PROCEDURE — 3288F PR FALLS RISK ASSESSMENT DOCUMENTED: ICD-10-PCS | Mod: HCNC,CPTII,S$GLB, | Performed by: INTERNAL MEDICINE

## 2023-12-11 PROCEDURE — 3075F PR MOST RECENT SYSTOLIC BLOOD PRESS GE 130-139MM HG: ICD-10-PCS | Mod: HCNC,CPTII,S$GLB, | Performed by: INTERNAL MEDICINE

## 2023-12-11 PROCEDURE — 99999 PR PBB SHADOW E&M-EST. PATIENT-LVL IV: CPT | Mod: PBBFAC,HCNC,, | Performed by: INTERNAL MEDICINE

## 2023-12-11 PROCEDURE — 36415 COLL VENOUS BLD VENIPUNCTURE: CPT | Mod: HCNC | Performed by: INTERNAL MEDICINE

## 2023-12-11 PROCEDURE — 1126F AMNT PAIN NOTED NONE PRSNT: CPT | Mod: HCNC,CPTII,S$GLB, | Performed by: INTERNAL MEDICINE

## 2023-12-11 PROCEDURE — 3288F FALL RISK ASSESSMENT DOCD: CPT | Mod: HCNC,CPTII,S$GLB, | Performed by: INTERNAL MEDICINE

## 2023-12-11 PROCEDURE — 83880 ASSAY OF NATRIURETIC PEPTIDE: CPT | Mod: HCNC | Performed by: INTERNAL MEDICINE

## 2023-12-11 PROCEDURE — 80048 BASIC METABOLIC PNL TOTAL CA: CPT | Mod: HCNC | Performed by: INTERNAL MEDICINE

## 2023-12-11 PROCEDURE — 1159F PR MEDICATION LIST DOCUMENTED IN MEDICAL RECORD: ICD-10-PCS | Mod: HCNC,CPTII,S$GLB, | Performed by: INTERNAL MEDICINE

## 2023-12-11 PROCEDURE — 1101F PR PT FALLS ASSESS DOC 0-1 FALLS W/OUT INJ PAST YR: ICD-10-PCS | Mod: HCNC,CPTII,S$GLB, | Performed by: INTERNAL MEDICINE

## 2023-12-11 PROCEDURE — 3075F SYST BP GE 130 - 139MM HG: CPT | Mod: HCNC,CPTII,S$GLB, | Performed by: INTERNAL MEDICINE

## 2023-12-11 PROCEDURE — 3078F DIAST BP <80 MM HG: CPT | Mod: HCNC,CPTII,S$GLB, | Performed by: INTERNAL MEDICINE

## 2023-12-11 PROCEDURE — 99215 PR OFFICE/OUTPT VISIT, EST, LEVL V, 40-54 MIN: ICD-10-PCS | Mod: HCNC,S$GLB,, | Performed by: INTERNAL MEDICINE

## 2023-12-11 PROCEDURE — 1101F PT FALLS ASSESS-DOCD LE1/YR: CPT | Mod: HCNC,CPTII,S$GLB, | Performed by: INTERNAL MEDICINE

## 2023-12-11 PROCEDURE — 1159F MED LIST DOCD IN RCRD: CPT | Mod: HCNC,CPTII,S$GLB, | Performed by: INTERNAL MEDICINE

## 2023-12-11 PROCEDURE — 1126F PR PAIN SEVERITY QUANTIFIED, NO PAIN PRESENT: ICD-10-PCS | Mod: HCNC,CPTII,S$GLB, | Performed by: INTERNAL MEDICINE

## 2023-12-11 PROCEDURE — 99999 PR PBB SHADOW E&M-EST. PATIENT-LVL IV: ICD-10-PCS | Mod: PBBFAC,HCNC,, | Performed by: INTERNAL MEDICINE

## 2023-12-11 PROCEDURE — 1157F ADVNC CARE PLAN IN RCRD: CPT | Mod: HCNC,CPTII,S$GLB, | Performed by: INTERNAL MEDICINE

## 2023-12-11 RX ORDER — BUMETANIDE 2 MG/1
3 TABLET ORAL 2 TIMES DAILY
Qty: 120 TABLET | Refills: 5 | Status: SHIPPED | OUTPATIENT
Start: 2023-12-11

## 2023-12-11 RX ORDER — POTASSIUM CHLORIDE 20 MEQ/1
20 TABLET, EXTENDED RELEASE ORAL 2 TIMES DAILY
Qty: 60 TABLET | Refills: 5 | Status: SHIPPED | OUTPATIENT
Start: 2023-12-11

## 2023-12-11 RX ORDER — BUMETANIDE 2 MG/1
2 TABLET ORAL 2 TIMES DAILY
COMMUNITY
End: 2023-12-11 | Stop reason: SDUPTHER

## 2023-12-13 ENCOUNTER — LAB VISIT (OUTPATIENT)
Dept: LAB | Facility: HOSPITAL | Age: 78
End: 2023-12-13
Attending: INTERNAL MEDICINE
Payer: MEDICARE

## 2023-12-13 DIAGNOSIS — I34.0 MITRAL VALVE INSUFFICIENCY, UNSPECIFIED ETIOLOGY: ICD-10-CM

## 2023-12-13 LAB
ANION GAP SERPL CALC-SCNC: 14 MMOL/L (ref 8–16)
BNP SERPL-MCNC: 356 PG/ML (ref 0–99)
BUN SERPL-MCNC: 30 MG/DL (ref 8–23)
CALCIUM SERPL-MCNC: 9.6 MG/DL (ref 8.7–10.5)
CHLORIDE SERPL-SCNC: 105 MMOL/L (ref 95–110)
CO2 SERPL-SCNC: 24 MMOL/L (ref 23–29)
CREAT SERPL-MCNC: 1.5 MG/DL (ref 0.5–1.4)
EST. GFR  (NO RACE VARIABLE): 35.4 ML/MIN/1.73 M^2
GLUCOSE SERPL-MCNC: 101 MG/DL (ref 70–110)
POTASSIUM SERPL-SCNC: 4.3 MMOL/L (ref 3.5–5.1)
SODIUM SERPL-SCNC: 143 MMOL/L (ref 136–145)

## 2023-12-13 PROCEDURE — 83880 ASSAY OF NATRIURETIC PEPTIDE: CPT | Mod: HCNC | Performed by: INTERNAL MEDICINE

## 2023-12-13 PROCEDURE — 80048 BASIC METABOLIC PNL TOTAL CA: CPT | Mod: HCNC | Performed by: INTERNAL MEDICINE

## 2023-12-13 PROCEDURE — 36415 COLL VENOUS BLD VENIPUNCTURE: CPT | Mod: HCNC,PO | Performed by: INTERNAL MEDICINE

## 2023-12-15 ENCOUNTER — PATIENT MESSAGE (OUTPATIENT)
Dept: TRANSPLANT | Facility: CLINIC | Age: 78
End: 2023-12-15
Payer: MEDICARE

## 2023-12-18 DIAGNOSIS — I25.10 CORONARY ARTERY DISEASE INVOLVING NATIVE CORONARY ARTERY OF NATIVE HEART WITHOUT ANGINA PECTORIS: Primary | ICD-10-CM

## 2023-12-18 RX ORDER — LISINOPRIL 5 MG/1
5 TABLET ORAL DAILY
Qty: 90 TABLET | Refills: 3 | Status: SHIPPED | OUTPATIENT
Start: 2023-12-18 | End: 2024-12-17

## 2023-12-18 NOTE — TELEPHONE ENCOUNTER
12/18/2023    Received and reviewed repeat BMP; increased Bumex (diuretics) aggressively before planned intervention, see clinic note 12/11/23.     Na+:    143  K+:   4.3  CL:    105  CO2:    24  BUN:    30  (slightly down from 36 on 12/11/23)  CR:    1.5  (unchanged from 12/11/23)    BNP:    356   (slightly increased from 350 on 12/11/23)    Called and spoke with patient daughter, daughter states that she is assisting in managing all medications and medication administration.  Daughter reports no change in SOB from office visit on 12/11/23.  Daughter reports no additional or worsening edema in BLE or abd.  Daughter reports working to keep patient within 2 gram sodium restriction and 1.5 liter fluid restriction; barriers to meeting goal are patients understanding the importance of low sodium snacks, and fluid restriction.  Weight is reported between 120-122 lbs.  Weight log report:      12/11/23  122.32 lbs in clinic weight  12/12/23  120 lbs  12/13/23  121 lbs  12/14/23  121 lbs  12/15/23  120 lbs    Daughter confirms the following medications, dosages and frequency:    -Lipitor 80 mg PO daily  -Bumex 3 mg PO BID  -Plavix 75 mg PO daily  -Potassium 20 meq PO BID  * no PRN nitroglycerin has been taken for CP     Reviewed and discussed the above with MD Griselda Russo.  Received the below written orders:  ----- Message from Kevin Russo MD sent at 12/18/2023  9:39 AM CST -----  I recommend to start today: LISINOPRIL 5 mg daily.  F/u blood test Thursday Thx    Called and spoke with Daughter via phone.  Instructed daughter Stephanie to start LISINOPRIL 5 mg daily.  Repeat BMP and BNP on Thursday 12/21/23.  Prescription will be routed to patient preferred pharmacy in [place] for fill and start today.  Confirmation from Daughter mitral value clip is scheduled for 12/20/23.  Instructed daughter the importance of weighing and recording weights daily.  Daughter voices understanding and repeats back all instructions  correctly.    BMP and BNP entered and scheduled at satellite facility of choice.  Remind Me entered.

## 2023-12-19 ENCOUNTER — ANESTHESIA EVENT (OUTPATIENT)
Dept: MEDSURG UNIT | Facility: HOSPITAL | Age: 78
DRG: 267 | End: 2023-12-19
Payer: MEDICARE

## 2023-12-19 NOTE — ANESTHESIA PREPROCEDURE EVALUATION
[facility]  Anesthesia Pre-Operative Evaluation        Patient Name: Trudy R Dakin  YOB: 1945  MRN: [id number]    SUBJECTIVE:     Pre-operative Evaluation for Procedure(s) (LRB):  Mitral clip (N/A)  ECHOCARDIOGRAM, TRANSESOPHAGEAL (N/A)     12/19/2023    Trudy R Dakin is a 78 y.o. female with a PMHx significant for Severe mitral regurgitation, HTN, CAD, HFpEF (EF 50-55%), Aortic stenosis, CKD3, T2DM, Paralysis of left vocal cord.     She now presents for the above procedure(s) with - Dr. More    Previous Airway: 4/13/2021 10:06 AM  Performed by: Pinky Whyte CRNA  Authorized by: Jos Velazco MD      Intubation:     Induction:  Intravenous    Intubated:  Postinduction    Mask Ventilation:  Easy mask    Attempts:  1    Attempted By:  Student (Sammy ROSAS)    Method of Intubation:  Direct    Blade:  Hussein 2    Laryngeal View Grade: Grade I - full view of chords      Difficult Airway Encountered?: No      Complications:  None    Airway Device:  Oral endotracheal tube    Airway Device Size:  7.0    Style/Cuff Inflation:  Cuffed (inflated to minimal occlusive pressure)    Tube secured:  21    Secured at:  The lips    Placement Verified By:  Capnometry    Complicating Factors:  None    Findings Post-Intubation:  BS equal bilateral    Pertinent Cardiac Studies:  TTE [facility]   Results for orders placed during the hospital encounter of 09/12/23    Echo Saline Bubble? No    Interpretation Summary    Left Ventricle: The left ventricle is normal in size. Normal wall thickness. Normal wall motion. There is normal systolic function with a visually estimated ejection fraction of 55 - 60%. Biplane (2D) method of discs ejection fraction is 58%. Global longitudinal strain is -20.0%. There is normal diastolic function.    Right Ventricle: Normal right ventricular cavity size. Wall thickness is normal. Right ventricle wall motion  is normal. Systolic function is normal.    Aortic Valve: The  aortic valve is a trileaflet valve. There is moderate aortic valve sclerosis. There is moderate annular calcification present. Mildly restricted motion. There is low flow mild stenosis. Aortic valve area by VTI is 1.51 cm². Aortic valve peak velocity is 1.84 m/s. Mean gradient is 9 mmHg. The dimensionless index is 0.45.    Mitral Valve: There is moderate annular dilation present. There is severe mitral annular calcification present. There is moderate to severe regurgitation eccentric posteriorly directed. posterior leaflet is restricted causing mild over riding anterior leaflet and MR, Estimated Rvol is 48 ml.    Pulmonary Artery: The estimated pulmonary artery systolic pressure is 70 mmHg.    IVC/SVC: Normal venous pressure at 3 mmHg.   No results found for this or any previous visit.       Patient Active Problem List   Diagnosis    Essential hypertension    Aortic stenosis    Cervical spondylosis with myelopathy    Arthropathy of lumbar facet joint    Chronic constipation    Edema    Insomnia    Lumbar radiculopathy    Lumbar spinal stenosis    Opioid dependence    Osteoarthritis of knee    Senile purpura    Vitamin B12 deficiency    High cholesterol    Diabetes mellitus with neuropathy    Hx of adenomatous colonic polyps    Paroxysmal atrial fibrillation    Cerebral ischemia    CAD (coronary artery disease)    Acute on chronic diastolic heart failure    Depression    Severe mitral regurgitation    Lumbar disc herniation with radiculopathy    (HFpEF) heart failure with preserved ejection fraction    Hypervolemia    Decreased strength in legs    Paralysis of left vocal cord    Type 2 diabetes mellitus with stage 3b chronic kidney disease, without long-term current use of insulin    Impaired mobility    Major depressive disorder, single episode, mild    Hypokalemia    Elevated d-dimer    Lung nodules    Hammer toe    Acute confusional state    ACP (advance care planning)    Encephalopathy, toxic    Stage 3b chronic  kidney disease    Vitamin D deficiency    Hospital discharge follow-up    Vertigo    Pulmonary HTN    Valvular heart disease       Review of patient's allergies indicates:  No Known Allergies    Current Outpatient Medications   Medication Instructions    acetaminophen (TYLENOL) 650 mg, Oral, Every 8 hours    atorvastatin (LIPITOR) 80 mg, Oral, Daily    bumetanide (BUMEX) 3 mg, Oral, 2 times daily    busPIRone (BUSPAR) 5 MG Tab Take half tablet (0.5mg) by mouth twice a day as needed for anxiety    clopidogreL (PLAVIX) 75 mg, Oral    cyanocobalamin 1,000 mcg/mL injection INJECT 1 ML INTO THE MUSCLE EVERY 14 DAYS    EScitalopram oxalate (LEXAPRO) 20 mg, Oral, Daily    lisinopriL (PRINIVIL,ZESTRIL) 5 mg, Oral, Daily    nitroGLYCERIN (NITROSTAT) 0.4 mg, Sublingual, Every 5 min PRN    nystatin (MYCOSTATIN) cream Topical (Top), 2 times daily    potassium chloride SA (K-DUR,KLOR-CON) 20 MEQ tablet 20 mEq, Oral, 2 times daily    pregabalin (LYRICA) 75 mg, Oral, 2 times daily       Past Surgical History:   Procedure Laterality Date    ABDOMINAL SURGERY  2006    diverticulitis x3    ANTERIOR CERVICAL DISCECTOMY W/ FUSION N/A 8/30/2018    FUSION C6-7 Surgeon: Rickey Martin MD    APPENDECTOMY      CARPAL TUNNEL RELEASE      COLECTOMY  07/2020    EPIDURAL STEROID INJECTION INTO LUMBAR SPINE N/A 1/20/2021    Procedure: Injection-steroid-epidural-lumbar--L3-4;  Surgeon: Colleen Carvajal MD;  Location: [facility];  Service: Pain Management;  Laterality: N/A;    HYSTERECTOMY  1970    @25yrs of age    LARYNGOSCOPY N/A 1/4/2022    Procedure: Suspension microlaryngoscopy with left vocal fold injection augmentation - Restylane L;  Surgeon: Yuan Mir MD;  Location: [address];  Service: ENT;  Laterality: N/A;  Microscope, telescopes, tower, injector, Restylane-L    MANDIBLE FRACTURE SURGERY  1970    MICRODISCECTOMY OF SPINE Left 4/13/2021    Procedure: MICRODISCECTOMY, SPINE Left L3-4 far lateral Microdiscectomy;   Surgeon: Rickey Martin MD;  Location: [facility] OR;  Service: Neurosurgery;  Laterality: Left;  Procedure: Left L3-4 far lateral Microdiscectomy   Surgery Time: 1.5 hrs  LOS: 0-1  Anesthesia: General  Radiology: C-arm  SNS: EMG, SEP  Bed: [place] [id number] Poster  Position: Melissa Demarco w/ Globus confirmed 4/12/21 [place]    OOPHORECTOMY  1970    ROTATOR CUFF REPAIR Left 11/2016    TOTAL REDUCTION MAMMOPLASTY Bilateral 1990    TRANSESOPHAGEAL ECHOCARDIOGRAPHY N/A 11/1/2023    Procedure: ECHOCARDIOGRAM, TRANSESOPHAGEAL;  Surgeon: Provider, [facility];  Location: [facility] EP LAB;  Service: Cardiology;  Laterality: N/A;    TRANSFORAMINAL EPIDURAL INJECTION OF STEROID Left 12/23/2020    Procedure: Injection,steroid,epidural,transforaminal approach--Left L4 and L5;  Surgeon: Colleen Carvajal MD;  Location: [facility] PAIN MGT;  Service: Pain Management;  Laterality: Left;       Social History     Substance and Sexual Activity   Drug Use No     Alcohol Use: Not At Risk (12/8/2023)    AUDIT-C     Frequency of Alcohol Consumption: Never     Average Number of Drinks: Patient does not drink     Frequency of Binge Drinking: Never     Tobacco Use: Low Risk  (12/11/2023)    Patient History     Smoking Tobacco Use: Never     Smokeless Tobacco Use: Never     Passive Exposure: Not on file       OBJECTIVE:     Vital Signs Range (Last 24H):         Significant Labs    Heme Profile  Lab Results   Component Value Date    WBC 4.30 11/09/2023    HGB 8.7 (L) 11/09/2023    HCT 28.4 (L) 11/09/2023     11/09/2023       Coagulation Studies  Lab Results   Component Value Date    LABPROT 11.6 11/01/2023    INR 1.1 11/01/2023    APTT 28.3 03/31/2021       BMP  Lab Results   Component Value Date     12/13/2023    K 4.3 12/13/2023     12/13/2023    CO2 24 12/13/2023    BUN 30 (H) 12/13/2023    CREATININE 1.5 (H) 12/13/2023    MG 2.2 11/09/2023    PHOS 4.4 11/08/2023       Liver Function Tests  Lab Results   Component Value Date    AST 31  11/07/2023    ALT 18 11/07/2023    ALKPHOS 121 11/07/2023    BILITOT 0.6 11/07/2023    PROT 7.8 11/07/2023    ALBUMIN 3.8 11/08/2023       Lipid Profile  Lab Results   Component Value Date    CHOL 154 04/12/2023    HDL 29 (L) 04/12/2023    TRIG 119 04/12/2023       Endocrine Profile  Lab Results   Component Value Date    HGBA1C 6.1 (H) 10/03/2023    TSH 0.885 11/07/2023       Diagnostic Studies    Cardiac Studies    EKG:   Results for orders placed or performed during the hospital encounter of 11/07/23   EKG 12-lead    Collection Time: 11/07/23  9:13 PM    Narrative    Test Reason : R53.1,    Vent. Rate : 070 BPM     Atrial Rate : 070 BPM     P-R Int : 200 ms          QRS Dur : 104 ms      QT Int : 462 ms       P-R-T Axes : 092 108 071 degrees     QTc Int : 498 ms     Suspect arm lead reversal, interpretation assumes no reversal  Normal sinus rhythm  Rightward axis  Pulmonary disease pattern  Prolonged QT  Abnormal ECG  When compared with ECG of 07-NOV-2023 17:42,  Nonspecific T wave abnormality no longer evident in Inferior leads  Nonspecific T wave abnormality, improved in Anterior leads  Confirmed by Cj Courtney MD ([id number]) on 11/8/2023 11:14:03 PM    Referred By: VANDANA   SELF           Confirmed By:Cj Courtney MD       TTE:  Results for orders placed during the hospital encounter of 09/12/23    Echo Saline Bubble? No    Interpretation Summary    Left Ventricle: The left ventricle is normal in size. Normal wall thickness. Normal wall motion. There is normal systolic function with a visually estimated ejection fraction of 55 - 60%. Biplane (2D) method of discs ejection fraction is 58%. Global longitudinal strain is -20.0%. There is normal diastolic function.    Right Ventricle: Normal right ventricular cavity size. Wall thickness is normal. Right ventricle wall motion  is normal. Systolic function is normal.    Aortic Valve: The aortic valve is a trileaflet valve. There is moderate aortic valve sclerosis.  There is moderate annular calcification present. Mildly restricted motion. There is low flow mild stenosis. Aortic valve area by VTI is 1.51 cm². Aortic valve peak velocity is 1.84 m/s. Mean gradient is 9 mmHg. The dimensionless index is 0.45.    Mitral Valve: There is moderate annular dilation present. There is severe mitral annular calcification present. There is moderate to severe regurgitation eccentric posteriorly directed. posterior leaflet is restricted causing mild over riding anterior leaflet and MR, Estimated Rvol is 48 ml.    Pulmonary Artery: The estimated pulmonary artery systolic pressure is 70 mmHg.    IVC/SVC: Normal venous pressure at 3 mmHg.      PORTIA:  Results for orders placed during the hospital encounter of 11/01/23    Transesophageal echo (PORTIA)    Interpretation Summary    Left Ventricle: Normal wall thickness. There is low normal systolic function with a visually estimated ejection fraction of 50 - 55%.    Right Ventricle: Systolic function is normal.    Left Atrium: Left atrium is dilated. The left atrial appendage appears normal. The left atrial appendage has a windsock morphology. Appendage velocity is normal at greater than 40 cm/sec. There is no thrombus in the left atrial appendage. The pulmonary veins have systolic flow reversal.    Right Atrium: Right atrium is dilated.    Aortic Valve: The aortic valve is a trileaflet valve. There is moderate aortic valve sclerosis. There is moderate annular calcification present. Mildly restricted motion. There is trace aortic regurgitation.    Mitral Valve: There is moderate mitral annular calcification present. Posterior leaflet length is 1.3cm There is posterior leaflet tethering. There is no mass/vegetation present. The mean pressure gradient across the mitral valve is 3 mmHg at a heart rate of 67 bpm. There is severe regurgitation with a centrally directed jet. It originates on the medial part of A2-P2. RV is 70cc, ERO 0.5cm2, RF 53%     Tricuspid Valve: There is mild regurgitation.    Aorta: Grade 3 atherosclerosis of the ascending aorta and descending aorta.    Pulmonary Artery: The estimated pulmonary artery systolic pressure isat least 67 mmHg.      Nuclear Stress Test (11/1/23)      There are no clinically significant perfusion abnormalities. There is a small (<10%) amount of mild resting heterogeneity that improves with stress, indicative of non-obstructive disease.    The whole heart absolute myocardial perfusion values averaged 0.95 cc/min/g at rest, which is normal; 1.79 cc/min/g at stress, which is mildly reduced; and CFR is 1.86 , which is mildly reduced.    CT attenuation images demonstrate moderate diffuse coronary calcifications in the LAD, LCX and RCA territory and severe diffuse aortic calcifications in the ascending aorta and descending aorta.    The gated perfusion images showed an ejection fraction of 60% at rest. The visually estimated LVEF is normal during stress. A normal ejection fraction is greater than 47%.    The wall motion is normal at rest and during stress.    The LV cavity size is normal at rest and stress.    The ECG portion of the study is negative for ischemia.    During stress, rare and rare PVCs are noted.    The patient reported no chest pain during the stress test.    There are no prior studies for comparison.     Cardiac Catheterization:  No results found for this or any previous visit.    ASSESSMENT/PLAN:     Pre-op Assessment    I have reviewed the Patient Summary Reports.     I have reviewed the Nursing Notes. I have reviewed the NPO Status.   I have reviewed the Medications.     Review of Systems  Anesthesia Hx:  No problems with previous Anesthesia                Social:  Non-Smoker       Hematology/Oncology:  Hematology Normal   Oncology Normal                                   EENT/Dental:  EENT/Dental Normal           Cardiovascular:     Hypertension Valvular problems/Murmurs, AS, MR  CAD       CHF                                  Pulmonary:  Pulmonary Normal                       Renal/:  Chronic Renal Disease, CKD                Hepatic/GI:  Hepatic/GI Normal                 Musculoskeletal:  Arthritis               Neurological:  Neurology Normal                                      Endocrine:  Diabetes           Psych:    depression                   Anesthesia Plan  Type of Anesthesia, risks & benefits discussed:    Anesthesia Type: Gen Natural Airway, MAC  Intra-op Monitoring Plan: Standard ASA Monitors and Art Line  Post Op Pain Control Plan: multimodal analgesia and IV/PO Opioids PRN  Induction:  IV  Informed Consent: Informed consent signed with the Patient and all parties understand the risks and agree with anesthesia plan.  All questions answered. Patient consented to blood products? Yes  ASA Score: 4  Day of Surgery Review of History & Physical: H&P Update referred to the surgeon/provider.    Ready For Surgery From Anesthesia Perspective.     .

## 2023-12-20 ENCOUNTER — ANESTHESIA (OUTPATIENT)
Dept: MEDSURG UNIT | Facility: HOSPITAL | Age: 78
DRG: 267 | End: 2023-12-20
Payer: MEDICARE

## 2023-12-20 ENCOUNTER — EXTERNAL HOME HEALTH (OUTPATIENT)
Dept: HOME HEALTH SERVICES | Facility: HOSPITAL | Age: 78
End: 2023-12-20
Payer: MEDICARE

## 2023-12-20 ENCOUNTER — HOSPITAL ENCOUNTER (INPATIENT)
Facility: HOSPITAL | Age: 78
LOS: 1 days | Discharge: HOME | DRG: 267 | End: 2023-12-20
Attending: INTERNAL MEDICINE | Admitting: INTERNAL MEDICINE
Payer: MEDICARE

## 2023-12-20 DIAGNOSIS — Z95.818 S/P MITRAL VALVE CLIP IMPLANTATION: ICD-10-CM

## 2023-12-20 DIAGNOSIS — I34.0 SEVERE MITRAL REGURGITATION: ICD-10-CM

## 2023-12-20 DIAGNOSIS — Z98.890 S/P MITRAL VALVE CLIP IMPLANTATION: ICD-10-CM

## 2023-12-20 PROBLEM — I35.0 AORTIC STENOSIS: Status: RESOLVED | Noted: 2018-06-19 | Resolved: 2023-12-20

## 2023-12-20 LAB
ABO + RH BLD: NORMAL
ALBUMIN SERPL BCP-MCNC: 3 G/DL (ref 3.5–5.2)
ALP SERPL-CCNC: 77 U/L (ref 55–135)
ALT SERPL W/O P-5'-P-CCNC: 7 U/L (ref 10–44)
ANION GAP SERPL CALC-SCNC: 9 MMOL/L (ref 8–16)
AST SERPL-CCNC: 14 U/L (ref 10–40)
BASOPHILS # BLD AUTO: 0.04 K/UL (ref 0–0.2)
BASOPHILS NFR BLD: 0.8 % (ref 0–1.9)
BILIRUB SERPL-MCNC: 0.6 MG/DL (ref 0.1–1)
BLD GP AB SCN CELLS X3 SERPL QL: NORMAL
BNP SERPL-MCNC: 100 PG/ML (ref 0–99)
BUN SERPL-MCNC: 33 MG/DL (ref 8–23)
CALCIUM SERPL-MCNC: 8.5 MG/DL (ref 8.7–10.5)
CHLORIDE SERPL-SCNC: 110 MMOL/L (ref 95–110)
CO2 SERPL-SCNC: 25 MMOL/L (ref 23–29)
CREAT SERPL-MCNC: 1.6 MG/DL (ref 0.5–1.4)
DIFFERENTIAL METHOD: ABNORMAL
EOSINOPHIL # BLD AUTO: 0.1 K/UL (ref 0–0.5)
EOSINOPHIL NFR BLD: 1.7 % (ref 0–8)
ERYTHROCYTE [DISTWIDTH] IN BLOOD BY AUTOMATED COUNT: 15.7 % (ref 11.5–14.5)
EST. GFR  (NO RACE VARIABLE): 32.8 ML/MIN/1.73 M^2
GLUCOSE SERPL-MCNC: 106 MG/DL (ref 70–110)
HCT VFR BLD AUTO: 26.9 % (ref 37–48.5)
HGB BLD-MCNC: 8.3 G/DL (ref 12–16)
IMM GRANULOCYTES # BLD AUTO: 0.01 K/UL (ref 0–0.04)
IMM GRANULOCYTES NFR BLD AUTO: 0.2 % (ref 0–0.5)
INR PPP: 1.1 (ref 0.8–1.2)
LYMPHOCYTES # BLD AUTO: 0.9 K/UL (ref 1–4.8)
LYMPHOCYTES NFR BLD: 17.8 % (ref 18–48)
MCH RBC QN AUTO: 27 PG (ref 27–31)
MCHC RBC AUTO-ENTMCNC: 30.9 G/DL (ref 32–36)
MCV RBC AUTO: 88 FL (ref 82–98)
MONOCYTES # BLD AUTO: 0.4 K/UL (ref 0.3–1)
MONOCYTES NFR BLD: 7.3 % (ref 4–15)
NEUTROPHILS # BLD AUTO: 3.8 K/UL (ref 1.8–7.7)
NEUTROPHILS NFR BLD: 72.2 % (ref 38–73)
NRBC BLD-RTO: 0 /100 WBC
PLATELET # BLD AUTO: 213 K/UL (ref 150–450)
PMV BLD AUTO: 11 FL (ref 9.2–12.9)
POCT GLUCOSE: 98 MG/DL (ref 70–110)
POTASSIUM SERPL-SCNC: 4.4 MMOL/L (ref 3.5–5.1)
PROT SERPL-MCNC: 5.6 G/DL (ref 6–8.4)
PROTHROMBIN TIME: 11.4 SEC (ref 9–12.5)
RBC # BLD AUTO: 3.07 M/UL (ref 4–5.4)
SODIUM SERPL-SCNC: 144 MMOL/L (ref 136–145)
WBC # BLD AUTO: 5.22 K/UL (ref 3.9–12.7)

## 2023-12-20 PROCEDURE — 37000008 HC ANESTHESIA 1ST 15 MINUTES: Mod: HCNC | Performed by: INTERNAL MEDICINE

## 2023-12-20 PROCEDURE — C1889 IMPLANT/INSERT DEVICE, NOC: HCPCS | Mod: HCNC | Performed by: INTERNAL MEDICINE

## 2023-12-20 PROCEDURE — 82962 GLUCOSE BLOOD TEST: CPT | Mod: HCNC | Performed by: INTERNAL MEDICINE

## 2023-12-20 PROCEDURE — 33418 REPAIR TCAT MITRAL VALVE: CPT | Mod: Q0,HCNC,, | Performed by: INTERNAL MEDICINE

## 2023-12-20 PROCEDURE — 25000003 PHARM REV CODE 250: Mod: HCNC | Performed by: INTERNAL MEDICINE

## 2023-12-20 PROCEDURE — 83880 ASSAY OF NATRIURETIC PEPTIDE: CPT | Mod: HCNC | Performed by: INTERNAL MEDICINE

## 2023-12-20 PROCEDURE — 11000001 HC ACUTE MED/SURG PRIVATE ROOM: Mod: HCNC

## 2023-12-20 PROCEDURE — 1111F DSCHRG MED/CURRENT MED MERGE: CPT | Mod: HCNC,CPTII,, | Performed by: INTERNAL MEDICINE

## 2023-12-20 PROCEDURE — 93010 EKG 12-LEAD: ICD-10-PCS | Mod: HCNC,,, | Performed by: INTERNAL MEDICINE

## 2023-12-20 PROCEDURE — 37000009 HC ANESTHESIA EA ADD 15 MINS: Mod: HCNC | Performed by: INTERNAL MEDICINE

## 2023-12-20 PROCEDURE — 80053 COMPREHEN METABOLIC PANEL: CPT | Mod: HCNC | Performed by: INTERNAL MEDICINE

## 2023-12-20 PROCEDURE — 99499 NO LOS: ICD-10-PCS | Mod: HCNC,,, | Performed by: INTERNAL MEDICINE

## 2023-12-20 PROCEDURE — C1769 GUIDE WIRE: HCPCS | Mod: HCNC | Performed by: INTERNAL MEDICINE

## 2023-12-20 PROCEDURE — C1887 CATHETER, GUIDING: HCPCS | Mod: HCNC | Performed by: INTERNAL MEDICINE

## 2023-12-20 PROCEDURE — 85025 COMPLETE CBC W/AUTO DIFF WBC: CPT | Mod: HCNC | Performed by: INTERNAL MEDICINE

## 2023-12-20 PROCEDURE — C1894 INTRO/SHEATH, NON-LASER: HCPCS | Mod: HCNC | Performed by: INTERNAL MEDICINE

## 2023-12-20 PROCEDURE — 33418 REPAIR TCAT MITRAL VALVE: CPT | Mod: Q0,HCNC | Performed by: INTERNAL MEDICINE

## 2023-12-20 PROCEDURE — D9220A PRA ANESTHESIA: Mod: Q0,HCNC,, | Performed by: ANESTHESIOLOGY

## 2023-12-20 PROCEDURE — 33418 PR REPAIR MITRAL VALVE PERC APPRCH, INCL TRANSSEPTAL PUNCTRE;INITIAL: ICD-10-PCS | Mod: Q0,HCNC,, | Performed by: INTERNAL MEDICINE

## 2023-12-20 PROCEDURE — 93010 ELECTROCARDIOGRAM REPORT: CPT | Mod: HCNC,,, | Performed by: INTERNAL MEDICINE

## 2023-12-20 PROCEDURE — 63600175 PHARM REV CODE 636 W HCPCS: Mod: HCNC | Performed by: ANESTHESIOLOGY

## 2023-12-20 PROCEDURE — 99499 UNLISTED E&M SERVICE: CPT | Mod: HCNC,,, | Performed by: INTERNAL MEDICINE

## 2023-12-20 PROCEDURE — 27201037 HC PRESSURE MONITORING SET UP: Mod: HCNC

## 2023-12-20 PROCEDURE — D9220A PRA ANESTHESIA: ICD-10-PCS | Mod: Q0,HCNC,, | Performed by: ANESTHESIOLOGY

## 2023-12-20 PROCEDURE — 25000003 PHARM REV CODE 250: Mod: HCNC

## 2023-12-20 PROCEDURE — 93005 ELECTROCARDIOGRAM TRACING: CPT | Mod: HCNC

## 2023-12-20 PROCEDURE — 85610 PROTHROMBIN TIME: CPT | Mod: HCNC | Performed by: INTERNAL MEDICINE

## 2023-12-20 PROCEDURE — 1111F PR DISCHARGE MEDS RECONCILED W/ CURRENT OUTPATIENT MED LIST: ICD-10-PCS | Mod: HCNC,CPTII,, | Performed by: INTERNAL MEDICINE

## 2023-12-20 PROCEDURE — 27201423 OPTIME MED/SURG SUP & DEVICES STERILE SUPPLY: Mod: HCNC | Performed by: INTERNAL MEDICINE

## 2023-12-20 PROCEDURE — 86850 RBC ANTIBODY SCREEN: CPT | Mod: HCNC | Performed by: INTERNAL MEDICINE

## 2023-12-20 PROCEDURE — 25000003 PHARM REV CODE 250: Mod: HCNC | Performed by: ANESTHESIOLOGY

## 2023-12-20 DEVICE — SYS MITRACLIP G4 DELIVERY XTW: Type: IMPLANTABLE DEVICE | Site: HEART | Status: FUNCTIONAL

## 2023-12-20 DEVICE — STEERABLE GUIDE CATHETER
Type: IMPLANTABLE DEVICE | Site: OTHER (ADD COMMENT) | Status: FUNCTIONAL
Brand: MITRACLIP

## 2023-12-20 RX ORDER — DIPHENHYDRAMINE HCL 50 MG
50 CAPSULE ORAL ONCE
Status: COMPLETED | OUTPATIENT
Start: 2023-12-20 | End: 2023-12-20

## 2023-12-20 RX ORDER — HEPARIN SODIUM 1000 [USP'U]/ML
INJECTION, SOLUTION INTRAVENOUS; SUBCUTANEOUS
Status: DISCONTINUED | OUTPATIENT
Start: 2023-12-20 | End: 2023-12-20

## 2023-12-20 RX ORDER — HYDROMORPHONE HYDROCHLORIDE 1 MG/ML
0.2 INJECTION, SOLUTION INTRAMUSCULAR; INTRAVENOUS; SUBCUTANEOUS EVERY 10 MIN PRN
Status: DISCONTINUED | OUTPATIENT
Start: 2023-12-20 | End: 2023-12-20 | Stop reason: HOSPADM

## 2023-12-20 RX ORDER — ONDANSETRON 2 MG/ML
INJECTION INTRAMUSCULAR; INTRAVENOUS
Status: DISCONTINUED | OUTPATIENT
Start: 2023-12-20 | End: 2023-12-20

## 2023-12-20 RX ORDER — ONDANSETRON 8 MG/1
8 TABLET, ORALLY DISINTEGRATING ORAL EVERY 8 HOURS PRN
Status: DISCONTINUED | OUTPATIENT
Start: 2023-12-20 | End: 2023-12-20 | Stop reason: HOSPADM

## 2023-12-20 RX ORDER — HALOPERIDOL 5 MG/ML
0.5 INJECTION INTRAMUSCULAR EVERY 10 MIN PRN
Status: DISCONTINUED | OUTPATIENT
Start: 2023-12-20 | End: 2023-12-20 | Stop reason: HOSPADM

## 2023-12-20 RX ORDER — SODIUM CHLORIDE 9 MG/ML
INJECTION, SOLUTION INTRAVENOUS CONTINUOUS
Status: DISCONTINUED | OUTPATIENT
Start: 2023-12-20 | End: 2024-02-23

## 2023-12-20 RX ORDER — PHENYLEPHRINE HYDROCHLORIDE 10 MG/ML
INJECTION INTRAVENOUS
Status: DISCONTINUED | OUTPATIENT
Start: 2023-12-20 | End: 2023-12-20

## 2023-12-20 RX ORDER — LIDOCAINE HYDROCHLORIDE 20 MG/ML
INJECTION, SOLUTION INFILTRATION; PERINEURAL
Status: DISCONTINUED | OUTPATIENT
Start: 2023-12-20 | End: 2023-12-20 | Stop reason: HOSPADM

## 2023-12-20 RX ORDER — SODIUM CHLORIDE 0.9 % (FLUSH) 0.9 %
10 SYRINGE (ML) INJECTION
Status: DISCONTINUED | OUTPATIENT
Start: 2023-12-20 | End: 2023-12-20 | Stop reason: HOSPADM

## 2023-12-20 RX ORDER — FENTANYL CITRATE 50 UG/ML
INJECTION, SOLUTION INTRAMUSCULAR; INTRAVENOUS
Status: DISCONTINUED | OUTPATIENT
Start: 2023-12-20 | End: 2023-12-20

## 2023-12-20 RX ORDER — CEFAZOLIN SODIUM 1 G/3ML
INJECTION, POWDER, FOR SOLUTION INTRAMUSCULAR; INTRAVENOUS
Status: DISCONTINUED | OUTPATIENT
Start: 2023-12-20 | End: 2023-12-20

## 2023-12-20 RX ORDER — ACETAMINOPHEN 325 MG/1
650 TABLET ORAL EVERY 4 HOURS PRN
Status: DISCONTINUED | OUTPATIENT
Start: 2023-12-20 | End: 2023-12-20 | Stop reason: HOSPADM

## 2023-12-20 RX ORDER — FENTANYL CITRATE 50 UG/ML
25 INJECTION, SOLUTION INTRAMUSCULAR; INTRAVENOUS EVERY 5 MIN PRN
Status: DISCONTINUED | OUTPATIENT
Start: 2023-12-20 | End: 2023-12-20 | Stop reason: HOSPADM

## 2023-12-20 RX ORDER — ACETAMINOPHEN 500 MG
1000 TABLET ORAL ONCE
Status: COMPLETED | OUTPATIENT
Start: 2023-12-20 | End: 2023-12-20

## 2023-12-20 RX ORDER — PROPOFOL 10 MG/ML
VIAL (ML) INTRAVENOUS CONTINUOUS PRN
Status: DISCONTINUED | OUTPATIENT
Start: 2023-12-20 | End: 2023-12-20

## 2023-12-20 RX ORDER — LIDOCAINE HYDROCHLORIDE 20 MG/ML
INJECTION, SOLUTION EPIDURAL; INFILTRATION; INTRACAUDAL; PERINEURAL
Status: DISCONTINUED | OUTPATIENT
Start: 2023-12-20 | End: 2023-12-20

## 2023-12-20 RX ORDER — HEPARIN SOD,PORCINE/0.9 % NACL 1000/500ML
INTRAVENOUS SOLUTION INTRAVENOUS
Status: DISCONTINUED | OUTPATIENT
Start: 2023-12-20 | End: 2023-12-20 | Stop reason: HOSPADM

## 2023-12-20 RX ORDER — PROTAMINE SULFATE 10 MG/ML
INJECTION, SOLUTION INTRAVENOUS
Status: DISCONTINUED | OUTPATIENT
Start: 2023-12-20 | End: 2023-12-20

## 2023-12-20 RX ADMIN — HEPARIN SODIUM 6000 UNITS: 1000 INJECTION, SOLUTION INTRAVENOUS; SUBCUTANEOUS at 09:12

## 2023-12-20 RX ADMIN — PROTAMINE SULFATE 50 MG: 10 INJECTION, SOLUTION INTRAVENOUS at 09:12

## 2023-12-20 RX ADMIN — CEFAZOLIN 2 G: 330 INJECTION, POWDER, FOR SOLUTION INTRAMUSCULAR; INTRAVENOUS at 08:12

## 2023-12-20 RX ADMIN — FENTANYL CITRATE 25 MCG: 0.05 INJECTION, SOLUTION INTRAMUSCULAR; INTRAVENOUS at 09:12

## 2023-12-20 RX ADMIN — HEPARIN SODIUM 1000 UNITS: 1000 INJECTION, SOLUTION INTRAVENOUS; SUBCUTANEOUS at 09:12

## 2023-12-20 RX ADMIN — HEPARIN SODIUM 2000 UNITS: 1000 INJECTION, SOLUTION INTRAVENOUS; SUBCUTANEOUS at 09:12

## 2023-12-20 RX ADMIN — PHENYLEPHRINE HYDROCHLORIDE 50 MCG: 10 INJECTION INTRAVENOUS at 08:12

## 2023-12-20 RX ADMIN — PHENYLEPHRINE HYDROCHLORIDE 50 MCG: 10 INJECTION INTRAVENOUS at 09:12

## 2023-12-20 RX ADMIN — ACETAMINOPHEN 1000 MG: 500 TABLET ORAL at 07:12

## 2023-12-20 RX ADMIN — PROPOFOL 50 MCG/KG/MIN: 10 INJECTION, EMULSION INTRAVENOUS at 08:12

## 2023-12-20 RX ADMIN — ONDANSETRON 4 MG: 2 INJECTION INTRAMUSCULAR; INTRAVENOUS at 08:12

## 2023-12-20 RX ADMIN — PROPOFOL 30 MG: 10 INJECTION, EMULSION INTRAVENOUS at 08:12

## 2023-12-20 RX ADMIN — DIPHENHYDRAMINE HYDROCHLORIDE 50 MG: 50 CAPSULE ORAL at 07:12

## 2023-12-20 RX ADMIN — LIDOCAINE HYDROCHLORIDE 100 MG: 20 INJECTION, SOLUTION EPIDURAL; INFILTRATION; INTRACAUDAL; PERINEURAL at 08:12

## 2023-12-20 RX ADMIN — SODIUM CHLORIDE: 0.9 INJECTION, SOLUTION INTRAVENOUS at 08:12

## 2023-12-20 RX ADMIN — NOREPINEPHRINE BITARTRATE 0.02 MCG/KG/MIN: 1 INJECTION, SOLUTION, CONCENTRATE INTRAVENOUS at 09:12

## 2023-12-20 RX ADMIN — PROPOFOL 20 MG: 10 INJECTION, EMULSION INTRAVENOUS at 08:12

## 2023-12-20 RX ADMIN — SODIUM CHLORIDE, SODIUM GLUCONATE, SODIUM ACETATE, POTASSIUM CHLORIDE, MAGNESIUM CHLORIDE, SODIUM PHOSPHATE, DIBASIC, AND POTASSIUM PHOSPHATE: .53; .5; .37; .037; .03; .012; .00082 INJECTION, SOLUTION INTRAVENOUS at 08:12

## 2023-12-20 NOTE — PLAN OF CARE
Patient discharged per MD orders. Instructions given on medications, wound care, activity, signs of infection, when to call MD, and follow up appointments. Pt verbalized understanding. Telemetry and PIV removed. Patient and family awaiting wc to private vehicle.

## 2023-12-20 NOTE — ANESTHESIA POSTPROCEDURE EVALUATION
Anesthesia Post Evaluation    Patient: Trudy R Dakin    Procedure(s) Performed: Procedure(s) (LRB):  Mitral clip (N/A)  ECHOCARDIOGRAM, TRANSESOPHAGEAL (N/A)    Final Anesthesia Type: general      Patient location during evaluation: PACU  Patient participation: Yes- Able to Participate  Level of consciousness: awake and alert  Post-procedure vital signs: reviewed and stable  Pain management: adequate  Airway patency: patent    PONV status at discharge: No PONV  Anesthetic complications: no      Cardiovascular status: blood pressure returned to baseline  Respiratory status: unassisted  Follow-up not needed.              Vitals Value Taken Time   /59 12/20/23 1301   Temp 37 °C (98.6 °F) 12/20/23 1200   Pulse 82 12/20/23 1305   Resp 18 12/20/23 1305   SpO2 96 % 12/20/23 1305   Vitals shown include unvalidated device data.      No case tracking events are documented in the log.      Pain/Jason Score: Pain Rating Prior to Med Admin: 1 (12/20/2023  7:27 AM)  Jason Score: 9 (12/20/2023 11:47 AM)

## 2023-12-20 NOTE — PLAN OF CARE
Patient arrived to room. PIV placed, labs sent. Admit assessment completed. Plan of care discussed with patient. Daughter in law at bedside. Nurse call bell within reach. Will monitor

## 2023-12-20 NOTE — Clinical Note
[employer] MITRAC[employer] G4 DELIVERY XTW - [id number] was inserted into the mitral valve.

## 2023-12-20 NOTE — HPI
78 F w/ PMHx of DM2, CAD s/p Stents, HF from valvular heart disease, Pulm HTN, Severe Mitral Valve Regurgitation, Aortic Valve Stenosis and Diverticulitis s/p Abdominal Surgery (remote) presented today for mitral clip.     She has been dealing with congestion/volume overload for months. She has had hospitalizations for hypervolemia, diuresed but then asked to stop her diuretics, concerned for her kidney function. She gets congested again. Lately, due to severe edema and symptoms, her family started diuretics on their own. Patient improved clinically per their report. However, she continues to have congestive symptoms, orthopnea, pnd, leg edema, abdominal distention and decreased appetite

## 2023-12-20 NOTE — PLAN OF CARE
Received report from ELIO Zavaleta. Patient s/p mitral clip, AAOx3. VSS, no c/o pain or discomfort at this time, resp even and unlabored. Suture to R groin with ooze and quarter size hematoma. Pressure applied x 15 minutes. Site soft. MD Diego notified. Post procedure protocol reviewed with patient and patient's family. Understanding verbalized. Family members at bedside. Nurse call bell within reach. Will continue to monitor per post procedure protocol.

## 2023-12-20 NOTE — H&P
[facility] - Short Stay Cardiac Unit  Interventional Cardiology  H&P    Patient Name: Trudy R Dakin  MRN: [id number]  Admission Date: 12/20/2023  Code Status: Prior   Attending Provider: Kevin More MD   Primary Care Physician: Camilo Patel MD  Principal Problem:<principal problem not specified>    Patient information was obtained from patient.     Subjective:         HPI:  78 F w/ PMHx of DM2, CAD s/p Stents, HF from valvular heart disease, Pulm HTN, Severe Mitral Valve Regurgitation, Aortic Valve Stenosis and Diverticulitis s/p Abdominal Surgery (remote) presented today for mitral clip.     She has been dealing with congestion/volume overload for months. She has had hospitalizations for hypervolemia, diuresed but then asked to stop her diuretics, concerned for her kidney function. She gets congested again. Lately, due to severe edema and symptoms, her family started diuretics on their own. Patient improved clinically per their report. However, she continues to have congestive symptoms, orthopnea, pnd, leg edema, abdominal distention and decreased appetite     11/1/23 PORTIA     Left Ventricle: Normal wall thickness. There is low normal systolic function with a visually estimated ejection fraction of 50 - 55%.    Right Ventricle: Systolic function is normal.    Left Atrium: Left atrium is dilated. The left atrial appendage appears normal. The left atrial appendage has a windsock morphology. Appendage velocity is normal at greater than 40 cm/sec. There is no thrombus in the left atrial appendage. The pulmonary veins have systolic flow reversal.    Right Atrium: Right atrium is dilated.    Aortic Valve: The aortic valve is a trileaflet valve. There is moderate aortic valve sclerosis. There is moderate annular calcification present. Mildly restricted motion. There is trace aortic regurgitation.    Mitral Valve: There is moderate mitral annular calcification present. Posterior leaflet length is 1.3cm There is  posterior leaflet tethering. There is no mass/vegetation present. The mean pressure gradient across the mitral valve is 3 mmHg at a heart rate of 67 bpm. There is severe regurgitation with a centrally directed jet. It originates on the medial part of A2-P2. RV is 70cc, ERO 0.5cm2, RF 53%    Tricuspid Valve: There is mild regurgitation.    Aorta: Grade 3 atherosclerosis of the ascending aorta and descending aorta.    Pulmonary Artery: The estimated pulmonary artery systolic pressure isat least 67 mmHg.      Past Medical History:   Diagnosis Date    CHF (congestive heart failure)     Diabetes     Diverticulitis     Hypertension     Renal disorder        Past Surgical History:   Procedure Laterality Date    ABDOMINAL SURGERY  2006    diverticulitis x3    ANTERIOR CERVICAL DISCECTOMY W/ FUSION N/A 8/30/2018    FUSION C6-7 Surgeon: Rickey Martin MD    APPENDECTOMY      CARPAL TUNNEL RELEASE      COLECTOMY  07/2020    EPIDURAL STEROID INJECTION INTO LUMBAR SPINE N/A 1/20/2021    Procedure: Injection-steroid-epidural-lumbar--L3-4;  Surgeon: Collene Carvajal MD;  Location: [facility] PAIN MGT;  Service: Pain Management;  Laterality: N/A;    HYSTERECTOMY  1970    @25yrs of age    LARYNGOSCOPY N/A 1/4/2022    Procedure: Suspension microlaryngoscopy with left vocal fold injection augmentation - Restylane L;  Surgeon: Yuan Mir MD;  Location: [address];  Service: ENT;  Laterality: N/A;  Microscope, telescopes, tower, injector, Restylane-L    MANDIBLE FRACTURE SURGERY  1970    MICRODISCECTOMY OF SPINE Left 4/13/2021    Procedure: MICRODISCECTOMY, SPINE Left L3-4 far lateral Microdiscectomy;  Surgeon: Rickey Martin MD;  Location: [facility] OR;  Service: Neurosurgery;  Laterality: Left;  Procedure: Left L3-4 far lateral Microdiscectomy   Surgery Time: 1.5 hrs  LOS: 0-1  Anesthesia: General  Radiology: C-arm  SNS: EMG, SEP  Bed: [place] [address] Poster  Position: Melissa Demarco w/ Globus confirmed 4/12/21 [place]    OOPHORECTOMY   1970    ROTATOR CUFF REPAIR Left 11/2016    TOTAL REDUCTION MAMMOPLASTY Bilateral 1990    TRANSESOPHAGEAL ECHOCARDIOGRAPHY N/A 11/1/2023    Procedure: ECHOCARDIOGRAM, TRANSESOPHAGEAL;  Surgeon: Provider, Dosc Diagnostic;  Location: [facility] EP LAB;  Service: Cardiology;  Laterality: N/A;    TRANSFORAMINAL EPIDURAL INJECTION OF STEROID Left 12/23/2020    Procedure: Injection,steroid,epidural,transforaminal approach--Left L4 and L5;  Surgeon: Colleen Carvajal MD;  Location: [facility];  Service: Pain Management;  Laterality: Left;       Review of patient's allergies indicates:  No Known Allergies    PTA Medications   Medication Sig    acetaminophen (TYLENOL) 650 MG TbSR Take 1 tablet (650 mg total) by mouth every 8 (eight) hours.    atorvastatin (LIPITOR) 80 MG tablet Take 1 tablet (80 mg total) by mouth once daily. (Patient taking differently: Take 80 mg by mouth every evening.)    bumetanide (BUMEX) 2 MG tablet Take 1.5 tablets (3 mg total) by mouth 2 (two) times daily.    busPIRone (BUSPAR) 5 MG Tab Take half tablet (0.5mg) by mouth twice a day as needed for anxiety (Patient taking differently: Take 5 mg by mouth nightly.)    clopidogreL (PLAVIX) 75 mg tablet TAKE ONE TABLET BY MOUTH EVERY DAY    EScitalopram oxalate (LEXAPRO) 20 MG tablet Take 1 tablet (20 mg total) by mouth once daily.    lisinopriL (PRINIVIL,ZESTRIL) 5 MG tablet Take 1 tablet (5 mg total) by mouth once daily.    potassium chloride SA (K-DUR,KLOR-CON) 20 MEQ tablet Take 1 tablet (20 mEq total) by mouth 2 (two) times daily.    pregabalin (LYRICA) 75 MG capsule Take 1 capsule (75 mg total) by mouth 2 (two) times daily.    cyanocobalamin 1,000 mcg/mL injection INJECT 1 ML INTO THE MUSCLE EVERY 14 DAYS (Patient taking differently: Inject 1,000 mcg into the muscle every 14 (fourteen) days.)    nitroGLYCERIN (NITROSTAT) 0.4 MG SL tablet Place 1 tablet (0.4 mg total) under the tongue every 5 (five) minutes as needed for Chest pain.    nystatin  (MYCOSTATIN) cream Apply topically 2 (two) times daily. (Patient not taking: Reported on 12/11/2023)     Family History       Problem Relation (Age of Onset)    Heart disease Father          Tobacco Use    Smoking status: Never    Smokeless tobacco: Never   Substance and Sexual Activity    Alcohol use: No    Drug use: No    Sexual activity: Never     Review of Systems   Constitutional: Negative for fever and malaise/fatigue.   Eyes:  Negative for blurred vision and pain.   Cardiovascular:  Negative for chest pain, dyspnea on exertion, leg swelling, orthopnea, palpitations and paroxysmal nocturnal dyspnea.   Respiratory:  Negative for cough, shortness of breath, sputum production and wheezing.    Hematologic/Lymphatic: Negative for adenopathy and bleeding problem.   Skin:  Negative for rash.   Musculoskeletal:  Negative for back pain and neck pain.   Gastrointestinal:  Negative for abdominal pain, constipation, diarrhea, nausea and vomiting.   Genitourinary:  Negative for dysuria.   Neurological:  Negative for dizziness, headaches, light-headedness and weakness.     Objective:     Vital Signs (Most Recent):  Temp: 98.2 °F (36.8 °C) (12/20/23 0623)  Pulse: 63 (12/20/23 0623)  Resp: 20 (12/20/23 0623)  BP: (!) 100/53 (12/20/23 0626)  SpO2: 99 % (12/20/23 0623) Vital Signs (24h Range):  Temp:  [98.2 °F (36.8 °C)] 98.2 °F (36.8 °C)  Pulse:  [63] 63  Resp:  [20] 20  SpO2:  [99 %] 99 %  BP: (100-109)/(52-53) 100/53     Weight: 55.3 kg (122 lb)  Body mass index is 22.31 kg/m².    SpO2: 99 %       No intake or output data in the 24 hours ending 12/20/23 0706    Lines/Drains/Airways       None                    Physical Exam  Constitutional:       General: She is not in acute distress.     Appearance: Normal appearance. She is not ill-appearing, toxic-appearing or diaphoretic.   HENT:      Head: Normocephalic and atraumatic.      Nose: Nose normal.   Eyes:      Extraocular Movements: Extraocular movements intact.       "Pupils: Pupils are equal, round, and reactive to light.   Cardiovascular:      Rate and Rhythm: Normal rate and regular rhythm.      Heart sounds: No murmur heard.     No friction rub. No gallop.   Pulmonary:      Effort: Pulmonary effort is normal. No respiratory distress.      Breath sounds: Normal breath sounds. No wheezing or rales.   Abdominal:      General: Abdomen is flat. There is no distension.      Palpations: Abdomen is soft. There is no mass.      Tenderness: There is no abdominal tenderness.   Musculoskeletal:         General: No swelling. Normal range of motion.      Cervical back: Normal range of motion. No rigidity.      Right lower leg: No edema.      Left lower leg: No edema.   Skin:     General: Skin is warm and dry.      Coloration: Skin is not jaundiced.      Findings: No bruising.   Neurological:      General: No focal deficit present.      Mental Status: She is alert and oriented to person, place, and time.      Cranial Nerves: No cranial nerve deficit.      Motor: No weakness.            Significant Labs: BMP: No results for input(s): "GLU", "NA", "K", "CL", "CO2", "BUN", "CREATININE", "CALCIUM", "MG" in the last 48 hours. and CBC No results for input(s): "WBC", "HGB", "HCT", "PLT" in the last 48 hours.    Significant Imaging: Reviewed   Assessment and Plan:     Cardiac/Vascular  Severe mitral regurgitation  Trudy R Dakin is a 78 y.o. female referred by Dr Lety Mcwilliams for evaluation of severe MR (NYHA Class III sx).     Mitral Valve Disease Etiology: Other/Indeterminate (tethering posterior leaflet)     The patient has undergone the following MitraClip work-up:   PORTIA (Date 11/1/2023): Severe MR, MG 3 mmHg, ERO 0.5 cm2, EF= 50-55%.   End systolic dimensions 3.67 (TTE on 9/12/23)  LHC: Deferred for PET stress (11/1/2023) that showed no significant perfusion abnormalities   STS: 8%   Frailty: 2/4   CT Surgery risk assessment: inoperable, per Dr. Cook due to frailty  PFTs: " Deferred.  Comorbidities: coronary artery disease. CKD III-IV   Labs: Cr Pending      Transcatheter [id number] Repair   Valve: Mitral-Clip  ECMO:  On Call  AIDA access: RTFV  Post op destination: ICU  Antibiotics given: (to be done during procedure)  Heart failure on admission?        VTE Risk Mitigation (From admission, onward)      None            Aime Santos MD  Interventional Cardiology   [facility] - Short Stay Cardiac Unit

## 2023-12-20 NOTE — NURSING TRANSFER
Nursing Transfer Note      12/20/2023   1:05 PM    Nurse giving handoff:kamlesh lópez   Nurse receiving handoff:kamlesh sandoval     Reason patient is being transferred: post procedure     Transfer To: [facility] 09    Transfer via stretcher    Transfer with cardiac monitoring    Transported by rn    Telemetry: Box Number 0600, Rate 81, and Rhythm NSR  Order for Tele Monitor? Yes    Any special needs or follow-up needed: routine    Chart send with patient: Yes    Notified: daughter in law    Patient reassessed at: 1300      Dr. Gaspar at bedside and reassessed the patients groin. No new bleed at this time. I notified him I had difficulty finding posterior tibial pulse to R .

## 2023-12-20 NOTE — DISCHARGE INSTRUCTIONS
Do not strain or lift anything greater than 5 lb for 1 week (gallon of milk).  Do not drive or operate any dangerous machinery for 24 hours.   Remove bandage after 24 hours. Clean the area with mild soap and water, no alcohol, peroxide or neosporin needed.  No tub bath; do not submerge access site in water for 1 week.Once the skin has healed, bathing in a tub or swimming is allowed.   Inspect the groin site daily and report to the physician any unusual pain at the   access site or affected extremity, unusual swelling at the access site, or signs or   symptoms of infection such as redness, pain, or fever.   Call 911 if you have:   Bleeding from the puncture site that you cannot stop by doing the following:   Relax and lie down right away. Keep your leg/arm flat and apply firm pressure to the site using your fingers and a gauze pad. Keep the pressure on for 20 minutes. Continue this until the bleeding stops. This may take awhile. When bleeding stops, cover the site with a sterile bandage and keep your leg still as much as possible. Go to emergency department or call 911 to check site.

## 2023-12-20 NOTE — CONSULTS
[facility], [place]  PORTIA Consult      Trudy R Dakin  YOB: 1945  Medical Record Number:  [id number]  Attending Physician:  Kevin More MD   Current Principal Problem:  <principal problem not specified>    History     Cc: MR    HPI  78 F w/ PMHx of DM2, CAD s/p Stents, HF from valvular heart disease, Pulm HTN, Severe Mitral Valve Regurgitation, Aortic Valve Stenosis and Diverticulitis s/p Abdominal Surgery (remote) presented today for mitral clip.      She has been dealing with congestion/volume overload for months. She has had hospitalizations for hypervolemia, diuresed but then asked to stop her diuretics, concerned for her kidney function. She gets congested again. Lately, due to severe edema and symptoms, her family started diuretics on their own. Patient improved clinically per their report. However, she continues to have congestive symptoms, orthopnea, pnd, leg edema, abdominal distention and decreased appetite     Dysphagia or odynophagia:  No  Liver Disease, esophageal disease, or known varices:  No  Upper GI Bleeding: No  Snoring:  No  Sleep Apnea:  No  Prior neck surgery or radiation:  No  History of anesthetic difficulties:  No  Family history of anesthetic difficulties:  No  Last oral intake: yesterday before midnight  Able to move neck in all directions:  Yes    Medications - Outpatient  Prior to Admission medications    Medication Sig Start Date End Date Taking? Authorizing Provider   acetaminophen (TYLENOL) 650 MG TbSR Take 1 tablet (650 mg total) by mouth every 8 (eight) hours. 1/4/22  Yes Ariel Pal MD   atorvastatin (LIPITOR) 80 MG tablet Take 1 tablet (80 mg total) by mouth once daily.  Patient taking differently: Take 80 mg by mouth every evening. 9/21/23  Yes Camilo Patel MD   bumetanide (BUMEX) 2 MG tablet Take 1.5 tablets (3 mg total) by mouth 2 (two) times daily. 12/11/23  Yes Kevin Haro MD   busPIRone (BUSPAR) 5 MG Tab Take half  tablet (0.5mg) by mouth twice a day as needed for anxiety  Patient taking differently: Take 5 mg by mouth nightly. 10/4/23  Yes Melissa Cooper MD   clopidogreL (PLAVIX) 75 mg tablet TAKE ONE TABLET BY MOUTH EVERY DAY 10/23/23  Yes Camilo Patel MD   EScitalopram oxalate (LEXAPRO) 20 MG tablet Take 1 tablet (20 mg total) by mouth once daily. 9/21/23 9/15/24 Yes Camilo Patel MD   lisinopriL (PRINIVIL,ZESTRIL) 5 MG tablet Take 1 tablet (5 mg total) by mouth once daily. 12/18/23 12/17/24 Yes Kevin Haro MD   potassium chloride SA (K-DUR,KLOR-CON) 20 MEQ tablet Take 1 tablet (20 mEq total) by mouth 2 (two) times daily. 12/11/23  Yes Kevin Haro MD   pregabalin (LYRICA) 75 MG capsule Take 1 capsule (75 mg total) by mouth 2 (two) times daily. 10/4/23 10/3/24 Yes Melissa Cooper MD   cyanocobalamin 1,000 mcg/mL injection INJECT 1 ML INTO THE MUSCLE EVERY 14 DAYS  Patient taking differently: Inject 1,000 mcg into the muscle every 14 (fourteen) days. 8/7/22   Aaron Tim MD   nitroGLYCERIN (NITROSTAT) 0.4 MG SL tablet Place 1 tablet (0.4 mg total) under the tongue every 5 (five) minutes as needed for Chest pain. 3/28/23   Aaron Tim MD   nystatin (MYCOSTATIN) cream Apply topically 2 (two) times daily.  Patient not taking: Reported on 12/11/2023 11/6/23   Camilo Patel MD       Medications - Current  Scheduled Meds:   acetaminophen  1,000 mg Oral Once    diphenhydrAMINE  50 mg Oral Once     Continuous Infusions:   sodium chloride 0.9%         Allergies  Review of patient's allergies indicates:  No Known Allergies  Past Medical History  Past Medical History:   Diagnosis Date    CHF (congestive heart failure)     Diabetes     Diverticulitis     Hypertension     Renal disorder      Past Surgical History  Past Surgical History:   Procedure Laterality Date    ABDOMINAL SURGERY  2006    diverticulitis x3    ANTERIOR CERVICAL DISCECTOMY W/ FUSION N/A 8/30/2018     FUSION C6-7 Surgeon: Rickey Martin MD    APPENDECTOMY      CARPAL TUNNEL RELEASE      COLECTOMY  07/2020    EPIDURAL STEROID INJECTION INTO LUMBAR SPINE N/A 1/20/2021    Procedure: Injection-steroid-epidural-lumbar--L3-4;  Surgeon: Colleen Carvajal MD;  Location: [facility] PAIN T;  Service: Pain Management;  Laterality: N/A;    HYSTERECTOMY  1970    @25yrs of age    LARYNGOSCOPY N/A 1/4/2022    Procedure: Suspension microlaryngoscopy with left vocal fold injection augmentation - Restylane L;  Surgeon: Yuan Mir MD;  Location: [address] FLR;  Service: ENT;  Laterality: N/A;  Microscope, telescopes, tower, injector, Restylane-L    MANDIBLE FRACTURE SURGERY  1970    MICRODISCECTOMY OF SPINE Left 4/13/2021    Procedure: MICRODISCECTOMY, SPINE Left L3-4 far lateral Microdiscectomy;  Surgeon: Rickey Martin MD;  Location: [facility] OR;  Service: Neurosurgery;  Laterality: Left;  Procedure: Left L3-4 far lateral Microdiscectomy   Surgery Time: 1.5 hrs  LOS: 0-1  Anesthesia: General  Radiology: C-arm  SNS: EMG, SEP  Bed: [place] [address] Poster  Position: Melissa  Dav w/ Globus confirmed 4/12/21 [place]    OOPHORECTOMY  1970    ROTATOR CUFF REPAIR Left 11/2016    TOTAL REDUCTION MAMMOPLASTY Bilateral 1990    TRANSESOPHAGEAL ECHOCARDIOGRAPHY N/A 11/1/2023    Procedure: ECHOCARDIOGRAM, TRANSESOPHAGEAL;  Surgeon: Provider, Dosshon Diagnostic;  Location: [facility] EP LAB;  Service: Cardiology;  Laterality: N/A;    TRANSFORAMINAL EPIDURAL INJECTION OF STEROID Left 12/23/2020    Procedure: Injection,steroid,epidural,transforaminal approach--Left L4 and L5;  Surgeon: Colleen Carvajal MD;  Location: [facility] PAIN MGT;  Service: Pain Management;  Laterality: Left;     ROS  10 point ROS performed and negative except as stated in HPI     Physical Examination   Vital Signs  Vitals  Temp: 98.2 °F (36.8 °C)  Temp Source: Temporal  Pulse: 63  Heart Rate Source: Monitor  Resp: 20  SpO2: 99 %  BP: (!) 100/53  MAP (mmHg): 74  BP Location: Left arm  BP  "Method: Automatic  Patient Position: Lying    24 Hour VS Range  Temp:  [98.2 °F (36.8 °C)]   Pulse:  [63]   Resp:  [20]   BP: (100-109)/(52-53)   SpO2:  [99 %]   No intake or output data in the 24 hours ending 12/20/23 0719      Physical Exam  HENT:      Head: Normocephalic and atraumatic.   Eyes:      Conjunctiva/sclera: Conjunctivae normal.   Cardiovascular:      Rate and Rhythm: Normal rate and regular rhythm.   Pulmonary:      Effort: Pulmonary effort is normal. No respiratory distress.      Breath sounds: Normal breath sounds. No wheezing.   Abdominal:      General: There is no distension.      Palpations: Abdomen is soft.      Tenderness: There is no abdominal tenderness.   Musculoskeletal:      Cervical back: Normal range of motion.      Right lower leg: Edema present.      Left lower leg: Edema present.   Neurological:      Mental Status: She is alert and oriented to person, place, and time.   Psychiatric:         Mood and Affect: Affect normal.         Data   No results for input(s): "WBC", "HGB", "HCT", "PLT" in the last 168 hours.   No results for input(s): "PROTIME", "INR" in the last 168 hours.   Recent Labs   Lab 12/13/23  1112      K 4.3      CO2 24   BUN 30*   CREATININE 1.5*   ANIONGAP 14   CALCIUM 9.6      Assessment & Plan     PORTIA for bandar clip placement   -No absolute contraindications of esophageal stricture, tumor, perforation, laceration,or diverticulum and/or active GI bleed  -The risks, benefits & alternatives of the procedure were explained to the patient.   -The risks of transesophageal echo include but are not limited to:  Dental trauma, esophageal trauma/perforation, bleeding, laryngospasm/brochospasm, aspiration, sore throat/hoarseness, & dislodgement of the endotracheal tube/nasogastric tube (where applicable).    -The risks of ANES monitored sedation include hypotension, respiratory depression, arrhythmias, bronchospasm, & death.    -Informed consent was obtained. The " patient is agreeable to proceed with the procedure and all questions and concerns addressed.    Case discussed with an attending in echocardiography lab.    [place] , MD  [facility]   Cardiovascular Disease PGY-V

## 2023-12-20 NOTE — PLAN OF CARE
Amb with nurse, gregoria gonzalez with slight shadowing. MD Diego notified said okay to proceed with dc.

## 2023-12-20 NOTE — Clinical Note
The sheath was inserted through the larger sheath in the right femoral vein.  And repositioned to the RA.

## 2023-12-20 NOTE — TRANSFER OF CARE
"Anesthesia Transfer of Care Note    Patient: Trudy R Dakin    Procedure(s) Performed: Procedure(s) (LRB):  Mitral clip (N/A)  ECHOCARDIOGRAM, TRANSESOPHAGEAL (N/A)    Patient location: PACU    Anesthesia Type: general    Transport from OR: Transported from OR on 6-10 L/min O2 by face mask with adequate spontaneous ventilation    Post pain: adequate analgesia    Post assessment: no apparent anesthetic complications    Post vital signs: stable    Level of consciousness: awake and alert    Nausea/Vomiting: no nausea/vomiting    Complications: none    Transfer of care protocol was followed      Last vitals: Visit Vitals  /71   Pulse 76   Temp 36.7 °C (98.1 °F) (Temporal)   Resp 17   Ht 5' 2" (1.575 m)   Wt 55.3 kg (122 lb)   LMP 01/01/1970   SpO2 (!) 94%   Breastfeeding No   BMI 22.31 kg/m²     "

## 2023-12-20 NOTE — BRIEF OP NOTE
Discharge Summary  Interventional Cardiology      Admit Date: 12/20/2023    Discharge Date:  12/20/2023    Attending Physician: Kevin More MD    Discharge Physician: Kevin More MD    Principal Diagnoses: Mitral regurgitation  Indication for Admission: Mitral clip (N/A), ECHOCARDIOGRAM, TRANSESOPHAGEAL (N/A)    Discharged Condition: Good    Hospital Course:   Patient presented for outpatient  mitral clip placement  which went without complication. See full cath report in Epic for details. Hemostasis of patient's R CFV access site was achieved with  figure of 8 stitch . Patient was monitored per post-cath protocol, and her R radial access site was c/d/i with no hematoma. Patient was able to ambulate without difficulty. She was feeling well and anticipating discharge home today.     Outpatient Plan:  - There were no medication changes    Diet: Cardiac diet    Activity: Ad toño, wound care instructions provided    Disposition: Home or Self Care      Discharge Medications:      Medication List        CHANGE how you take these medications      atorvastatin 80 MG tablet  Commonly known as: LIPITOR  Take 1 tablet (80 mg total) by mouth once daily.  What changed: when to take this     busPIRone 5 MG Tab  Commonly known as: BUSPAR  Take half tablet (0.5mg) by mouth twice a day as needed for anxiety  What changed:   how much to take  how to take this  when to take this  additional instructions            CONTINUE taking these medications      acetaminophen 650 MG Tbsr  Commonly known as: TYLENOL  Take 1 tablet (650 mg total) by mouth every 8 (eight) hours.     bumetanide 2 MG tablet  Commonly known as: BUMEX  Take 1.5 tablets (3 mg total) by mouth 2 (two) times daily.     clopidogreL 75 mg tablet  Commonly known as: PLAVIX  TAKE ONE TABLET BY MOUTH EVERY DAY     cyanocobalamin 1,000 mcg/mL injection  INJECT 1 ML INTO THE MUSCLE EVERY 14 DAYS     EScitalopram oxalate 20 MG tablet  Commonly known as: LEXAPRO  Take  1 tablet (20 mg total) by mouth once daily.     lisinopriL 5 MG tablet  Commonly known as: PRINIVIL,ZESTRIL  Take 1 tablet (5 mg total) by mouth once daily.     nitroGLYCERIN 0.4 MG SL tablet  Commonly known as: NITROSTAT  Place 1 tablet (0.4 mg total) under the tongue every 5 (five) minutes as needed for Chest pain.     potassium chloride SA 20 MEQ tablet  Commonly known as: K-DUR,KLOR-CON  Take 1 tablet (20 mEq total) by mouth 2 (two) times daily.     pregabalin 75 MG capsule  Commonly known as: LYRICA  Take 1 capsule (75 mg total) by mouth 2 (two) times daily.            ASK your doctor about these medications      nystatin cream  Commonly known as: MYCOSTATIN  Apply topically 2 (two) times daily.              Aime Santos  Cardiology Fellow PGY-6

## 2023-12-20 NOTE — BRIEF OP NOTE
Brief Operative Note:    : Kevin More MD     Referring Physician: Kevin More     All Operators: Surgeon(s):  Cristine Brooks MD Murphy, Barret, MD Jenkins, James S., Aime Elizabeth MD Davis, MD Brandie Cavazos, MD Christiano     Preoperative Diagnosis: Severe mitral regurgitation [I34.0]     Postop Diagnosis: Severe mitral regurgitation [I34.0]    Treatments/Procedures: Procedure(s) (LRB):  Mitral clip (N/A)  ECHOCARDIOGRAM, TRANSESOPHAGEAL (N/A)    Access: Right CFV    Intervention: Placement of mitral clip with reduction of MR from 6+ to 2+     See catheterization report for full details.    Closure device:  Figure of 8 stitch        Plan:  - Post cath protocol   - Bed rest x 4 hours   - Follow up with outpatient cardiologist    Estimated Blood loss: 20 cc    Specimens removed: No    Aime Santos MD  Interventional Cardiology PGY-6

## 2023-12-20 NOTE — SUBJECTIVE & OBJECTIVE
Past Medical History:   Diagnosis Date    CHF (congestive heart failure)     Diabetes     Diverticulitis     Hypertension     Renal disorder        Past Surgical History:   Procedure Laterality Date    ABDOMINAL SURGERY  2006    diverticulitis x3    ANTERIOR CERVICAL DISCECTOMY W/ FUSION N/A 8/30/2018    FUSION C6-7 Surgeon: Rickey Martin MD    APPENDECTOMY      CARPAL TUNNEL RELEASE      COLECTOMY  07/2020    EPIDURAL STEROID INJECTION INTO LUMBAR SPINE N/A 1/20/2021    Procedure: Injection-steroid-epidural-lumbar--L3-4;  Surgeon: Colleen Carvajal MD;  Location: [facility];  Service: Pain Management;  Laterality: N/A;    HYSTERECTOMY  1970    @25yrs of age    LARYNGOSCOPY N/A 1/4/2022    Procedure: Suspension microlaryngoscopy with left vocal fold injection augmentation - Restylane L;  Surgeon: Yuan Mir MD;  Location: [address];  Service: ENT;  Laterality: N/A;  Microscope, telescopes, tower, injector, Restylane-L    MANDIBLE FRACTURE SURGERY  1970    MICRODISCECTOMY OF SPINE Left 4/13/2021    Procedure: MICRODISCECTOMY, SPINE Left L3-4 far lateral Microdiscectomy;  Surgeon: Rickey Martin MD;  Location: [facility];  Service: Neurosurgery;  Laterality: Left;  Procedure: Left L3-4 far lateral Microdiscectomy   Surgery Time: 1.5 hrs  LOS: 0-1  Anesthesia: General  Radiology: C-arm  SNS: EMG, SEP  Bed: [place] [id number] Poster  Position: Melissa Demarco w/ Globus confirmed 4/12/21 [place]    OOPHORECTOMY  1970    ROTATOR CUFF REPAIR Left 11/2016    TOTAL REDUCTION MAMMOPLASTY Bilateral 1990    TRANSESOPHAGEAL ECHOCARDIOGRAPHY N/A 11/1/2023    Procedure: ECHOCARDIOGRAM, TRANSESOPHAGEAL;  Surgeon: Provider, Dosc Diagnostic;  Location: [facility] EP [facility];  Service: Cardiology;  Laterality: N/A;    TRANSFORAMINAL EPIDURAL INJECTION OF STEROID Left 12/23/2020    Procedure: Injection,steroid,epidural,transforaminal approach--Left L4 and L5;  Surgeon: Colleen Carvajal MD;  Location: [facility] T;  Service: Pain Management;   Laterality: Left;       Review of patient's allergies indicates:  No Known Allergies    PTA Medications   Medication Sig    acetaminophen (TYLENOL) 650 MG TbSR Take 1 tablet (650 mg total) by mouth every 8 (eight) hours.    atorvastatin (LIPITOR) 80 MG tablet Take 1 tablet (80 mg total) by mouth once daily. (Patient taking differently: Take 80 mg by mouth every evening.)    bumetanide (BUMEX) 2 MG tablet Take 1.5 tablets (3 mg total) by mouth 2 (two) times daily.    busPIRone (BUSPAR) 5 MG Tab Take half tablet (0.5mg) by mouth twice a day as needed for anxiety (Patient taking differently: Take 5 mg by mouth nightly.)    clopidogreL (PLAVIX) 75 mg tablet TAKE ONE TABLET BY MOUTH EVERY DAY    EScitalopram oxalate (LEXAPRO) 20 MG tablet Take 1 tablet (20 mg total) by mouth once daily.    lisinopriL (PRINIVIL,ZESTRIL) 5 MG tablet Take 1 tablet (5 mg total) by mouth once daily.    potassium chloride SA (K-DUR,KLOR-CON) 20 MEQ tablet Take 1 tablet (20 mEq total) by mouth 2 (two) times daily.    pregabalin (LYRICA) 75 MG capsule Take 1 capsule (75 mg total) by mouth 2 (two) times daily.    cyanocobalamin 1,000 mcg/mL injection INJECT 1 ML INTO THE MUSCLE EVERY 14 DAYS (Patient taking differently: Inject 1,000 mcg into the muscle every 14 (fourteen) days.)    nitroGLYCERIN (NITROSTAT) 0.4 MG SL tablet Place 1 tablet (0.4 mg total) under the tongue every 5 (five) minutes as needed for Chest pain.    nystatin (MYCOSTATIN) cream Apply topically 2 (two) times daily. (Patient not taking: Reported on 12/11/2023)     Family History       Problem Relation (Age of Onset)    Heart disease Father          Tobacco Use    Smoking status: Never    Smokeless tobacco: Never   Substance and Sexual Activity    Alcohol use: No    Drug use: No    Sexual activity: Never     Review of Systems   Constitutional: Negative for fever and malaise/fatigue.   Eyes:  Negative for blurred vision and pain.   Cardiovascular:  Negative for chest pain,  dyspnea on exertion, leg swelling, orthopnea, palpitations and paroxysmal nocturnal dyspnea.   Respiratory:  Negative for cough, shortness of breath, sputum production and wheezing.    Hematologic/Lymphatic: Negative for adenopathy and bleeding problem.   Skin:  Negative for rash.   Musculoskeletal:  Negative for back pain and neck pain.   Gastrointestinal:  Negative for abdominal pain, constipation, diarrhea, nausea and vomiting.   Genitourinary:  Negative for dysuria.   Neurological:  Negative for dizziness, headaches, light-headedness and weakness.     Objective:     Vital Signs (Most Recent):  Temp: 98.2 °F (36.8 °C) (12/20/23 0623)  Pulse: 63 (12/20/23 0623)  Resp: 20 (12/20/23 0623)  BP: (!) 100/53 (12/20/23 0626)  SpO2: 99 % (12/20/23 0623) Vital Signs (24h Range):  Temp:  [98.2 °F (36.8 °C)] 98.2 °F (36.8 °C)  Pulse:  [63] 63  Resp:  [20] 20  SpO2:  [99 %] 99 %  BP: (100-109)/(52-53) 100/53     Weight: 55.3 kg (122 lb)  Body mass index is 22.31 kg/m².    SpO2: 99 %       No intake or output data in the 24 hours ending 12/20/23 0706    Lines/Drains/Airways       None                    Physical Exam  Constitutional:       General: She is not in acute distress.     Appearance: Normal appearance. She is not ill-appearing, toxic-appearing or diaphoretic.   HENT:      Head: Normocephalic and atraumatic.      Nose: Nose normal.   Eyes:      Extraocular Movements: Extraocular movements intact.      Pupils: Pupils are equal, round, and reactive to light.   Cardiovascular:      Rate and Rhythm: Normal rate and regular rhythm.      Heart sounds: No murmur heard.     No friction rub. No gallop.   Pulmonary:      Effort: Pulmonary effort is normal. No respiratory distress.      Breath sounds: Normal breath sounds. No wheezing or rales.   Abdominal:      General: Abdomen is flat. There is no distension.      Palpations: Abdomen is soft. There is no mass.      Tenderness: There is no abdominal tenderness.  "  Musculoskeletal:         General: No swelling. Normal range of motion.      Cervical back: Normal range of motion. No rigidity.      Right lower leg: No edema.      Left lower leg: No edema.   Skin:     General: Skin is warm and dry.      Coloration: Skin is not jaundiced.      Findings: No bruising.   Neurological:      General: No focal deficit present.      Mental Status: She is alert and oriented to person, place, and time.      Cranial Nerves: No cranial nerve deficit.      Motor: No weakness.            Significant Labs: BMP: No results for input(s): "GLU", "NA", "K", "CL", "CO2", "BUN", "CREATININE", "CALCIUM", "MG" in the last 48 hours. and CBC No results for input(s): "WBC", "HGB", "HCT", "PLT" in the last 48 hours.    Significant Imaging: Reviewed   "

## 2023-12-20 NOTE — ASSESSMENT & PLAN NOTE
Trudy R Dakin is a 78 y.o. female referred by Dr Lety Mcwilliams for evaluation of severe MR (NYHA Class III sx).     Mitral Valve Disease Etiology: Other/Indeterminate (tethering posterior leaflet)     The patient has undergone the following MitraClip work-up:   PORTIA (Date 11/1/2023): Severe MR, MG 3 mmHg, ERO 0.5 cm2, EF= 50-55%.   End systolic dimensions 3.67 (TTE on 9/12/23)  LHC: Deferred for PET stress (11/1/2023) that showed no significant perfusion abnormalities   STS: 8%   Frailty: 2/4   CT Surgery risk assessment: inoperable, per Dr. Cook due to frailty  PFTs: Deferred.  Comorbidities: coronary artery disease. CKD III-IV   Labs: Cr Pending      Transcatheter Zuki-ao-Elni Repair   Valve: Mitral-Clip  ECMO:  On Call  AIDA access: RTFV  Post op destination: ICU  Antibiotics given: (to be done during procedure)  Heart failure on admission?

## 2023-12-20 NOTE — PROGRESS NOTES
Patient arrived to EP recovery and r groin bleeding. Dr. Comer called to bedside. Pressure held and new pressure dressing applied. Pt. To be on bedrest x4 hours

## 2023-12-20 NOTE — PLAN OF CARE
MD Diego to bedside, updated on vs. Pt asymptomatic. Suture removed and quickclot placed. MD Diego said to ambulate at 1630.

## 2023-12-21 ENCOUNTER — LAB VISIT (OUTPATIENT)
Dept: LAB | Facility: HOSPITAL | Age: 78
End: 2023-12-21
Attending: INTERNAL MEDICINE
Payer: MEDICARE

## 2023-12-21 VITALS
WEIGHT: 122 LBS | DIASTOLIC BLOOD PRESSURE: 51 MMHG | HEART RATE: 73 BPM | HEIGHT: 62 IN | RESPIRATION RATE: 18 BRPM | TEMPERATURE: 98 F | BODY MASS INDEX: 22.45 KG/M2 | SYSTOLIC BLOOD PRESSURE: 104 MMHG | OXYGEN SATURATION: 99 %

## 2023-12-21 DIAGNOSIS — I25.10 CORONARY ARTERY DISEASE INVOLVING NATIVE CORONARY ARTERY OF NATIVE HEART WITHOUT ANGINA PECTORIS: ICD-10-CM

## 2023-12-21 DIAGNOSIS — I34.0 MITRAL VALVE INSUFFICIENCY, UNSPECIFIED ETIOLOGY: Primary | ICD-10-CM

## 2023-12-21 LAB
ANION GAP SERPL CALC-SCNC: 12 MMOL/L (ref 8–16)
BNP SERPL-MCNC: 305 PG/ML (ref 0–99)
BUN SERPL-MCNC: 31 MG/DL (ref 8–23)
CALCIUM SERPL-MCNC: 9.4 MG/DL (ref 8.7–10.5)
CHLORIDE SERPL-SCNC: 108 MMOL/L (ref 95–110)
CO2 SERPL-SCNC: 21 MMOL/L (ref 23–29)
CREAT SERPL-MCNC: 1.6 MG/DL (ref 0.5–1.4)
EST. GFR  (NO RACE VARIABLE): 32.8 ML/MIN/1.73 M^2
GLUCOSE SERPL-MCNC: 121 MG/DL (ref 70–110)
POC ACTIVATED CLOTTING TIME K: 158 SEC (ref 74–137)
POC ACTIVATED CLOTTING TIME K: 244 SEC (ref 74–137)
POC ACTIVATED CLOTTING TIME K: 244 SEC (ref 74–137)
POTASSIUM SERPL-SCNC: 5.1 MMOL/L (ref 3.5–5.1)
SAMPLE: ABNORMAL
SODIUM SERPL-SCNC: 141 MMOL/L (ref 136–145)

## 2023-12-21 PROCEDURE — 80048 BASIC METABOLIC PNL TOTAL CA: CPT | Mod: HCNC | Performed by: INTERNAL MEDICINE

## 2023-12-21 PROCEDURE — 83880 ASSAY OF NATRIURETIC PEPTIDE: CPT | Mod: HCNC | Performed by: INTERNAL MEDICINE

## 2023-12-21 PROCEDURE — 36415 COLL VENOUS BLD VENIPUNCTURE: CPT | Mod: HCNC,PO | Performed by: INTERNAL MEDICINE

## 2023-12-28 ENCOUNTER — TELEPHONE (OUTPATIENT)
Dept: FAMILY MEDICINE | Facility: CLINIC | Age: 78
End: 2023-12-28
Payer: MEDICARE

## 2023-12-28 RX ORDER — BUSPIRONE HYDROCHLORIDE 5 MG/1
TABLET ORAL
Qty: 30 TABLET | Refills: 0 | Status: SHIPPED | OUTPATIENT
Start: 2023-12-28 | End: 2024-02-21

## 2023-12-28 NOTE — TELEPHONE ENCOUNTER
----- Message from Lily Mejia sent at 12/28/2023  2:30 PM CST -----  Regarding: advise  Contact: [facility]  Type: Needs Medical Advice  Who Called:  [facility]  Symptoms (please be specific):  busPIRone (BUSPAR) 5 MG Tab  How long has patient had these symptoms:    Pharmacy name and phone #:    [facility] [address] [address]  [address] [address] [address]  Phone: [phone] Fax: [phone]      Best Call Back Number: [phone]    Additional Information: has her down for 10mg but 5mg was sent in needs to know if she is suppose to be on both call to advise thanks!

## 2023-12-28 NOTE — TELEPHONE ENCOUNTER
No care due was identified.  [facility] Embedded Care Due Messages. Reference number: [id number].   12/28/2023 1:37:00 PM CST

## 2024-01-02 ENCOUNTER — OFFICE VISIT (OUTPATIENT)
Dept: NEPHROLOGY | Facility: CLINIC | Age: 79
End: 2024-01-02
Payer: MEDICARE

## 2024-01-02 VITALS
OXYGEN SATURATION: 64 % | BODY MASS INDEX: 22.44 KG/M2 | HEART RATE: 71 BPM | DIASTOLIC BLOOD PRESSURE: 58 MMHG | HEIGHT: 62 IN | SYSTOLIC BLOOD PRESSURE: 106 MMHG | WEIGHT: 121.94 LBS

## 2024-01-02 DIAGNOSIS — N18.32 STAGE 3B CHRONIC KIDNEY DISEASE: ICD-10-CM

## 2024-01-02 PROCEDURE — 1100F PTFALLS ASSESS-DOCD GE2>/YR: CPT | Mod: HCNC,CPTII,S$GLB, | Performed by: INTERNAL MEDICINE

## 2024-01-02 PROCEDURE — 1157F ADVNC CARE PLAN IN RCRD: CPT | Mod: HCNC,CPTII,S$GLB, | Performed by: INTERNAL MEDICINE

## 2024-01-02 PROCEDURE — 3074F SYST BP LT 130 MM HG: CPT | Mod: HCNC,CPTII,S$GLB, | Performed by: INTERNAL MEDICINE

## 2024-01-02 PROCEDURE — 1160F RVW MEDS BY RX/DR IN RCRD: CPT | Mod: HCNC,CPTII,S$GLB, | Performed by: INTERNAL MEDICINE

## 2024-01-02 PROCEDURE — 1111F DSCHRG MED/CURRENT MED MERGE: CPT | Mod: HCNC,CPTII,S$GLB, | Performed by: INTERNAL MEDICINE

## 2024-01-02 PROCEDURE — 1159F MED LIST DOCD IN RCRD: CPT | Mod: HCNC,CPTII,S$GLB, | Performed by: INTERNAL MEDICINE

## 2024-01-02 PROCEDURE — 3288F FALL RISK ASSESSMENT DOCD: CPT | Mod: HCNC,CPTII,S$GLB, | Performed by: INTERNAL MEDICINE

## 2024-01-02 PROCEDURE — 99999 PR PBB SHADOW E&M-EST. PATIENT-LVL III: CPT | Mod: PBBFAC,HCNC,, | Performed by: INTERNAL MEDICINE

## 2024-01-02 PROCEDURE — 3078F DIAST BP <80 MM HG: CPT | Mod: HCNC,CPTII,S$GLB, | Performed by: INTERNAL MEDICINE

## 2024-01-02 PROCEDURE — 99204 OFFICE O/P NEW MOD 45 MIN: CPT | Mod: HCNC,S$GLB,, | Performed by: INTERNAL MEDICINE

## 2024-01-02 RX ORDER — ERGOCALCIFEROL 1.25 MG/1
50000 CAPSULE ORAL
COMMUNITY
Start: 2023-12-27

## 2024-01-02 NOTE — PROGRESS NOTES
"  Subjective:       Patient ID: Trudy R Dakin is a 78 y.o. White female who presents for new patient evaluation for chronic renal failure.    Trudy R Dakin is referred by Camilo Patel MD to be evaluated for chronic renal failure.  She reports her   last year and she has lost 30 pounds sine then.  She has been having falls and reports she is "woozy".  She has no uremic or urinary symptoms and is in her usual state of health.  She is on a diuretic and used to ave much difficulty with edema but this has improved.      Review of Systems   Constitutional:  Negative for fever.   Respiratory:  Positive for shortness of breath. Negative for cough.    Cardiovascular:  Negative for chest pain and leg swelling.   Gastrointestinal:  Positive for abdominal pain. Negative for nausea and vomiting.   Genitourinary:  Negative for difficulty urinating, dysuria and hematuria.   Neurological:  Positive for light-headedness.       The past medical, family and social histories were reviewed for this encounter.     Past Medical History:   Diagnosis Date    CHF (congestive heart failure)     Diabetes     Diverticulitis     Hypertension     Renal disorder      Past Surgical History:   Procedure Laterality Date    ABDOMINAL SURGERY  2006    diverticulitis x3    ANTERIOR CERVICAL DISCECTOMY W/ FUSION N/A 2018    FUSION C6-7 Surgeon: Rickey Martin MD    APPENDECTOMY      CARPAL TUNNEL RELEASE      COLECTOMY  2020    EPIDURAL STEROID INJECTION INTO LUMBAR SPINE N/A 2021    Procedure: Injection-steroid-epidural-lumbar--L3-4;  Surgeon: Colleen Carvajal MD;  Location: [facility];  Service: Pain Management;  Laterality: N/A;    HYSTERECTOMY  1970    @25yrs of age    LARYNGOSCOPY N/A 2022    Procedure: Suspension microlaryngoscopy with left vocal fold injection augmentation - Restylane L;  Surgeon: Yuan Mir MD;  Location: [address];  Service: ENT;  Laterality: N/A;  Microscope, telescopes, " maria elenaer, injector, Restylane-L    MANDIBLE FRACTURE SURGERY  1970    MICRODISCECTOMY OF SPINE Left 4/13/2021    Procedure: MICRODISCECTOMY, SPINE Left L3-4 far lateral Microdiscectomy;  Surgeon: Rickey Martin MD;  Location: [facility] OR;  Service: Neurosurgery;  Laterality: Left;  Procedure: Left L3-4 far lateral Microdiscectomy   Surgery Time: 1.5 hrs  LOS: 0-1  Anesthesia: General  Radiology: C-arm  SNS: EMG, SEP  Bed: [place] [address] Poster  Position: Melissa  Dav w/ Globus confirmed 4/12/21 [place]    MITRAL CLIP N/A 12/20/2023    Procedure: Mitral clip;  Surgeon: Kevin More MD;  Location: [facility] CATH [facility];  Service: Cardiology;  Laterality: N/A;    OOPHORECTOMY  1970    ROTATOR CUFF REPAIR Left 11/2016    TOTAL REDUCTION MAMMOPLASTY Bilateral 1990    TRANSESOPHAGEAL ECHOCARDIOGRAPHY N/A 11/1/2023    Procedure: ECHOCARDIOGRAM, TRANSESOPHAGEAL;  Surgeon: Brea Melgar Diagnostic;  Location: [facility] EP [facility];  Service: Cardiology;  Laterality: N/A;    TRANSESOPHAGEAL ECHOCARDIOGRAPHY N/A 12/20/2023    Procedure: ECHOCARDIOGRAM, TRANSESOPHAGEAL;  Surgeon: Cristine Brooks MD;  Location: [facility];  Service: Cardiology;  Laterality: N/A;    TRANSFORAMINAL EPIDURAL INJECTION OF STEROID Left 12/23/2020    Procedure: Injection,steroid,epidural,transforaminal approach--Left L4 and L5;  Surgeon: Colleen Carvajal MD;  Location: [facility];  Service: Pain Management;  Laterality: Left;     Social History     Socioeconomic History    Marital status:    Tobacco Use    Smoking status: Never    Smokeless tobacco: Never   Substance and Sexual Activity    Alcohol use: No    Drug use: No    Sexual activity: Never     Social Determinants of Health     Financial Resource Strain: Patient Declined (12/8/2023)    Overall Financial Resource Strain (CARDIA)     Difficulty of Paying Living Expenses: Patient declined   Recent Concern: Financial Resource Strain - High Risk (10/18/2023)    Overall Financial Resource Strain (CARDIA)      Difficulty of Paying Living Expenses: Hard   Food Insecurity: Patient Declined (12/8/2023)    Hunger Vital Sign     Worried About Running Out of Food in the Last Year: Patient declined     Ran Out of Food in the Last Year: Patient declined   Recent Concern: Food Insecurity - Food Insecurity Present (10/18/2023)    Hunger Vital Sign     Worried About Running Out of Food in the Last Year: Often true     Ran Out of Food in the Last Year: Never true   Transportation Needs: No Transportation Needs (12/8/2023)    PRAPARE - Transportation     Lack of Transportation (Medical): No     Lack of Transportation (Non-Medical): No   Physical Activity: Inactive (12/8/2023)    Exercise Vital Sign     Days of Exercise per Week: 0 days     Minutes of Exercise per Session: 0 min   Stress: No Stress Concern Present (12/8/2023)    [place] [employer] - Occupational Stress Questionnaire     Feeling of Stress : Not at all   Social Connections: Socially Isolated (12/8/2023)    Social Connection and Isolation Panel [NHANES]     Frequency of Communication with Friends and Family: More than three times a week     Frequency of Social Gatherings with Friends and Family: More than three times a week     Attends [employer] Services: Never     Active Member of Clubs or Organizations: No     Attends Club or Organization Meetings: Never     Marital Status:    Housing Stability: Unknown (12/8/2023)    Housing Stability Vital Sign     Unable to Pay for Housing in the Last Year: Patient refused     Number of Places Lived in the Last Year: 1     Unstable Housing in the Last Year: No   Recent Concern: Housing Stability - High Risk (10/18/2023)    Housing Stability Vital Sign     Unable to Pay for Housing in the Last Year: Yes     Number of Places Lived in the Last Year: 2     Unstable Housing in the Last Year: No     Current Outpatient Medications   Medication Sig    acetaminophen (TYLENOL) 650 MG TbSR Take 1 tablet (650 mg  "total) by mouth every 8 (eight) hours.    atorvastatin (LIPITOR) 80 MG tablet Take 1 tablet (80 mg total) by mouth once daily. (Patient taking differently: Take 80 mg by mouth every evening.)    bumetanide (BUMEX) 2 MG tablet Take 1.5 tablets (3 mg total) by mouth 2 (two) times daily.    busPIRone (BUSPAR) 5 MG Tab Take half tablet (0.5mg) by mouth twice a day as needed for anxiety    clopidogreL (PLAVIX) 75 mg tablet TAKE ONE TABLET BY MOUTH EVERY DAY    cyanocobalamin 1,000 mcg/mL injection INJECT 1 ML INTO THE MUSCLE EVERY 14 DAYS (Patient taking differently: Inject 1,000 mcg into the muscle every 14 (fourteen) days.)    EScitalopram oxalate (LEXAPRO) 20 MG tablet Take 1 tablet (20 mg total) by mouth once daily.    lisinopriL (PRINIVIL,ZESTRIL) 5 MG tablet Take 1 tablet (5 mg total) by mouth once daily.    nitroGLYCERIN (NITROSTAT) 0.4 MG SL tablet Place 1 tablet (0.4 mg total) under the tongue every 5 (five) minutes as needed for Chest pain.    nystatin (MYCOSTATIN) cream Apply topically 2 (two) times daily. (Patient not taking: Reported on 12/11/2023)    potassium chloride SA (K-DUR,KLOR-CON) 20 MEQ tablet Take 1 tablet (20 mEq total) by mouth 2 (two) times daily.    pregabalin (LYRICA) 75 MG capsule Take 1 capsule (75 mg total) by mouth 2 (two) times daily.     No current facility-administered medications for this visit.     Facility-Administered Medications Ordered in Other Visits   Medication    0.9%  NaCl infusion    electrolyte-S (pH 7.4) bolus SolP 250 mL 250 mL    haloperidol lactate injection 0.5 mg    HYDROmorphone injection 0.2 mg    sodium chloride 0.9% flush 3 mL     BP (!) 106/58 (BP Location: Left arm, Patient Position: Sitting, BP Method: Medium (Manual))   Pulse 71   Ht 5' 2" (1.575 m)   Wt 55.3 kg (121 lb 14.6 oz)   LMP 01/01/1970   SpO2 (!) 64%   BMI 22.30 kg/m²     Objective:      Physical Exam  Vitals reviewed.   Constitutional:       General: She is not in acute distress.  HENT:      " "Head: Normocephalic and atraumatic.   Eyes:      General: No scleral icterus.  Cardiovascular:      Rate and Rhythm: Normal rate.   Pulmonary:      Effort: Pulmonary effort is normal. No respiratory distress.      Breath sounds: No wheezing.   Abdominal:      General: Abdomen is flat.      Palpations: Abdomen is soft.   Musculoskeletal:      Right lower leg: No edema.      Left lower leg: No edema.   Skin:     General: Skin is warm and dry.   Neurological:      Mental Status: She is alert and oriented to person, place, and time.         Assessment:       1. Stage 3b chronic kidney disease        Lab Results   Component Value Date    CREATININE 1.6 (H) 12/21/2023    BUN 31 (H) 12/21/2023     12/21/2023    K 5.1 12/21/2023     12/21/2023    CO2 21 (L) 12/21/2023     Lab Results   Component Value Date    .4 (H) 04/12/2023    CALCIUM 9.4 12/21/2023    PHOS 4.4 11/08/2023     Lab Results   Component Value Date    HCT 26.9 (L) 12/20/2023     No results found for: "UTPCR"  Plan:   Return to clinic in 4 months.  Labs for next visit include labs per standing orders.  RP in 1 month.  Baseline creatinine is 1.1-1.4 since 2021 but changed to 1.4-1.6 since 2023.  I suspect her departure from her baseline in related to her diuretics.  She has somewhat low blood pressure and is falling thus I will direct thisnote to her Cardiologist.  Her blood pressure and possible volume depletion in setting of recent 30 pound weight loss may be catching up with her and contributing to renal hypoperfusion.        "

## 2024-01-04 ENCOUNTER — OFFICE VISIT (OUTPATIENT)
Dept: CARDIOLOGY | Facility: CLINIC | Age: 79
End: 2024-01-04
Payer: MEDICARE

## 2024-01-04 VITALS
HEIGHT: 62 IN | SYSTOLIC BLOOD PRESSURE: 122 MMHG | WEIGHT: 125.25 LBS | BODY MASS INDEX: 23.05 KG/M2 | DIASTOLIC BLOOD PRESSURE: 73 MMHG | HEART RATE: 62 BPM

## 2024-01-04 DIAGNOSIS — R60.0 LOCALIZED EDEMA: ICD-10-CM

## 2024-01-04 DIAGNOSIS — N18.32 STAGE 3B CHRONIC KIDNEY DISEASE: ICD-10-CM

## 2024-01-04 DIAGNOSIS — I48.0 PAROXYSMAL ATRIAL FIBRILLATION: Chronic | ICD-10-CM

## 2024-01-04 DIAGNOSIS — I34.0 MITRAL VALVE INSUFFICIENCY, UNSPECIFIED ETIOLOGY: ICD-10-CM

## 2024-01-04 DIAGNOSIS — I50.33 ACUTE ON CHRONIC DIASTOLIC HEART FAILURE: ICD-10-CM

## 2024-01-04 DIAGNOSIS — I25.10 CORONARY ARTERY DISEASE INVOLVING NATIVE CORONARY ARTERY OF NATIVE HEART WITHOUT ANGINA PECTORIS: Chronic | ICD-10-CM

## 2024-01-04 DIAGNOSIS — E78.00 HIGH CHOLESTEROL: ICD-10-CM

## 2024-01-04 DIAGNOSIS — I38 VALVULAR HEART DISEASE: ICD-10-CM

## 2024-01-04 DIAGNOSIS — I10 ESSENTIAL HYPERTENSION: Primary | Chronic | ICD-10-CM

## 2024-01-04 DIAGNOSIS — I27.20 PULMONARY HTN: ICD-10-CM

## 2024-01-04 DIAGNOSIS — E08.40 DIABETES MELLITUS DUE TO UNDERLYING CONDITION WITH DIABETIC NEUROPATHY, WITHOUT LONG-TERM CURRENT USE OF INSULIN: Chronic | ICD-10-CM

## 2024-01-04 DIAGNOSIS — I50.30 HEART FAILURE WITH PRESERVED EJECTION FRACTION, UNSPECIFIED HF CHRONICITY: ICD-10-CM

## 2024-01-04 PROCEDURE — 3078F DIAST BP <80 MM HG: CPT | Mod: HCNC,CPTII,S$GLB, | Performed by: INTERNAL MEDICINE

## 2024-01-04 PROCEDURE — 1159F MED LIST DOCD IN RCRD: CPT | Mod: HCNC,CPTII,S$GLB, | Performed by: INTERNAL MEDICINE

## 2024-01-04 PROCEDURE — 1101F PT FALLS ASSESS-DOCD LE1/YR: CPT | Mod: HCNC,CPTII,S$GLB, | Performed by: INTERNAL MEDICINE

## 2024-01-04 PROCEDURE — 3288F FALL RISK ASSESSMENT DOCD: CPT | Mod: HCNC,CPTII,S$GLB, | Performed by: INTERNAL MEDICINE

## 2024-01-04 PROCEDURE — 1111F DSCHRG MED/CURRENT MED MERGE: CPT | Mod: HCNC,CPTII,S$GLB, | Performed by: INTERNAL MEDICINE

## 2024-01-04 PROCEDURE — 1160F RVW MEDS BY RX/DR IN RCRD: CPT | Mod: HCNC,CPTII,S$GLB, | Performed by: INTERNAL MEDICINE

## 2024-01-04 PROCEDURE — 99999 PR PBB SHADOW E&M-EST. PATIENT-LVL III: CPT | Mod: PBBFAC,HCNC,, | Performed by: INTERNAL MEDICINE

## 2024-01-04 PROCEDURE — 1157F ADVNC CARE PLAN IN RCRD: CPT | Mod: HCNC,CPTII,S$GLB, | Performed by: INTERNAL MEDICINE

## 2024-01-04 PROCEDURE — 99214 OFFICE O/P EST MOD 30 MIN: CPT | Mod: HCNC,S$GLB,, | Performed by: INTERNAL MEDICINE

## 2024-01-04 PROCEDURE — 1126F AMNT PAIN NOTED NONE PRSNT: CPT | Mod: HCNC,CPTII,S$GLB, | Performed by: INTERNAL MEDICINE

## 2024-01-04 PROCEDURE — 3074F SYST BP LT 130 MM HG: CPT | Mod: HCNC,CPTII,S$GLB, | Performed by: INTERNAL MEDICINE

## 2024-01-04 NOTE — PROGRESS NOTES
Subjective:    Patient ID:  Trudy R Dakin is a 78 y.o. female patient here for evaluation Follow-up      History of Present Illness:  Cardiology follow-up.  CAD.  Valvular heart disease.  Status post recent MitraClip improvement in lower extremity edema dyspnea.  Performed December 20, 2023.  Pre MitraClip workup included negative PET for ischemia, niharika with severe MR, moderate AS.  EF preserved.      History of PAF was on chronic oral anticoagulation with Eliquis 5 b.i.d., chronic kidney disease, GFR 28.5.  Discontinued due to fall risk.      Review of patient's allergies indicates:  No Known Allergies    Past Medical History:   Diagnosis Date    CHF (congestive heart failure)     Diabetes     Diverticulitis     Hypertension     Renal disorder      Past Surgical History:   Procedure Laterality Date    ABDOMINAL SURGERY  2006    diverticulitis x3    ANTERIOR CERVICAL DISCECTOMY W/ FUSION N/A 8/30/2018    FUSION C6-7 Surgeon: Rickey Martin MD    APPENDECTOMY      CARPAL TUNNEL RELEASE      COLECTOMY  07/2020    EPIDURAL STEROID INJECTION INTO LUMBAR SPINE N/A 1/20/2021    Procedure: Injection-steroid-epidural-lumbar--L3-4;  Surgeon: Colleen Carvajal MD;  Location: [facility] PAIN MGT;  Service: Pain Management;  Laterality: N/A;    HYSTERECTOMY  1970    @25yrs of age    LARYNGOSCOPY N/A 1/4/2022    Procedure: Suspension microlaryngoscopy with left vocal fold injection augmentation - Restylane L;  Surgeon: Yuan Mir MD;  Location: [address];  Service: ENT;  Laterality: N/A;  Microscope, telescopes, tower, injector, Restylane-L    MANDIBLE FRACTURE SURGERY  1970    MICRODISCECTOMY OF SPINE Left 4/13/2021    Procedure: MICRODISCECTOMY, SPINE Left L3-4 far lateral Microdiscectomy;  Surgeon: Rickey Martin MD;  Location: [facility] OR;  Service: Neurosurgery;  Laterality: Left;  Procedure: Left L3-4 far lateral Microdiscectomy   Surgery Time: 1.5 hrs  LOS: 0-1  Anesthesia: General  Radiology: C-arm  SNS: EMG,  SEP  Bed: [place] [id number] Poster  Position: Melissa Barkleyhan w/ Globus confirmed 4/12/21 [place]    MITRAL CLIP N/A 12/20/2023    Procedure: Mitral clip;  Surgeon: Kevin More MD;  Location: [facility] CATH LAB;  Service: Cardiology;  Laterality: N/A;    OOPHORECTOMY  1970    ROTATOR CUFF REPAIR Left 11/2016    TOTAL REDUCTION MAMMOPLASTY Bilateral 1990    TRANSESOPHAGEAL ECHOCARDIOGRAPHY N/A 11/1/2023    Procedure: ECHOCARDIOGRAM, TRANSESOPHAGEAL;  Surgeon: Brea Melgar Diagnostic;  Location: [facility] EP LAB;  Service: Cardiology;  Laterality: N/A;    TRANSESOPHAGEAL ECHOCARDIOGRAPHY N/A 12/20/2023    Procedure: ECHOCARDIOGRAM, TRANSESOPHAGEAL;  Surgeon: Cristine Brooks MD;  Location: [facility] CATH LAB;  Service: Cardiology;  Laterality: N/A;    TRANSFORAMINAL EPIDURAL INJECTION OF STEROID Left 12/23/2020    Procedure: Injection,steroid,epidural,transforaminal approach--Left L4 and L5;  Surgeon: Colleen Carvajal MD;  Location: [facility];  Service: Pain Management;  Laterality: Left;     Social History     Tobacco Use    Smoking status: Never    Smokeless tobacco: Never   Substance Use Topics    Alcohol use: No    Drug use: No        Review of Systems:    As noted in HPI in addition      REVIEW OF SYSTEMS  Review of Systems   Constitutional: Negative for decreased appetite, diaphoresis, night sweats, weight gain and weight loss.   HENT:  Negative for nosebleeds and odynophagia.    Eyes:  Negative for double vision and photophobia.   Cardiovascular:  Negative for chest pain, claudication, cyanosis, dyspnea on exertion, irregular heartbeat, leg swelling, near-syncope, orthopnea, palpitations, paroxysmal nocturnal dyspnea and syncope.   Respiratory:  Negative for cough, hemoptysis, shortness of breath and wheezing.    Hematologic/Lymphatic: Negative for adenopathy.   Skin:  Negative for flushing, skin cancer and suspicious lesions.   Musculoskeletal:  Negative for gout, myalgias and neck pain.   Gastrointestinal:  Negative for  abdominal pain, heartburn, hematemesis and hematochezia.   Genitourinary:  Negative for bladder incontinence, hesitancy and nocturia.   Neurological:  Positive for light-headedness, loss of balance and weakness. Negative for focal weakness, headaches and paresthesias.   Psychiatric/Behavioral:  Negative for memory loss and substance abuse.               Objective:        Vitals:    01/04/24 0916   BP: 122/73   Pulse: 62       Lab Results   Component Value Date    WBC 5.22 12/20/2023    HGB 8.3 (L) 12/20/2023    HCT 26.9 (L) 12/20/2023     12/20/2023    CHOL 154 04/12/2023    TRIG 119 04/12/2023    HDL 29 (L) 04/12/2023    ALT 7 (L) 12/20/2023    AST 14 12/20/2023     12/21/2023    K 5.1 12/21/2023     12/21/2023    CREATININE 1.6 (H) 12/21/2023    BUN 31 (H) 12/21/2023    CO2 21 (L) 12/21/2023    TSH 0.885 11/07/2023    INR 1.1 12/20/2023    HGBA1C 6.1 (H) 10/03/2023    MICROALBUR 1.6 09/13/2022        ECHOCARDIOGRAM RESULTS  Results for orders placed during the hospital encounter of 12/20/23    Transesophageal echo (PORTIA)    Interpretation Summary    PORTIA for Jazmin clip presence of severe MR medial to A2P2.,Clip placed slightly medial to A2P2. MR reduced to 2+. No pericardial effusion. Mean gradient 5 mmHg at the endo of the preocdure. Pulmonary systolic vein flow normalized.    Mitral Valve: There is moderate bileaflet sclerosis. There is severe posterior mitral annular calcification present. Moderately restricted motion of the posterior leaflet and overriding anterior leaflet leading to severe posteriorly directed ject. There is no stenosis Mean gradient 2 mmHg at baseline. PV systolic flow reversal    Left Atrium: Left atrium is severely dilated. The pulmonary veins have systolic blunting. There is no thrombus in the left atrial cavity.    Left Ventricle: The left ventricle is normal in size. Normal wall thickness. Normal wall motion. There is normal systolic function with a visually estimated  ejection fraction of 60 - 65%.    Right Ventricle: Normal right ventricular cavity size. Wall thickness is normal. Right ventricle wall motion  is normal. Systolic function is normal.        CURRENT/PREVIOUS VISIT EKG  Results for orders placed or performed during the hospital encounter of 12/20/23   EKG 12-lead    Collection Time: 12/20/23  5:43 AM    Narrative    Test Reason : I34.0,Z98.890,Z95.818,    Vent. Rate : 066 BPM     Atrial Rate : 066 BPM     P-R Int : 190 ms          QRS Dur : 094 ms      QT Int : 430 ms       P-R-T Axes : 079 096 089 degrees     QTc Int : 450 ms    Normal sinus rhythm  Rightward axis  Borderline Abnormal ECG  When compared with ECG of 07-NOV-2023 21:13,  QT has shortened  Confirmed by Ashvin ROJAS MD ([id number]) on 12/20/2023 9:34:39 AM    Referred By: JACLYN BECKWITH           Confirmed By:Ashvin ROJAS MD     No valid procedures specified.   No results found for this or any previous visit.    No valid procedures specified.    PHYSICAL EXAM  CONSTITUTIONAL: Well built, well nourished in no apparent distress  NECK: no carotid bruit, no JVD  LUNGS: CTA  CHEST WALL: no tenderness,  HEART: regular rate and rhythm, S1, S2 normal, 1 to 2/6 systolic crescendo murmur mitral valve area.  ABDOMEN: soft, non-tender; bowel sounds normal; no masses,  no organomegaly  EXTREMITIES: Extremities normal, no edema, no calf tenderness noted  NEURO: AAO X 3    I HAVE REVIEWED :    The vital signs, nurses notes, and all the pertinent radiology and labs.         Current Outpatient Medications   Medication Instructions    acetaminophen (TYLENOL) 650 mg, Oral, Every 8 hours    atorvastatin (LIPITOR) 80 mg, Oral, Daily    bumetanide (BUMEX) 3 mg, Oral, 2 times daily    busPIRone (BUSPAR) 5 MG Tab Take half tablet (0.5mg) by mouth twice a day as needed for anxiety    clopidogreL (PLAVIX) 75 mg, Oral    cyanocobalamin 1,000 mcg/mL injection INJECT 1 ML INTO THE MUSCLE EVERY 14 DAYS    ergocalciferol (ERGOCALCIFEROL) 50,000  Units, Oral, Every 7 days    EScitalopram oxalate (LEXAPRO) 20 mg, Oral, Daily    lisinopriL (PRINIVIL,ZESTRIL) 5 mg, Oral, Daily    nitroGLYCERIN (NITROSTAT) 0.4 mg, Sublingual, Every 5 min PRN    nystatin (MYCOSTATIN) cream Topical (Top), 2 times daily    potassium chloride SA (K-DUR,KLOR-CON) 20 MEQ tablet 20 mEq, Oral, 2 times daily    pregabalin (LYRICA) 75 mg, Oral, 2 times daily          Assessment:   CAD.  PCI stent [facility] [address] [facility].  Recent PET negative for ischemia.  PET done pre workup for MitraClip performed December 20, 2023.    Severe MR status post MitraClip.  Improved.    Moderate AS.  EF preserved.    Kidney disease stage IIIB.  GFR 20.5.    PAF, no oral anticoagulation due to fall risk.    Unsteady gait, falls, rule out vertigo.    Plan:   Nephrology note reviewed.  Agree with backing off on diuretics as patient is status post MitraClip with reduction in mitral regurgitation.  Multivalvular disease with moderate AS.    Change Bumex to every other day.    Follow up with me in about 3 months.  Repeat echo at that time.  Labs follow-up primary care.  Referral to ENT.  No follow-ups on file.

## 2024-01-15 PROBLEM — Z09 HOSPITAL DISCHARGE FOLLOW-UP: Status: RESOLVED | Noted: 2023-10-10 | Resolved: 2024-01-15

## 2024-01-23 ENCOUNTER — OFFICE VISIT (OUTPATIENT)
Dept: CARDIOLOGY | Facility: CLINIC | Age: 79
End: 2024-01-23
Payer: MEDICARE

## 2024-01-23 ENCOUNTER — HOSPITAL ENCOUNTER (OUTPATIENT)
Dept: CARDIOLOGY | Facility: HOSPITAL | Age: 79
Discharge: HOME | End: 2024-01-23
Attending: INTERNAL MEDICINE
Payer: MEDICARE

## 2024-01-23 VITALS
SYSTOLIC BLOOD PRESSURE: 140 MMHG | WEIGHT: 127.88 LBS | BODY MASS INDEX: 24.15 KG/M2 | HEIGHT: 61 IN | OXYGEN SATURATION: 98 % | HEART RATE: 67 BPM | DIASTOLIC BLOOD PRESSURE: 62 MMHG

## 2024-01-23 VITALS
HEIGHT: 62 IN | SYSTOLIC BLOOD PRESSURE: 128 MMHG | DIASTOLIC BLOOD PRESSURE: 70 MMHG | HEART RATE: 65 BPM | BODY MASS INDEX: 23 KG/M2 | WEIGHT: 125 LBS

## 2024-01-23 DIAGNOSIS — I34.0 MITRAL VALVE INSUFFICIENCY, UNSPECIFIED ETIOLOGY: ICD-10-CM

## 2024-01-23 DIAGNOSIS — I70.0 AORTIC ATHEROSCLEROSIS: Primary | ICD-10-CM

## 2024-01-23 LAB
ASCENDING AORTA: 3.6 CM
AV INDEX (PROSTH): 0.42
AV MEAN GRADIENT: 11 MMHG
AV PEAK GRADIENT: 19 MMHG
AV VALVE AREA BY VELOCITY RATIO: 1.18 CM²
AV VALVE AREA: 1.22 CM²
AV VELOCITY RATIO: 0.4
BSA FOR ECHO PROCEDURE: 1.57 M2
CV ECHO LV RWT: 0.4 CM
DOP CALC AO PEAK VEL: 2.2 M/S
DOP CALC AO VTI: 51.3 CM
DOP CALC LVOT AREA: 2.9 CM2
DOP CALC LVOT DIAMETER: 1.93 CM
DOP CALC LVOT PEAK VEL: 0.89 M/S
DOP CALC LVOT STROKE VOLUME: 62.81 CM3
DOP CALC MV VTI: 15.69 CM
DOP CALCLVOT PEAK VEL VTI: 21.48 CM
E WAVE DECELERATION TIME: 537.21 MSEC
E/A RATIO: 1.06
ECHO LV POSTERIOR WALL: 1 CM (ref 0.6–1.1)
FRACTIONAL SHORTENING: 28 % (ref 28–44)
GLOBAL LONGITUIDAL STRAIN: 14 %
HR MV ECHO: 66 BPM
INTERVENTRICULAR SEPTUM: 0.62 CM (ref 0.6–1.1)
IVRT: 82.78 MSEC
LA MAJOR: 6.56 CM
LA MINOR: 6.63 CM
LA WIDTH: 4.71 CM
LEFT ATRIUM SIZE: 4.14 CM
LEFT ATRIUM VOLUME INDEX MOD: 58.8 ML/M2
LEFT ATRIUM VOLUME INDEX: 69.6 ML/M2
LEFT ATRIUM VOLUME MOD: 92.3 CM3
LEFT ATRIUM VOLUME: 109.31 CM3
LEFT INTERNAL DIMENSION IN SYSTOLE: 3.56 CM (ref 2.1–4)
LEFT VENTRICLE DIASTOLIC VOLUME INDEX: 73.07 ML/M2
LEFT VENTRICLE DIASTOLIC VOLUME: 114.72 ML
LEFT VENTRICLE MASS INDEX: 86 G/M2
LEFT VENTRICLE SYSTOLIC VOLUME INDEX: 33.8 ML/M2
LEFT VENTRICLE SYSTOLIC VOLUME: 53.08 ML
LEFT VENTRICULAR INTERNAL DIMENSION IN DIASTOLE: 4.94 CM (ref 3.5–6)
LEFT VENTRICULAR MASS: 135.17 G
MV A" WAVE DURATION": 7.61 MSEC
MV MEAN GRADIENT: 7 MMHG
MV PEAK A VEL: 1.71 M/S
MV PEAK E VEL: 1.82 M/S
MV PEAK GRADIENT: 1 MMHG
MV STENOSIS PRESSURE HALF TIME: 155.79 MS
MV VALVE AREA BY CONTINUITY EQUATION: 4 CM2
MV VALVE AREA P 1/2 METHOD: 1.41 CM2
OHS LV EJECTION FRACTION SIMPSONS BIPLANE MOD: 46 %
PISA MRMAX VEL: 0.05 M/S
PISA TR MAX VEL: 3.81 M/S
PULM VEIN S/D RATIO: 1.31
PV PEAK D VEL: 0.39 M/S
PV PEAK S VEL: 0.51 M/S
RA MAJOR: 5.42 CM
RA PRESSURE ESTIMATED: 8 MMHG
RA WIDTH: 3.28 CM
RIGHT VENTRICULAR END-DIASTOLIC DIMENSION: 3.24 CM
RV TB RVSP: 12 MMHG
SINUS: 2.91 CM
STJ: 2.41 CM
TR MAX PG: 58 MMHG
TRICUSPID ANNULAR PLANE SYSTOLIC EXCURSION: 1.58 CM
TV REST PULMONARY ARTERY PRESSURE: 66 MMHG
Z-SCORE OF LEFT VENTRICULAR DIMENSION IN END DIASTOLE: 0.91
Z-SCORE OF LEFT VENTRICULAR DIMENSION IN END SYSTOLE: 1.9

## 2024-01-23 PROCEDURE — 93306 TTE W/DOPPLER COMPLETE: CPT | Mod: HCNC

## 2024-01-23 PROCEDURE — 1126F AMNT PAIN NOTED NONE PRSNT: CPT | Mod: HCNC,CPTII,S$GLB, | Performed by: INTERNAL MEDICINE

## 2024-01-23 PROCEDURE — 99999 PR PBB SHADOW E&M-EST. PATIENT-LVL IV: CPT | Mod: PBBFAC,HCNC,, | Performed by: INTERNAL MEDICINE

## 2024-01-23 PROCEDURE — 1157F ADVNC CARE PLAN IN RCRD: CPT | Mod: HCNC,CPTII,S$GLB, | Performed by: INTERNAL MEDICINE

## 2024-01-23 PROCEDURE — 93356 MYOCRD STRAIN IMG SPCKL TRCK: CPT | Mod: HCNC,,, | Performed by: INTERNAL MEDICINE

## 2024-01-23 PROCEDURE — 3078F DIAST BP <80 MM HG: CPT | Mod: HCNC,CPTII,S$GLB, | Performed by: INTERNAL MEDICINE

## 2024-01-23 PROCEDURE — 3288F FALL RISK ASSESSMENT DOCD: CPT | Mod: HCNC,CPTII,S$GLB, | Performed by: INTERNAL MEDICINE

## 2024-01-23 PROCEDURE — 1159F MED LIST DOCD IN RCRD: CPT | Mod: HCNC,CPTII,S$GLB, | Performed by: INTERNAL MEDICINE

## 2024-01-23 PROCEDURE — 1160F RVW MEDS BY RX/DR IN RCRD: CPT | Mod: HCNC,CPTII,S$GLB, | Performed by: INTERNAL MEDICINE

## 2024-01-23 PROCEDURE — 1100F PTFALLS ASSESS-DOCD GE2>/YR: CPT | Mod: HCNC,CPTII,S$GLB, | Performed by: INTERNAL MEDICINE

## 2024-01-23 PROCEDURE — 3077F SYST BP >= 140 MM HG: CPT | Mod: HCNC,CPTII,S$GLB, | Performed by: INTERNAL MEDICINE

## 2024-01-23 PROCEDURE — 93306 TTE W/DOPPLER COMPLETE: CPT | Mod: 26,HCNC,, | Performed by: INTERNAL MEDICINE

## 2024-01-23 PROCEDURE — 99024 POSTOP FOLLOW-UP VISIT: CPT | Mod: HCNC,S$GLB,, | Performed by: INTERNAL MEDICINE

## 2024-01-23 NOTE — PROGRESS NOTES
PCP - Camilo Patel MD  Subjective:   Patient ID:  Trudy Rauch Dakin is a 78 y.o. female who presents for a 1 month follow up appointment.    HPI:   78 year old female with Hx of CHF, Diabetes, Hypertension, CKD, and Mitral Regurgitation s/p Mitral-clip via RTFV (12/20/2023). Patient denies any chest pain, palpitations, shortness of breath, orthopnea or PND. Reports improvement in her exercise tolerance. She is able to walk around more and go up a stairs without feeling short of breath.     Mitral Valve Disease Etiology: Other/Indeterminate (tethering posterior leaflet)     The patient has undergone the following MitraClip work-up:   PORTIA (Date 11/1/2023): Severe mitral insufficiency, MVA needs  cm2, MG 3 mmHg, EOA needs  cm2, EF= 50-55%.   LHC: Deferred for PET stress (11/1/2023) that showed no significant perfusion abnormalities   STS: 8%   Frailty: 2/4   CT Surgery risk assessment: inoperable, per Dr. Cook due to frailty  PFTs: Deferred.  Comorbidities: coronary artery disease. CKD III-IV     History:     Past Medical History:   Diagnosis Date    CHF (congestive heart failure)     Diabetes     Diverticulitis     Hypertension     Renal disorder      Past Surgical History:   Procedure Laterality Date    ABDOMINAL SURGERY  2006    diverticulitis x3    ANTERIOR CERVICAL DISCECTOMY W/ FUSION N/A 8/30/2018    FUSION C6-7 Surgeon: Rickey Martin MD    APPENDECTOMY      CARPAL TUNNEL RELEASE      COLECTOMY  07/2020    EPIDURAL STEROID INJECTION INTO LUMBAR SPINE N/A 1/20/2021    Procedure: Injection-steroid-epidural-lumbar--L3-4;  Surgeon: Colleen Carvajal MD;  Location: [facility] PAIN T;  Service: Pain Management;  Laterality: N/A;    HYSTERECTOMY  1970    @25yrs of age    LARYNGOSCOPY N/A 1/4/2022    Procedure: Suspension microlaryngoscopy with left vocal fold injection augmentation - Restylane L;  Surgeon: Yuan Mir MD;  Location: [facility] OR [address] [place] [address];  Service: ENT;  Laterality: N/A;  Microscope,  telescopes, tower, injector, Restylane-L    MANDIBLE FRACTURE SURGERY  1970    MICRODISCECTOMY OF SPINE Left 4/13/2021    Procedure: MICRODISCECTOMY, SPINE Left L3-4 far lateral Microdiscectomy;  Surgeon: Rickey Martin MD;  Location: [facility] OR;  Service: Neurosurgery;  Laterality: Left;  Procedure: Left L3-4 far lateral Microdiscectomy   Surgery Time: 1.5 hrs  LOS: 0-1  Anesthesia: General  Radiology: C-arm  SNS: EMG, SEP  Bed: [place] [address] Poster  Position: Melissa  Dav w/ Globus confirmed 4/12/21 [place]    MITRAL CLIP N/A 12/20/2023    Procedure: Mitral clip;  Surgeon: Kevin More MD;  Location: [facility] CATH LAB;  Service: Cardiology;  Laterality: N/A;    OOPHORECTOMY  1970    ROTATOR CUFF REPAIR Left 11/2016    TOTAL REDUCTION MAMMOPLASTY Bilateral 1990    TRANSESOPHAGEAL ECHOCARDIOGRAPHY N/A 11/1/2023    Procedure: ECHOCARDIOGRAM, TRANSESOPHAGEAL;  Surgeon: Brea Melgar Diagnostic;  Location: [facility] EP LAB;  Service: Cardiology;  Laterality: N/A;    TRANSESOPHAGEAL ECHOCARDIOGRAPHY N/A 12/20/2023    Procedure: ECHOCARDIOGRAM, TRANSESOPHAGEAL;  Surgeon: Cristine Brooks MD;  Location: [facility] CATH LAB;  Service: Cardiology;  Laterality: N/A;    TRANSFORAMINAL EPIDURAL INJECTION OF STEROID Left 12/23/2020    Procedure: Injection,steroid,epidural,transforaminal approach--Left L4 and L5;  Surgeon: Colleen Carvajal MD;  Location: [facility] PAIN MGT;  Service: Pain Management;  Laterality: Left;     Social History     Tobacco Use    Smoking status: Never    Smokeless tobacco: Never   Substance Use Topics    Alcohol use: No     Family History   Problem Relation Age of Onset    Heart disease Father     Glaucoma Neg Hx     Macular degeneration Neg Hx        Meds:   Review of patient's allergies indicates:  No Known Allergies    Current Outpatient Medications:     acetaminophen (TYLENOL) 650 MG TbSR, Take 1 tablet (650 mg total) by mouth every 8 (eight) hours., Disp: , Rfl: 0    atorvastatin (LIPITOR) 80 MG tablet, Take 1  tablet (80 mg total) by mouth once daily. (Patient taking differently: Take 80 mg by mouth every evening.), Disp: 90 tablet, Rfl: 3    bumetanide (BUMEX) 2 MG tablet, Take 1.5 tablets (3 mg total) by mouth 2 (two) times daily., Disp: 120 tablet, Rfl: 5    busPIRone (BUSPAR) 5 MG Tab, Take half tablet (0.5mg) by mouth twice a day as needed for anxiety, Disp: 30 tablet, Rfl: 0    clopidogreL (PLAVIX) 75 mg tablet, TAKE ONE TABLET BY MOUTH EVERY DAY, Disp: 90 tablet, Rfl: 3    cyanocobalamin 1,000 mcg/mL injection, INJECT 1 ML INTO THE MUSCLE EVERY 14 DAYS (Patient taking differently: Inject 1,000 mcg into the muscle every 14 (fourteen) days.), Disp: 10 mL, Rfl: 1    ergocalciferol (ERGOCALCIFEROL) 50,000 unit Cap, Take 50,000 Units by mouth every 7 days., Disp: , Rfl:     EScitalopram oxalate (LEXAPRO) 20 MG tablet, Take 1 tablet (20 mg total) by mouth once daily., Disp: 90 tablet, Rfl: 3    lisinopriL (PRINIVIL,ZESTRIL) 5 MG tablet, Take 1 tablet (5 mg total) by mouth once daily., Disp: 90 tablet, Rfl: 3    potassium chloride SA (K-DUR,KLOR-CON) 20 MEQ tablet, Take 1 tablet (20 mEq total) by mouth 2 (two) times daily., Disp: 60 tablet, Rfl: 5    pregabalin (LYRICA) 75 MG capsule, Take 1 capsule (75 mg total) by mouth 2 (two) times daily., Disp: 180 capsule, Rfl: 1    nitroGLYCERIN (NITROSTAT) 0.4 MG SL tablet, Place 1 tablet (0.4 mg total) under the tongue every 5 (five) minutes as needed for Chest pain. (Patient not taking: Reported on 1/2/2024), Disp: 25 tablet, Rfl: 5    nystatin (MYCOSTATIN) cream, Apply topically 2 (two) times daily. (Patient not taking: Reported on 12/11/2023), Disp: 15 g, Rfl: 1  No current facility-administered medications for this visit.    Facility-Administered Medications Ordered in Other Visits:     0.9%  NaCl infusion, , Intravenous, Continuous, Kevin More MD, New Bag at 12/20/23 0817    electrolyte-S (pH 7.4) bolus SolP 250 mL 250 mL, 250 mL, Intravenous, Once, Ricardo Mccann  "MD KHRIS    haloperidol lactate injection 0.5 mg, 0.5 mg, Intravenous, Q10 Min PRN, Ricardo Mccann MD    HYDROmorphone injection 0.2 mg, 0.2 mg, Intravenous, Q5 Min PRN, Ricardo Mccann MD    sodium chloride 0.9% flush 3 mL, 3 mL, Intravenous, PRN, Ricardo Mccann MD        Objective:   BP (!) 140/62 (BP Location: Right arm, Patient Position: Sitting, BP Method: Large (Automatic))   Pulse 67   Ht 5' 1" (1.549 m)   Wt 58 kg (127 lb 13.9 oz)   LMP 01/01/1970   SpO2 98%   BMI 24.16 kg/m²   Physical Exam  Constitutional:       Appearance: Normal appearance.   Eyes:      Conjunctiva/sclera: Conjunctivae normal.   Cardiovascular:      Rate and Rhythm: Normal rate and regular rhythm.      Heart sounds: Murmur heard.   Pulmonary:      Effort: Pulmonary effort is normal.      Breath sounds: Normal breath sounds.   Abdominal:      General: There is no distension.      Palpations: Abdomen is soft.      Tenderness: There is no abdominal tenderness.   Musculoskeletal:      Right lower leg: Edema present.      Left lower leg: Edema present.   Skin:     General: Skin is warm.   Neurological:      Mental Status: She is alert.   Psychiatric:         Mood and Affect: Mood normal.       Labs:     Lab Results   Component Value Date     01/23/2024    K 4.2 01/23/2024     01/23/2024    CO2 23 01/23/2024    BUN 38 (H) 01/23/2024    CREATININE 1.5 (H) 01/23/2024    ANIONGAP 9 01/23/2024     Lab Results   Component Value Date    HGBA1C 6.1 (H) 10/03/2023     Lab Results   Component Value Date     (H) 12/21/2023     (H) 12/20/2023     (H) 12/13/2023       Lab Results   Component Value Date    WBC 5.47 01/23/2024    HGB 9.1 (L) 01/23/2024    HCT 30.2 (L) 01/23/2024     01/23/2024    GRAN 3.8 01/23/2024    GRAN 70.0 01/23/2024     Lab Results   Component Value Date    CHOL 154 04/12/2023    HDL 29 (L) 04/12/2023    LDLCALC 101.2 04/12/2023    TRIG 119 04/12/2023       Lab Results "   Component Value Date     01/23/2024    K 4.2 01/23/2024     01/23/2024    CO2 23 01/23/2024    BUN 38 (H) 01/23/2024    CREATININE 1.5 (H) 01/23/2024    ANIONGAP 9 01/23/2024     Lab Results   Component Value Date    HGBA1C 6.1 (H) 10/03/2023     Lab Results   Component Value Date     (H) 12/21/2023     (H) 12/20/2023     (H) 12/13/2023    Lab Results   Component Value Date    WBC 5.47 01/23/2024    HGB 9.1 (L) 01/23/2024    HCT 30.2 (L) 01/23/2024     01/23/2024    GRAN 3.8 01/23/2024    GRAN 70.0 01/23/2024     Lab Results   Component Value Date    CHOL 154 04/12/2023    HDL 29 (L) 04/12/2023    LDLCALC 101.2 04/12/2023    TRIG 119 04/12/2023                Cardiovascular Imaging:   Echo:   EF   Date Value Ref Range Status   07/07/2023 60 % Final   05/15/2023 55 % Final   04/14/2021 55 % Final     Nuc Rest EF   Date Value Ref Range Status   11/01/2023 60  Final         Assessment & Plan:     Mitral Regurgitation s/p Jazmin-clip  - Stable, patient denies any complaints and reports improvement in her symptoms  - Echo reviewed  - Repeat Echocardiogram in 1 year with follow up labs      Alice Cruz MD  Cardiovascular Disease, PGY IV  [facility]    I have seen the patient, reviewed the Fellow's history and physical, assessment and plan. I have personally interviewed and examined the patient and agree with the findings.     DAPHNIE More MD

## 2024-02-14 ENCOUNTER — DOCUMENT SCAN (OUTPATIENT)
Dept: HOME HEALTH SERVICES | Facility: HOSPITAL | Age: 79
End: 2024-02-14
Payer: MEDICARE

## 2024-02-20 NOTE — TELEPHONE ENCOUNTER
Care Due:                  Date            Visit Type   Department     Provider  --------------------------------------------------------------------------------                                EP -                              PRIMARY      [facility] FAMILY  Last Visit: 11-      CARE ([facility])   YVONNE Patel                               -                              PRIMARY      [facility]  Next Visit: 03-      CARE ([facility])   YVONNE Patel                                                            Last  Test          Frequency    Reason                     Performed    Due Date  --------------------------------------------------------------------------------    Lipid Panel.  12 months..  atorvastatin.............  04- 04-    [facility] Embedded Care Due Messages. Reference number: [id number].   2/20/2024 5:56:10 PM CST

## 2024-02-21 RX ORDER — BUSPIRONE HYDROCHLORIDE 5 MG/1
TABLET ORAL
Qty: 60 TABLET | Refills: 0 | Status: SHIPPED | OUTPATIENT
Start: 2024-02-21 | End: 2024-03-18

## 2024-02-23 ENCOUNTER — OFFICE VISIT (OUTPATIENT)
Dept: HOME HEALTH SERVICES | Facility: CLINIC | Age: 79
End: 2024-02-23
Payer: MEDICARE

## 2024-02-23 VITALS
DIASTOLIC BLOOD PRESSURE: 56 MMHG | HEART RATE: 63 BPM | BODY MASS INDEX: 23.98 KG/M2 | HEIGHT: 61 IN | SYSTOLIC BLOOD PRESSURE: 98 MMHG | WEIGHT: 127 LBS

## 2024-02-23 DIAGNOSIS — D69.2 SENILE PURPURA: ICD-10-CM

## 2024-02-23 DIAGNOSIS — M54.16 LUMBAR RADICULOPATHY: Chronic | ICD-10-CM

## 2024-02-23 DIAGNOSIS — E11.22 TYPE 2 DIABETES MELLITUS WITH STAGE 3B CHRONIC KIDNEY DISEASE, WITHOUT LONG-TERM CURRENT USE OF INSULIN: Chronic | ICD-10-CM

## 2024-02-23 DIAGNOSIS — I38 VALVULAR HEART DISEASE: ICD-10-CM

## 2024-02-23 DIAGNOSIS — I50.30 HEART FAILURE WITH PRESERVED EJECTION FRACTION, UNSPECIFIED HF CHRONICITY: ICD-10-CM

## 2024-02-23 DIAGNOSIS — E53.8 VITAMIN B12 DEFICIENCY: ICD-10-CM

## 2024-02-23 DIAGNOSIS — E55.9 VITAMIN D DEFICIENCY: ICD-10-CM

## 2024-02-23 DIAGNOSIS — N18.32 TYPE 2 DIABETES MELLITUS WITH STAGE 3B CHRONIC KIDNEY DISEASE, WITHOUT LONG-TERM CURRENT USE OF INSULIN: Chronic | ICD-10-CM

## 2024-02-23 DIAGNOSIS — I25.10 CORONARY ARTERY DISEASE INVOLVING NATIVE CORONARY ARTERY OF NATIVE HEART WITHOUT ANGINA PECTORIS: Chronic | ICD-10-CM

## 2024-02-23 DIAGNOSIS — I48.0 PAROXYSMAL ATRIAL FIBRILLATION: Chronic | ICD-10-CM

## 2024-02-23 DIAGNOSIS — I10 ESSENTIAL HYPERTENSION: Chronic | ICD-10-CM

## 2024-02-23 DIAGNOSIS — I70.0 AORTIC ATHEROSCLEROSIS: ICD-10-CM

## 2024-02-23 DIAGNOSIS — E21.3 HYPERPARATHYROIDISM: ICD-10-CM

## 2024-02-23 DIAGNOSIS — Z00.00 ENCOUNTER FOR PREVENTIVE HEALTH EXAMINATION: Primary | ICD-10-CM

## 2024-02-23 DIAGNOSIS — E78.49 OTHER HYPERLIPIDEMIA: ICD-10-CM

## 2024-02-23 DIAGNOSIS — F32.0 MAJOR DEPRESSIVE DISORDER, SINGLE EPISODE, MILD: ICD-10-CM

## 2024-02-23 PROCEDURE — 1101F PT FALLS ASSESS-DOCD LE1/YR: CPT | Mod: CPTII,S$GLB,, | Performed by: NURSE PRACTITIONER

## 2024-02-23 PROCEDURE — 3078F DIAST BP <80 MM HG: CPT | Mod: CPTII,S$GLB,, | Performed by: NURSE PRACTITIONER

## 2024-02-23 PROCEDURE — G9919 SCRN ND POS ND PROV OF REC: HCPCS | Mod: CPTII,S$GLB,, | Performed by: NURSE PRACTITIONER

## 2024-02-23 PROCEDURE — 3074F SYST BP LT 130 MM HG: CPT | Mod: CPTII,S$GLB,, | Performed by: NURSE PRACTITIONER

## 2024-02-23 PROCEDURE — 1160F RVW MEDS BY RX/DR IN RCRD: CPT | Mod: CPTII,S$GLB,, | Performed by: NURSE PRACTITIONER

## 2024-02-23 PROCEDURE — 3288F FALL RISK ASSESSMENT DOCD: CPT | Mod: CPTII,S$GLB,, | Performed by: NURSE PRACTITIONER

## 2024-02-23 PROCEDURE — G0439 PPPS, SUBSEQ VISIT: HCPCS | Mod: S$GLB,,, | Performed by: NURSE PRACTITIONER

## 2024-02-23 PROCEDURE — 1157F ADVNC CARE PLAN IN RCRD: CPT | Mod: CPTII,S$GLB,, | Performed by: NURSE PRACTITIONER

## 2024-02-23 PROCEDURE — 1159F MED LIST DOCD IN RCRD: CPT | Mod: CPTII,S$GLB,, | Performed by: NURSE PRACTITIONER

## 2024-02-23 PROCEDURE — 1126F AMNT PAIN NOTED NONE PRSNT: CPT | Mod: CPTII,S$GLB,, | Performed by: NURSE PRACTITIONER

## 2024-02-25 PROBLEM — F05 ACUTE CONFUSIONAL STATE: Status: RESOLVED | Noted: 2023-10-02 | Resolved: 2024-02-25

## 2024-02-25 PROBLEM — F11.20 UNCOMPLICATED OPIOID DEPENDENCE: Chronic | Status: RESOLVED | Noted: 2018-02-28 | Resolved: 2024-02-25

## 2024-02-25 PROBLEM — E78.49 OTHER HYPERLIPIDEMIA: Status: ACTIVE | Noted: 2017-01-25

## 2024-02-25 PROBLEM — E21.3 HYPERPARATHYROIDISM: Status: ACTIVE | Noted: 2024-02-25

## 2024-02-25 PROBLEM — F32.A MILD DEPRESSION: Chronic | Status: RESOLVED | Noted: 2020-11-05 | Resolved: 2024-02-25

## 2024-02-25 PROBLEM — I50.33 ACUTE ON CHRONIC DIASTOLIC HEART FAILURE: Status: RESOLVED | Noted: 2020-11-05 | Resolved: 2024-02-25

## 2024-02-25 PROBLEM — G92.9 ENCEPHALOPATHY, TOXIC: Status: RESOLVED | Noted: 2023-10-04 | Resolved: 2024-02-25

## 2024-02-25 PROBLEM — M51.16 LUMBAR DISC HERNIATION WITH RADICULOPATHY: Status: RESOLVED | Noted: 2021-04-13 | Resolved: 2024-02-25

## 2024-02-25 NOTE — PATIENT INSTRUCTIONS
Counseling and Referral of Other Preventative  (Italic type indicates deductible and co-insurance are waived)    Patient Name: Trudy Dakin  Today's Date: 2/25/2024    Health Maintenance       Date Due Completion Date    Hepatitis C Screening Never done ---    RSV Vaccine (Age 60+ and Pregnant patients) (1 - 1-dose 60+ series) Never done ---    Shingles Vaccine (2 of 3) 03/20/2014 1/23/2014    COVID-19 Vaccine (3 - Moderna risk series) 04/17/2021 3/20/2021    Hemoglobin A1c 04/03/2024 10/3/2023    Diabetes Urine Screening 04/12/2024 4/12/2023    Lipid Panel 04/12/2024 4/12/2023    Eye Exam 10/30/2024 10/30/2023    DEXA Scan 10/30/2026 10/30/2023    TETANUS VACCINE 02/15/2032 2/15/2022        No orders of the defined types were placed in this encounter.    The following information is provided to all patients.  This information is to help you find resources for any of the problems found today that may be affecting your health:                  Living healthy guide: www.[facility].[place].gov      Understanding Diabetes: www.diabetes.org      Eating healthy: www.cdc.gov/healthyweight      CDC home safety checklist: www.cdc.gov/steadi/patient.html      Agency on Aging: www.goea.[place].[facility]      Alcoholics anonymous (AA): www.aa.org      Physical Activity: www.letitia.nih.gov/dc9edjt      Tobacco use: www.quitwithusla.org

## 2024-02-25 NOTE — PROGRESS NOTES
"  Trudy Dakin presented for a follow-up Medicare AWV today. The following components were reviewed and updated:    Medical history  Family History  Social history  Allergies and Current Medications  Health Risk Assessment  Health Maintenance  Care Team    **See Completed Assessments for Annual Wellness visit with in the encounter summary    The following assessments were completed:  Depression Screening  Cognitive function Screening  Timed Get Up Test  Whisper Test      Opioid documentation:      Patient does not have a current opioid prescription.          Vitals:    02/23/24 0910   BP: (!) 98/56   Pulse: 63   Weight: 57.6 kg (127 lb)   Height: 5' 1" (1.549 m)     Body mass index is 24 kg/m².       Physical Exam  Constitutional:       Appearance: Normal appearance.   HENT:      Head: Normocephalic and atraumatic.      Nose: Nose normal.      Mouth/Throat:      Mouth: Mucous membranes are dry.   Eyes:      Extraocular Movements: Extraocular movements intact.      Pupils: Pupils are equal, round, and reactive to light.   Cardiovascular:      Rate and Rhythm: Normal rate and regular rhythm.   Pulmonary:      Effort: Pulmonary effort is normal.      Breath sounds: Normal breath sounds.   Abdominal:      General: Bowel sounds are normal.      Palpations: Abdomen is soft.   Musculoskeletal:         General: Normal range of motion.      Cervical back: Normal range of motion and neck supple.   Skin:     General: Skin is warm and dry.   Neurological:      General: No focal deficit present.      Mental Status: She is alert and oriented to person, place, and time.   Psychiatric:         Mood and Affect: Mood normal.         Behavior: Behavior normal.           Diagnoses and health risks identified today and associated recommendations/orders:  1. Encounter for preventive health examination  Awv completed      2. Hyperparathyroidism  Chronic and stable. Continue current treatment. Follow with PCP.  Followed with labs      3. " Senile purpura  Chronic and stable. Continue current treatment. Follow with PCP.      4. Major depressive disorder, single episode, mild  Chronic and stable. Continue current treatment. Follow with PCP.  On meds - stable      5. Heart failure with preserved ejection fraction, unspecified HF chronicity  Chronic and stable. Continue current treatment. Follow with PCP.  Monitored with cardiology, daily weights and diuretic    6. Aortic atherosclerosis  Chronic and stable. Continue current treatment. Follow with PCP.  Plavix and statin       7. CAD (coronary artery disease)  Chronic and stable. Continue current treatment. Follow with PCP.      8. Essential hypertension  Chronic and stable. Continue current treatment. Follow with PCP.  Stable on meds - monitor at home      9. Paroxysmal atrial fibrillation  Chronic and stable. Continue current treatment. Follow with PCP.      10. Valvular heart disease  Chronic and stable. Continue current treatment. Follow with PCP.      11. Type 2 diabetes mellitus with stage 3b chronic kidney disease, without long-term current use of insulin  Chronic and stable. Continue current treatment. Follow with PCP.  Diet controlled      12. Vitamin B12 deficiency  Chronic and stable. Continue current treatment. Follow with PCP.  Monthly injections       13. Vitamin D deficiency  Chronic and stable. Continue current treatment. Follow with PCP.          14. Lumbar radiculopathy  Chronic and stable. Continue current treatment. Follow with PCP.  Using lyrica for pain       15. Other hyperlipidemia  Chronic and stable. Continue current treatment. Follow with PCP.  On statin        Provided [place] with a 5-10 year written screening schedule and personal prevention plan. Recommendations were developed using the USPSTF age appropriate recommendations. Education, counseling, and referrals were provided as needed.  After Visit Summary printed and given to patient which includes a list of additional  screenings\tests needed.    Fu in 1 yr for awv            Kim Perea, NP

## 2024-03-05 ENCOUNTER — PATIENT MESSAGE (OUTPATIENT)
Dept: FAMILY MEDICINE | Facility: CLINIC | Age: 79
End: 2024-03-05
Payer: MEDICARE

## 2024-03-17 NOTE — TELEPHONE ENCOUNTER
No care due was identified.  [facility] Embedded Care Due Messages. Reference number: [id number].   3/17/2024 8:03:31 AM CDT

## 2024-03-18 RX ORDER — BUSPIRONE HYDROCHLORIDE 5 MG/1
TABLET ORAL
Qty: 60 TABLET | Refills: 0 | Status: SHIPPED | OUTPATIENT
Start: 2024-03-18 | End: 2024-04-21 | Stop reason: SDUPTHER

## 2024-03-21 ENCOUNTER — LAB VISIT (OUTPATIENT)
Dept: LAB | Facility: HOSPITAL | Age: 79
End: 2024-03-21
Attending: FAMILY MEDICINE
Payer: MEDICARE

## 2024-03-21 DIAGNOSIS — E78.00 HIGH CHOLESTEROL: ICD-10-CM

## 2024-03-21 DIAGNOSIS — I25.10 CORONARY ARTERY DISEASE INVOLVING NATIVE CORONARY ARTERY OF NATIVE HEART WITHOUT ANGINA PECTORIS: Chronic | ICD-10-CM

## 2024-03-21 DIAGNOSIS — N18.32 TYPE 2 DIABETES MELLITUS WITH STAGE 3B CHRONIC KIDNEY DISEASE, WITHOUT LONG-TERM CURRENT USE OF INSULIN: Chronic | ICD-10-CM

## 2024-03-21 DIAGNOSIS — E11.22 TYPE 2 DIABETES MELLITUS WITH STAGE 3B CHRONIC KIDNEY DISEASE, WITHOUT LONG-TERM CURRENT USE OF INSULIN: Chronic | ICD-10-CM

## 2024-03-21 DIAGNOSIS — Z11.59 NEED FOR HEPATITIS C SCREENING TEST: ICD-10-CM

## 2024-03-21 LAB
ALBUMIN SERPL BCP-MCNC: 3.9 G/DL (ref 3.5–5.2)
ALP SERPL-CCNC: 121 U/L (ref 55–135)
ALT SERPL W/O P-5'-P-CCNC: 11 U/L (ref 10–44)
ANION GAP SERPL CALC-SCNC: 9 MMOL/L (ref 8–16)
AST SERPL-CCNC: 17 U/L (ref 10–40)
BILIRUB SERPL-MCNC: 0.4 MG/DL (ref 0.1–1)
BUN SERPL-MCNC: 64 MG/DL (ref 8–23)
CALCIUM SERPL-MCNC: 9.5 MG/DL (ref 8.7–10.5)
CHLORIDE SERPL-SCNC: 109 MMOL/L (ref 95–110)
CHOLEST SERPL-MCNC: 126 MG/DL (ref 120–199)
CHOLEST/HDLC SERPL: 3.4 {RATIO} (ref 2–5)
CO2 SERPL-SCNC: 23 MMOL/L (ref 23–29)
CREAT SERPL-MCNC: 1.7 MG/DL (ref 0.5–1.4)
EST. GFR  (NO RACE VARIABLE): 30.5 ML/MIN/1.73 M^2
ESTIMATED AVG GLUCOSE: 123 MG/DL (ref 68–131)
GLUCOSE SERPL-MCNC: 106 MG/DL (ref 70–110)
HBA1C MFR BLD: 5.9 % (ref 4–5.6)
HCV AB SERPL QL IA: NORMAL
HDLC SERPL-MCNC: 37 MG/DL (ref 40–75)
HDLC SERPL: 29.4 % (ref 20–50)
LDLC SERPL CALC-MCNC: 71.4 MG/DL (ref 63–159)
NONHDLC SERPL-MCNC: 89 MG/DL
POTASSIUM SERPL-SCNC: 5 MMOL/L (ref 3.5–5.1)
PROT SERPL-MCNC: 7.1 G/DL (ref 6–8.4)
SODIUM SERPL-SCNC: 141 MMOL/L (ref 136–145)
TRIGL SERPL-MCNC: 88 MG/DL (ref 30–150)

## 2024-03-21 PROCEDURE — 36415 COLL VENOUS BLD VENIPUNCTURE: CPT | Mod: HCNC,PO | Performed by: FAMILY MEDICINE

## 2024-03-21 PROCEDURE — 86803 HEPATITIS C AB TEST: CPT | Mod: HCNC | Performed by: FAMILY MEDICINE

## 2024-03-21 PROCEDURE — 80053 COMPREHEN METABOLIC PANEL: CPT | Mod: HCNC | Performed by: FAMILY MEDICINE

## 2024-03-21 PROCEDURE — 83036 HEMOGLOBIN GLYCOSYLATED A1C: CPT | Mod: HCNC | Performed by: FAMILY MEDICINE

## 2024-03-21 PROCEDURE — 80061 LIPID PANEL: CPT | Mod: HCNC | Performed by: FAMILY MEDICINE

## 2024-03-28 ENCOUNTER — OFFICE VISIT (OUTPATIENT)
Dept: FAMILY MEDICINE | Facility: CLINIC | Age: 79
End: 2024-03-28
Payer: MEDICARE

## 2024-03-28 VITALS
HEIGHT: 61 IN | SYSTOLIC BLOOD PRESSURE: 136 MMHG | OXYGEN SATURATION: 79 % | WEIGHT: 132.25 LBS | TEMPERATURE: 98 F | HEART RATE: 67 BPM | BODY MASS INDEX: 24.97 KG/M2 | DIASTOLIC BLOOD PRESSURE: 64 MMHG

## 2024-03-28 DIAGNOSIS — F32.A MILD DEPRESSION: ICD-10-CM

## 2024-03-28 DIAGNOSIS — N18.32 STAGE 3B CHRONIC KIDNEY DISEASE: ICD-10-CM

## 2024-03-28 DIAGNOSIS — I25.10 CORONARY ARTERY DISEASE INVOLVING NATIVE CORONARY ARTERY OF NATIVE HEART WITHOUT ANGINA PECTORIS: Chronic | ICD-10-CM

## 2024-03-28 DIAGNOSIS — I48.0 PAROXYSMAL ATRIAL FIBRILLATION: Chronic | ICD-10-CM

## 2024-03-28 DIAGNOSIS — M54.16 LUMBAR RADICULOPATHY: Chronic | ICD-10-CM

## 2024-03-28 DIAGNOSIS — E78.49 OTHER HYPERLIPIDEMIA: ICD-10-CM

## 2024-03-28 DIAGNOSIS — I10 ESSENTIAL HYPERTENSION: Primary | Chronic | ICD-10-CM

## 2024-03-28 DIAGNOSIS — N18.32 TYPE 2 DIABETES MELLITUS WITH STAGE 3B CHRONIC KIDNEY DISEASE, WITHOUT LONG-TERM CURRENT USE OF INSULIN: Chronic | ICD-10-CM

## 2024-03-28 DIAGNOSIS — I50.30 HEART FAILURE WITH PRESERVED EJECTION FRACTION, UNSPECIFIED HF CHRONICITY: ICD-10-CM

## 2024-03-28 DIAGNOSIS — E11.22 TYPE 2 DIABETES MELLITUS WITH STAGE 3B CHRONIC KIDNEY DISEASE, WITHOUT LONG-TERM CURRENT USE OF INSULIN: Chronic | ICD-10-CM

## 2024-03-28 DIAGNOSIS — F32.0 MAJOR DEPRESSIVE DISORDER, SINGLE EPISODE, MILD: ICD-10-CM

## 2024-03-28 PROCEDURE — 3288F FALL RISK ASSESSMENT DOCD: CPT | Mod: HCNC,CPTII,S$GLB, | Performed by: FAMILY MEDICINE

## 2024-03-28 PROCEDURE — 99999 PR PBB SHADOW E&M-EST. PATIENT-LVL IV: CPT | Mod: PBBFAC,HCNC,, | Performed by: FAMILY MEDICINE

## 2024-03-28 PROCEDURE — 3078F DIAST BP <80 MM HG: CPT | Mod: HCNC,CPTII,S$GLB, | Performed by: FAMILY MEDICINE

## 2024-03-28 PROCEDURE — 99214 OFFICE O/P EST MOD 30 MIN: CPT | Mod: HCNC,S$GLB,, | Performed by: FAMILY MEDICINE

## 2024-03-28 PROCEDURE — 3075F SYST BP GE 130 - 139MM HG: CPT | Mod: HCNC,CPTII,S$GLB, | Performed by: FAMILY MEDICINE

## 2024-03-28 PROCEDURE — 1101F PT FALLS ASSESS-DOCD LE1/YR: CPT | Mod: HCNC,CPTII,S$GLB, | Performed by: FAMILY MEDICINE

## 2024-03-28 PROCEDURE — 1157F ADVNC CARE PLAN IN RCRD: CPT | Mod: HCNC,CPTII,S$GLB, | Performed by: FAMILY MEDICINE

## 2024-03-28 PROCEDURE — 1159F MED LIST DOCD IN RCRD: CPT | Mod: HCNC,CPTII,S$GLB, | Performed by: FAMILY MEDICINE

## 2024-03-28 PROCEDURE — 1126F AMNT PAIN NOTED NONE PRSNT: CPT | Mod: HCNC,CPTII,S$GLB, | Performed by: FAMILY MEDICINE

## 2024-03-28 RX ORDER — ESCITALOPRAM OXALATE 10 MG/1
10 TABLET ORAL DAILY
Qty: 30 TABLET | Refills: 0 | Status: ON HOLD | OUTPATIENT
Start: 2024-03-28 | End: 2024-05-29 | Stop reason: HOSPADM

## 2024-03-28 NOTE — PROGRESS NOTES
"Subjective:       Patient ID: Trudy Rauch Dakin is a 78 y.o. female.    Chief Complaint: Diabetes    Here today to follow up on chronic medical conditions. Seen in ED yesterday with a skin tear on right wrist.     Type II DM: She is on Metformin 500 mg 2 pills BID. HgA1c of 5.9 in 3/2024  HTN:  She is on Toprol and Spironolactone.  Her BP is controlled  A.Fib/CAD/HF:  She is on Beta Blocker and plavix.  Also on Statin.  She is on Bumex.  Seeing Cardiology.  S/P mitral clip in Jan.  HLD:  she is on Lipitor.  Cholesterol to goal in April 3/2024  Chronic Pain:  She has been on Norco (PRN) which was prescribed by her old PCP.  Last filled in Sept 2023 #60.  She is off the Norco at this time  She is on Lyrica.   Insomnia:  She is on elavil 25 mg 1/2 tab at night to help her sleep  ELI/MDD:  She has been on Lexapro since the passing of her .  Overall she feels like the medication worked well and she no longer feels like she needs it.           Review of Systems   Constitutional:  Negative for activity change, appetite change, fatigue and fever.   Respiratory:  Negative for cough, shortness of breath and wheezing.    Cardiovascular:  Negative for chest pain and palpitations.   Gastrointestinal:  Negative for abdominal pain, constipation, diarrhea and nausea.   Skin:  Negative for pallor and rash.   Neurological:  Negative for dizziness, syncope, light-headedness and headaches.   Psychiatric/Behavioral:  The patient is not nervous/anxious.        Objective:      Vitals:    03/28/24 0902   BP: 136/64   Pulse: 67   Temp: 97.9 °F (36.6 °C)   TempSrc: Oral   SpO2: (!) 79%   Weight: 60 kg (132 lb 4.4 oz)   Height: 5' 1" (1.549 m)      Physical Exam  Constitutional:       General: She is not in acute distress.  Cardiovascular:      Rate and Rhythm: Normal rate and regular rhythm.      Heart sounds: Murmur heard.   Pulmonary:      Effort: Pulmonary effort is normal. No respiratory distress.      Breath sounds: Normal breath " sounds. No wheezing, rhonchi or rales.   Skin:     General: Skin is warm and dry.      Findings: Lesion (skin tear to right wrist - no signs of infection) present.   Neurological:      General: No focal deficit present.      Mental Status: She is alert.   Psychiatric:         Mood and Affect: Mood normal.         Behavior: Behavior normal.         Thought Content: Thought content normal.         Results for orders placed or performed in visit on 03/21/24   Hepatitis C Antibody   Result Value Ref Range    Hepatitis C Ab Non-reactive Non-reactive   Comprehensive Metabolic Panel   Result Value Ref Range    Sodium 141 136 - 145 mmol/L    Potassium 5.0 3.5 - 5.1 mmol/L    Chloride 109 95 - 110 mmol/L    CO2 23 23 - 29 mmol/L    Glucose 106 70 - 110 mg/dL    BUN 64 (H) 8 - 23 mg/dL    Creatinine 1.7 (H) 0.5 - 1.4 mg/dL    Calcium 9.5 8.7 - 10.5 mg/dL    Total Protein 7.1 6.0 - 8.4 g/dL    Albumin 3.9 3.5 - 5.2 g/dL    Total Bilirubin 0.4 0.1 - 1.0 mg/dL    Alkaline Phosphatase 121 55 - 135 U/L    AST 17 10 - 40 U/L    ALT 11 10 - 44 U/L    eGFR 30.5 (A) >60 mL/min/1.73 m^2    Anion Gap 9 8 - 16 mmol/L   Hemoglobin A1C   Result Value Ref Range    Hemoglobin A1C 5.9 (H) 4.0 - 5.6 %    Estimated Avg Glucose 123 68 - 131 mg/dL   Lipid Panel   Result Value Ref Range    Cholesterol 126 120 - 199 mg/dL    Triglycerides 88 30 - 150 mg/dL    HDL 37 (L) 40 - 75 mg/dL    LDL Cholesterol 71.4 63.0 - 159.0 mg/dL    HDL/Cholesterol Ratio 29.4 20.0 - 50.0 %    Total Cholesterol/HDL Ratio 3.4 2.0 - 5.0    Non-HDL Cholesterol 89 mg/dL      Assessment:       1. Essential hypertension    2. Type 2 diabetes mellitus with stage 3b chronic kidney disease, without long-term current use of insulin    3. Other hyperlipidemia    4. Heart failure with preserved ejection fraction, unspecified HF chronicity    5. Paroxysmal atrial fibrillation    6. CAD (coronary artery disease)    7. Stage 3b chronic kidney disease    8. Major depressive disorder,  single episode, mild    9. Lumbar radiculopathy    10. Depression        Plan:       Essential hypertension  Continue current BP medications  Type 2 diabetes mellitus with stage 3b chronic kidney disease, without long-term current use of insulin  -     Comprehensive Metabolic Panel; Future; Expected date: 09/28/2024  -     Hemoglobin A1C; Future; Expected date: 09/28/2024  -     Microalbumin/Creatinine Ratio, Urine; Future; Expected date: 09/28/2024  Continue metformin.  Recheck lab work in 6 months  Other hyperlipidemia  Continue Lipitor  Heart failure with preserved ejection fraction, unspecified HF chronicity  Continue current medications per cardiology  Paroxysmal atrial fibrillation  Continue current medications per cardiology  CAD (coronary artery disease)  Continue current medications per cardiology  Stage 3b chronic kidney disease  Maintain good hydration and avoid excessive NSAIDs  Major depressive disorder, single episode, mild  Improved.  Discussed a trial off Lexapro and weaning instructions given  Lumbar radiculopathy  Not taking Norco at this time  Depression  -     EScitalopram oxalate (LEXAPRO) 10 MG tablet; Take 1 tablet (10 mg total) by mouth once daily. Taper off as directed  Dispense: 30 tablet; Refill: 0          Medication List with Changes/Refills   Current Medications    ACETAMINOPHEN (TYLENOL) 650 MG TBSR    Take 1 tablet (650 mg total) by mouth every 8 (eight) hours.    ATORVASTATIN (LIPITOR) 80 MG TABLET    Take 1 tablet (80 mg total) by mouth once daily.    BUMETANIDE (BUMEX) 2 MG TABLET    Take 1.5 tablets (3 mg total) by mouth 2 (two) times daily.    BUSPIRONE (BUSPAR) 5 MG TAB    TAKE ONE TABLET BY MOUTH TWICE DAILY    CLOPIDOGREL (PLAVIX) 75 MG TABLET    TAKE ONE TABLET BY MOUTH EVERY DAY    CYANOCOBALAMIN 1,000 MCG/ML INJECTION    INJECT 1 ML INTO THE MUSCLE EVERY 14 DAYS    ERGOCALCIFEROL (ERGOCALCIFEROL) 50,000 UNIT CAP    Take 50,000 Units by mouth every 7 days.    LISINOPRIL  (PRINIVIL,ZESTRIL) 5 MG TABLET    Take 1 tablet (5 mg total) by mouth once daily.    NITROGLYCERIN (NITROSTAT) 0.4 MG SL TABLET    Place 1 tablet (0.4 mg total) under the tongue every 5 (five) minutes as needed for Chest pain.    POTASSIUM CHLORIDE SA (K-DUR,KLOR-CON) 20 MEQ TABLET    Take 1 tablet (20 mEq total) by mouth 2 (two) times daily.    PREGABALIN (LYRICA) 75 MG CAPSULE    Take 1 capsule (75 mg total) by mouth 2 (two) times daily.   Changed and/or Refilled Medications    Modified Medication Previous Medication    ESCITALOPRAM OXALATE (LEXAPRO) 10 MG TABLET EScitalopram oxalate (LEXAPRO) 20 MG tablet       Take 1 tablet (10 mg total) by mouth once daily. Taper off as directed    Take 1 tablet (20 mg total) by mouth once daily.

## 2024-04-04 ENCOUNTER — LAB VISIT (OUTPATIENT)
Dept: LAB | Facility: HOSPITAL | Age: 79
End: 2024-04-04
Attending: INTERNAL MEDICINE
Payer: MEDICARE

## 2024-04-04 ENCOUNTER — OFFICE VISIT (OUTPATIENT)
Dept: CARDIOLOGY | Facility: CLINIC | Age: 79
End: 2024-04-04
Payer: MEDICARE

## 2024-04-04 VITALS
DIASTOLIC BLOOD PRESSURE: 85 MMHG | HEART RATE: 70 BPM | HEIGHT: 61 IN | SYSTOLIC BLOOD PRESSURE: 146 MMHG | WEIGHT: 135.38 LBS | BODY MASS INDEX: 25.56 KG/M2

## 2024-04-04 DIAGNOSIS — N18.32 TYPE 2 DIABETES MELLITUS WITH STAGE 3B CHRONIC KIDNEY DISEASE, WITHOUT LONG-TERM CURRENT USE OF INSULIN: Chronic | ICD-10-CM

## 2024-04-04 DIAGNOSIS — N18.32 STAGE 3B CHRONIC KIDNEY DISEASE: ICD-10-CM

## 2024-04-04 DIAGNOSIS — I34.0 MITRAL VALVE INSUFFICIENCY, UNSPECIFIED ETIOLOGY: ICD-10-CM

## 2024-04-04 DIAGNOSIS — I48.0 PAROXYSMAL ATRIAL FIBRILLATION: Chronic | ICD-10-CM

## 2024-04-04 DIAGNOSIS — R60.9 EDEMA, UNSPECIFIED TYPE: ICD-10-CM

## 2024-04-04 DIAGNOSIS — I25.10 CORONARY ARTERY DISEASE INVOLVING NATIVE CORONARY ARTERY OF NATIVE HEART WITHOUT ANGINA PECTORIS: ICD-10-CM

## 2024-04-04 DIAGNOSIS — I27.20 PULMONARY HTN: ICD-10-CM

## 2024-04-04 DIAGNOSIS — Z71.89 ACP (ADVANCE CARE PLANNING): ICD-10-CM

## 2024-04-04 DIAGNOSIS — I50.30 HEART FAILURE WITH PRESERVED EJECTION FRACTION, UNSPECIFIED HF CHRONICITY: Primary | ICD-10-CM

## 2024-04-04 DIAGNOSIS — Z74.09 IMPAIRED MOBILITY: ICD-10-CM

## 2024-04-04 DIAGNOSIS — I10 ESSENTIAL HYPERTENSION: Chronic | ICD-10-CM

## 2024-04-04 DIAGNOSIS — I38 VALVULAR HEART DISEASE: ICD-10-CM

## 2024-04-04 DIAGNOSIS — E11.22 TYPE 2 DIABETES MELLITUS WITH STAGE 3B CHRONIC KIDNEY DISEASE, WITHOUT LONG-TERM CURRENT USE OF INSULIN: Chronic | ICD-10-CM

## 2024-04-04 DIAGNOSIS — I25.10 CORONARY ARTERY DISEASE INVOLVING NATIVE CORONARY ARTERY OF NATIVE HEART WITHOUT ANGINA PECTORIS: Chronic | ICD-10-CM

## 2024-04-04 DIAGNOSIS — E78.49 OTHER HYPERLIPIDEMIA: ICD-10-CM

## 2024-04-04 DIAGNOSIS — E87.6 HYPOKALEMIA: ICD-10-CM

## 2024-04-04 DIAGNOSIS — I70.0 AORTIC ATHEROSCLEROSIS: ICD-10-CM

## 2024-04-04 LAB
ANION GAP SERPL CALC-SCNC: 10 MMOL/L (ref 8–16)
BASOPHILS # BLD AUTO: 0.06 K/UL (ref 0–0.2)
BASOPHILS NFR BLD: 1 % (ref 0–1.9)
BNP SERPL-MCNC: 1025 PG/ML (ref 0–99)
BUN SERPL-MCNC: 30 MG/DL (ref 8–23)
CALCIUM SERPL-MCNC: 9.6 MG/DL (ref 8.7–10.5)
CHLORIDE SERPL-SCNC: 114 MMOL/L (ref 95–110)
CO2 SERPL-SCNC: 17 MMOL/L (ref 23–29)
CREAT SERPL-MCNC: 1.5 MG/DL (ref 0.5–1.4)
DIFFERENTIAL METHOD BLD: ABNORMAL
EOSINOPHIL # BLD AUTO: 0.1 K/UL (ref 0–0.5)
EOSINOPHIL NFR BLD: 1.9 % (ref 0–8)
ERYTHROCYTE [DISTWIDTH] IN BLOOD BY AUTOMATED COUNT: 17.7 % (ref 11.5–14.5)
EST. GFR  (NO RACE VARIABLE): 35.4 ML/MIN/1.73 M^2
GLUCOSE SERPL-MCNC: 108 MG/DL (ref 70–110)
HCT VFR BLD AUTO: 31.6 % (ref 37–48.5)
HGB BLD-MCNC: 9.9 G/DL (ref 12–16)
IMM GRANULOCYTES # BLD AUTO: 0.02 K/UL (ref 0–0.04)
IMM GRANULOCYTES NFR BLD AUTO: 0.3 % (ref 0–0.5)
LYMPHOCYTES # BLD AUTO: 1.1 K/UL (ref 1–4.8)
LYMPHOCYTES NFR BLD: 17.5 % (ref 18–48)
MCH RBC QN AUTO: 28.9 PG (ref 27–31)
MCHC RBC AUTO-ENTMCNC: 31.3 G/DL (ref 32–36)
MCV RBC AUTO: 92 FL (ref 82–98)
MONOCYTES # BLD AUTO: 0.4 K/UL (ref 0.3–1)
MONOCYTES NFR BLD: 5.9 % (ref 4–15)
NEUTROPHILS # BLD AUTO: 4.6 K/UL (ref 1.8–7.7)
NEUTROPHILS NFR BLD: 73.4 % (ref 38–73)
NRBC BLD-RTO: 0 /100 WBC
PLATELET # BLD AUTO: 201 K/UL (ref 150–450)
PMV BLD AUTO: 11.4 FL (ref 9.2–12.9)
POTASSIUM SERPL-SCNC: 4.8 MMOL/L (ref 3.5–5.1)
RBC # BLD AUTO: 3.42 M/UL (ref 4–5.4)
SODIUM SERPL-SCNC: 141 MMOL/L (ref 136–145)
WBC # BLD AUTO: 6.29 K/UL (ref 3.9–12.7)

## 2024-04-04 PROCEDURE — 1126F AMNT PAIN NOTED NONE PRSNT: CPT | Mod: HCNC,CPTII,S$GLB, | Performed by: INTERNAL MEDICINE

## 2024-04-04 PROCEDURE — 85025 COMPLETE CBC W/AUTO DIFF WBC: CPT | Mod: HCNC | Performed by: INTERNAL MEDICINE

## 2024-04-04 PROCEDURE — 1101F PT FALLS ASSESS-DOCD LE1/YR: CPT | Mod: HCNC,CPTII,S$GLB, | Performed by: INTERNAL MEDICINE

## 2024-04-04 PROCEDURE — 83880 ASSAY OF NATRIURETIC PEPTIDE: CPT | Mod: HCNC | Performed by: INTERNAL MEDICINE

## 2024-04-04 PROCEDURE — 36415 COLL VENOUS BLD VENIPUNCTURE: CPT | Mod: HCNC,PO | Performed by: INTERNAL MEDICINE

## 2024-04-04 PROCEDURE — 99999 PR PBB SHADOW E&M-EST. PATIENT-LVL III: CPT | Mod: PBBFAC,HCNC,, | Performed by: INTERNAL MEDICINE

## 2024-04-04 PROCEDURE — 3077F SYST BP >= 140 MM HG: CPT | Mod: HCNC,CPTII,S$GLB, | Performed by: INTERNAL MEDICINE

## 2024-04-04 PROCEDURE — 1157F ADVNC CARE PLAN IN RCRD: CPT | Mod: HCNC,CPTII,S$GLB, | Performed by: INTERNAL MEDICINE

## 2024-04-04 PROCEDURE — 80048 BASIC METABOLIC PNL TOTAL CA: CPT | Mod: HCNC | Performed by: INTERNAL MEDICINE

## 2024-04-04 PROCEDURE — 1159F MED LIST DOCD IN RCRD: CPT | Mod: HCNC,CPTII,S$GLB, | Performed by: INTERNAL MEDICINE

## 2024-04-04 PROCEDURE — 99214 OFFICE O/P EST MOD 30 MIN: CPT | Mod: HCNC,S$GLB,, | Performed by: INTERNAL MEDICINE

## 2024-04-04 PROCEDURE — 3288F FALL RISK ASSESSMENT DOCD: CPT | Mod: HCNC,CPTII,S$GLB, | Performed by: INTERNAL MEDICINE

## 2024-04-04 PROCEDURE — 1160F RVW MEDS BY RX/DR IN RCRD: CPT | Mod: HCNC,CPTII,S$GLB, | Performed by: INTERNAL MEDICINE

## 2024-04-04 PROCEDURE — 3079F DIAST BP 80-89 MM HG: CPT | Mod: HCNC,CPTII,S$GLB, | Performed by: INTERNAL MEDICINE

## 2024-04-04 NOTE — PROGRESS NOTES
Subjective:    Patient ID:  Trudy Rauch Dakin is a 78 y.o. female patient here for evaluation Follow-up      History of Present Illness:  Cardiology follow-up, valvular heart disease.  MitraClip 12/20/2023.  Pre MitraClip workup included negative PET scan for ischemia.  Follow-up echo 1/2 20 03/2024 with EF of 45-50%.  Mild moderate AS, moderate MR.  MitraClip present A3 P3.  Mitral valve area by pressure half-time 1.4 cm2.    Presents with 12 lb weight gain, progressive dyspnea, no angina.  No arrhythmia.    History of PAF on chronic oral anticoagulation with Eliquis 5 b.i.d., chronic kidney disease, GFR 30.  03/21/2024    Repeat labs today pending.               Review of patient's allergies indicates:  No Known Allergies    Past Medical History:   Diagnosis Date    CHF (congestive heart failure)     Diabetes     Diverticulitis     Hypertension     Renal disorder      Past Surgical History:   Procedure Laterality Date    ABDOMINAL SURGERY  2006    diverticulitis x3    ANTERIOR CERVICAL DISCECTOMY W/ FUSION N/A 8/30/2018    FUSION C6-7 Surgeon: Rickey Martin MD    APPENDECTOMY      CARPAL TUNNEL RELEASE      COLECTOMY  07/2020    EPIDURAL STEROID INJECTION INTO LUMBAR SPINE N/A 1/20/2021    Procedure: Injection-steroid-epidural-lumbar--L3-4;  Surgeon: Colleen Carvajal MD;  Location: [facility] PAIN MGT;  Service: Pain Management;  Laterality: N/A;    HYSTERECTOMY  1970    @25yrs of age    LARYNGOSCOPY N/A 1/4/2022    Procedure: Suspension microlaryngoscopy with left vocal fold injection augmentation - Restylane L;  Surgeon: Yuan Mir MD;  Location: [address];  Service: ENT;  Laterality: N/A;  Microscope, telescopes, tower, injector, Restylane-L    MANDIBLE FRACTURE SURGERY  1970    MICRODISCECTOMY OF SPINE Left 4/13/2021    Procedure: MICRODISCECTOMY, SPINE Left L3-4 far lateral Microdiscectomy;  Surgeon: Rickey Martin MD;  Location: [facility] OR;  Service: Neurosurgery;  Laterality: Left;  Procedure: Left  L3-4 far lateral Microdiscectomy   Surgery Time: 1.5 hrs  LOS: 0-1  Anesthesia: General  Radiology: C-arm  SNS: EMG, SEP  Bed: [place] [id number] Poster  Position: Melissa Mouranathan w/ Globus confirmed 4/12/21 [place]    MITRAL CLIP N/A 12/20/2023    Procedure: Mitral clip;  Surgeon: Kevin More MD;  Location: [facility] CATH LAB;  Service: Cardiology;  Laterality: N/A;    OOPHORECTOMY  1970    ROTATOR CUFF REPAIR Left 11/2016    TOTAL REDUCTION MAMMOPLASTY Bilateral 1990    TRANSESOPHAGEAL ECHOCARDIOGRAPHY N/A 11/1/2023    Procedure: ECHOCARDIOGRAM, TRANSESOPHAGEAL;  Surgeon: Brea Melgar Diagnostic;  Location: [facility] EP LAB;  Service: Cardiology;  Laterality: N/A;    TRANSESOPHAGEAL ECHOCARDIOGRAPHY N/A 12/20/2023    Procedure: ECHOCARDIOGRAM, TRANSESOPHAGEAL;  Surgeon: Cristine Brooks MD;  Location: [facility] CATH LAB;  Service: Cardiology;  Laterality: N/A;    TRANSFORAMINAL EPIDURAL INJECTION OF STEROID Left 12/23/2020    Procedure: Injection,steroid,epidural,transforaminal approach--Left L4 and L5;  Surgeon: Colleen Carvajal MD;  Location: [facility] PAIN MGT;  Service: Pain Management;  Laterality: Left;     Social History     Tobacco Use    Smoking status: Never    Smokeless tobacco: Never   Substance Use Topics    Alcohol use: No    Drug use: No        Review of Systems:    As noted in HPI in addition      REVIEW OF SYSTEMS  Review of Systems   Constitutional: Negative for decreased appetite, diaphoresis, night sweats, weight gain and weight loss.   HENT:  Negative for nosebleeds and odynophagia.    Eyes:  Negative for double vision and photophobia.   Cardiovascular:  Positive for dyspnea on exertion, leg swelling and orthopnea. Negative for chest pain, claudication, cyanosis, irregular heartbeat, near-syncope, palpitations, paroxysmal nocturnal dyspnea and syncope.   Respiratory:  Positive for shortness of breath. Negative for cough, hemoptysis and wheezing.    Hematologic/Lymphatic: Negative for adenopathy.   Skin:  Negative for  flushing, skin cancer and suspicious lesions.   Musculoskeletal:  Negative for gout, myalgias and neck pain.   Gastrointestinal:  Negative for abdominal pain, heartburn, hematemesis and hematochezia.   Genitourinary:  Negative for bladder incontinence, hesitancy and nocturia.   Neurological:  Negative for focal weakness, headaches, light-headedness and paresthesias.   Psychiatric/Behavioral:  Negative for memory loss and substance abuse.               Objective:        Vitals:    04/04/24 1411   BP: (!) 146/85   Pulse: 70       Lab Results   Component Value Date    WBC 5.47 01/23/2024    HGB 9.1 (L) 01/23/2024    HCT 30.2 (L) 01/23/2024     01/23/2024    CHOL 126 03/21/2024    TRIG 88 03/21/2024    HDL 37 (L) 03/21/2024    ALT 11 03/21/2024    AST 17 03/21/2024     03/21/2024    K 5.0 03/21/2024     03/21/2024    CREATININE 1.7 (H) 03/21/2024    BUN 64 (H) 03/21/2024    CO2 23 03/21/2024    TSH 0.885 11/07/2023    INR 1.1 12/20/2023    HGBA1C 5.9 (H) 03/21/2024    MICROALBUR 1.6 09/13/2022        ECHOCARDIOGRAM RESULTS  Results for orders placed during the hospital encounter of 01/23/24    Echo Saline Bubble? No    Interpretation Summary    Left Ventricle: The left ventricle is normal in size. Ventricular mass is normal. Normal wall thickness. Normal wall motion. There is mildly reduced systolic function with a visually estimated ejection fraction of 45 - 50%. Biplane (2D) method of discs ejection fraction is 46%. Global longitudinal strain is -14.0%.    Right Ventricle: Normal right ventricular cavity size. Systolic function is borderline low.    Left Atrium: Left atrium is severely dilated. There is no thrombus in the left atrial cavity.    Aortic Valve: There is moderate aortic valve sclerosis. Moderately restricted motion. There is mild to moderate stenosis. Aortic valve area by VTI is 1.22 cm². Aortic valve peak velocity is 2.20 m/s. Mean gradient is 11 mmHg. The dimensionless index is  0.42.    ELLIOT 1.4cm^2 by planimetry    Mitral Valve: The mitral valve is repaired by MitraClip. There is moderate mitral annular calcification present. There is moderate regurgitation.    Tricuspid Valve: There is moderate regurgitation.    Pulmonary Artery: The estimated pulmonary artery systolic pressure is 66 mmHg.    IVC/SVC: Intermediate venous pressure at 8 mmHg.    Pericardium: There is a trivial circumferential effusion.    S/p MitraClip placed on lateral edge of A3/P3    Mean diastolic gradient across the mitral valve 7-8mmHg at a heart rate of 66bpm. MVA by PHT 1.4cm^2, MVA 1.7 cm^2 by planimetry    Results for orders placed during the hospital encounter of 12/20/23    Cardiac catheterization    Conclusion    The estimated blood loss was none.    The MitralClip was successfully placed.    The procedure log was documented by Documenter: Sully Alvarenga and verified by Jaclyn More MD.    Date: 12/20/2023  Time: 10:21 AM          CURRENT/PREVIOUS VISIT EKG  Results for orders placed or performed during the hospital encounter of 12/20/23   EKG 12-lead    Collection Time: 12/20/23  5:43 AM    Narrative    Test Reason : I34.0,Z98.890,Z95.818,    Vent. Rate : 066 BPM     Atrial Rate : 066 BPM     P-R Int : 190 ms          QRS Dur : 094 ms      QT Int : 430 ms       P-R-T Axes : 079 096 089 degrees     QTc Int : 450 ms    Normal sinus rhythm  Rightward axis  Borderline Abnormal ECG  When compared with ECG of 07-NOV-2023 21:13,  QT has shortened  Confirmed by Ashvin ROJAS MD ([id number]) on 12/20/2023 9:34:39 AM    Referred By: JACLYN MORE           Confirmed By:Ashvin ROJAS MD     No valid procedures specified.   No results found for this or any previous visit.    No valid procedures specified.    PHYSICAL EXAM  GENERAL: well built, well nourished, well-developed in no apparent distress alert and oriented.   HEENT: Normocephalic. Pupils normal and conjunctivae normal.  Mucous membranes normal, no cyanosis or  icterus, trachea central,no pallor or icterus is noted..   NECK:  Mild JVD. No bruit..   THYROID: Thyroid not enlarged. No nodules present..   CARDIAC:  Dyspnea S1-S2.  Grade 2 over 6 crescendo decrescendo murmur aortic area.  Grade 2/6 crescendo systolic apex soft S3 gallop.  LUNGS: Clear to auscultation. No wheezing or rhonchi..   ABDOMEN: Soft no masses or organomegaly.  No abdomen pulsations or bruits.  Normal bowel sounds. No pulsations and no masses felt, No guarding or rebound.   URINARY: No curiel catheter   EXTREMITIES: No cyanosis, clubbing or edema noted at this time., no calf tenderness bilaterally.   PERIPHERAL VASCULAR SYSTEM: Good palpable distal pulses.  2+ femoral, popliteal and pedal pulses.  No bruits    CENTRAL NERVOUS SYSTEM: No focal motor or sensory deficits noted.   SKIN: Skin without lesions, moist, well perfused.   MUSCLE STRENGTH & TONE: No noteable weakness, atrophy or abnormal movement    I HAVE REVIEWED :    The vital signs, nurses notes, and all the pertinent radiology and labs.         Current Outpatient Medications   Medication Instructions    acetaminophen (TYLENOL) 650 mg, Oral, Every 8 hours    atorvastatin (LIPITOR) 80 mg, Oral, Daily    bumetanide (BUMEX) 3 mg, Oral, 2 times daily    busPIRone (BUSPAR) 5 MG Tab TAKE ONE TABLET BY MOUTH TWICE DAILY    clopidogreL (PLAVIX) 75 mg, Oral    cyanocobalamin 1,000 mcg/mL injection INJECT 1 ML INTO THE MUSCLE EVERY 14 DAYS    ergocalciferol (ERGOCALCIFEROL) 50,000 Units, Oral, Every 7 days    EScitalopram oxalate (LEXAPRO) 10 mg, Oral, Daily, Taper off as directed    lisinopriL (PRINIVIL,ZESTRIL) 5 mg, Oral, Daily    nitroGLYCERIN (NITROSTAT) 0.4 mg, Sublingual, Every 5 min PRN    potassium chloride SA (K-DUR,KLOR-CON) 20 MEQ tablet 20 mEq, Oral, 2 times daily    pregabalin (LYRICA) 75 mg, Oral, 2 times daily          Assessment:   Heart disease with mild moderate AS, moderate MR by most recent echo status post MitraClip 12/20/2023.  Echo  1/20 03/2024.    12 lb weight gain, increasing dyspnea.  Labs pending today which include basic metabolic profile, BNP.    Chronic kidney disease, GFR 30.    Plan:   Await results lab to reassess GFR electrolytes and BNP.  Patient to see structural heart disease next week at [facility].  Difficult problem with both aortic and mitral valve disease in the face of chronic kidney disease with EF 45%.          No follow-ups on file.

## 2024-04-08 ENCOUNTER — OFFICE VISIT (OUTPATIENT)
Dept: TRANSPLANT | Facility: CLINIC | Age: 79
End: 2024-04-08
Payer: MEDICARE

## 2024-04-08 VITALS
HEART RATE: 54 BPM | DIASTOLIC BLOOD PRESSURE: 63 MMHG | HEIGHT: 62 IN | SYSTOLIC BLOOD PRESSURE: 139 MMHG | WEIGHT: 132.69 LBS | BODY MASS INDEX: 24.42 KG/M2

## 2024-04-08 DIAGNOSIS — I50.30 HEART FAILURE WITH PRESERVED EJECTION FRACTION, UNSPECIFIED HF CHRONICITY: ICD-10-CM

## 2024-04-08 DIAGNOSIS — I10 ESSENTIAL HYPERTENSION: Chronic | ICD-10-CM

## 2024-04-08 DIAGNOSIS — I25.10 CORONARY ARTERY DISEASE INVOLVING NATIVE CORONARY ARTERY OF NATIVE HEART WITHOUT ANGINA PECTORIS: Chronic | ICD-10-CM

## 2024-04-08 DIAGNOSIS — I38 VALVULAR HEART DISEASE: ICD-10-CM

## 2024-04-08 DIAGNOSIS — I48.0 PAROXYSMAL ATRIAL FIBRILLATION: Chronic | ICD-10-CM

## 2024-04-08 DIAGNOSIS — I50.33 ACUTE ON CHRONIC DIASTOLIC HEART FAILURE: Primary | ICD-10-CM

## 2024-04-08 DIAGNOSIS — I34.0 MITRAL VALVE INSUFFICIENCY, UNSPECIFIED ETIOLOGY: ICD-10-CM

## 2024-04-08 PROBLEM — E87.6 HYPOKALEMIA: Status: RESOLVED | Noted: 2023-07-06 | Resolved: 2024-04-08

## 2024-04-08 PROCEDURE — 1157F ADVNC CARE PLAN IN RCRD: CPT | Mod: CPTII,S$GLB,, | Performed by: INTERNAL MEDICINE

## 2024-04-08 PROCEDURE — 3288F FALL RISK ASSESSMENT DOCD: CPT | Mod: CPTII,S$GLB,, | Performed by: INTERNAL MEDICINE

## 2024-04-08 PROCEDURE — 1159F MED LIST DOCD IN RCRD: CPT | Mod: CPTII,S$GLB,, | Performed by: INTERNAL MEDICINE

## 2024-04-08 PROCEDURE — 3078F DIAST BP <80 MM HG: CPT | Mod: CPTII,S$GLB,, | Performed by: INTERNAL MEDICINE

## 2024-04-08 PROCEDURE — 3075F SYST BP GE 130 - 139MM HG: CPT | Mod: CPTII,S$GLB,, | Performed by: INTERNAL MEDICINE

## 2024-04-08 PROCEDURE — 1101F PT FALLS ASSESS-DOCD LE1/YR: CPT | Mod: CPTII,S$GLB,, | Performed by: INTERNAL MEDICINE

## 2024-04-08 PROCEDURE — 99214 OFFICE O/P EST MOD 30 MIN: CPT | Mod: S$GLB,,, | Performed by: INTERNAL MEDICINE

## 2024-04-08 PROCEDURE — 1126F AMNT PAIN NOTED NONE PRSNT: CPT | Mod: CPTII,S$GLB,, | Performed by: INTERNAL MEDICINE

## 2024-04-08 PROCEDURE — 99999 PR PBB SHADOW E&M-EST. PATIENT-LVL IV: CPT | Mod: PBBFAC,HCNC,, | Performed by: INTERNAL MEDICINE

## 2024-04-08 RX ORDER — BUMETANIDE 2 MG/1
4 TABLET ORAL 2 TIMES DAILY
Qty: 120 TABLET | Refills: 5 | Status: SHIPPED | OUTPATIENT
Start: 2024-04-08 | End: 2024-04-24

## 2024-04-08 RX ORDER — POTASSIUM CHLORIDE 20 MEQ/1
TABLET, EXTENDED RELEASE ORAL
Qty: 90 TABLET | Refills: 5 | Status: SHIPPED | OUTPATIENT
Start: 2024-04-08 | End: 2024-04-24

## 2024-04-08 NOTE — PROGRESS NOTES
"                                                                                       [facility] Follow up.      Attending Physician: Kevin Russo MD.  The patient's last visit with me was on 12/11/2023.         HPI.  78 F w/ PMHx of DM2, CAD s/p Stents, HF from valvular heart disease, Pulm HTN, Severe Mitral Valve Regurgitation, Aortic Valve Stenosis and Diverticulitis s/p Abdominal Surgery (remote) presented today for consultation as part of evaluation for mitral clip.     She has been dealing with congestion/volume overload for months. She has had hospitalizations for hypervolemia, diuresed but then asked to stop her diuretics, concerned for her kidney function. She gets congested again. Lately, due to severe edema and symptoms, her family started diuretics on their own. Patient improved clinically per their report. However, she continues to have congestive symptoms, orthopnea, pnd, leg edema, abdominal distention and decreased appetite. They recently "invested" in a hospital bed, and now she sleeps at a 45 angle.      April 8, 2024:   Since last appointment, she underwent mitral clip last December 2023. Today patient comes c/o dyspnea on exertion after minimal activities. She is currently on bumex 3 mg BID. She saw General Cardiology last week. She said she was told she had extra fluid on her but reports no changes on medications.       Review of Systems   Constitutional:  Positive for fatigue. Negative for activity change, appetite change, chills, diaphoresis, fever and unexpected weight change.   HENT:  Negative for nasal congestion, rhinorrhea and sore throat.    Eyes:  Negative for visual disturbance.   Respiratory:  Positive for cough and shortness of breath. Negative for wheezing and stridor.    Cardiovascular:  Positive for leg swelling. Negative for chest pain and palpitations.   Gastrointestinal:  Negative for abdominal distention, abdominal pain, diarrhea, " nausea and vomiting.   Genitourinary:  Negative for difficulty urinating, dysuria and hematuria.   Integumentary:  Negative for rash.   Neurological:  Negative for seizures, syncope and light-headedness.   Psychiatric/Behavioral:  Negative for agitation and hallucinations.         Past Medical History:   Diagnosis Date    CHF (congestive heart failure)     Diabetes     Diverticulitis     Hypertension     Renal disorder         Past Surgical History:   Procedure Laterality Date    ABDOMINAL SURGERY  2006    diverticulitis x3    ANTERIOR CERVICAL DISCECTOMY W/ FUSION N/A 8/30/2018    FUSION C6-7 Surgeon: Rickey Martin MD    APPENDECTOMY      CARPAL TUNNEL RELEASE      COLECTOMY  07/2020    EPIDURAL STEROID INJECTION INTO LUMBAR SPINE N/A 1/20/2021    Procedure: Injection-steroid-epidural-lumbar--L3-4;  Surgeon: Colleen Carvajal MD;  Location: [facility] PAIN MGT;  Service: Pain Management;  Laterality: N/A;    HYSTERECTOMY  1970    @25yrs of age    LARYNGOSCOPY N/A 1/4/2022    Procedure: Suspension microlaryngoscopy with left vocal fold injection augmentation - Restylane L;  Surgeon: Yuan Mir MD;  Location: [address];  Service: ENT;  Laterality: N/A;  Microscope, telescopes, tower, injector, Restylane-L    MANDIBLE FRACTURE SURGERY  1970    MICRODISCECTOMY OF SPINE Left 4/13/2021    Procedure: MICRODISCECTOMY, SPINE Left L3-4 far lateral Microdiscectomy;  Surgeon: Rickey Martin MD;  Location: [facility] OR;  Service: Neurosurgery;  Laterality: Left;  Procedure: Left L3-4 far lateral Microdiscectomy   Surgery Time: 1.5 hrs  LOS: 0-1  Anesthesia: General  Radiology: C-arm  SNS: EMG, SEP  Bed: [place] [id number] Poster  Position: Melissa Demarco w/ Globus confirmed 4/12/21 [place]    MITRAL CLIP N/A 12/20/2023    Procedure: Mitral clip;  Surgeon: Kevin More MD;  Location: [facility] CATH LAB;  Service: Cardiology;  Laterality: N/A;    OOPHORECTOMY  1970    ROTATOR CUFF REPAIR Left 11/2016    TOTAL REDUCTION MAMMOPLASTY  Bilateral 1990    TRANSESOPHAGEAL ECHOCARDIOGRAPHY N/A 11/1/2023    Procedure: ECHOCARDIOGRAM, TRANSESOPHAGEAL;  Surgeon: Brea Melgar Diagnostic;  Location: [facility] EP LAB;  Service: Cardiology;  Laterality: N/A;    TRANSESOPHAGEAL ECHOCARDIOGRAPHY N/A 12/20/2023    Procedure: ECHOCARDIOGRAM, TRANSESOPHAGEAL;  Surgeon: Cristine Brooks MD;  Location: [facility] CATH LAB;  Service: Cardiology;  Laterality: N/A;    TRANSFORAMINAL EPIDURAL INJECTION OF STEROID Left 12/23/2020    Procedure: Injection,steroid,epidural,transforaminal approach--Left L4 and L5;  Surgeon: Colleen Carvajal MD;  Location: [facility] PAIN MGT;  Service: Pain Management;  Laterality: Left;        Family History   Problem Relation Age of Onset    Heart disease Father     Glaucoma Neg Hx     Macular degeneration Neg Hx         Review of patient's allergies indicates:  No Known Allergies     Current Outpatient Medications   Medication Instructions    acetaminophen (TYLENOL) 650 mg, Oral, Every 8 hours    atorvastatin (LIPITOR) 80 mg, Oral, Daily    bumetanide (BUMEX) 3 mg, Oral, 2 times daily    busPIRone (BUSPAR) 5 MG Tab TAKE ONE TABLET BY MOUTH TWICE DAILY    clopidogreL (PLAVIX) 75 mg, Oral    cyanocobalamin 1,000 mcg/mL injection INJECT 1 ML INTO THE MUSCLE EVERY 14 DAYS    ergocalciferol (ERGOCALCIFEROL) 50,000 Units, Oral, Every 7 days    EScitalopram oxalate (LEXAPRO) 10 mg, Oral, Daily, Taper off as directed    lisinopriL (PRINIVIL,ZESTRIL) 5 mg, Oral, Daily    nitroGLYCERIN (NITROSTAT) 0.4 mg, Sublingual, Every 5 min PRN    potassium chloride SA (K-DUR,KLOR-CON) 20 MEQ tablet 20 mEq, Oral, 2 times daily    pregabalin (LYRICA) 75 mg, Oral, 2 times daily        Vitals:    04/08/24 0831   BP: 139/63   Pulse: (!) 54        Wt Readings from Last 3 Encounters:   04/08/24 60.2 kg (132 lb 11.5 oz)   04/04/24 61.4 kg (135 lb 5.8 oz)   03/28/24 60 kg (132 lb 4.4 oz)     Temp Readings from Last 3 Encounters:   03/28/24 97.9 °F (36.6 °C) (Oral)   03/27/24 98 °F  "(36.7 °C)   12/20/23 98 °F (36.7 °C)     BP Readings from Last 3 Encounters:   04/08/24 139/63   04/04/24 (!) 146/85   03/28/24 136/64     Pulse Readings from Last 3 Encounters:   04/08/24 (!) 54   04/04/24 70   03/28/24 67        Body mass index is 24.27 kg/m². Estimated body surface area is 1.62 meters squared as calculated from the following:    Height as of this encounter: 5' 2" (1.575 m).    Weight as of this encounter: 60.2 kg (132 lb 11.5 oz).     Physical Exam  Constitutional:       Appearance: She is well-developed.   HENT:      Head: Normocephalic and atraumatic.      Right Ear: External ear normal.      Left Ear: External ear normal.   Eyes:      Conjunctiva/sclera: Conjunctivae normal.      Pupils: Pupils are equal, round, and reactive to light.   Neck:      Vascular: JVD present. No hepatojugular reflux.      Comments: JVP 16 cmH20  Cardiovascular:      Rate and Rhythm: Normal rate and regular rhythm.      Pulses: Intact distal pulses.      Heart sounds: S1 normal and S2 normal. Murmur heard.      Holosystolic murmur is present with a grade of 3/6 at the lower left sternal border and apex.      No friction rub. No gallop.   Pulmonary:      Effort: Pulmonary effort is normal.      Breath sounds: Normal breath sounds.   Abdominal:      General: Bowel sounds are normal. There is no distension.      Palpations: Abdomen is soft.      Tenderness: There is no abdominal tenderness. There is no guarding or rebound.   Musculoskeletal:      Cervical back: Normal range of motion and neck supple.      Right lower leg: No edema.      Left lower leg: No edema.   Neurological:      Mental Status: She is alert and oriented to person, place, and time.          Lab Results   Component Value Date    BNP 1,025 (H) 04/04/2024     04/04/2024    K 4.8 04/04/2024    MG 2.2 11/09/2023     (H) 04/04/2024    CO2 17 (L) 04/04/2024    BUN 30 (H) 04/04/2024    CREATININE 1.5 (H) 04/04/2024     04/04/2024    HGBA1C " 5.9 (H) 03/21/2024    AST 17 03/21/2024    ALT 11 03/21/2024    ALBUMIN 3.9 03/21/2024    PROT 7.1 03/21/2024    BILITOT 0.4 03/21/2024    WBC 6.29 04/04/2024    HGB 9.9 (L) 04/04/2024    HCT 31.6 (L) 04/04/2024     04/04/2024    INR 1.1 12/20/2023    TSH 0.885 11/07/2023    CHOL 126 03/21/2024    HDL 37 (L) 03/21/2024    LDLCALC 71.4 03/21/2024    TRIG 88 03/21/2024         Results for orders placed during the hospital encounter of 01/23/24    Echo Saline Bubble? No    Interpretation Summary    Left Ventricle: The left ventricle is normal in size. Ventricular mass is normal. Normal wall thickness. Normal wall motion. There is mildly reduced systolic function with a visually estimated ejection fraction of 45 - 50%. Biplane (2D) method of discs ejection fraction is 46%. Global longitudinal strain is -14.0%.    Right Ventricle: Normal right ventricular cavity size. Systolic function is borderline low.    Left Atrium: Left atrium is severely dilated. There is no thrombus in the left atrial cavity.    Aortic Valve: There is moderate aortic valve sclerosis. Moderately restricted motion. There is mild to moderate stenosis. Aortic valve area by VTI is 1.22 cm². Aortic valve peak velocity is 2.20 m/s. Mean gradient is 11 mmHg. The dimensionless index is 0.42.    ELLIOT 1.4cm^2 by planimetry    Mitral Valve: The mitral valve is repaired by MitraClip. There is moderate mitral annular calcification present. There is moderate regurgitation.    Tricuspid Valve: There is moderate regurgitation.    Pulmonary Artery: The estimated pulmonary artery systolic pressure is 66 mmHg.    IVC/SVC: Intermediate venous pressure at 8 mmHg.    Pericardium: There is a trivial circumferential effusion.    S/p MitraClip placed on lateral edge of A3/P3    Mean diastolic gradient across the mitral valve 7-8mmHg at a heart rate of 66bpm. MVA by PHT 1.4cm^2, MVA 1.7 cm^2 by planimetry        Results for orders placed during the hospital encounter  of 12/20/23    Cardiac catheterization    Conclusion    The estimated blood loss was none.    The MitralClip was successfully placed.    The procedure log was documented by Documenter: Sully Alvarenga and verified by Kevin More MD.    Date: 12/20/2023  Time: 10:21 AM         Assessment and Plan:  There are no diagnoses linked to this encounter.      Valvular heart disease. Severe mitral regurgitation from a restricted posterior mitral valve leaflet. This is in the setting of known CAD, previous PCI. Ischemic heart disease.  She is now s/p mitral clip.  HF with normal EF and severe congestion. Result of her valvular heart disease.   She is currently warm and hypervolemic on exam.  Plan:  -Increase bumex from 3 mg twice a day to 4 mg twice a day.  -Increase potassium from 1 tablet (20 meq) twice a day to 2 tablets in am and 1 tablet in pm (40 meq in am and 20 meq in pm).  -patient was asked to keep daily record of her weight.  -F/u blood test Thursday am.  -patient was asked to call us Thursday afternoon to report weight, symptoms and to discuss results/plan.     F/u in 2 months with labs.

## 2024-04-08 NOTE — PATIENT INSTRUCTIONS
You have extra fluid on you.  Please adhere to a low sodium diet (no more than 1.5 grams of sodium in 24h).  3.   Follow fluid restriction of  2. no more than 1.5 liters in 24 hours..   4. Increase bumex from 3 mg twice a day to 4 mg twice a day.  5. Increase potassium from 1 tablet (20 meq) twice a day to 2 tablets in am and 1 tablet in pm (40 meq in am and 20 meq in pm).  6. F/u blood test Thursday am.  7. Call us Thursday afternoon to report weight, symptoms and to discuss results/plan.  8. F/u in 2 months.  
Handoff

## 2024-04-11 ENCOUNTER — LAB VISIT (OUTPATIENT)
Dept: LAB | Facility: HOSPITAL | Age: 79
End: 2024-04-11
Attending: INTERNAL MEDICINE
Payer: MEDICARE

## 2024-04-11 DIAGNOSIS — I50.33 ACUTE ON CHRONIC DIASTOLIC HEART FAILURE: ICD-10-CM

## 2024-04-11 LAB
ANION GAP SERPL CALC-SCNC: 13 MMOL/L (ref 8–16)
BNP SERPL-MCNC: 201 PG/ML (ref 0–99)
BUN SERPL-MCNC: 52 MG/DL (ref 8–23)
CALCIUM SERPL-MCNC: 10.5 MG/DL (ref 8.7–10.5)
CHLORIDE SERPL-SCNC: 108 MMOL/L (ref 95–110)
CO2 SERPL-SCNC: 21 MMOL/L (ref 23–29)
CREAT SERPL-MCNC: 2 MG/DL (ref 0.5–1.4)
EST. GFR  (NO RACE VARIABLE): 25.1 ML/MIN/1.73 M^2
GLUCOSE SERPL-MCNC: 121 MG/DL (ref 70–110)
POTASSIUM SERPL-SCNC: 4.9 MMOL/L (ref 3.5–5.1)
SODIUM SERPL-SCNC: 142 MMOL/L (ref 136–145)

## 2024-04-11 PROCEDURE — 36415 COLL VENOUS BLD VENIPUNCTURE: CPT | Mod: PO | Performed by: INTERNAL MEDICINE

## 2024-04-11 PROCEDURE — 80048 BASIC METABOLIC PNL TOTAL CA: CPT | Performed by: INTERNAL MEDICINE

## 2024-04-11 PROCEDURE — 83880 ASSAY OF NATRIURETIC PEPTIDE: CPT | Performed by: INTERNAL MEDICINE

## 2024-04-12 ENCOUNTER — TELEPHONE (OUTPATIENT)
Dept: TRANSPLANT | Facility: CLINIC | Age: 79
End: 2024-04-12
Payer: MEDICARE

## 2024-04-12 NOTE — TELEPHONE ENCOUNTER
4/12/24     Received and reviewed repeat BMP, labs drawn on 4/11/24 and resulted after the close of business day.  Repeat labs follow up; increased Bumex and potassium, see clinic note on 4/8//24.     NA+:                142                 K+:                  4.9                   CL:                  108                 CO2:                21 (Patient trend 17-23)                 BUN:               52 (Patient trend 30-64)              CR:                  2.0      (Patient trend 1.5-1.7)    BNP  201 (Down from 1025 on 4/4/24)     Attempted to reach patient via phone, no answer.  Left a detailed message outlining the reason for my call, contact information for the clinic and a request for a return phone call to complete phone assessment.    4/15/24  Attempted to reach patient by phone, no answer.   Left a detailed message outlining the reason for my call, contact information for the clinic and a request for a return phone call to complete phone assessment.

## 2024-04-15 DIAGNOSIS — I50.33 ACUTE ON CHRONIC DIASTOLIC HEART FAILURE: Primary | ICD-10-CM

## 2024-04-16 ENCOUNTER — LAB VISIT (OUTPATIENT)
Dept: LAB | Facility: HOSPITAL | Age: 79
End: 2024-04-16
Attending: INTERNAL MEDICINE
Payer: MEDICARE

## 2024-04-16 DIAGNOSIS — I50.33 ACUTE ON CHRONIC DIASTOLIC HEART FAILURE: ICD-10-CM

## 2024-04-16 LAB
ANION GAP SERPL CALC-SCNC: 13 MMOL/L (ref 8–16)
BNP SERPL-MCNC: 157 PG/ML (ref 0–99)
BUN SERPL-MCNC: 59 MG/DL (ref 8–23)
CALCIUM SERPL-MCNC: 10.4 MG/DL (ref 8.7–10.5)
CHLORIDE SERPL-SCNC: 104 MMOL/L (ref 95–110)
CO2 SERPL-SCNC: 22 MMOL/L (ref 23–29)
CREAT SERPL-MCNC: 1.8 MG/DL (ref 0.5–1.4)
EST. GFR  (NO RACE VARIABLE): 28.5 ML/MIN/1.73 M^2
GLUCOSE SERPL-MCNC: 132 MG/DL (ref 70–110)
POTASSIUM SERPL-SCNC: 4.7 MMOL/L (ref 3.5–5.1)
SODIUM SERPL-SCNC: 139 MMOL/L (ref 136–145)

## 2024-04-16 PROCEDURE — 80048 BASIC METABOLIC PNL TOTAL CA: CPT | Performed by: INTERNAL MEDICINE

## 2024-04-16 PROCEDURE — 83880 ASSAY OF NATRIURETIC PEPTIDE: CPT | Performed by: INTERNAL MEDICINE

## 2024-04-16 PROCEDURE — 36415 COLL VENOUS BLD VENIPUNCTURE: CPT | Mod: PO | Performed by: INTERNAL MEDICINE

## 2024-04-18 ENCOUNTER — TELEPHONE (OUTPATIENT)
Dept: TRANSPLANT | Facility: CLINIC | Age: 79
End: 2024-04-18
Payer: MEDICARE

## 2024-04-18 NOTE — TELEPHONE ENCOUNTER
4/18/24 - See below message thread.    Written orders received below in bold from DAPHNIE Russo M.D.    Called/spoke with patient's daughter in law and instructed her of need for repeat labs next Tuesday.  She verbalized understanding.  Lab appt scheduled at [facility] and remind me entered.        ----- Message from Kevin Russo MD sent at 4/18/2024  5:13 PM CDT -----  Thank you Ina  Lets have her to follow up labs next week.    ----- Message -----  From: Ian Anne RN  Sent: 4/18/2024   1:33 PM CDT  To: MD Kevin Pérez,    This is the report from her daughter in law, that the patient wants all questions to go through.  The daughter in law reports patient is not home right now, she is out with her sister.  As for a she knows she has some swelling in her stomach, which you said she had, at the time of her clinic visit, reports patient has said her weight is staying between 130-134, however, she has not seen that written down and denies patient has increase in SOB more than normal.      I confirmed with daughter in law the patient is taking Bumex 4 mg BID and potassium 40 meq AM and 20 meq PM.    ThanksIna  ----- Message -----  From: Kevin Haro MD  Sent: 4/18/2024  12:59 PM CDT  To: Ina Anne RN; [facility] Heart Failure Clinical    Hi  I need a clinical follow up on this patient, please.  Thank you  ----- Message -----  From: Ina Anne RN  Sent: 4/16/2024   5:04 PM CDT  To: Kevin Russo MD; #    Kevin,    The patient's labs have resulted, that you requested for today.  Please review and if you want anything done, please reply back to all, as I will be out of the office tomorrow.    Ina Hyatt

## 2024-04-21 DIAGNOSIS — F41.1 GAD (GENERALIZED ANXIETY DISORDER): Primary | ICD-10-CM

## 2024-04-21 NOTE — TELEPHONE ENCOUNTER
No care due was identified.  [facility] Embedded Care Due Messages. Reference number: [id number].   4/21/2024 2:54:22 PM CDT

## 2024-04-22 RX ORDER — BUSPIRONE HYDROCHLORIDE 5 MG/1
TABLET ORAL
Qty: 60 TABLET | Refills: 3 | Status: ON HOLD | OUTPATIENT
Start: 2024-04-22 | End: 2024-05-29 | Stop reason: HOSPADM

## 2024-04-23 ENCOUNTER — LAB VISIT (OUTPATIENT)
Dept: LAB | Facility: HOSPITAL | Age: 79
End: 2024-04-23
Attending: INTERNAL MEDICINE
Payer: MEDICARE

## 2024-04-23 ENCOUNTER — TELEPHONE (OUTPATIENT)
Dept: TRANSPLANT | Facility: CLINIC | Age: 79
End: 2024-04-23
Payer: MEDICARE

## 2024-04-23 DIAGNOSIS — I25.10 CORONARY ARTERY DISEASE INVOLVING NATIVE CORONARY ARTERY OF NATIVE HEART WITHOUT ANGINA PECTORIS: ICD-10-CM

## 2024-04-23 LAB
ANION GAP SERPL CALC-SCNC: 13 MMOL/L (ref 8–16)
BUN SERPL-MCNC: 50 MG/DL (ref 8–23)
CALCIUM SERPL-MCNC: 10.2 MG/DL (ref 8.7–10.5)
CHLORIDE SERPL-SCNC: 106 MMOL/L (ref 95–110)
CO2 SERPL-SCNC: 22 MMOL/L (ref 23–29)
CREAT SERPL-MCNC: 2 MG/DL (ref 0.5–1.4)
EST. GFR  (NO RACE VARIABLE): 25.1 ML/MIN/1.73 M^2
GLUCOSE SERPL-MCNC: 132 MG/DL (ref 70–110)
POTASSIUM SERPL-SCNC: 5 MMOL/L (ref 3.5–5.1)
SODIUM SERPL-SCNC: 141 MMOL/L (ref 136–145)

## 2024-04-23 PROCEDURE — 80048 BASIC METABOLIC PNL TOTAL CA: CPT | Performed by: INTERNAL MEDICINE

## 2024-04-23 PROCEDURE — 36415 COLL VENOUS BLD VENIPUNCTURE: CPT | Mod: PO | Performed by: INTERNAL MEDICINE

## 2024-04-23 NOTE — TELEPHONE ENCOUNTER
3:25 pm: F/U on todays nurse lab phone reminder  Results in epic: cr up slightly 2.0  gorm 4/16: 1.8 , 4/11 2.0 , 4/4: 1.5 and 3/21: 1.7  K 5.0  from 4.7-5.0  See recent NN by Tip RN w/ her assessment and Dr. Villalobos asking for repeat labs:    Despite full nurse assessment just done by Tip 4/18  I placed call to dtr in law for another assessment at this time:  Spoke w/ her, Stephanie  Informed her of rising kidney function     She informed me the recent , and she thinks the more accurate wts are 125-126 pd range  She is aware/recalls the report from last week: 130-134, but she nicely said she does not think that's right  Asked if she would be able to assist sometimes w/ the daily wts and she very nicely said she does, has and pt gets aggravated    She does not know about swelling to legs/ankles or feet in that pt wears pants  She thinks sometimes her abdomen may have a little fluid  She does not appreciate any sob or heavy breathing w/ activity  No cough that she hears at [facility]    She again confirmed she is taking (2) bumex 2 mg tablets=4 mg twice daily   And (2) potassiums every am and (1) every pm    Told her I would inform Dr. Villalobos of this information and let her know if he wants to make any changes    I explained ideally with her kidney function going up-as is her potassium level, ideally Dr. Villalobos may consider less fluid pills , and that's why we ask so many questions pertaining to her fluid and why an accurate every am wt is so important    She seems to fully understand, but sees pt is not always cooperative    Pt lives with Miss Brown, her dtr in law ans son    Routine to Dr. Villalobos for his awareness and response at this time.

## 2024-04-24 DIAGNOSIS — I50.33 ACUTE ON CHRONIC DIASTOLIC HEART FAILURE: Primary | ICD-10-CM

## 2024-04-24 RX ORDER — POTASSIUM CHLORIDE 20 MEQ/1
20 TABLET, EXTENDED RELEASE ORAL DAILY
Qty: 30 TABLET | Refills: 5 | Status: ON HOLD | OUTPATIENT
Start: 2024-04-24 | End: 2024-05-29

## 2024-04-24 RX ORDER — BUMETANIDE 2 MG/1
3 TABLET ORAL 2 TIMES DAILY
Qty: 90 TABLET | Refills: 3 | Status: ON HOLD | OUTPATIENT
Start: 2024-04-24 | End: 2024-05-29

## 2024-04-24 NOTE — TELEPHONE ENCOUNTER
0940 am: Just received the following cosign note from my NN yesterday  Kevin Haro MD Labella-Walker, Katherine Y RN  I recommend to decrease bumex back to 3 mg BID.  Repeat labs Monday am.  Thx x the assessment.     0945 am:  Called and spoke Amandeep nava  She had some difficulty understanding the directions   We went over it several times and I asked her to write it down   She then understood Ms. Dakin will take bumex 2 mg strength tablets : a full tablet plus a half tablet every am and every pm  She initially kept saying she will take 2 tablets in the am and pm, But later understood it will be 1 1/2 tablets of the 2 mg strength every am and same pm to lower the dose from 4 mg twice daily   To   3 mg twice daily     Sent message to Dr. Villalobos to ask if he wanted to lower k dosing for 40 am and 20 pm since lowering Bumex and K 5.0 yesterday   Awaiting his response.   He replied to change K from 40 am and 20 pm to 20 meq once daily    Informed dtr in Stephanie kimble of the potassium med change  She easily understood this  Pt has already taken 40 meq this am -instructed her she will start one potassium 20 meq tablet once daily tomorrow    She also offered pt has taken 2 of the Bumex this am, So she will start the new dose this evening    In agreement to non fasting labs Monday 4/29-prefers same time and location as labs yesterday  I will send a message to Idania CHASE MA asking she schedule this.

## 2024-04-29 ENCOUNTER — LAB VISIT (OUTPATIENT)
Dept: LAB | Facility: HOSPITAL | Age: 79
End: 2024-04-29
Attending: INTERNAL MEDICINE
Payer: MEDICARE

## 2024-04-29 DIAGNOSIS — I25.10 CORONARY ARTERY DISEASE INVOLVING NATIVE CORONARY ARTERY OF NATIVE HEART WITHOUT ANGINA PECTORIS: ICD-10-CM

## 2024-04-29 LAB
ANION GAP SERPL CALC-SCNC: 12 MMOL/L (ref 8–16)
BUN SERPL-MCNC: 58 MG/DL (ref 8–23)
CALCIUM SERPL-MCNC: 9.6 MG/DL (ref 8.7–10.5)
CHLORIDE SERPL-SCNC: 106 MMOL/L (ref 95–110)
CO2 SERPL-SCNC: 22 MMOL/L (ref 23–29)
CREAT SERPL-MCNC: 1.9 MG/DL (ref 0.5–1.4)
EST. GFR  (NO RACE VARIABLE): 26.7 ML/MIN/1.73 M^2
GLUCOSE SERPL-MCNC: 118 MG/DL (ref 70–110)
POTASSIUM SERPL-SCNC: 4.3 MMOL/L (ref 3.5–5.1)
SODIUM SERPL-SCNC: 140 MMOL/L (ref 136–145)

## 2024-04-29 PROCEDURE — 80048 BASIC METABOLIC PNL TOTAL CA: CPT | Performed by: INTERNAL MEDICINE

## 2024-04-29 PROCEDURE — 36415 COLL VENOUS BLD VENIPUNCTURE: CPT | Mod: PO | Performed by: INTERNAL MEDICINE

## 2024-04-30 ENCOUNTER — TELEPHONE (OUTPATIENT)
Dept: TRANSPLANT | Facility: CLINIC | Age: 79
End: 2024-04-30
Payer: MEDICARE

## 2024-04-30 NOTE — TELEPHONE ENCOUNTER
4/30/24 - Received lab results from yesterday NA/K+ 140/4.3, BUN/Cr - 58/1.9.  Received below written orders from DAPHNIE Russo, in bold.  See entire message thread.    Called/spoke with Stephanie and instructed her to have patient continue taking Bumex 3 mg BID and take extra 3 mg, only if needed for weight gain greater than 4 lb's, and instructed of need for repeat labs next week.  Stephanie repeated all instructions back correctly and verbalized understanding.  Lab appt scheduled at [facility] and remind me entered.    ----- Message from Kevin Russo MD sent at 4/30/2024 11:17 AM CDT -----  No changes.  Take extra dose of bunex only if needed for weight gain >4 pounds to defend current weight.  Follow up blood test next week.  Thx  ----- Message -----  From: Ina Anne RN  Sent: 4/30/2024  11:11 AM CDT  To: MD Kevin Pérez,    I received patient's lab results NA/K+ 140/4.3, BUN/Cr - 58/1.9.  I called/spoke with patient's daughter in law, Stephanie who reports patient did decrease her Bumex to 3 mg BID.  She reports patient's weight has been between 125 - 127 lb's and patient reports to Stephanie, she is feeling pretty good.  What would you like to do?    Ina Hyatt

## 2024-05-07 ENCOUNTER — LAB VISIT (OUTPATIENT)
Dept: LAB | Facility: HOSPITAL | Age: 79
End: 2024-05-07
Attending: INTERNAL MEDICINE
Payer: MEDICARE

## 2024-05-07 ENCOUNTER — OFFICE VISIT (OUTPATIENT)
Dept: PODIATRY | Facility: CLINIC | Age: 79
End: 2024-05-07
Payer: MEDICARE

## 2024-05-07 VITALS — WEIGHT: 132.69 LBS | HEIGHT: 62 IN | BODY MASS INDEX: 24.42 KG/M2

## 2024-05-07 DIAGNOSIS — L90.9 FAT PAD ATROPHY OF FOOT: ICD-10-CM

## 2024-05-07 DIAGNOSIS — E11.22 TYPE 2 DIABETES MELLITUS WITH STAGE 3B CHRONIC KIDNEY DISEASE, WITHOUT LONG-TERM CURRENT USE OF INSULIN: Primary | ICD-10-CM

## 2024-05-07 DIAGNOSIS — I25.10 CORONARY ARTERY DISEASE INVOLVING NATIVE CORONARY ARTERY OF NATIVE HEART WITHOUT ANGINA PECTORIS: ICD-10-CM

## 2024-05-07 DIAGNOSIS — N18.32 TYPE 2 DIABETES MELLITUS WITH STAGE 3B CHRONIC KIDNEY DISEASE, WITHOUT LONG-TERM CURRENT USE OF INSULIN: Primary | ICD-10-CM

## 2024-05-07 DIAGNOSIS — M20.40 HAMMER TOE, UNSPECIFIED LATERALITY: ICD-10-CM

## 2024-05-07 DIAGNOSIS — L84 CORN OR CALLUS: ICD-10-CM

## 2024-05-07 LAB
ANION GAP SERPL CALC-SCNC: 10 MMOL/L (ref 8–16)
BUN SERPL-MCNC: 58 MG/DL (ref 8–23)
CALCIUM SERPL-MCNC: 9.8 MG/DL (ref 8.7–10.5)
CHLORIDE SERPL-SCNC: 104 MMOL/L (ref 95–110)
CO2 SERPL-SCNC: 25 MMOL/L (ref 23–29)
CREAT SERPL-MCNC: 1.9 MG/DL (ref 0.5–1.4)
EST. GFR  (NO RACE VARIABLE): 26.7 ML/MIN/1.73 M^2
GLUCOSE SERPL-MCNC: 123 MG/DL (ref 70–110)
POTASSIUM SERPL-SCNC: 4.4 MMOL/L (ref 3.5–5.1)
SODIUM SERPL-SCNC: 139 MMOL/L (ref 136–145)

## 2024-05-07 PROCEDURE — 36415 COLL VENOUS BLD VENIPUNCTURE: CPT | Mod: PO | Performed by: INTERNAL MEDICINE

## 2024-05-07 PROCEDURE — 99999 PR PBB SHADOW E&M-EST. PATIENT-LVL III: CPT | Mod: PBBFAC,,, | Performed by: STUDENT IN AN ORGANIZED HEALTH CARE EDUCATION/TRAINING PROGRAM

## 2024-05-07 PROCEDURE — 80048 BASIC METABOLIC PNL TOTAL CA: CPT | Performed by: INTERNAL MEDICINE

## 2024-05-07 NOTE — PROGRESS NOTES
Subjective:      Patient ID: Trudy Rauch Dakin is a 78 y.o. female.    Chief Complaint: Callouses    Ms. Dakin presents today with complaints of hammertoes and changes to her nails.     5/7/24:  Wants L 3rd toe nail avulsion now and has painful submet 2-3 callus R foot.     Review of Systems   Skin:  Positive for nail changes.        Callus R foot   Musculoskeletal:         Hammertoes   All other systems reviewed and are negative.          Objective:      Physical Exam  Cardiovascular:      Pulses:           Dorsalis pedis pulses are 1+ on the right side and 1+ on the left side.        Posterior tibial pulses are 1+ on the right side and 1+ on the left side.   Feet:      Right foot:      Skin integrity: Callus present.      Toenail Condition: Right toenails are abnormally thick.      Left foot:      Skin integrity: Callus present.      Toenail Condition: Left toenails are abnormally thick.      Comments: Thickened nails with some slight discoloration. No ingrowing. Extreme dystrophy of the L 3rd with TTP.    Hammertoes b/l Without pain. They're semi-reducible except b/l 2nd. Submet 2 callus R foot.              Assessment:       Encounter Diagnoses   Name Primary?    Type 2 diabetes mellitus with stage 3b chronic kidney disease, without long-term current use of insulin Yes    Hammer toe, unspecified laterality     Fat pad atrophy of foot     Corn or callus          Plan:       [place] was seen today for [facility].    Diagnoses and all orders for this visit:    Type 2 diabetes mellitus with stage 3b chronic kidney disease, without long-term current use of insulin  -     DIABETIC SHOES FOR HOME USE    Hammer toe, unspecified laterality  -     DIABETIC SHOES FOR HOME USE    Fat pad atrophy of foot  -     DIABETIC SHOES FOR HOME USE    Corn or callus  -     DIABETIC SHOES FOR HOME USE      I counseled the patient on her conditions, their implications and medical management.    Nail removed today.     Rx for DM  shoes.    F/u 2 weeks for post op.    Derrick Sánchez DPM    Nail Removal    Date/Time: 5/7/2024 8:20 AM    Performed by: Derrick Sánchez DPM  Authorized by: Derrick Sánchez DPM    Consent Done?:  Yes (Written)  Time out: Immediately prior to the procedure a time out was called    Prep: patient was prepped and draped in usual sterile fashion    Location:     Location:  Left foot    Location detail:  Left third toe  Anesthesia:     Anesthesia:  Digital block    Local anesthetic:  Lidocaine 1% without epinephrine and bupivacaine 0.5% without epinephrine    Anesthetic total (ml):  5  Procedure Details:     Preparation:  Skin prepped with ChloraPrep    Amount removed:  Complete    Wedge excision of skin of nail fold: No      Nail bed sutured?: No      Nail matrix removed:  Complete    Removal method:  Phenol and alcohol    Removed nail replaced and anchored: No      Dressing applied:  4x4 and antibiotic ointment    Patient tolerance:  Patient tolerated the procedure well with no immediate complications

## 2024-05-08 ENCOUNTER — TELEPHONE (OUTPATIENT)
Dept: TRANSPLANT | Facility: CLINIC | Age: 79
End: 2024-05-08
Payer: MEDICARE

## 2024-05-08 NOTE — TELEPHONE ENCOUNTER
5/8/24 - Received below bolded written orders from DAPHNIE Russo M.D. See message thread for details.    Called/spoke with patient's daughter in law Stephanie and instructed her of need for repeat labs next Tuesday.  Stephanie verbalized understanding and stated she will let the patient know.  Lab appt scheduled at [place] and remind me entered.    ----- Message from Kevin Russo MD sent at 5/8/2024  9:20 AM CDT -----  Rg Buckley  I recommend to check labs again next week. Could you please include BNP with the BMP. It has been almost a month since last check on BNP. We defenitely brought it down from over 1000. It would be nice to have the full picture.  No changes on meds today.  Next week, if things are stable again we can decide how to space testing further.  Thx  ----- Message -----  From: Ina Anne RN  Sent: 5/7/2024   6:02 PM CDT  To: MD Kevin Pérez,    I received patient's lab results, Na/K+ 139/4.4, BUN/Cr - 58/1.9.  Her daughter in law Stephanie reports patient's weight's have been stable at 122-126 lb's.  I confirmed she is still taking her Bumex 3 mg BID, but has not had to take any extra.  Right now she is staying at her Grandson's house and is doing well.    What would you like to do?    Ina Hyatt

## 2024-05-10 ENCOUNTER — OFFICE VISIT (OUTPATIENT)
Dept: URGENT CARE | Facility: CLINIC | Age: 79
End: 2024-05-10
Payer: MEDICARE

## 2024-05-10 VITALS
OXYGEN SATURATION: 95 % | BODY MASS INDEX: 23.19 KG/M2 | HEART RATE: 75 BPM | RESPIRATION RATE: 18 BRPM | HEIGHT: 62 IN | DIASTOLIC BLOOD PRESSURE: 68 MMHG | WEIGHT: 126 LBS | SYSTOLIC BLOOD PRESSURE: 115 MMHG | TEMPERATURE: 98 F

## 2024-05-10 DIAGNOSIS — T81.49XA WOUND INFECTION FOLLOWING PROCEDURE: Primary | ICD-10-CM

## 2024-05-10 DIAGNOSIS — M79.675 PAIN OF TOE OF LEFT FOOT: ICD-10-CM

## 2024-05-10 PROCEDURE — 99214 OFFICE O/P EST MOD 30 MIN: CPT | Mod: S$GLB,,,

## 2024-05-10 RX ORDER — DOXYCYCLINE HYCLATE 100 MG
100 TABLET ORAL 2 TIMES DAILY
Qty: 20 TABLET | Refills: 0 | Status: SHIPPED | OUTPATIENT
Start: 2024-05-10 | End: 2024-05-20

## 2024-05-10 RX ORDER — MUPIROCIN 20 MG/G
OINTMENT TOPICAL 3 TIMES DAILY
Qty: 30 G | Refills: 0 | Status: ON HOLD | OUTPATIENT
Start: 2024-05-10 | End: 2024-05-29 | Stop reason: HOSPADM

## 2024-05-10 NOTE — PATIENT INSTRUCTIONS
Antibiotics as prescribed  Doxycycline twice a day for 10 days.  Always take with food and a full glass of water and do not lay down for 1 hour after taking each dose.  Be sure to protect her skin against the sun as doxycycline is well known to cause excessive sun skin sensitivity that could result in severe sunburn, rash, blistering    Please call Podiatry 1st thing Monday morning for re-evaluation appointment    Strict ER precautions as discussed if symptoms worsen at all go directly to the ER for further evaluation.    Warm water soaks twice a day otherwise keep foot clean and dry

## 2024-05-10 NOTE — PROGRESS NOTES
"Subjective:      Patient ID: Trudy Rauch Dakin is a 78 y.o. female.    Vitals:  height is 5' 2" (1.575 m) and weight is 57.2 kg (126 lb). Her oral temperature is 97.7 °F (36.5 °C). Her blood pressure is 115/68 and her pulse is 75. Her respiration is 18 and oxygen saturation is 95%.     Chief Complaint: Nail Problem    Middle toe left foot.  Patient is status post toenail removal from Dr. Sánchez on Tuesday.  She reports she took dressing off today as instructed and realize her toe was red and swollen and was concerned about the nail bed potentially being infected.  Patient does have a history of diabetes.  Patient is also concerned about swelling up to her knee in the leg.    Nail Problem  This is a new problem. The current episode started yesterday. The problem occurs intermittently. The problem has been unchanged. Pertinent negatives include no chills, fatigue or fever. Associated symptoms comments: Leg swelling   . Exacerbated by: walking. She has tried acetaminophen for the symptoms. The treatment provided no relief.       Constitution: Negative for chills, fatigue and fever.   HENT: Negative.     Neck: neck negative.   Cardiovascular: Negative.    Respiratory: Negative.     Gastrointestinal: Negative.    Musculoskeletal: Negative.  Positive for pain (left toe).   Skin:  Positive for wound (from toe nail removal) and erythema.   Neurological: Negative.    Psychiatric/Behavioral: Negative.        Objective:     Physical Exam   Constitutional: She is oriented to person, place, and time. She appears well-developed.   HENT:   Head: Normocephalic and atraumatic. Head is without abrasion, without contusion and without laceration.   Ears:   Right Ear: External ear normal.   Left Ear: External ear normal.   Nose: Nose normal.   Mouth/Throat: Oropharynx is clear and moist and mucous membranes are normal.   Eyes: Conjunctivae, EOM and lids are normal. Pupils are equal, round, and reactive to light.   Neck: Trachea normal and " phonation normal. Neck supple.   Cardiovascular: Normal rate.   Pulmonary/Chest: Effort normal. No respiratory distress.   Musculoskeletal: Normal range of motion.         General: Normal range of motion.      Left foot: Left 3rd toe: Exhibits swelling and tenderness. Injuries: nailbed injury (post nail removal).   Neurological: She is alert and oriented to person, place, and time. She displays no weakness.   Skin: Skin is warm, dry, intact and no rash. Capillary refill takes less than 2 seconds. erythema No abrasion, No burn, No bruising and No ecchymosis   Psychiatric: Her speech is normal and behavior is normal. Mood, judgment and thought content normal.   Nursing note and vitals reviewed.      Assessment:     1. Wound infection following procedure    2. Pain of toe of left foot        Plan:       Wound infection following procedure  -     doxycycline (VIBRA-TABS) 100 MG tablet; Take 1 tablet (100 mg total) by mouth 2 (two) times daily. for 10 days  Dispense: 20 tablet; Refill: 0  -     mupirocin (BACTROBAN) 2 % ointment; Apply topically 3 (three) times daily.  Dispense: 30 g; Refill: 0    Pain of toe of left foot      Re-dressed with mupirocin and bandage.  Continue postop shoe as prescribed by Podiatry    Discussed medication with patient and grandson who acknowledges understanding and is agreeable to POC. Follow up with podiatry. Increase fluid intake. Red flags for ER discussed.

## 2024-05-14 ENCOUNTER — TELEPHONE (OUTPATIENT)
Dept: TRANSPLANT | Facility: CLINIC | Age: 79
End: 2024-05-14
Payer: MEDICARE

## 2024-05-14 NOTE — TELEPHONE ENCOUNTER
"1140 am;  Following up on todays nurse lab phone reminder and noted this appt was cancelled by family on 5/10 and not rescheduled    Below is orders from DANYELL by Tip RN 5/8/24 w/ indication for this lab:    Rg Buckley  I recommend to check labs again next week. Could you please include BNP with the BMP. It has been almost a month since last check on BNP. We defenitely brought it down from over 1000. It would be nice to have the full picture.  No changes on meds today.  Next week, if things are stable again we can decide how to space testing further.  Charlene    Placed call at this time to see about rescheduling this lab appt  MICHAELLE FISH @ [phone] w/ details to call me back and reason for call  left my office     Then called number in demographics as dtrs: [phone] -MICHAELLE FISH for "Stephanie" explaining whom I am , doctor office w/ , day, date, time , and office call back number and calling regarding lab appt that was scheduled for today, but seems it was cancelled by family   Would like to reschedule and important to do so  Asked for a dianna back today       "

## 2024-05-15 ENCOUNTER — OFFICE VISIT (OUTPATIENT)
Dept: PODIATRY | Facility: CLINIC | Age: 79
End: 2024-05-15
Payer: MEDICARE

## 2024-05-15 ENCOUNTER — TELEPHONE (OUTPATIENT)
Dept: PODIATRY | Facility: CLINIC | Age: 79
End: 2024-05-15
Payer: MEDICARE

## 2024-05-15 VITALS — WEIGHT: 126.13 LBS | HEIGHT: 62 IN | BODY MASS INDEX: 23.21 KG/M2

## 2024-05-15 DIAGNOSIS — T81.41XA INFECTION OF SUPERFICIAL INCISIONAL SURGICAL SITE AFTER PROCEDURE, INITIAL ENCOUNTER: Primary | ICD-10-CM

## 2024-05-15 LAB
GRAM STN SPEC: NORMAL
GRAM STN SPEC: NORMAL

## 2024-05-15 PROCEDURE — 1160F RVW MEDS BY RX/DR IN RCRD: CPT | Mod: HCNC,CPTII,S$GLB, | Performed by: STUDENT IN AN ORGANIZED HEALTH CARE EDUCATION/TRAINING PROGRAM

## 2024-05-15 PROCEDURE — 87070 CULTURE OTHR SPECIMN AEROBIC: CPT | Mod: HCNC | Performed by: STUDENT IN AN ORGANIZED HEALTH CARE EDUCATION/TRAINING PROGRAM

## 2024-05-15 PROCEDURE — 3288F FALL RISK ASSESSMENT DOCD: CPT | Mod: HCNC,CPTII,S$GLB, | Performed by: STUDENT IN AN ORGANIZED HEALTH CARE EDUCATION/TRAINING PROGRAM

## 2024-05-15 PROCEDURE — 99999 PR PBB SHADOW E&M-EST. PATIENT-LVL III: CPT | Mod: PBBFAC,HCNC,, | Performed by: STUDENT IN AN ORGANIZED HEALTH CARE EDUCATION/TRAINING PROGRAM

## 2024-05-15 PROCEDURE — 1157F ADVNC CARE PLAN IN RCRD: CPT | Mod: HCNC,CPTII,S$GLB, | Performed by: STUDENT IN AN ORGANIZED HEALTH CARE EDUCATION/TRAINING PROGRAM

## 2024-05-15 PROCEDURE — 87205 SMEAR GRAM STAIN: CPT | Mod: HCNC | Performed by: STUDENT IN AN ORGANIZED HEALTH CARE EDUCATION/TRAINING PROGRAM

## 2024-05-15 PROCEDURE — 1159F MED LIST DOCD IN RCRD: CPT | Mod: HCNC,CPTII,S$GLB, | Performed by: STUDENT IN AN ORGANIZED HEALTH CARE EDUCATION/TRAINING PROGRAM

## 2024-05-15 PROCEDURE — 87075 CULTR BACTERIA EXCEPT BLOOD: CPT | Mod: HCNC | Performed by: STUDENT IN AN ORGANIZED HEALTH CARE EDUCATION/TRAINING PROGRAM

## 2024-05-15 PROCEDURE — 1125F AMNT PAIN NOTED PAIN PRSNT: CPT | Mod: HCNC,CPTII,S$GLB, | Performed by: STUDENT IN AN ORGANIZED HEALTH CARE EDUCATION/TRAINING PROGRAM

## 2024-05-15 PROCEDURE — 1101F PT FALLS ASSESS-DOCD LE1/YR: CPT | Mod: HCNC,CPTII,S$GLB, | Performed by: STUDENT IN AN ORGANIZED HEALTH CARE EDUCATION/TRAINING PROGRAM

## 2024-05-15 PROCEDURE — 99024 POSTOP FOLLOW-UP VISIT: CPT | Mod: HCNC,S$GLB,, | Performed by: STUDENT IN AN ORGANIZED HEALTH CARE EDUCATION/TRAINING PROGRAM

## 2024-05-15 NOTE — TELEPHONE ENCOUNTER
----- Message from Paris Bailey sent at 5/15/2024 10:22 AM CDT -----  Contact: self  Type: Needs Medical Advice  Who Called:  leena  Symptoms (please be specific):  infected toe nail that has been taken off is now infected pt states that her foot and leg is swollen up the leg and it's black.pt states she went to urgent care last Tuesday they sent a picture to his office and now it is worse.pt would like to know if she should go to the hospital.  How long has patient had these symptoms:  last night hurting now black and blood ozzing out  Pharmacy name and phone #:    [facility] #[id number] [address] & [address] [address]  Phone: [phone] Fax: [phone]     Best Call Back Number: [phone] kain is there now with pt  Additional Information: please call

## 2024-05-15 NOTE — PROGRESS NOTES
Subjective:      Patient ID: Trudy Rauch Dakin is a 78 y.o. female.    Chief Complaint: Post-op Evaluation    Ms. Dakin presents today with complaints of hammertoes and changes to her nails.     5/7/24:  Wants L 3rd toe nail avulsion now and has painful submet 2-3 callus R foot.     5/15/24:  Ms. Dakin presents today w/ post op problems of the L 3rd toe. She notes redness, swelling and pain prompting a visit to the  about 5 days ago. She was given doxy and mupirocin but notes worsening of the area today. I received a message saying that the toe was blackened with swelling to the leg.     Review of Systems   Skin:  Positive for nail changes.        Callus R foot   Musculoskeletal:         Hammertoes   All other systems reviewed and are negative.          Objective:      Physical Exam  Cardiovascular:      Pulses:           Dorsalis pedis pulses are 1+ on the right side and 1+ on the left side.        Posterior tibial pulses are 1+ on the right side and 1+ on the left side.   Feet:      Right foot:      Skin integrity: Callus present.      Toenail Condition: Right toenails are abnormally thick.      Left foot:      Skin integrity: Callus present.      Toenail Condition: Left toenails are abnormally thick.      Comments: Thickened nails with some slight discoloration. No ingrowing. Extreme dystrophy of the L 3rd with TTP.    Hammertoes b/l Without pain. They're semi-reducible except b/l 2nd. Submet 2 callus R foot.    5/15/24:  Minor erythema. No edema. No edema or streaking proximally. Good interval healing.           Assessment:       Encounter Diagnosis   Name Primary?    Infection of superficial incisional surgical site after procedure, initial encounter Yes         Plan:       [place] was seen today for post-op evaluation.    Diagnoses and all orders for this visit:    Infection of superficial incisional surgical site after procedure, initial encounter  -     Aerobic culture  -     Culture, Anaerobic  -     Gram  stain      I counseled the patient on her conditions, their implications and medical management.    Cultures taken. Will add on or change antibiotics as needed.    F/u 1 week. Cont local wound care. Crest pad to offload the toe.     Derrick Sánchez DPM

## 2024-05-15 NOTE — TELEPHONE ENCOUNTER
----- Message from Paris Bailey sent at 5/15/2024 10:22 AM CDT -----  Contact: self  Type: Needs Medical Advice  Who Called:  leena  Symptoms (please be specific):  infected toe nail that has been taken off is now infected pt states that her foot and leg is swollen up the leg and it's black.pt states she went to urgent care last Tuesday they sent a picture to his office and now it is worse.pt would like to know if she should go to the hospital.  How long has patient had these symptoms:  last night hurting now black and blood ozzing out  Pharmacy name and phone #:    [facility] #[id number] [address] & [address] [address]  Phone: [phone] Fax: [phone]     Best Call Back Number: [phone] kain is there now with pt  Additional Information: please call

## 2024-05-17 ENCOUNTER — TELEPHONE (OUTPATIENT)
Dept: NEPHROLOGY | Facility: CLINIC | Age: 79
End: 2024-05-17
Payer: MEDICARE

## 2024-05-18 LAB — BACTERIA SPEC AEROBE CULT: NO GROWTH

## 2024-05-20 ENCOUNTER — LAB VISIT (OUTPATIENT)
Dept: LAB | Facility: HOSPITAL | Age: 79
End: 2024-05-20
Payer: MEDICARE

## 2024-05-20 DIAGNOSIS — N18.32 STAGE 3B CHRONIC KIDNEY DISEASE: ICD-10-CM

## 2024-05-20 DIAGNOSIS — I34.0 MITRAL VALVE INSUFFICIENCY, UNSPECIFIED ETIOLOGY: ICD-10-CM

## 2024-05-20 DIAGNOSIS — I25.10 CORONARY ARTERY DISEASE INVOLVING NATIVE CORONARY ARTERY OF NATIVE HEART WITHOUT ANGINA PECTORIS: ICD-10-CM

## 2024-05-20 LAB
ALBUMIN SERPL BCP-MCNC: 3.7 G/DL (ref 3.5–5.2)
ALP SERPL-CCNC: 93 U/L (ref 55–135)
ALT SERPL W/O P-5'-P-CCNC: 10 U/L (ref 10–44)
ANION GAP SERPL CALC-SCNC: 13 MMOL/L (ref 8–16)
AST SERPL-CCNC: 21 U/L (ref 10–40)
BILIRUB SERPL-MCNC: 0.7 MG/DL (ref 0.1–1)
BNP SERPL-MCNC: 137 PG/ML (ref 0–99)
BNP SERPL-MCNC: 137 PG/ML (ref 0–99)
BUN SERPL-MCNC: 77 MG/DL (ref 8–23)
CALCIUM SERPL-MCNC: 9.5 MG/DL (ref 8.7–10.5)
CHLORIDE SERPL-SCNC: 102 MMOL/L (ref 95–110)
CO2 SERPL-SCNC: 21 MMOL/L (ref 23–29)
CREAT SERPL-MCNC: 2.1 MG/DL (ref 0.5–1.4)
EST. GFR  (NO RACE VARIABLE): 23.7 ML/MIN/1.73 M^2
GLUCOSE SERPL-MCNC: 123 MG/DL (ref 70–110)
PHOSPHATE SERPL-MCNC: 4.8 MG/DL (ref 2.7–4.5)
PHOSPHATE SERPL-MCNC: 4.8 MG/DL (ref 2.7–4.5)
POTASSIUM SERPL-SCNC: 3.8 MMOL/L (ref 3.5–5.1)
PROT SERPL-MCNC: 6.7 G/DL (ref 6–8.4)
PTH-INTACT SERPL-MCNC: 257.8 PG/ML (ref 9–77)
SODIUM SERPL-SCNC: 136 MMOL/L (ref 136–145)

## 2024-05-20 PROCEDURE — 36415 COLL VENOUS BLD VENIPUNCTURE: CPT | Mod: HCNC,PO | Performed by: INTERNAL MEDICINE

## 2024-05-20 PROCEDURE — 83880 ASSAY OF NATRIURETIC PEPTIDE: CPT | Mod: HCNC | Performed by: INTERNAL MEDICINE

## 2024-05-20 PROCEDURE — 83970 ASSAY OF PARATHORMONE: CPT | Mod: HCNC | Performed by: INTERNAL MEDICINE

## 2024-05-20 PROCEDURE — 84100 ASSAY OF PHOSPHORUS: CPT | Performed by: INTERNAL MEDICINE

## 2024-05-20 PROCEDURE — 80053 COMPREHEN METABOLIC PANEL: CPT | Mod: HCNC | Performed by: INTERNAL MEDICINE

## 2024-05-23 LAB — BACTERIA SPEC ANAEROBE CULT: NORMAL

## 2024-05-24 PROBLEM — N18.4 TYPE 2 DIABETES MELLITUS WITH STAGE 4 CHRONIC KIDNEY DISEASE, WITHOUT LONG-TERM CURRENT USE OF INSULIN: Status: ACTIVE | Noted: 2021-11-03

## 2024-05-25 PROBLEM — N18.4 ANEMIA DUE TO STAGE 4 CHRONIC KIDNEY DISEASE: Status: ACTIVE | Noted: 2024-05-25

## 2024-05-25 PROBLEM — D63.1 ANEMIA DUE TO STAGE 4 CHRONIC KIDNEY DISEASE: Status: ACTIVE | Noted: 2024-05-25

## 2024-05-25 PROBLEM — Z98.890 S/P MITRAL VALVE CLIP IMPLANTATION: Status: ACTIVE | Noted: 2024-05-25

## 2024-05-25 PROBLEM — N18.4 ACUTE RENAL FAILURE SUPERIMPOSED ON STAGE 4 CHRONIC KIDNEY DISEASE: Status: ACTIVE | Noted: 2024-05-25

## 2024-05-25 PROBLEM — N17.9 ACUTE RENAL FAILURE SUPERIMPOSED ON STAGE 4 CHRONIC KIDNEY DISEASE: Status: ACTIVE | Noted: 2024-05-25

## 2024-05-25 PROBLEM — E86.1 HYPOTENSION DUE TO HYPOVOLEMIA: Status: ACTIVE | Noted: 2024-05-25

## 2024-05-25 PROBLEM — I95.1 ORTHOSTATIC HYPOTENSION: Status: ACTIVE | Noted: 2024-05-25

## 2024-05-25 PROBLEM — S22.000A COMPRESSION FRACTURE OF BODY OF THORACIC VERTEBRA: Status: ACTIVE | Noted: 2024-05-25

## 2024-05-25 PROBLEM — Z95.818 S/P MITRAL VALVE CLIP IMPLANTATION: Status: ACTIVE | Noted: 2024-05-25

## 2024-05-27 DIAGNOSIS — I50.32 CHRONIC HEART FAILURE WITH PRESERVED EJECTION FRACTION (HFPEF): Primary | ICD-10-CM

## 2024-05-28 DIAGNOSIS — R00.1 BRADYCARDIA: Primary | ICD-10-CM

## 2024-05-28 PROBLEM — R07.9 CHEST PAIN: Status: ACTIVE | Noted: 2024-05-28

## 2024-05-28 PROBLEM — E53.1 PYRIDOXINE DEFICIENCY: Status: ACTIVE | Noted: 2024-05-28

## 2024-05-28 PROBLEM — I21.4 NSTEMI (NON-ST ELEVATED MYOCARDIAL INFARCTION): Status: ACTIVE | Noted: 2020-09-03

## 2024-05-29 PROBLEM — Z71.89 ACP (ADVANCE CARE PLANNING): Status: RESOLVED | Noted: 2023-10-02 | Resolved: 2024-05-29

## 2024-05-30 ENCOUNTER — TELEPHONE (OUTPATIENT)
Dept: CARDIOLOGY | Facility: CLINIC | Age: 79
End: 2024-05-30
Payer: MEDICARE

## 2024-05-30 NOTE — TELEPHONE ENCOUNTER
----- Message from Jovita Sheffield sent at 5/30/2024 11:22 AM CDT -----  Regarding: LAB ORDER  Contact: Fiona  Type:  Needs Medical Advice    Who Called: Fiona with [facility]  Would the patient rather a call back or a response via [employer] [facility]? Call back  Best Call Back Number: [phone]  Fax [phone]    Additional Information: sts she needs the orders faxed to the--please advise

## 2024-06-05 ENCOUNTER — OUTPATIENT CASE MANAGEMENT (OUTPATIENT)
Dept: ADMINISTRATIVE | Facility: OTHER | Age: 79
End: 2024-06-05
Payer: MEDICARE

## 2024-06-09 NOTE — PROGRESS NOTES
"                                                                                       [facility] Follow up.        Attending Physician: Kevin Russo MD.  The patient's last visit with me was on 4/8/2024.         HPI.  78 F w/ PMHx of DM2, CAD s/p Stents, HF from valvular heart disease, Pulm HTN, Severe Mitral Valve Regurgitation, Aortic Valve Stenosis and Diverticulitis s/p Abdominal Surgery (remote) presented today for consultation as part of evaluation for mitral clip.     She has been dealing with congestion/volume overload for months. She has had hospitalizations for hypervolemia, diuresed but then asked to stop her diuretics, concerned for her kidney function. She gets congested again. Lately, due to severe edema and symptoms, her family started diuretics on their own. Patient improved clinically per their report. However, she continues to have congestive symptoms, orthopnea, pnd, leg edema, abdominal distention and decreased appetite. They recently "invested" in a hospital bed, and now she sleeps at a 45 angle.        April 8, 2024:   Since last appointment, she underwent mitral clip last December 2023. Today patient comes c/o dyspnea on exertion after minimal activities. She is currently on bumex 3 mg BID. She saw General Cardiology last week. She said she was told she had extra fluid on her but reports no changes on medications.    Jamila 10, 2024:  patient comes for scheduled f/u appointment. Since last appointment she was admitted to OSH the last week of May with dehydration and hypotension. The previous weeks she had also been doing more errands, going out.  Her lisinopril was stopped. She was started in midodrine three times a day.  Her bumex dose was decreased from 4 mg BID to 3 mg BID. Today she comes feeling ok, but reports persistent dyspnea on exertion and fatigue.        Review of Systems   Constitutional:  Positive for activity change and fatigue. " Negative for chills, diaphoresis, fever and unexpected weight change.   HENT:  Negative for nasal congestion, rhinorrhea and sore throat.    Eyes:  Negative for visual disturbance.   Respiratory:  Positive for shortness of breath.    Cardiovascular:  Negative for chest pain, palpitations and leg swelling.   Gastrointestinal:  Negative for abdominal distention, abdominal pain, diarrhea, nausea and vomiting.   Genitourinary:  Negative for difficulty urinating, dysuria and hematuria.   Integumentary:  Negative for rash.   Neurological:  Negative for seizures, syncope and light-headedness.   Psychiatric/Behavioral:  Negative for agitation and hallucinations.         Past Medical History:   Diagnosis Date    CHF (congestive heart failure)     Diabetes     Diverticulitis     Hypertension     Renal disorder         Past Surgical History:   Procedure Laterality Date    ABDOMINAL SURGERY  2006    diverticulitis x3    ANTERIOR CERVICAL DISCECTOMY W/ FUSION N/A 8/30/2018    FUSION C6-7 Surgeon: Rickey Martin MD    APPENDECTOMY      CARPAL TUNNEL RELEASE      COLECTOMY  07/2020    EPIDURAL STEROID INJECTION INTO LUMBAR SPINE N/A 1/20/2021    Procedure: Injection-steroid-epidural-lumbar--L3-4;  Surgeon: Colleen Carvajal MD;  Location: [facility] PAIN MGT;  Service: Pain Management;  Laterality: N/A;    HYSTERECTOMY  1970    @25yrs of age    LARYNGOSCOPY N/A 1/4/2022    Procedure: Suspension microlaryngoscopy with left vocal fold injection augmentation - Restylane L;  Surgeon: Yuan Mir MD;  Location: [address];  Service: ENT;  Laterality: N/A;  Microscope, telescopes, tower, injector, Restylane-L    MANDIBLE FRACTURE SURGERY  1970    MICRODISCECTOMY OF SPINE Left 4/13/2021    Procedure: MICRODISCECTOMY, SPINE Left L3-4 far lateral Microdiscectomy;  Surgeon: Rickey Martin MD;  Location: [facility] OR;  Service: Neurosurgery;  Laterality: Left;  Procedure: Left L3-4 far lateral Microdiscectomy   Surgery Time: 1.5 hrs  LOS:  0-1  Anesthesia: General  Radiology: C-arm  SNS: EMG, SEP  Bed: [place] [address] Poster  Position: Melissa  Dav w/ Globus confirmed 4/12/21 [place]    MITRAL CLIP N/A 12/20/2023    Procedure: Mitral clip;  Surgeon: Kevin More MD;  Location: [facility] CATH LAB;  Service: Cardiology;  Laterality: N/A;    OOPHORECTOMY  1970    ROTATOR CUFF REPAIR Left 11/2016    TOTAL REDUCTION MAMMOPLASTY Bilateral 1990    TRANSESOPHAGEAL ECHOCARDIOGRAPHY N/A 11/1/2023    Procedure: ECHOCARDIOGRAM, TRANSESOPHAGEAL;  Surgeon: Ramón [facility];  Location: [facility] EP LAB;  Service: Cardiology;  Laterality: N/A;    TRANSESOPHAGEAL ECHOCARDIOGRAPHY N/A 12/20/2023    Procedure: ECHOCARDIOGRAM, TRANSESOPHAGEAL;  Surgeon: Cristine Brooks MD;  Location: [facility] CATH LAB;  Service: Cardiology;  Laterality: N/A;    TRANSFORAMINAL EPIDURAL INJECTION OF STEROID Left 12/23/2020    Procedure: Injection,steroid,epidural,transforaminal approach--Left L4 and L5;  Surgeon: Colleen Carvajal MD;  Location: [facility];  Service: Pain Management;  Laterality: Left;        Family History   Problem Relation Name Age of Onset    Heart disease Father      Glaucoma Neg Hx      Macular degeneration Neg Hx          Review of patient's allergies indicates:  No Known Allergies     Current Outpatient Medications   Medication Instructions    acetaminophen (TYLENOL) 650 mg, Oral, Every 8 hours PRN    apixaban (ELIQUIS) 2.5 mg, Oral, 2 times daily    atorvastatin (LIPITOR) 80 mg, Oral, Daily    bumetanide (BUMEX) 3 mg, Oral, 2 times daily    cyanocobalamin 1,000 mcg/mL injection INJECT 1 ML INTO THE MUSCLE EVERY 14 DAYS    ergocalciferol (ERGOCALCIFEROL) 50,000 Units, Oral, Every 7 days    gabapentin (NEURONTIN) 100 mg, Oral, Nightly    midodrine (PROAMATINE) 10 mg, Oral, 3 times daily    mupirocin (BACTROBAN) 2 % ointment Topical (Top), Daily    nitroGLYCERIN (NITROSTAT) 0.4 mg, Sublingual, Every 5 min PRN    potassium chloride SA (K-DUR,KLOR-CON) 20 MEQ tablet 20  "mEq, Oral, Once    pregabalin (LYRICA) 75 mg, Oral, 2 times daily    pyridoxine (vitamin B6) (B-6) 25 mg, Oral, Daily        Vitals:    06/10/24 0815   BP: (!) 121/58   Pulse: 66        Wt Readings from Last 3 Encounters:   06/10/24 57.4 kg (126 lb 8.7 oz)   05/29/24 57.8 kg (127 lb 6.8 oz)   05/15/24 57.2 kg (126 lb 1.7 oz)     Temp Readings from Last 3 Encounters:   05/29/24 97.6 °F (36.4 °C)   05/10/24 97.7 °F (36.5 °C) (Oral)   03/28/24 97.9 °F (36.6 °C) (Oral)     BP Readings from Last 3 Encounters:   06/10/24 (!) 121/58   05/29/24 (!) 129/56   05/10/24 115/68     Pulse Readings from Last 3 Encounters:   06/10/24 66   05/29/24 (!) 43   05/10/24 75        Body mass index is 23.15 kg/m². Estimated body surface area is 1.58 meters squared as calculated from the following:    Height as of this encounter: 5' 2" (1.575 m).    Weight as of this encounter: 57.4 kg (126 lb 8.7 oz).     Physical Exam  Constitutional:       Appearance: She is well-developed.   HENT:      Head: Normocephalic and atraumatic.      Right Ear: External ear normal.      Left Ear: External ear normal.   Eyes:      Conjunctiva/sclera: Conjunctivae normal.      Pupils: Pupils are equal, round, and reactive to light.   Neck:      Vascular: Hepatojugular reflux and JVD present.      Comments: Tall v waves overlying carotid pulsation and +HJR that enhances the high JVP. JVP 14 cmH20  Cardiovascular:      Rate and Rhythm: Normal rate and regular rhythm.      Pulses: Intact distal pulses.      Heart sounds: S1 normal and S2 normal. No murmur heard.     No friction rub. No gallop.   Pulmonary:      Effort: Pulmonary effort is normal.      Breath sounds: Normal breath sounds.   Abdominal:      General: Bowel sounds are normal. There is no distension.      Palpations: Abdomen is soft.      Tenderness: There is no abdominal tenderness. There is no guarding or rebound.   Musculoskeletal:      Cervical back: Normal range of motion and neck supple.      Right " lower leg: No edema.      Left lower leg: No edema.   Neurological:      Mental Status: She is alert and oriented to person, place, and time.          Lab Results   Component Value Date     (H) 05/20/2024     (H) 05/20/2024     05/27/2024    K 3.8 05/27/2024    MG 1.8 05/27/2024     05/27/2024    CO2 24 05/27/2024    BUN 57 (H) 05/27/2024    CREATININE 1.47 (H) 05/27/2024     05/27/2024    HGBA1C 5.9 (H) 03/21/2024    AST 34 05/27/2024    ALT 17 05/27/2024    ALBUMIN 3.5 05/27/2024    PROT 6.1 05/27/2024    BILITOT 0.3 05/27/2024    WBC 7.00 06/10/2024    HGB 10.8 (L) 06/10/2024    HCT 34.7 (L) 06/10/2024     06/10/2024    INR 1.1 12/20/2023    TSH 1.660 05/25/2024    CHOL 126 03/21/2024    HDL 37 (L) 03/21/2024    LDLCALC 71.4 03/21/2024    TRIG 88 03/21/2024         Results for orders placed during the hospital encounter of 01/23/24    Echo Saline Bubble? No    Interpretation Summary    Left Ventricle: The left ventricle is normal in size. Ventricular mass is normal. Normal wall thickness. Normal wall motion. There is mildly reduced systolic function with a visually estimated ejection fraction of 45 - 50%. Biplane (2D) method of discs ejection fraction is 46%. Global longitudinal strain is -14.0%.    Right Ventricle: Normal right ventricular cavity size. Systolic function is borderline low.    Left Atrium: Left atrium is severely dilated. There is no thrombus in the left atrial cavity.    Aortic Valve: There is moderate aortic valve sclerosis. Moderately restricted motion. There is mild to moderate stenosis. Aortic valve area by VTI is 1.22 cm². Aortic valve peak velocity is 2.20 m/s. Mean gradient is 11 mmHg. The dimensionless index is 0.42.    ELLIOT 1.4cm^2 by planimetry    Mitral Valve: The mitral valve is repaired by MitraClip. There is moderate mitral annular calcification present. There is moderate regurgitation.    Tricuspid Valve: There is moderate regurgitation.     Pulmonary Artery: The estimated pulmonary artery systolic pressure is 66 mmHg.    IVC/SVC: Intermediate venous pressure at 8 mmHg.    Pericardium: There is a trivial circumferential effusion.    S/p MitraClip placed on lateral edge of A3/P3    Mean diastolic gradient across the mitral valve 7-8mmHg at a heart rate of 66bpm. MVA by PHT 1.4cm^2, MVA 1.7 cm^2 by planimetry        Results for orders placed during the hospital encounter of 12/20/23    Cardiac catheterization    Conclusion    The estimated blood loss was none.    The MitralClip was successfully placed.    The procedure log was documented by Documenter: Sully Alvarenga and verified by Kevin More MD.    Date: 12/20/2023  Time: 10:21 AM         Assessment and Plan:  Heart failure with preserved ejection fraction, unspecified HF chronicity  -     CBC Auto Differential; Future; Expected date: 07/10/2024  -     Comprehensive Metabolic Panel; Future; Expected date: 07/10/2024  -     NT-Pro Natriuretic Peptide; Future; Expected date: 07/10/2024  -     Basic Metabolic Panel; Future; Expected date: 06/17/2024  -     NT-Pro Natriuretic Peptide; Future; Expected date: 06/17/2024    Valvular heart disease    Paroxysmal atrial fibrillation    Chronic HFrEF (heart failure with reduced ejection fraction)    Other orders  -     dapagliflozin propanediol (FARXIGA) 10 mg tablet; Take 1 tablet (10 mg total) by mouth once daily.  Dispense: 30 tablet; Refill: 5  -     midodrine (PROAMATINE) 10 MG tablet; Take 1 tablet (10 mg total) by mouth as needed.           Valvular heart disease.  She is now s/p mitral clip. Severe mitral regurgitation from a restricted posterior mitral valve leaflet. This is in the setting of known CAD, previous PCI. Ischemic heart disease.    HF with borderline reduced EF.  She is currently warm and mild central hypervolemia on exam. No peripheral edema.   Plan:  I suspect when increasing bumex from 3 mg  BID to 4 mg BID she went from  hypervolemic (BNP 1025) to euvolemic and then to hypovolemic in a span of 2 months.  Today her creatinine is 1.6, some of the lowest values she gets but she is symptomatic with evidence of congestion.  We will target to procure a milder decongestion. For that I will add farxiga. NO changes on bumex today.  Follow up blood test next Monday.  Change midodrine to as needed, as her dehydration/hypotension have resolved.  Eventually I would like her to be in at least a low dose of ACEI and Bblocker.     F/u in 2 months with labs.

## 2024-06-10 ENCOUNTER — OFFICE VISIT (OUTPATIENT)
Dept: TRANSPLANT | Facility: CLINIC | Age: 79
End: 2024-06-10
Payer: MEDICARE

## 2024-06-10 ENCOUNTER — LAB VISIT (OUTPATIENT)
Dept: LAB | Facility: HOSPITAL | Age: 79
End: 2024-06-10
Attending: INTERNAL MEDICINE
Payer: MEDICARE

## 2024-06-10 VITALS
HEIGHT: 62 IN | SYSTOLIC BLOOD PRESSURE: 121 MMHG | BODY MASS INDEX: 23.29 KG/M2 | WEIGHT: 126.56 LBS | HEART RATE: 66 BPM | DIASTOLIC BLOOD PRESSURE: 58 MMHG

## 2024-06-10 DIAGNOSIS — I50.33 ACUTE ON CHRONIC DIASTOLIC HEART FAILURE: ICD-10-CM

## 2024-06-10 DIAGNOSIS — I50.22 CHRONIC HFREF (HEART FAILURE WITH REDUCED EJECTION FRACTION): ICD-10-CM

## 2024-06-10 DIAGNOSIS — I38 VALVULAR HEART DISEASE: ICD-10-CM

## 2024-06-10 DIAGNOSIS — I48.0 PAROXYSMAL ATRIAL FIBRILLATION: Chronic | ICD-10-CM

## 2024-06-10 DIAGNOSIS — I50.30 HEART FAILURE WITH PRESERVED EJECTION FRACTION, UNSPECIFIED HF CHRONICITY: Primary | ICD-10-CM

## 2024-06-10 PROBLEM — N17.9 AKI (ACUTE KIDNEY INJURY): Status: RESOLVED | Noted: 2024-05-25 | Resolved: 2024-06-10

## 2024-06-10 PROBLEM — R79.89 ELEVATED TROPONIN: Status: RESOLVED | Noted: 2023-07-06 | Resolved: 2024-06-10

## 2024-06-10 LAB
ALBUMIN SERPL BCP-MCNC: 3.8 G/DL (ref 3.5–5.2)
ALP SERPL-CCNC: 91 U/L (ref 55–135)
ALT SERPL W/O P-5'-P-CCNC: 15 U/L (ref 10–44)
ANION GAP SERPL CALC-SCNC: 12 MMOL/L (ref 8–16)
AST SERPL-CCNC: 25 U/L (ref 10–40)
BASOPHILS # BLD AUTO: 0.06 K/UL (ref 0–0.2)
BASOPHILS NFR BLD: 0.9 % (ref 0–1.9)
BILIRUB SERPL-MCNC: 0.4 MG/DL (ref 0.1–1)
BNP SERPL-MCNC: 489 PG/ML (ref 0–99)
BUN SERPL-MCNC: 46 MG/DL (ref 8–23)
CALCIUM SERPL-MCNC: 9.9 MG/DL (ref 8.7–10.5)
CHLORIDE SERPL-SCNC: 104 MMOL/L (ref 95–110)
CO2 SERPL-SCNC: 26 MMOL/L (ref 23–29)
CREAT SERPL-MCNC: 1.6 MG/DL (ref 0.5–1.4)
DIFFERENTIAL METHOD BLD: ABNORMAL
EOSINOPHIL # BLD AUTO: 0.2 K/UL (ref 0–0.5)
EOSINOPHIL NFR BLD: 2.6 % (ref 0–8)
ERYTHROCYTE [DISTWIDTH] IN BLOOD BY AUTOMATED COUNT: 15.7 % (ref 11.5–14.5)
EST. GFR  (NO RACE VARIABLE): 32.8 ML/MIN/1.73 M^2
GLUCOSE SERPL-MCNC: 143 MG/DL (ref 70–110)
HCT VFR BLD AUTO: 34.7 % (ref 37–48.5)
HGB BLD-MCNC: 10.8 G/DL (ref 12–16)
IMM GRANULOCYTES # BLD AUTO: 0.01 K/UL (ref 0–0.04)
IMM GRANULOCYTES NFR BLD AUTO: 0.1 % (ref 0–0.5)
LYMPHOCYTES # BLD AUTO: 1.3 K/UL (ref 1–4.8)
LYMPHOCYTES NFR BLD: 19 % (ref 18–48)
MCH RBC QN AUTO: 29 PG (ref 27–31)
MCHC RBC AUTO-ENTMCNC: 31.1 G/DL (ref 32–36)
MCV RBC AUTO: 93 FL (ref 82–98)
MONOCYTES # BLD AUTO: 0.5 K/UL (ref 0.3–1)
MONOCYTES NFR BLD: 7.3 % (ref 4–15)
NEUTROPHILS # BLD AUTO: 4.9 K/UL (ref 1.8–7.7)
NEUTROPHILS NFR BLD: 70.1 % (ref 38–73)
NRBC BLD-RTO: 0 /100 WBC
PLATELET # BLD AUTO: 245 K/UL (ref 150–450)
PMV BLD AUTO: 11.1 FL (ref 9.2–12.9)
POTASSIUM SERPL-SCNC: 3.8 MMOL/L (ref 3.5–5.1)
PROT SERPL-MCNC: 7.2 G/DL (ref 6–8.4)
RBC # BLD AUTO: 3.73 M/UL (ref 4–5.4)
SODIUM SERPL-SCNC: 142 MMOL/L (ref 136–145)
WBC # BLD AUTO: 7 K/UL (ref 3.9–12.7)

## 2024-06-10 PROCEDURE — 80053 COMPREHEN METABOLIC PANEL: CPT | Mod: HCNC | Performed by: INTERNAL MEDICINE

## 2024-06-10 PROCEDURE — 3078F DIAST BP <80 MM HG: CPT | Mod: HCNC,CPTII,S$GLB, | Performed by: INTERNAL MEDICINE

## 2024-06-10 PROCEDURE — 1111F DSCHRG MED/CURRENT MED MERGE: CPT | Mod: HCNC,CPTII,S$GLB, | Performed by: INTERNAL MEDICINE

## 2024-06-10 PROCEDURE — 99214 OFFICE O/P EST MOD 30 MIN: CPT | Mod: HCNC,S$GLB,, | Performed by: INTERNAL MEDICINE

## 2024-06-10 PROCEDURE — 99999 PR PBB SHADOW E&M-EST. PATIENT-LVL IV: CPT | Mod: PBBFAC,HCNC,, | Performed by: INTERNAL MEDICINE

## 2024-06-10 PROCEDURE — G0180 MD CERTIFICATION HHA PATIENT: HCPCS | Mod: ,,, | Performed by: FAMILY MEDICINE

## 2024-06-10 PROCEDURE — 3074F SYST BP LT 130 MM HG: CPT | Mod: HCNC,CPTII,S$GLB, | Performed by: INTERNAL MEDICINE

## 2024-06-10 PROCEDURE — 1101F PT FALLS ASSESS-DOCD LE1/YR: CPT | Mod: HCNC,CPTII,S$GLB, | Performed by: INTERNAL MEDICINE

## 2024-06-10 PROCEDURE — 83880 ASSAY OF NATRIURETIC PEPTIDE: CPT | Mod: HCNC | Performed by: INTERNAL MEDICINE

## 2024-06-10 PROCEDURE — 3288F FALL RISK ASSESSMENT DOCD: CPT | Mod: HCNC,CPTII,S$GLB, | Performed by: INTERNAL MEDICINE

## 2024-06-10 PROCEDURE — 85025 COMPLETE CBC W/AUTO DIFF WBC: CPT | Mod: HCNC | Performed by: INTERNAL MEDICINE

## 2024-06-10 PROCEDURE — 36415 COLL VENOUS BLD VENIPUNCTURE: CPT | Mod: HCNC | Performed by: INTERNAL MEDICINE

## 2024-06-10 PROCEDURE — 1157F ADVNC CARE PLAN IN RCRD: CPT | Mod: HCNC,CPTII,S$GLB, | Performed by: INTERNAL MEDICINE

## 2024-06-10 PROCEDURE — 1126F AMNT PAIN NOTED NONE PRSNT: CPT | Mod: HCNC,CPTII,S$GLB, | Performed by: INTERNAL MEDICINE

## 2024-06-10 PROCEDURE — 1159F MED LIST DOCD IN RCRD: CPT | Mod: HCNC,CPTII,S$GLB, | Performed by: INTERNAL MEDICINE

## 2024-06-10 RX ORDER — GABAPENTIN 100 MG/1
100 CAPSULE ORAL NIGHTLY
COMMUNITY
Start: 2024-06-03 | End: 2024-06-12 | Stop reason: SDUPTHER

## 2024-06-10 RX ORDER — DAPAGLIFLOZIN 10 MG/1
10 TABLET, FILM COATED ORAL DAILY
Qty: 30 TABLET | Refills: 5 | Status: SHIPPED | OUTPATIENT
Start: 2024-06-10

## 2024-06-10 RX ORDER — MIDODRINE HYDROCHLORIDE 10 MG/1
10 TABLET ORAL
Start: 2024-06-10 | End: 2025-06-10

## 2024-06-10 RX ORDER — MUPIROCIN 20 MG/G
OINTMENT TOPICAL DAILY
COMMUNITY
Start: 2024-05-29

## 2024-06-10 RX ORDER — POTASSIUM CHLORIDE 20 MEQ/1
20 TABLET, EXTENDED RELEASE ORAL ONCE
COMMUNITY
Start: 2024-05-29

## 2024-06-10 NOTE — TELEPHONE ENCOUNTER
I left message on patient's daughter's voice identifying voice mail verifying the follow up. Addressed  
Patient needs a follow up with .She was due in April.  
No

## 2024-06-10 NOTE — PATIENT INSTRUCTIONS
You have extra fluid on you.  Please adhere to a low sodium diet (no more than 1.5 grams of sodium in 24h).  3.   Follow fluid restriction of  1. no more than 2 liters in 24 hours..   4. Start farxiga 10 mg daily.  5. Only take midodrine if needed for BP < 90/60.  6.  Follow up blood test next Monday.  7. F/u in 2 months with labs.

## 2024-06-12 DIAGNOSIS — E55.9 VITAMIN D DEFICIENCY: ICD-10-CM

## 2024-06-12 DIAGNOSIS — I48.0 PAROXYSMAL ATRIAL FIBRILLATION: Chronic | ICD-10-CM

## 2024-06-12 DIAGNOSIS — M54.16 LUMBAR RADICULOPATHY: Primary | Chronic | ICD-10-CM

## 2024-06-12 RX ORDER — GABAPENTIN 100 MG/1
100 CAPSULE ORAL NIGHTLY
Qty: 90 CAPSULE | Refills: 3 | Status: SHIPPED | OUTPATIENT
Start: 2024-06-12 | End: 2025-06-12

## 2024-06-12 RX ORDER — ERGOCALCIFEROL 1.25 MG/1
50000 CAPSULE ORAL
Qty: 12 CAPSULE | Refills: 3 | Status: SHIPPED | OUTPATIENT
Start: 2024-06-12 | End: 2025-06-12

## 2024-06-12 NOTE — TELEPHONE ENCOUNTER
----- Message from Pablo Romero sent at 6/12/2024  7:16 AM CDT -----  Type:  RX Refill Request    Who Called:  pt  Refill or New Rx:  refills  RX Name and Strength:  apixaban (ELIQUIS) 2.5 mg Tab, gabapentin (NEURONTIN) 100 MG capsule, and vitamin d 2 25mg  How is the patient currently taking it? (ex. 1XDay):  as directed  Is this a 30 day or 90 day RX:  n/a, n/a,   Preferred Pharmacy with phone number:    [address] [address]  Phone: [phone] Fax: [phone]  Local or Mail Order:  local  Ordering Provider:  antonio Moscoso Call Back Number:  [phone]  Additional Information:  Pt is out of apixaban (ELIQUIS) 2.5 mg Tab, gabapentin (NEURONTIN) 100 MG capsule, and vitamin d 2 25mg.

## 2024-06-12 NOTE — TELEPHONE ENCOUNTER
No care due was identified.  Health system Embedded Care Due Messages. Reference number: [id number].   6/12/2024 7:29:23 AM CDT

## 2024-06-12 NOTE — TELEPHONE ENCOUNTER
Refill Routing Note   Medication(s) are not appropriate for processing by [facility] for the following reason(s):        Outside of protocol    [facility] action(s):  Route               Appointments  past 12m or future 3m with PCP    Date Provider   Last Visit   3/28/2024 Camilo Patel MD   Next Visit   9/30/2024 Camilo Patel MD   ED visits in past 90 days: 1        Note composed:8:01 AM 06/12/2024

## 2024-06-17 ENCOUNTER — LAB VISIT (OUTPATIENT)
Dept: LAB | Facility: HOSPITAL | Age: 79
End: 2024-06-17
Attending: INTERNAL MEDICINE
Payer: MEDICARE

## 2024-06-17 DIAGNOSIS — I50.30 HEART FAILURE WITH PRESERVED EJECTION FRACTION, UNSPECIFIED HF CHRONICITY: ICD-10-CM

## 2024-06-17 LAB
ANION GAP SERPL CALC-SCNC: 14 MMOL/L (ref 8–16)
BUN SERPL-MCNC: 30 MG/DL (ref 8–23)
CALCIUM SERPL-MCNC: 10 MG/DL (ref 8.7–10.5)
CHLORIDE SERPL-SCNC: 102 MMOL/L (ref 95–110)
CO2 SERPL-SCNC: 25 MMOL/L (ref 23–29)
CREAT SERPL-MCNC: 1.5 MG/DL (ref 0.5–1.4)
EST. GFR  (NO RACE VARIABLE): 35.4 ML/MIN/1.73 M^2
GLUCOSE SERPL-MCNC: 125 MG/DL (ref 70–110)
POTASSIUM SERPL-SCNC: 3.9 MMOL/L (ref 3.5–5.1)
SODIUM SERPL-SCNC: 141 MMOL/L (ref 136–145)

## 2024-06-17 PROCEDURE — 83880 ASSAY OF NATRIURETIC PEPTIDE: CPT | Mod: HCNC | Performed by: INTERNAL MEDICINE

## 2024-06-17 PROCEDURE — 36415 COLL VENOUS BLD VENIPUNCTURE: CPT | Mod: HCNC,PO | Performed by: INTERNAL MEDICINE

## 2024-06-17 PROCEDURE — 80048 BASIC METABOLIC PNL TOTAL CA: CPT | Mod: HCNC | Performed by: INTERNAL MEDICINE

## 2024-06-18 ENCOUNTER — TELEPHONE (OUTPATIENT)
Dept: TRANSPLANT | Facility: CLINIC | Age: 79
End: 2024-06-18
Payer: MEDICARE

## 2024-06-18 LAB — NT-PROBNP SERPL IA-MCNC: 1404 PG/ML

## 2024-06-18 NOTE — TELEPHONE ENCOUNTER
----- Message -----  From: Xiomara Dela Cruz LPN  Sent: 6/17/2024   4:53 PM CDT  To: Kevin Russo MD  Subject: advisement                                       Patient labs collected and reviewed today d/t adding medication Farxiga 10mg PO daily.    Na 141 K+ 3.9 Cr 1.5 BUN 30     Patient began medication on Tuesday 6/11  Patient has c/o dizziness blood pressure  130/47 HR 65  Denies SOB, chest pain , there is some leg swelling in left leg , pt c/o fatigue more than her usual , weight 127.2, c/o of nausea since Friday , denies headache and afebrile     Patient medications bumex 3mg PO BID K+ 20Meq daily     Would you like to make any changes ?      ----- Message from Kevin Russo MD sent at 6/18/2024 11:58 AM CDT -----  Regarding: RE: advisement  No changes    6/18/24 @3283 contacted patient in regards to advisement from Dr. iVllalobos r/t medication change , Farxiga 10mg PO daily , started on 6/11/24    Spoke with patient's son informed him that Dr. Villalobos made no changes to medication. To continue medication as ordered. Bryon stated that patient is feeling well. He then gave phone to his wife Angel because she handles the patient's medication. She was instructed to continue medication as orders Bumex 3mg PO BID, K+ 20meq PO daily Farxiga 10mg PO daily.     To contact office if patient encounters any symptoms of SOB, chest pain, dizziness. Understanding verbalized.

## 2024-07-08 ENCOUNTER — LAB VISIT (OUTPATIENT)
Dept: LAB | Facility: HOSPITAL | Age: 79
End: 2024-07-08
Attending: INTERNAL MEDICINE
Payer: MEDICARE

## 2024-07-08 DIAGNOSIS — N18.32 STAGE 3B CHRONIC KIDNEY DISEASE: ICD-10-CM

## 2024-07-08 DIAGNOSIS — I50.30 HEART FAILURE WITH PRESERVED EJECTION FRACTION, UNSPECIFIED HF CHRONICITY: ICD-10-CM

## 2024-07-08 LAB
ALBUMIN SERPL BCP-MCNC: 3.8 G/DL (ref 3.5–5.2)
ALBUMIN SERPL BCP-MCNC: 3.8 G/DL (ref 3.5–5.2)
ALP SERPL-CCNC: 104 U/L (ref 55–135)
ALT SERPL W/O P-5'-P-CCNC: 10 U/L (ref 10–44)
ANION GAP SERPL CALC-SCNC: 13 MMOL/L (ref 8–16)
ANION GAP SERPL CALC-SCNC: 13 MMOL/L (ref 8–16)
AST SERPL-CCNC: 17 U/L (ref 10–40)
BASOPHILS # BLD AUTO: 0.05 K/UL (ref 0–0.2)
BASOPHILS NFR BLD: 0.8 % (ref 0–1.9)
BILIRUB SERPL-MCNC: 0.5 MG/DL (ref 0.1–1)
BUN SERPL-MCNC: 32 MG/DL (ref 8–23)
BUN SERPL-MCNC: 32 MG/DL (ref 8–23)
CALCIUM SERPL-MCNC: 9.7 MG/DL (ref 8.7–10.5)
CALCIUM SERPL-MCNC: 9.7 MG/DL (ref 8.7–10.5)
CHLORIDE SERPL-SCNC: 105 MMOL/L (ref 95–110)
CHLORIDE SERPL-SCNC: 105 MMOL/L (ref 95–110)
CO2 SERPL-SCNC: 25 MMOL/L (ref 23–29)
CO2 SERPL-SCNC: 25 MMOL/L (ref 23–29)
CREAT SERPL-MCNC: 1.6 MG/DL (ref 0.5–1.4)
CREAT SERPL-MCNC: 1.6 MG/DL (ref 0.5–1.4)
DIFFERENTIAL METHOD BLD: ABNORMAL
EOSINOPHIL # BLD AUTO: 0.2 K/UL (ref 0–0.5)
EOSINOPHIL NFR BLD: 3 % (ref 0–8)
ERYTHROCYTE [DISTWIDTH] IN BLOOD BY AUTOMATED COUNT: 14.7 % (ref 11.5–14.5)
EST. GFR  (NO RACE VARIABLE): 32.8 ML/MIN/1.73 M^2
EST. GFR  (NO RACE VARIABLE): 32.8 ML/MIN/1.73 M^2
GLUCOSE SERPL-MCNC: 117 MG/DL (ref 70–110)
GLUCOSE SERPL-MCNC: 117 MG/DL (ref 70–110)
HCT VFR BLD AUTO: 35.1 % (ref 37–48.5)
HGB BLD-MCNC: 10.9 G/DL (ref 12–16)
IMM GRANULOCYTES # BLD AUTO: 0.01 K/UL (ref 0–0.04)
IMM GRANULOCYTES NFR BLD AUTO: 0.2 % (ref 0–0.5)
LYMPHOCYTES # BLD AUTO: 1.1 K/UL (ref 1–4.8)
LYMPHOCYTES NFR BLD: 18.7 % (ref 18–48)
MCH RBC QN AUTO: 28.9 PG (ref 27–31)
MCHC RBC AUTO-ENTMCNC: 31.1 G/DL (ref 32–36)
MCV RBC AUTO: 93 FL (ref 82–98)
MONOCYTES # BLD AUTO: 0.4 K/UL (ref 0.3–1)
MONOCYTES NFR BLD: 6.3 % (ref 4–15)
NEUTROPHILS # BLD AUTO: 4.3 K/UL (ref 1.8–7.7)
NEUTROPHILS NFR BLD: 71 % (ref 38–73)
NRBC BLD-RTO: 0 /100 WBC
PHOSPHATE SERPL-MCNC: 3.8 MG/DL (ref 2.7–4.5)
PLATELET # BLD AUTO: 251 K/UL (ref 150–450)
PMV BLD AUTO: 11 FL (ref 9.2–12.9)
POTASSIUM SERPL-SCNC: 4.1 MMOL/L (ref 3.5–5.1)
POTASSIUM SERPL-SCNC: 4.1 MMOL/L (ref 3.5–5.1)
PROT SERPL-MCNC: 7 G/DL (ref 6–8.4)
PTH-INTACT SERPL-MCNC: 277.2 PG/ML (ref 9–77)
RBC # BLD AUTO: 3.77 M/UL (ref 4–5.4)
SODIUM SERPL-SCNC: 143 MMOL/L (ref 136–145)
SODIUM SERPL-SCNC: 143 MMOL/L (ref 136–145)
WBC # BLD AUTO: 6.04 K/UL (ref 3.9–12.7)

## 2024-07-08 PROCEDURE — 80053 COMPREHEN METABOLIC PANEL: CPT | Mod: HCNC | Performed by: INTERNAL MEDICINE

## 2024-07-08 PROCEDURE — 80069 RENAL FUNCTION PANEL: CPT | Mod: HCNC | Performed by: INTERNAL MEDICINE

## 2024-07-08 PROCEDURE — 83880 ASSAY OF NATRIURETIC PEPTIDE: CPT | Mod: HCNC | Performed by: INTERNAL MEDICINE

## 2024-07-08 PROCEDURE — 85025 COMPLETE CBC W/AUTO DIFF WBC: CPT | Mod: HCNC | Performed by: INTERNAL MEDICINE

## 2024-07-08 PROCEDURE — 36415 COLL VENOUS BLD VENIPUNCTURE: CPT | Mod: HCNC,PO | Performed by: INTERNAL MEDICINE

## 2024-07-08 PROCEDURE — 83970 ASSAY OF PARATHORMONE: CPT | Mod: HCNC | Performed by: INTERNAL MEDICINE

## 2024-07-09 ENCOUNTER — PATIENT MESSAGE (OUTPATIENT)
Dept: OTOLARYNGOLOGY | Facility: CLINIC | Age: 79
End: 2024-07-09
Payer: MEDICARE

## 2024-07-09 ENCOUNTER — DOCUMENT SCAN (OUTPATIENT)
Dept: HOME HEALTH SERVICES | Facility: HOSPITAL | Age: 79
End: 2024-07-09
Payer: MEDICARE

## 2024-07-09 LAB — NT-PROBNP SERPL IA-MCNC: 922 PG/ML

## 2024-07-15 ENCOUNTER — OFFICE VISIT (OUTPATIENT)
Dept: CARDIOLOGY | Facility: CLINIC | Age: 79
End: 2024-07-15
Payer: MEDICARE

## 2024-07-15 VITALS
WEIGHT: 121.69 LBS | SYSTOLIC BLOOD PRESSURE: 133 MMHG | HEART RATE: 71 BPM | BODY MASS INDEX: 22.39 KG/M2 | DIASTOLIC BLOOD PRESSURE: 78 MMHG | HEIGHT: 62 IN

## 2024-07-15 DIAGNOSIS — Z95.818 S/P MITRAL VALVE CLIP IMPLANTATION: ICD-10-CM

## 2024-07-15 DIAGNOSIS — I34.0 MITRAL VALVE INSUFFICIENCY, UNSPECIFIED ETIOLOGY: ICD-10-CM

## 2024-07-15 DIAGNOSIS — I70.0 AORTIC ATHEROSCLEROSIS: Primary | ICD-10-CM

## 2024-07-15 DIAGNOSIS — I25.10 CORONARY ARTERY DISEASE INVOLVING NATIVE CORONARY ARTERY OF NATIVE HEART WITHOUT ANGINA PECTORIS: Chronic | ICD-10-CM

## 2024-07-15 DIAGNOSIS — N18.32 STAGE 3B CHRONIC KIDNEY DISEASE: ICD-10-CM

## 2024-07-15 DIAGNOSIS — I35.0 MODERATE AORTIC STENOSIS: ICD-10-CM

## 2024-07-15 DIAGNOSIS — Z74.09 IMPAIRED MOBILITY: ICD-10-CM

## 2024-07-15 DIAGNOSIS — I50.22 CHRONIC HFREF (HEART FAILURE WITH REDUCED EJECTION FRACTION): ICD-10-CM

## 2024-07-15 DIAGNOSIS — I10 ESSENTIAL HYPERTENSION: Chronic | ICD-10-CM

## 2024-07-15 DIAGNOSIS — Z98.890 S/P MITRAL VALVE CLIP IMPLANTATION: ICD-10-CM

## 2024-07-15 PROCEDURE — 99999 PR PBB SHADOW E&M-EST. PATIENT-LVL III: CPT | Mod: PBBFAC,HCNC,, | Performed by: INTERNAL MEDICINE

## 2024-07-15 PROCEDURE — 1159F MED LIST DOCD IN RCRD: CPT | Mod: HCNC,CPTII,S$GLB, | Performed by: INTERNAL MEDICINE

## 2024-07-15 PROCEDURE — 1157F ADVNC CARE PLAN IN RCRD: CPT | Mod: HCNC,CPTII,S$GLB, | Performed by: INTERNAL MEDICINE

## 2024-07-15 PROCEDURE — 3078F DIAST BP <80 MM HG: CPT | Mod: HCNC,CPTII,S$GLB, | Performed by: INTERNAL MEDICINE

## 2024-07-15 PROCEDURE — 99214 OFFICE O/P EST MOD 30 MIN: CPT | Mod: HCNC,S$GLB,, | Performed by: INTERNAL MEDICINE

## 2024-07-15 PROCEDURE — 3075F SYST BP GE 130 - 139MM HG: CPT | Mod: HCNC,CPTII,S$GLB, | Performed by: INTERNAL MEDICINE

## 2024-07-15 NOTE — PROGRESS NOTES
Subjective:    Patient ID:  Trudy Rauch Dakin is a 78 y.o. female patient here for evaluation Follow-up      History of Present Illness:  Follow-up visit valvular heart disease.  Stable ALEGRE.  Stable orthopnea.  No edema, no weight gain.  Post MitraClip, EF 45 50% with associated mild moderate AS, moderate MR on last echo.  MitraClip present A3 P3 mitral valve area by pressure half-time 0.4 cm2.    PAF, no chronic oral anticoagulation due to fall risk.  History chronic kidney disease, GFR 33             Review of patient's allergies indicates:  No Known Allergies    Past Medical History:   Diagnosis Date    CHF (congestive heart failure)     Diabetes     Diverticulitis     Hypertension     Renal disorder      Past Surgical History:   Procedure Laterality Date    ABDOMINAL SURGERY  2006    diverticulitis x3    ANTERIOR CERVICAL DISCECTOMY W/ FUSION N/A 8/30/2018    FUSION C6-7 Surgeon: Rickey Martin MD    APPENDECTOMY      CARPAL TUNNEL RELEASE      COLECTOMY  07/2020    EPIDURAL STEROID INJECTION INTO LUMBAR SPINE N/A 1/20/2021    Procedure: Injection-steroid-epidural-lumbar--L3-4;  Surgeon: Colleen Carvajal MD;  Location: [facility]IN [facility]GT;  Service: Pain Management;  Laterality: N/A;    HYSTERECTOMY  1970    @25yrs of age    LARYNGOSCOPY N/A 1/4/2022    Procedure: Suspension microlaryngoscopy with left vocal fold injection augmentation - Restylane L;  Surgeon: Yuan Mir MD;  Location: [address];  Service: ENT;  Laterality: N/A;  Microscope, telescopes, tower, injector, Restylane-L    MANDIBLE FRACTURE SURGERY  1970    MICRODISCECTOMY OF SPINE Left 4/13/2021    Procedure: MICRODISCECTOMY, SPINE Left L3-4 far lateral Microdiscectomy;  Surgeon: Rickey Martin MD;  Location: [facility] OR;  Service: Neurosurgery;  Laterality: Left;  Procedure: Left L3-4 far lateral Microdiscectomy   Surgery Time: 1.5 hrs  LOS: 0-1  Anesthesia: General  Radiology: C-arm  SNS: EMG, SEP  Bed: [place] [id number] Poster  Position:  Melissa Demarco w/ Globus confirmed 4/12/21 [place]    MITRAL CLIP N/A 12/20/2023    Procedure: Mitral clip;  Surgeon: Kevin More MD;  Location: [facility] CATH LAB;  Service: Cardiology;  Laterality: N/A;    OOPHORECTOMY  1970    ROTATOR CUFF REPAIR Left 11/2016    TOTAL REDUCTION MAMMOPLASTY Bilateral 1990    TRANSESOPHAGEAL ECHOCARDIOGRAPHY N/A 11/1/2023    Procedure: ECHOCARDIOGRAM, TRANSESOPHAGEAL;  Surgeon: Brea Melgar Diagnostic;  Location: [facility] EP LAB;  Service: Cardiology;  Laterality: N/A;    TRANSESOPHAGEAL ECHOCARDIOGRAPHY N/A 12/20/2023    Procedure: ECHOCARDIOGRAM, TRANSESOPHAGEAL;  Surgeon: Cristine Brooks MD;  Location: [facility] CATH LAB;  Service: Cardiology;  Laterality: N/A;    TRANSFORAMINAL EPIDURAL INJECTION OF STEROID Left 12/23/2020    Procedure: Injection,steroid,epidural,transforaminal approach--Left L4 and L5;  Surgeon: Colleen Carvajal MD;  Location: [facility] PAIN MGT;  Service: Pain Management;  Laterality: Left;     Social History     Tobacco Use    Smoking status: Never    Smokeless tobacco: Never   Substance Use Topics    Alcohol use: No    Drug use: No        Review of Systems:    As noted in HPI in addition      REVIEW OF SYSTEMS  Review of Systems   Constitutional: Negative for decreased appetite, diaphoresis, night sweats, weight gain and weight loss.   HENT:  Negative for nosebleeds and odynophagia.    Eyes:  Negative for double vision and photophobia.   Cardiovascular:  Negative for chest pain, claudication, cyanosis, dyspnea on exertion, irregular heartbeat, leg swelling, near-syncope, orthopnea, palpitations, paroxysmal nocturnal dyspnea and syncope.   Respiratory:  Negative for cough, hemoptysis, shortness of breath and wheezing.    Hematologic/Lymphatic: Negative for adenopathy.   Skin:  Negative for flushing, skin cancer and suspicious lesions.   Musculoskeletal:  Negative for gout, myalgias and neck pain.   Gastrointestinal:  Negative for abdominal pain, heartburn, hematemesis  and hematochezia.   Genitourinary:  Negative for bladder incontinence, hesitancy and nocturia.   Neurological:  Negative for focal weakness, headaches, light-headedness and paresthesias.   Psychiatric/Behavioral:  Negative for memory loss and substance abuse.               Objective:        Vitals:    07/15/24 1009   BP: 133/78   Pulse: 71       Lab Results   Component Value Date    WBC 6.04 07/08/2024    HGB 10.9 (L) 07/08/2024    HCT 35.1 (L) 07/08/2024     07/08/2024    CHOL 126 03/21/2024    TRIG 88 03/21/2024    HDL 37 (L) 03/21/2024    ALT 10 07/08/2024    AST 17 07/08/2024     07/08/2024     07/08/2024    K 4.1 07/08/2024    K 4.1 07/08/2024     07/08/2024     07/08/2024    CREATININE 1.6 (H) 07/08/2024    CREATININE 1.6 (H) 07/08/2024    BUN 32 (H) 07/08/2024    BUN 32 (H) 07/08/2024    CO2 25 07/08/2024    CO2 25 07/08/2024    TSH 1.660 05/25/2024    INR 1.1 12/20/2023    HGBA1C 5.9 (H) 03/21/2024    MICROALBUR 1.6 09/13/2022        ECHOCARDIOGRAM RESULTS  Results for orders placed during the hospital encounter of 01/23/24    Echo Saline Bubble? No    Interpretation Summary    Left Ventricle: The left ventricle is normal in size. Ventricular mass is normal. Normal wall thickness. Normal wall motion. There is mildly reduced systolic function with a visually estimated ejection fraction of 45 - 50%. Biplane (2D) method of discs ejection fraction is 46%. Global longitudinal strain is -14.0%.    Right Ventricle: Normal right ventricular cavity size. Systolic function is borderline low.    Left Atrium: Left atrium is severely dilated. There is no thrombus in the left atrial cavity.    Aortic Valve: There is moderate aortic valve sclerosis. Moderately restricted motion. There is mild to moderate stenosis. Aortic valve area by VTI is 1.22 cm². Aortic valve peak velocity is 2.20 m/s. Mean gradient is 11 mmHg. The dimensionless index is 0.42.    ELLIOT 1.4cm^2 by planimetry    Mitral  Valve: The mitral valve is repaired by MitraClip. There is moderate mitral annular calcification present. There is moderate regurgitation.    Tricuspid Valve: There is moderate regurgitation.    Pulmonary Artery: The estimated pulmonary artery systolic pressure is 66 mmHg.    IVC/SVC: Intermediate venous pressure at 8 mmHg.    Pericardium: There is a trivial circumferential effusion.    S/p MitraClip placed on lateral edge of A3/P3    Mean diastolic gradient across the mitral valve 7-8mmHg at a heart rate of 66bpm. MVA by PHT 1.4cm^2, MVA 1.7 cm^2 by planimetry    Results for orders placed during the hospital encounter of 12/20/23    Cardiac catheterization    Conclusion    The estimated blood loss was none.    The MitralClip was successfully placed.    The procedure log was documented by Documenter: Sully Alvarenga and verified by Kevin More MD.    Date: 12/20/2023  Time: 10:21 AM          CURRENT/PREVIOUS VISIT EKG  Results for orders placed or performed during the hospital encounter of 05/24/24   EKG 12-lead    Collection Time: 05/24/24  8:04 PM   Result Value Ref Range    QRS Duration 94 ms    [facility] QTC Calculation 430 ms    Narrative    Test Reason : R53.1,    Vent. Rate : 074 BPM     Atrial Rate : 074 BPM     P-R Int : 178 ms          QRS Dur : 094 ms      QT Int : 388 ms       P-R-T Axes : 027 100 082 degrees     QTc Int : 430 ms    Normal sinus rhythm  Rightward axis  Borderline Abnormal ECG  When compared with ECG of 20-DEC-2023 05:43,  No significant change was found  Confirmed by Richard GUERRA, Marlo ([id number]) on 5/25/2024 11:55:12 AM    Referred By: [email or web address]   SELF           Confirmed By:Marlo Ramos MD     No valid procedures specified.   No results found for this or any previous visit.    No valid procedures specified.    PHYSICAL EXAM  GENERAL: well built, well nourished, well-developed in no apparent distress alert and oriented.   HEENT: Normocephalic. Pupils normal and conjunctivae normal.  Mucous  membranes normal, no cyanosis or icterus, trachea central,no pallor or icterus is noted..   NECK: No JVD. No bruit..   THYROID: Thyroid not enlarged. No nodules present..   CARDIAC:  Normal S1-S2.  Grade 2/6 crescendo systolic murmur left sternal border cardiac apex.    LUNGS: Clear to auscultation. No wheezing or rhonchi..   ABDOMEN: Soft no masses or organomegaly.  No abdomen pulsations or bruits.  Normal bowel sounds. No pulsations and no masses felt, No guarding or rebound.   URINARY: No curiel catheter   EXTREMITIES: No cyanosis, clubbing or edema noted at this time., no calf tenderness bilaterally.   PERIPHERAL VASCULAR SYSTEM: Good palpable distal pulses.  2+ femoral, popliteal and pedal pulses.  No bruits    CENTRAL NERVOUS SYSTEM: No focal motor or sensory deficits noted.   SKIN: Skin without lesions, moist, well perfused.   MUSCLE STRENGTH & TONE: No noteable weakness, atrophy or abnormal movement    I HAVE REVIEWED :    The vital signs, nurses notes, and all the pertinent radiology and labs.         Current Outpatient Medications   Medication Instructions    acetaminophen (TYLENOL) 650 mg, Oral, Every 8 hours PRN    apixaban (ELIQUIS) 2.5 mg, Oral, 2 times daily    atorvastatin (LIPITOR) 80 mg, Oral, Daily    bumetanide (BUMEX) 3 mg, Oral, 2 times daily    cyanocobalamin 1,000 mcg/mL injection INJECT 1 ML INTO THE MUSCLE EVERY 14 DAYS    dapagliflozin propanediol (FARXIGA) 10 mg, Oral, Daily    ergocalciferol (ERGOCALCIFEROL) 50,000 Units, Oral, Every 7 days    gabapentin (NEURONTIN) 100 mg, Oral, Nightly    midodrine (PROAMATINE) 10 mg, Oral, As needed (PRN)    mupirocin (BACTROBAN) 2 % ointment Topical (Top), Daily    nitroGLYCERIN (NITROSTAT) 0.4 mg, Sublingual, Every 5 min PRN    potassium chloride SA (K-DUR,KLOR-CON) 20 MEQ tablet 20 mEq, Oral, Once    pregabalin (LYRICA) 75 mg, Oral, 2 times daily          Assessment:   Valvular heart disease.  MitraClip 2023.   Negative pre  MitraClip PET for ischemic heart disease.  EF 45%.  Mild moderate AS.    Chronic kidney disease, GFR 33.  PAF, anticoagulation discontinued due to fall risk.      Plan:   Update cardiac echo, call results.  If stable six-month          No follow-ups on file.

## 2024-07-19 ENCOUNTER — OFFICE VISIT (OUTPATIENT)
Dept: NEPHROLOGY | Facility: CLINIC | Age: 79
End: 2024-07-19
Payer: MEDICARE

## 2024-07-19 ENCOUNTER — TELEPHONE (OUTPATIENT)
Dept: TRANSPLANT | Facility: CLINIC | Age: 79
End: 2024-07-19
Payer: MEDICARE

## 2024-07-19 VITALS
BODY MASS INDEX: 21.93 KG/M2 | SYSTOLIC BLOOD PRESSURE: 122 MMHG | DIASTOLIC BLOOD PRESSURE: 60 MMHG | WEIGHT: 119.19 LBS | HEIGHT: 62 IN

## 2024-07-19 DIAGNOSIS — I50.33 ACUTE ON CHRONIC DIASTOLIC HEART FAILURE: ICD-10-CM

## 2024-07-19 DIAGNOSIS — N18.32 STAGE 3B CHRONIC KIDNEY DISEASE: Primary | ICD-10-CM

## 2024-07-19 PROCEDURE — 1157F ADVNC CARE PLAN IN RCRD: CPT | Mod: HCNC,CPTII,S$GLB, | Performed by: INTERNAL MEDICINE

## 2024-07-19 PROCEDURE — 3078F DIAST BP <80 MM HG: CPT | Mod: HCNC,CPTII,S$GLB, | Performed by: INTERNAL MEDICINE

## 2024-07-19 PROCEDURE — 99999 PR PBB SHADOW E&M-EST. PATIENT-LVL III: CPT | Mod: PBBFAC,HCNC,, | Performed by: INTERNAL MEDICINE

## 2024-07-19 PROCEDURE — 3074F SYST BP LT 130 MM HG: CPT | Mod: HCNC,CPTII,S$GLB, | Performed by: INTERNAL MEDICINE

## 2024-07-19 PROCEDURE — 99214 OFFICE O/P EST MOD 30 MIN: CPT | Mod: HCNC,S$GLB,, | Performed by: INTERNAL MEDICINE

## 2024-07-19 PROCEDURE — 1101F PT FALLS ASSESS-DOCD LE1/YR: CPT | Mod: HCNC,CPTII,S$GLB, | Performed by: INTERNAL MEDICINE

## 2024-07-19 PROCEDURE — 1160F RVW MEDS BY RX/DR IN RCRD: CPT | Mod: HCNC,CPTII,S$GLB, | Performed by: INTERNAL MEDICINE

## 2024-07-19 PROCEDURE — 1159F MED LIST DOCD IN RCRD: CPT | Mod: HCNC,CPTII,S$GLB, | Performed by: INTERNAL MEDICINE

## 2024-07-19 PROCEDURE — 3288F FALL RISK ASSESSMENT DOCD: CPT | Mod: HCNC,CPTII,S$GLB, | Performed by: INTERNAL MEDICINE

## 2024-07-19 NOTE — PROGRESS NOTES
"  Subjective:       Patient ID: Trudy Rauch Dakin is a 78 y.o. White female who presents for return patient evaluation for chronic renal failure.      She reports her   in  and she has lost 30 pounds since then.  She has no uremic or urinary symptoms and is in her usual state of health.  She is on a diuretic and used to have much difficulty with edema but this has improved.  She takes it twice a day.  She reports she is thirsty much of the time and drinks a good bit of fluids due to dry mouth.      Review of Systems   Constitutional:  Negative for fever.   Respiratory:  Positive for shortness of breath. Negative for cough.    Cardiovascular:  Negative for chest pain and leg swelling.   Gastrointestinal:  Positive for abdominal pain. Negative for nausea and vomiting.   Genitourinary:  Negative for difficulty urinating, dysuria and hematuria.   Musculoskeletal:  Positive for arthralgias (knees).   Neurological:  Negative for dizziness and light-headedness.   Hematological:  Bruises/bleeds easily.   Psychiatric/Behavioral:  Positive for sleep disturbance.        The past medical, family and social histories were reviewed for this encounter.     /60 (BP Location: Right arm, Patient Position: Sitting, BP Method: Medium (Manual))   Ht 5' 2" (1.575 m)   Wt 54.1 kg (119 lb 2.5 oz)   LMP 1970   BMI 21.79 kg/m²     Objective:      Physical Exam  Vitals reviewed.   Constitutional:       General: She is not in acute distress.  HENT:      Head: Normocephalic and atraumatic.   Eyes:      General: No scleral icterus.  Cardiovascular:      Rate and Rhythm: Normal rate.   Pulmonary:      Effort: Pulmonary effort is normal. No respiratory distress.      Breath sounds: No wheezing.   Abdominal:      General: Abdomen is flat.      Palpations: Abdomen is soft.   Musculoskeletal:      Right lower leg: No edema.      Left lower leg: No edema.   Skin:     General: Skin is warm and dry.   Neurological:      " "Mental Status: She is alert and oriented to person, place, and time.         Assessment:       1. Stage 3b chronic kidney disease        Lab Results   Component Value Date    CREATININE 1.6 (H) 07/08/2024    CREATININE 1.6 (H) 07/08/2024    BUN 32 (H) 07/08/2024    BUN 32 (H) 07/08/2024     07/08/2024     07/08/2024    K 4.1 07/08/2024    K 4.1 07/08/2024     07/08/2024     07/08/2024    CO2 25 07/08/2024    CO2 25 07/08/2024     Lab Results   Component Value Date    .2 (H) 07/08/2024    CALCIUM 9.7 07/08/2024    CALCIUM 9.7 07/08/2024    PHOS 3.8 07/08/2024     Lab Results   Component Value Date    HCT 35.1 (L) 07/08/2024     No results found for: "UTPCR"  Plan:   Return to clinic in 4 months.  Labs for next visit include labs per standing orders.    Baseline creatinine is 1.1-1.4 since 2021 but changed to 1.4-1.6 since 2023.  PTH is 277 with calcium of 9.7.  Start D3 2000 units daily.  Ideally I would like her to dose her diuretic according to her target weight to avoid excessive IVVD or overload.  She is having dry mouth and concentrated urine more often than not.  I will send this note to her Cardiologist.      "

## 2024-07-20 DIAGNOSIS — E53.8 VITAMIN B12 DEFICIENCY: ICD-10-CM

## 2024-07-22 RX ORDER — CYANOCOBALAMIN 1000 UG/ML
1000 INJECTION, SOLUTION INTRAMUSCULAR; SUBCUTANEOUS
Qty: 30 ML | Refills: 3 | Status: SHIPPED | OUTPATIENT
Start: 2024-07-22

## 2024-07-23 ENCOUNTER — EXTERNAL HOME HEALTH (OUTPATIENT)
Dept: HOME HEALTH SERVICES | Facility: HOSPITAL | Age: 79
End: 2024-07-23
Payer: MEDICARE

## 2024-07-31 ENCOUNTER — HOSPITAL ENCOUNTER (OUTPATIENT)
Dept: CARDIOLOGY | Facility: HOSPITAL | Age: 79
Discharge: HOME | End: 2024-07-31
Attending: INTERNAL MEDICINE
Payer: MEDICARE

## 2024-07-31 VITALS — WEIGHT: 119 LBS | BODY MASS INDEX: 22.47 KG/M2 | HEIGHT: 61 IN

## 2024-07-31 DIAGNOSIS — I70.0 AORTIC ATHEROSCLEROSIS: ICD-10-CM

## 2024-07-31 DIAGNOSIS — I35.0 MODERATE AORTIC STENOSIS: ICD-10-CM

## 2024-07-31 DIAGNOSIS — I10 ESSENTIAL HYPERTENSION: Chronic | ICD-10-CM

## 2024-07-31 DIAGNOSIS — I50.22 CHRONIC HFREF (HEART FAILURE WITH REDUCED EJECTION FRACTION): ICD-10-CM

## 2024-07-31 DIAGNOSIS — I34.0 MITRAL VALVE INSUFFICIENCY, UNSPECIFIED ETIOLOGY: ICD-10-CM

## 2024-07-31 LAB
ASCENDING AORTA: 3.19 CM
AV INDEX (PROSTH): 0.4
AV MEAN GRADIENT: 14 MMHG
AV PEAK GRADIENT: 22 MMHG
AV REGURGITATION PRESSURE HALF TIME: 445.64 MS
AV VALVE AREA BY VELOCITY RATIO: 1.48 CM²
AV VALVE AREA: 1.39 CM²
AV VELOCITY RATIO: 0.43
BSA FOR ECHO PROCEDURE: 1.52 M2
CV ECHO LV RWT: 0.58 CM
DOP CALC AO PEAK VEL: 2.36 M/S
DOP CALC AO VTI: 56.6 CM
DOP CALC LVOT AREA: 3.4 CM2
DOP CALC LVOT DIAMETER: 2.09 CM
DOP CALC LVOT PEAK VEL: 1.02 M/S
DOP CALC LVOT STROKE VOLUME: 78.52 CM3
DOP CALC MV VTI: 57.4 CM
DOP CALCLVOT PEAK VEL VTI: 22.9 CM
E WAVE DECELERATION TIME: 374.3 MSEC
E/A RATIO: 0.75
E/E' RATIO: 29.25 M/S
ECHO LV POSTERIOR WALL: 1.2 CM (ref 0.6–1.1)
FRACTIONAL SHORTENING: 22 % (ref 28–44)
INTERVENTRICULAR SEPTUM: 1.17 CM (ref 0.6–1.1)
LEFT ATRIUM AREA SYSTOLIC (APICAL 2 CHAMBER): 20.65 CM2
LEFT ATRIUM AREA SYSTOLIC (APICAL 4 CHAMBER): 18.9 CM2
LEFT ATRIUM SIZE: 3.69 CM
LEFT ATRIUM VOLUME INDEX MOD: 39.4 ML/M2
LEFT ATRIUM VOLUME MOD: 59.9 CM3
LEFT INTERNAL DIMENSION IN SYSTOLE: 3.24 CM (ref 2.1–4)
LEFT VENTRICLE DIASTOLIC VOLUME INDEX: 50.3 ML/M2
LEFT VENTRICLE DIASTOLIC VOLUME: 76.45 ML
LEFT VENTRICLE END SYSTOLIC VOLUME APICAL 2 CHAMBER: 68.03 ML
LEFT VENTRICLE END SYSTOLIC VOLUME APICAL 4 CHAMBER: 50.86 ML
LEFT VENTRICLE MASS INDEX: 113 G/M2
LEFT VENTRICLE SYSTOLIC VOLUME INDEX: 27.7 ML/M2
LEFT VENTRICLE SYSTOLIC VOLUME: 42.14 ML
LEFT VENTRICULAR INTERNAL DIMENSION IN DIASTOLE: 4.15 CM (ref 3.5–6)
LEFT VENTRICULAR MASS: 171.74 G
LV LATERAL E/E' RATIO: 23.4 M/S
LV SEPTAL E/E' RATIO: 39 M/S
LVED V (TEICH): 76.45 ML
LVES V (TEICH): 42.14 ML
LVOT MG: 1.87 MMHG
LVOT MV: 0.64 CM/S
MV MEAN GRADIENT: 5 MMHG
MV PEAK A VEL: 1.55 M/S
MV PEAK E VEL: 1.17 M/S
MV PEAK GRADIENT: 11 MMHG
MV STENOSIS PRESSURE HALF TIME: 108.55 MS
MV VALVE AREA BY CONTINUITY EQUATION: 1.37 CM2
MV VALVE AREA P 1/2 METHOD: 2.03 CM2
OHS CV RV/LV RATIO: 0.88 CM
PISA AR MAX VEL: 3.75 M/S
PISA MRMAX VEL: 5.66 M/S
PISA TR MAX VEL: 3.06 M/S
PULM VEIN S/D RATIO: 1.61
PV PEAK D VEL: 0.31 M/S
PV PEAK S VEL: 0.5 M/S
RA PRESSURE ESTIMATED: 3 MMHG
RIGHT VENTRICLE DIASTOLIC LENGTH: 6 CM
RIGHT VENTRICLE DIASTOLIC MID DIMENSION: 2.4 CM
RIGHT VENTRICULAR END-DIASTOLIC DIMENSION: 3.67 CM
RIGHT VENTRICULAR LENGTH IN DIASTOLE (APICAL 4-CHAMBER VIEW): 5.99 CM
RV MID DIAMA: 2.38 CM
RV TB RVSP: 6 MMHG
RV TISSUE DOPPLER FREE WALL SYSTOLIC VELOCITY 1 (APICAL 4 CHAMBER VIEW): 13.26 CM/S
SINUS: 2.92 CM
STJ: 3.17 CM
TDI LATERAL: 0.05 M/S
TDI SEPTAL: 0.03 M/S
TDI: 0.04 M/S
TR MAX PG: 37 MMHG
TRICUSPID ANNULAR PLANE SYSTOLIC EXCURSION: 1.65 CM
TV REST PULMONARY ARTERY PRESSURE: 40 MMHG
Z-SCORE OF LEFT VENTRICULAR DIMENSION IN END DIASTOLE: -0.7
Z-SCORE OF LEFT VENTRICULAR DIMENSION IN END SYSTOLE: 1.26

## 2024-07-31 PROCEDURE — 93306 TTE W/DOPPLER COMPLETE: CPT | Mod: HCNC,PO

## 2024-07-31 PROCEDURE — 93306 TTE W/DOPPLER COMPLETE: CPT | Mod: 26,HCNC,, | Performed by: INTERNAL MEDICINE

## 2024-08-05 NOTE — PATIENT INSTRUCTIONS
Non rebreather removed and pt on room air.    You have extra fluid on you.  Please adhere to a low sodium diet (no more than 1.5 grams of sodium in 24h).  3.   Follow fluid restriction of  2. no more than 1.5 liters in 24 hours..   4.  Please have blood tests today (BMP, BNP).  5. Increase bumex from 2 mg twice a day to 3 mg twice a day.  6. We will call you later today regarding potassium result.  7. Please keep daily record of your weight.  8. F/u blood test Thursday am.  9. Call us Thursday afternoon to report weight, symptoms and to discuss results/plan.  10.  F/u in 2 months with labs.

## 2024-08-12 ENCOUNTER — LAB VISIT (OUTPATIENT)
Dept: LAB | Facility: HOSPITAL | Age: 79
End: 2024-08-12
Attending: INTERNAL MEDICINE
Payer: MEDICARE

## 2024-08-12 ENCOUNTER — OFFICE VISIT (OUTPATIENT)
Dept: TRANSPLANT | Facility: CLINIC | Age: 79
End: 2024-08-12
Payer: MEDICARE

## 2024-08-12 VITALS
BODY MASS INDEX: 23.6 KG/M2 | HEART RATE: 67 BPM | HEIGHT: 61 IN | WEIGHT: 125 LBS | DIASTOLIC BLOOD PRESSURE: 69 MMHG | SYSTOLIC BLOOD PRESSURE: 139 MMHG

## 2024-08-12 DIAGNOSIS — I48.0 PAROXYSMAL ATRIAL FIBRILLATION: Chronic | ICD-10-CM

## 2024-08-12 DIAGNOSIS — I25.10 CORONARY ARTERY DISEASE INVOLVING NATIVE CORONARY ARTERY OF NATIVE HEART WITHOUT ANGINA PECTORIS: ICD-10-CM

## 2024-08-12 DIAGNOSIS — I50.30 HEART FAILURE WITH PRESERVED EJECTION FRACTION, UNSPECIFIED HF CHRONICITY: Primary | ICD-10-CM

## 2024-08-12 DIAGNOSIS — I34.0 MITRAL VALVE INSUFFICIENCY, UNSPECIFIED ETIOLOGY: ICD-10-CM

## 2024-08-12 DIAGNOSIS — I50.22 CHRONIC HFREF (HEART FAILURE WITH REDUCED EJECTION FRACTION): ICD-10-CM

## 2024-08-12 DIAGNOSIS — I38 VALVULAR HEART DISEASE: ICD-10-CM

## 2024-08-12 DIAGNOSIS — I10 ESSENTIAL HYPERTENSION: Chronic | ICD-10-CM

## 2024-08-12 DIAGNOSIS — I25.10 CORONARY ARTERY DISEASE INVOLVING NATIVE CORONARY ARTERY OF NATIVE HEART WITHOUT ANGINA PECTORIS: Chronic | ICD-10-CM

## 2024-08-12 LAB — BNP SERPL-MCNC: 251 PG/ML (ref 0–99)

## 2024-08-12 PROCEDURE — 99214 OFFICE O/P EST MOD 30 MIN: CPT | Mod: HCNC,S$GLB,, | Performed by: INTERNAL MEDICINE

## 2024-08-12 PROCEDURE — 3078F DIAST BP <80 MM HG: CPT | Mod: HCNC,CPTII,S$GLB, | Performed by: INTERNAL MEDICINE

## 2024-08-12 PROCEDURE — 83880 ASSAY OF NATRIURETIC PEPTIDE: CPT | Mod: HCNC | Performed by: INTERNAL MEDICINE

## 2024-08-12 PROCEDURE — 1101F PT FALLS ASSESS-DOCD LE1/YR: CPT | Mod: HCNC,CPTII,S$GLB, | Performed by: INTERNAL MEDICINE

## 2024-08-12 PROCEDURE — 1126F AMNT PAIN NOTED NONE PRSNT: CPT | Mod: HCNC,CPTII,S$GLB, | Performed by: INTERNAL MEDICINE

## 2024-08-12 PROCEDURE — 3075F SYST BP GE 130 - 139MM HG: CPT | Mod: HCNC,CPTII,S$GLB, | Performed by: INTERNAL MEDICINE

## 2024-08-12 PROCEDURE — 36415 COLL VENOUS BLD VENIPUNCTURE: CPT | Mod: HCNC | Performed by: INTERNAL MEDICINE

## 2024-08-12 PROCEDURE — 1157F ADVNC CARE PLAN IN RCRD: CPT | Mod: HCNC,CPTII,S$GLB, | Performed by: INTERNAL MEDICINE

## 2024-08-12 PROCEDURE — 1159F MED LIST DOCD IN RCRD: CPT | Mod: HCNC,CPTII,S$GLB, | Performed by: INTERNAL MEDICINE

## 2024-08-12 PROCEDURE — 99999 PR PBB SHADOW E&M-EST. PATIENT-LVL IV: CPT | Mod: PBBFAC,HCNC,, | Performed by: INTERNAL MEDICINE

## 2024-08-12 PROCEDURE — 3288F FALL RISK ASSESSMENT DOCD: CPT | Mod: HCNC,CPTII,S$GLB, | Performed by: INTERNAL MEDICINE

## 2024-08-12 NOTE — PATIENT INSTRUCTIONS
You have just the right amount of fluid on you.  Please adhere to a low sodium diet (no more than 1.5 grams of sodium in 24h).  3.   Follow fluid restriction of  1. no more than 2 liters in 24 hours..   4.  No changes on medications today.  5. F/u in 4 months with labs.

## 2024-08-12 NOTE — PROGRESS NOTES
"                                                                                       [facility] Follow up.      Attending Physician: Kevin Russo MD.  The patient's last visit with me was on 6/10/2024.         HPI.  78 F w/ PMHx of DM2, CAD s/p Stents, HF from valvular heart disease, Pulm HTN, Severe Mitral Valve Regurgitation, Aortic Valve Stenosis and Diverticulitis s/p Abdominal Surgery (remote) presented today for consultation as part of evaluation for mitral clip.     She has been dealing with congestion/volume overload for months. She has had hospitalizations for hypervolemia, diuresed but then asked to stop her diuretics, concerned for her kidney function. She gets congested again. Lately, due to severe edema and symptoms, her family started diuretics on their own. Patient improved clinically per their report. However, she continues to have congestive symptoms, orthopnea, pnd, leg edema, abdominal distention and decreased appetite. They recently "invested" in a hospital bed, and now she sleeps at a 45 angle.        April 8, 2024:   Since last appointment, she underwent mitral clip last December 2023. Today patient comes c/o dyspnea on exertion after minimal activities. She is currently on bumex 3 mg BID. She saw General Cardiology last week. She said she was told she had extra fluid on her but reports no changes on medications.     Jamila 10, 2024:  patient comes for scheduled f/u appointment. Since last appointment she was admitted to OSH the last week of May with dehydration and hypotension. The previous weeks she had also been doing more errands, going out.  Her lisinopril was stopped. She was started in midodrine three times a day.  Her bumex dose was decreased from 4 mg BID to 3 mg BID. Today she comes feeling ok, but reports persistent dyspnea on exertion and fatigue.    August 12, 2024: she reports dyspnea on exertion. But no orthopnea, PND, edema. She is " "very concerned about "going back to fluid overload" and is asking if she can or should take higher dose of diuretic.       Review of Systems   Constitutional:  Negative for chills, diaphoresis and fever.   HENT:  Negative for nasal congestion, rhinorrhea and sore throat.    Eyes:  Negative for visual disturbance.   Respiratory:  Positive for shortness of breath. Negative for cough.    Cardiovascular:  Negative for chest pain.   Gastrointestinal:  Negative for abdominal pain, diarrhea, nausea and vomiting.   Genitourinary:  Negative for difficulty urinating, dysuria and hematuria.   Integumentary:  Negative for rash.   Neurological:  Negative for seizures, syncope and light-headedness.   Psychiatric/Behavioral:  Negative for agitation and hallucinations.         Past Medical History:   Diagnosis Date    CHF (congestive heart failure)     Diabetes     Diverticulitis     Hypertension     Renal disorder         Past Surgical History:   Procedure Laterality Date    ABDOMINAL SURGERY  2006    diverticulitis x3    ANTERIOR CERVICAL DISCECTOMY W/ FUSION N/A 8/30/2018    FUSION C6-7 Surgeon: Rickey Martin MD    APPENDECTOMY      CARPAL TUNNEL RELEASE      COLECTOMY  07/2020    EPIDURAL STEROID INJECTION INTO LUMBAR SPINE N/A 1/20/2021    Procedure: Injection-steroid-epidural-lumbar--L3-4;  Surgeon: Colleen Carvajal MD;  Location: [facility];  Service: Pain Management;  Laterality: N/A;    HYSTERECTOMY  1970    @25yrs of age    LARYNGOSCOPY N/A 1/4/2022    Procedure: Suspension microlaryngoscopy with left vocal fold injection augmentation - Restylane L;  Surgeon: Yuan Mir MD;  Location: [address];  Service: ENT;  Laterality: N/A;  Microscope, telescopes, tower, injector, Restylane-L    MANDIBLE FRACTURE SURGERY  1970    MICRODISCECTOMY OF SPINE Left 4/13/2021    Procedure: MICRODISCECTOMY, SPINE Left L3-4 far lateral Microdiscectomy;  Surgeon: Rickey Martin MD;  Location: [facility] OR;  Service: " Neurosurgery;  Laterality: Left;  Procedure: Left L3-4 far lateral Microdiscectomy   Surgery Time: 1.5 hrs  LOS: 0-1  Anesthesia: General  Radiology: C-arm  SNS: EMG, SEP  Bed: [place] [id number] Poster  Position: Melissa Demarco w/ Globus confirmed 4/12/21 MN    MITRAL CLIP N/A 12/20/2023    Procedure: Mitral clip;  Surgeon: Kevin More MD;  Location: [facility] CATH LAB;  Service: Cardiology;  Laterality: N/A;    OOPHORECTOMY  1970    ROTATOR CUFF REPAIR Left 11/2016    TOTAL REDUCTION MAMMOPLASTY Bilateral 1990    TRANSESOPHAGEAL ECHOCARDIOGRAPHY N/A 11/1/2023    Procedure: ECHOCARDIOGRAM, TRANSESOPHAGEAL;  Surgeon: Brea Melgar Diagnostic;  Location: [facility] EP LAB;  Service: Cardiology;  Laterality: N/A;    TRANSESOPHAGEAL ECHOCARDIOGRAPHY N/A 12/20/2023    Procedure: ECHOCARDIOGRAM, TRANSESOPHAGEAL;  Surgeon: Cristine Brooks MD;  Location: [facility] CATH LAB;  Service: Cardiology;  Laterality: N/A;    TRANSFORAMINAL EPIDURAL INJECTION OF STEROID Left 12/23/2020    Procedure: Injection,steroid,epidural,transforaminal approach--Left L4 and L5;  Surgeon: Colleen Carvajal MD;  Location: [facility] PAIN MGT;  Service: Pain Management;  Laterality: Left;        Family History   Problem Relation Name Age of Onset    Heart disease Father      Glaucoma Neg Hx      Macular degeneration Neg Hx          Review of patient's allergies indicates:  No Known Allergies     Current Outpatient Medications   Medication Instructions    acetaminophen (TYLENOL) 650 mg, Oral, Every 8 hours PRN    apixaban (ELIQUIS) 2.5 mg, Oral, 2 times daily    atorvastatin (LIPITOR) 80 mg, Oral, Daily    bumetanide (BUMEX) 3 mg, Oral, 2 times daily    cyanocobalamin 1,000 mcg, Intramuscular, Every 30 days    dapagliflozin propanediol (FARXIGA) 10 mg, Oral, Daily    ergocalciferol (ERGOCALCIFEROL) 50,000 Units, Oral, Every 7 days    gabapentin (NEURONTIN) 100 mg, Oral, Nightly    HYDROcodone-acetaminophen (NORCO) 5-325 mg per tablet 1 tablet, Oral, Every 6 hours PRN  "   midodrine (PROAMATINE) 10 mg, Oral, As needed (PRN)    mupirocin (BACTROBAN) 2 % ointment Topical (Top), Daily    nitroGLYCERIN (NITROSTAT) 0.4 mg, Sublingual, Every 5 min PRN    potassium chloride SA (K-DUR,KLOR-CON) 20 MEQ tablet 20 mEq, Oral, Once    pregabalin (LYRICA) 75 mg, Oral, 2 times daily        There were no vitals filed for this visit.     Wt Readings from Last 3 Encounters:   07/31/24 54 kg (119 lb)   07/23/24 54 kg (119 lb)   07/19/24 54.1 kg (119 lb 2.5 oz)     Temp Readings from Last 3 Encounters:   07/23/24 97.7 °F (36.5 °C) (Oral)   05/29/24 97.6 °F (36.4 °C)   05/10/24 97.7 °F (36.5 °C) (Oral)     BP Readings from Last 3 Encounters:   07/23/24 126/65   07/19/24 122/60   07/15/24 133/78     Pulse Readings from Last 3 Encounters:   07/23/24 65   07/15/24 71   06/10/24 66        There is no height or weight on file to calculate BMI. Estimated body surface area is 1.52 meters squared as calculated from the following:    Height as of 7/31/24: 5' 1" (1.549 m).    Weight as of 7/31/24: 54 kg (119 lb).     Physical Exam  Constitutional:       Appearance: She is well-developed.   HENT:      Head: Normocephalic and atraumatic.      Right Ear: External ear normal.      Left Ear: External ear normal.   Eyes:      Conjunctiva/sclera: Conjunctivae normal.      Pupils: Pupils are equal, round, and reactive to light.   Neck:      Vascular: No hepatojugular reflux or JVD.   Cardiovascular:      Rate and Rhythm: Normal rate and regular rhythm.      Pulses: Intact distal pulses.      Heart sounds: S1 normal and S2 normal. No murmur heard.     No friction rub. No gallop.   Pulmonary:      Effort: Pulmonary effort is normal.      Breath sounds: Normal breath sounds.   Abdominal:      General: Bowel sounds are normal. There is no distension.      Palpations: Abdomen is soft.      Tenderness: There is no abdominal tenderness. There is no guarding or rebound.   Musculoskeletal:      Cervical back: Normal range of " motion and neck supple.      Right lower leg: No edema.      Left lower leg: No edema.   Neurological:      Mental Status: She is alert and oriented to person, place, and time.          Lab Results   Component Value Date     (H) 06/10/2024     07/23/2024    K 3.6 07/23/2024    MG 1.8 05/27/2024     07/23/2024    CO2 23 07/23/2024    BUN 21 (H) 07/23/2024    CREATININE 1.42 (H) 07/23/2024     (H) 07/23/2024    HGBA1C 5.9 (H) 03/21/2024    AST 32 07/23/2024    ALT 19 07/23/2024    ALBUMIN 4.4 07/23/2024    PROT 7.4 07/23/2024    BILITOT 0.9 07/23/2024    WBC 7.13 07/23/2024    HGB 11.9 (L) 07/23/2024    HCT 36.2 (L) 07/23/2024     07/23/2024    INR 1.1 12/20/2023    TSH 1.660 05/25/2024    CHOL 126 03/21/2024    HDL 37 (L) 03/21/2024    LDLCALC 71.4 03/21/2024    TRIG 88 03/21/2024             Results for orders placed during the hospital encounter of 07/31/24    Echo    Interpretation Summary    Left Ventricle: Mildly increased wall thickness. Mild global hypokinesis present. There is mildly reduced systolic function with a visually estimated ejection fraction of 45 - 50%. Grade I diastolic dysfunction.    Right Ventricle: Normal right ventricular cavity size. Wall thickness is normal. Systolic function is normal.    Left Atrium: Left atrium is severely dilated.    Aortic Valve: There is mild aortic valve sclerosis. Mildly restricted motion. There is mild to moderate stenosis. Aortic valve area by VTI is 1.39 cm². Aortic valve peak velocity is 2.36 m/s. Mean gradient is 14 mmHg. The dimensionless index is 0.40. There is mild to moderate aortic regurgitation.    Mitral Valve: There is mild to moderate regurgitation.    Tricuspid Valve: There is moderate regurgitation.    Pulmonary Artery: There is mild pulmonary hypertension. The estimated pulmonary artery systolic pressure is 40 mmHg.    IVC/SVC: Normal venous pressure at 3 mmHg.        Results for orders placed during the hospital  encounter of 12/20/23    Cardiac catheterization    Conclusion    The estimated blood loss was none.    The MitralClip was successfully placed.    The procedure log was documented by Documenter: Sully Alvarenga and verified by Kevin More MD.    Date: 12/20/2023  Time: 10:21 AM         Assessment and Plan:  Heart failure with preserved ejection fraction, unspecified HF chronicity  -     CBC Auto Differential; Future; Expected date: 11/12/2024  -     BNP; Future; Expected date: 11/12/2024  -     Comprehensive Metabolic Panel; Future; Expected date: 11/12/2024    Valvular heart disease    Paroxysmal atrial fibrillation    Essential hypertension    Chronic HFrEF (heart failure with reduced ejection fraction)    CAD (coronary artery disease)    Mitral valve insufficiency, unspecified etiology          Valvular heart disease.  She is now s/p mitral clip. She had evere mitral regurgitation from a restricted posterior mitral valve leaflet. This is in the setting of known CAD, previous PCI. Ischemic heart disease.    Most recent echo July 2024: mild to moderate MR.    HF with borderline reduced EF.  LVEF 45-50%.  Warm, normotensive, euvolemic on exam today. , is lower than prior and echo last month shows no evidence of congestion (normal filling pressures). This is an improvement since last appointment, when she was started on farxiga.  Plan:  -  No changes on medications today.    F/u in 4 months with labs.

## 2024-09-03 ENCOUNTER — OFFICE VISIT (OUTPATIENT)
Dept: OPTOMETRY | Facility: CLINIC | Age: 79
End: 2024-09-03
Payer: MEDICARE

## 2024-09-03 DIAGNOSIS — H52.4 HYPEROPIA WITH ASTIGMATISM AND PRESBYOPIA, BILATERAL: ICD-10-CM

## 2024-09-03 DIAGNOSIS — E11.9 TYPE 2 DIABETES MELLITUS WITHOUT RETINOPATHY: Primary | ICD-10-CM

## 2024-09-03 DIAGNOSIS — H52.03 HYPEROPIA WITH ASTIGMATISM AND PRESBYOPIA, BILATERAL: ICD-10-CM

## 2024-09-03 DIAGNOSIS — H16.212 EXPOSURE KERATOPATHY, LEFT: ICD-10-CM

## 2024-09-03 DIAGNOSIS — H52.203 HYPEROPIA WITH ASTIGMATISM AND PRESBYOPIA, BILATERAL: ICD-10-CM

## 2024-09-03 DIAGNOSIS — Z96.1 PSEUDOPHAKIA OF BOTH EYES: ICD-10-CM

## 2024-09-03 DIAGNOSIS — H04.123 DRY EYE SYNDROME OF BILATERAL LACRIMAL GLANDS: ICD-10-CM

## 2024-09-03 PROCEDURE — 99214 OFFICE O/P EST MOD 30 MIN: CPT | Mod: HCNC,S$GLB,,

## 2024-09-03 PROCEDURE — 2023F DILAT RTA XM W/O RTNOPTHY: CPT | Mod: HCNC,CPTII,S$GLB,

## 2024-09-03 PROCEDURE — 1126F AMNT PAIN NOTED NONE PRSNT: CPT | Mod: HCNC,CPTII,S$GLB,

## 2024-09-03 PROCEDURE — 1159F MED LIST DOCD IN RCRD: CPT | Mod: HCNC,CPTII,S$GLB,

## 2024-09-03 PROCEDURE — 99999 PR PBB SHADOW E&M-EST. PATIENT-LVL III: CPT | Mod: PBBFAC,HCNC,,

## 2024-09-03 PROCEDURE — 92015 DETERMINE REFRACTIVE STATE: CPT | Mod: HCNC,S$GLB,,

## 2024-09-03 PROCEDURE — 1157F ADVNC CARE PLAN IN RCRD: CPT | Mod: HCNC,CPTII,S$GLB,

## 2024-09-03 RX ORDER — ERYTHROMYCIN 5 MG/G
OINTMENT OPHTHALMIC NIGHTLY
Qty: 1 G | Refills: 3 | Status: SHIPPED | OUTPATIENT
Start: 2024-09-03 | End: 2025-09-03

## 2024-09-03 NOTE — PROGRESS NOTES
HPI    Pt here for annual diabetic exam. Last exam- 1 year    DVA stable. NVA very blurry. Pt denies flashes/floaters/pain. C/o dry   eyes. Pt denies use of gtt, had tried something but it did not help. Does   not check BS at home.     Hemoglobin A1C       Date                     Value               Ref Range             Status                03/21/2024               5.9 (H)             4.0 - 5.6 %           Final                 10/03/2023               6.1 (H)             0.0 - 5.6 %           Final                  09/14/2023               5.9 (H)             4.0 - 5.6 %           Final            Last edited by Aleksandra Jain, OD on 9/3/2024  8:59 AM.            Assessment /Plan     For exam results, see Encounter Report.    Type 2 diabetes mellitus without retinopathy    Dry eye syndrome of bilateral lacrimal glands    Exposure keratopathy, left  -     erythromycin (ROMYCIN) ophthalmic ointment; Place into the left eye every evening.  Dispense: 1 g; Refill: 3    Pseudophakia of both eyes    Hyperopia with astigmatism and presbyopia, bilateral      No diabetic retinopathy or macular edema evident on today's exam OD, OS. Ed pt on importance of maintaining strict BS control due to potential for visual fluctuations and/or vision loss. Monitor with yearly dilated exam.    2-3. Ongoing dry eye signs and symptoms (especially epiphora) OS>>OD with interpalpebral SPK noted OS x 2 years in a row. Likely exposure component. Cautioned pt on direct ceiling fan and vent exposure and Rx'd Erythromycin jess to use QHS OS. Advised continued use of OTC artificial tears throughout the day. Ed pt to RTC if any worsening signs or symptoms.    4. PCIOL OU. Stable. Monitor yearly for changes.    5. Ed pt that BCVA is reduced OS secondary to dry eye. Increased plus to aid with better near acuity. Discussed spectacle options with pt and released final spec rx. Ed pt on change in rx and adaptation.      RTC: 1 year for comprehensive  exam or sooner prn

## 2024-09-13 DIAGNOSIS — E78.00 HIGH CHOLESTEROL: ICD-10-CM

## 2024-09-13 RX ORDER — ATORVASTATIN CALCIUM 80 MG/1
80 TABLET, FILM COATED ORAL DAILY
Qty: 90 TABLET | Refills: 1 | Status: SHIPPED | OUTPATIENT
Start: 2024-09-13

## 2024-09-13 NOTE — TELEPHONE ENCOUNTER
Refill Decision Note   Trudy Dakin  is requesting a refill authorization.  Brief Assessment and Rationale for Refill:  Approve     Medication Therapy Plan:  Last PCP Visit: 3/28/2024 Last Admission: 5/24/2024  for orthostatic hypotension and worsening CKD, Notes reviewed, pt instructed to continue atorvastatin therapy  Follow up CE 5.31.2024 Last ED Visit: 7/23/2024  Rib pain/possible rib fracture. ED notes reviewed  follow up with Ortho on 8.30.2024      Extended chart review required: Yes   Comments:     Note composed:2:09 PM 09/13/2024

## 2024-09-13 NOTE — TELEPHONE ENCOUNTER
No care due was identified.  Health [facility] Embedded Care Due Messages. Reference number: [id number].   9/13/2024 8:04:54 AM CDT

## 2024-09-16 ENCOUNTER — LAB VISIT (OUTPATIENT)
Dept: LAB | Facility: HOSPITAL | Age: 79
End: 2024-09-16
Attending: FAMILY MEDICINE
Payer: MEDICARE

## 2024-09-16 DIAGNOSIS — E11.22 TYPE 2 DIABETES MELLITUS WITH STAGE 3B CHRONIC KIDNEY DISEASE, WITHOUT LONG-TERM CURRENT USE OF INSULIN: Chronic | ICD-10-CM

## 2024-09-16 DIAGNOSIS — N18.32 TYPE 2 DIABETES MELLITUS WITH STAGE 3B CHRONIC KIDNEY DISEASE, WITHOUT LONG-TERM CURRENT USE OF INSULIN: Chronic | ICD-10-CM

## 2024-09-16 LAB
ALBUMIN/CREAT UR: 22.1 UG/MG (ref 0–30)
CREAT UR-MCNC: 113 MG/DL (ref 15–325)
MICROALBUMIN UR DL<=1MG/L-MCNC: 25 UG/ML

## 2024-09-16 PROCEDURE — 82570 ASSAY OF URINE CREATININE: CPT | Mod: HCNC | Performed by: FAMILY MEDICINE

## 2024-09-16 PROCEDURE — 82043 UR ALBUMIN QUANTITATIVE: CPT | Mod: HCNC | Performed by: FAMILY MEDICINE

## 2024-09-30 ENCOUNTER — TELEPHONE (OUTPATIENT)
Dept: CARDIOLOGY | Facility: CLINIC | Age: 79
End: 2024-09-30
Payer: MEDICARE

## 2024-09-30 ENCOUNTER — TELEPHONE (OUTPATIENT)
Dept: NEPHROLOGY | Facility: CLINIC | Age: 79
End: 2024-09-30
Payer: MEDICARE

## 2024-09-30 DIAGNOSIS — I50.22 CHRONIC HFREF (HEART FAILURE WITH REDUCED EJECTION FRACTION): Primary | ICD-10-CM

## 2024-09-30 DIAGNOSIS — I34.0 MITRAL VALVE INSUFFICIENCY, UNSPECIFIED ETIOLOGY: ICD-10-CM

## 2024-09-30 DIAGNOSIS — I10 ESSENTIAL HYPERTENSION: ICD-10-CM

## 2024-09-30 NOTE — TELEPHONE ENCOUNTER
"Received a call from Patient's Daughter in law, Stephanie. She states,"My mother is moving back to [place] and would like to start seeing doctors over there." She requested that Dr. Casiano recommend a new nephrologist and give a referral if needed. Upcoming appointment in November with Dr. Casiano canceled. Forwarding message to MD.  "

## 2024-09-30 NOTE — TELEPHONE ENCOUNTER
----- Message from Rae sent at 9/30/2024 11:29 AM CDT -----  Contact: arvin  Type:  Needs Medical Advice    Who Called: daughter in law Arvin  Symptoms (please be specific): needs to speak to nurse regarding a referral to a different nephrology dr on the [place].   Would the patient rather a call back or a response via [facility]? call  Best Call Back Number:     Additional Information: please advise and thank you.

## 2024-09-30 NOTE — TELEPHONE ENCOUNTER
----- Message from Rae sent at 9/30/2024 11:32 AM CDT -----  Contact: Stephanie  Type:  Needs Medical Advice    Who Called: daughter in law Stephanie  Symptoms (please be specific): needs to speak to nurse regarding a referral to a different cardio dr on the [place].   Would the patient rather a call back or a response via [facility]? call  Best Call Back Number:     Additional Information: please advise and thank you.

## 2024-10-01 ENCOUNTER — TELEPHONE (OUTPATIENT)
Dept: FAMILY MEDICINE | Facility: CLINIC | Age: 79
End: 2024-10-01
Payer: MEDICARE

## 2024-10-01 ENCOUNTER — OFFICE VISIT (OUTPATIENT)
Dept: FAMILY MEDICINE | Facility: CLINIC | Age: 79
End: 2024-10-01
Payer: MEDICARE

## 2024-10-01 VITALS
BODY MASS INDEX: 22.51 KG/M2 | SYSTOLIC BLOOD PRESSURE: 116 MMHG | HEART RATE: 64 BPM | WEIGHT: 119.25 LBS | DIASTOLIC BLOOD PRESSURE: 62 MMHG | OXYGEN SATURATION: 90 % | HEIGHT: 61 IN | TEMPERATURE: 98 F

## 2024-10-01 DIAGNOSIS — E11.22 TYPE 2 DIABETES MELLITUS WITH STAGE 4 CHRONIC KIDNEY DISEASE, WITHOUT LONG-TERM CURRENT USE OF INSULIN: ICD-10-CM

## 2024-10-01 DIAGNOSIS — F32.A MILD DEPRESSION: ICD-10-CM

## 2024-10-01 DIAGNOSIS — I10 ESSENTIAL HYPERTENSION: Chronic | ICD-10-CM

## 2024-10-01 DIAGNOSIS — I50.22 CHRONIC HFREF (HEART FAILURE WITH REDUCED EJECTION FRACTION): ICD-10-CM

## 2024-10-01 DIAGNOSIS — N18.4 TYPE 2 DIABETES MELLITUS WITH STAGE 4 CHRONIC KIDNEY DISEASE, WITHOUT LONG-TERM CURRENT USE OF INSULIN: ICD-10-CM

## 2024-10-01 DIAGNOSIS — I48.0 PAROXYSMAL ATRIAL FIBRILLATION: Chronic | ICD-10-CM

## 2024-10-01 DIAGNOSIS — I25.10 CORONARY ARTERY DISEASE INVOLVING NATIVE CORONARY ARTERY OF NATIVE HEART WITHOUT ANGINA PECTORIS: Chronic | ICD-10-CM

## 2024-10-01 DIAGNOSIS — G47.00 INSOMNIA, UNSPECIFIED TYPE: ICD-10-CM

## 2024-10-01 DIAGNOSIS — F32.0 MAJOR DEPRESSIVE DISORDER, SINGLE EPISODE, MILD: ICD-10-CM

## 2024-10-01 DIAGNOSIS — E78.49 OTHER HYPERLIPIDEMIA: ICD-10-CM

## 2024-10-01 DIAGNOSIS — Z00.00 ANNUAL PHYSICAL EXAM: Primary | ICD-10-CM

## 2024-10-01 DIAGNOSIS — Z23 NEED FOR VACCINATION: ICD-10-CM

## 2024-10-01 DIAGNOSIS — E53.8 VITAMIN B12 DEFICIENCY: ICD-10-CM

## 2024-10-01 PROCEDURE — 90653 IIV ADJUVANT VACCINE IM: CPT | Mod: HCNC,S$GLB,, | Performed by: FAMILY MEDICINE

## 2024-10-01 PROCEDURE — 1101F PT FALLS ASSESS-DOCD LE1/YR: CPT | Mod: HCNC,CPTII,S$GLB, | Performed by: FAMILY MEDICINE

## 2024-10-01 PROCEDURE — 1160F RVW MEDS BY RX/DR IN RCRD: CPT | Mod: HCNC,CPTII,S$GLB, | Performed by: FAMILY MEDICINE

## 2024-10-01 PROCEDURE — 1157F ADVNC CARE PLAN IN RCRD: CPT | Mod: HCNC,CPTII,S$GLB, | Performed by: FAMILY MEDICINE

## 2024-10-01 PROCEDURE — G0008 ADMIN INFLUENZA VIRUS VAC: HCPCS | Mod: HCNC,S$GLB,, | Performed by: FAMILY MEDICINE

## 2024-10-01 PROCEDURE — 99214 OFFICE O/P EST MOD 30 MIN: CPT | Mod: HCNC,S$GLB,, | Performed by: FAMILY MEDICINE

## 2024-10-01 PROCEDURE — 3288F FALL RISK ASSESSMENT DOCD: CPT | Mod: HCNC,CPTII,S$GLB, | Performed by: FAMILY MEDICINE

## 2024-10-01 PROCEDURE — 99999 PR PBB SHADOW E&M-EST. PATIENT-LVL V: CPT | Mod: PBBFAC,HCNC,, | Performed by: FAMILY MEDICINE

## 2024-10-01 PROCEDURE — G2211 COMPLEX E/M VISIT ADD ON: HCPCS | Mod: HCNC,S$GLB,, | Performed by: FAMILY MEDICINE

## 2024-10-01 PROCEDURE — 1126F AMNT PAIN NOTED NONE PRSNT: CPT | Mod: HCNC,CPTII,S$GLB, | Performed by: FAMILY MEDICINE

## 2024-10-01 PROCEDURE — 1159F MED LIST DOCD IN RCRD: CPT | Mod: HCNC,CPTII,S$GLB, | Performed by: FAMILY MEDICINE

## 2024-10-01 PROCEDURE — 3078F DIAST BP <80 MM HG: CPT | Mod: HCNC,CPTII,S$GLB, | Performed by: FAMILY MEDICINE

## 2024-10-01 PROCEDURE — 3074F SYST BP LT 130 MM HG: CPT | Mod: HCNC,CPTII,S$GLB, | Performed by: FAMILY MEDICINE

## 2024-10-01 RX ORDER — AMITRIPTYLINE HYDROCHLORIDE 25 MG/1
12.5 TABLET, FILM COATED ORAL NIGHTLY
Qty: 45 TABLET | Refills: 3 | Status: SHIPPED | OUTPATIENT
Start: 2024-10-01 | End: 2025-10-01

## 2024-10-01 RX ORDER — ESCITALOPRAM OXALATE 10 MG/1
10 TABLET ORAL DAILY
Qty: 90 TABLET | Refills: 3 | Status: SHIPPED | OUTPATIENT
Start: 2024-10-01 | End: 2025-09-26

## 2024-10-01 RX ORDER — CYANOCOBALAMIN 1000 UG/ML
1000 INJECTION, SOLUTION INTRAMUSCULAR; SUBCUTANEOUS
Qty: 30 ML | Refills: 3 | Status: SHIPPED | OUTPATIENT
Start: 2024-10-01

## 2024-10-01 NOTE — TELEPHONE ENCOUNTER
Sure let us get her back in our clinic-she just saw her family doctor today looks like she sees them every six months-please schedule her in six-month for an annual visit

## 2024-10-01 NOTE — PROGRESS NOTES
"Subjective:       Patient ID: Trudy Rauch Dakin is a 79 y.o. female.    Chief Complaint: 6 month f/u    Here today for her annual exam    Immunizations: Due Flu, COVID, and RSV  Last Lab work: 2023  Colon Ca screening: no longer recommended   Breast Ca Screening: MMG 4/2023  Cervical Ca Screening:  no longer recommended    Type II DM: She is on Farixga (started by transplant MD for CHF).  HgA1c of 6.3 in 9/2024  HTN:  She is on Toprol and Spironolactone.  Her BP is controlled  A.Fib/CAD/HF:  She is on Beta Blocker, Eliquis and plavix.  Also on High Dose Statin.  She is on Bumex.  Seeing Cardiology.  S/P mitral clip in Jan.  HLD:  she is on Lipitor 80 mg.  Cholesterol to goal in April 3/2024  Chronic Pain:   She is off the Norco at this time  She is on Lyrica.   Insomnia:  She was on elavil 25 mg 1/2 tab at night to help her sleep.  She states her care giver is no longer giving her this medication.  She is not sleeping.  ELI/MDD:  She has been off Lexapro since her last visit with me. She is not happy with her current living situation.  She is crying constantly.  She is moving to live with her daughter in [place].          Review of Systems   Constitutional:  Negative for activity change and appetite change.   Respiratory:  Negative for cough, shortness of breath and wheezing.    Cardiovascular:  Negative for chest pain and palpitations.   Gastrointestinal:  Negative for abdominal pain, constipation, diarrhea and nausea.   Neurological:  Negative for dizziness, syncope, light-headedness and headaches.       Objective:      Vitals:    10/01/24 0915   BP: 116/62   Pulse: 64   Temp: 97.9 °F (36.6 °C)   SpO2: (!) 90%   Weight: 54.1 kg (119 lb 4.3 oz)   Height: 5' 1" (1.549 m)      Physical Exam  Constitutional:       General: She is not in acute distress.  Cardiovascular:      Rate and Rhythm: Normal rate and regular rhythm.      Heart sounds: Normal heart sounds. No murmur heard.  Pulmonary:      Effort: Pulmonary effort " is normal. No respiratory distress.      Breath sounds: Normal breath sounds. No wheezing, rhonchi or rales.   Musculoskeletal:         General: No swelling.   Skin:     General: Skin is warm and dry.   Neurological:      General: No focal deficit present.      Mental Status: She is alert.   Psychiatric:         Mood and Affect: Mood normal.         Behavior: Behavior normal.         Thought Content: Thought content normal.         Results for orders placed or performed in visit on 09/16/24   Microalbumin/Creatinine Ratio, Urine    Collection Time: 09/16/24  8:00 AM   Result Value Ref Range    Microalbumin, Urine 25.0 ug/mL    Creatinine, Urine 113.0 15.0 - 325.0 mg/dL    Microalb/Creat Ratio 22.1 0.0 - 30.0 ug/mg      Assessment:       1. Annual physical exam    2. Type 2 diabetes mellitus with stage 4 chronic kidney disease, without long-term current use of insulin    3. Essential hypertension    4. Chronic HFrEF (heart failure with reduced ejection fraction)    5. Paroxysmal atrial fibrillation    6. CAD (coronary artery disease)    7. Other hyperlipidemia    8. Major depressive disorder, single episode, mild    9. Insomnia, unspecified type    10. Depression    11. Need for vaccination    12. Vitamin B12 deficiency        Plan:       Annual physical exam  Continue to work on dietary improvements (decrease overall calorie intake, decrease sugar and carb intake, decrease animal protein intake)  Continue to exercise at least 30-40 minutes, 3 times per week  Immunizations were discussed and flu today  Preventative exams were discussed and up to date   Type 2 diabetes mellitus with stage 4 chronic kidney disease, without long-term current use of insulin  -     Ambulatory referral/consult to Family Practice; Future; Expected date: 10/08/2024  DM is controlled.  Continue SGLT-2  Essential hypertension  BP controlled.  Continue current medications  Chronic HFrEF (heart failure with reduced ejection fraction)  Mgt per  cardiology  Paroxysmal atrial fibrillation  Continue Beta blocker and Eliquis  CAD (coronary artery disease)  Continue current medications  Other hyperlipidemia  Continue Statin  Major depressive disorder, single episode, mild  Restart Lexapro today  Insomnia, unspecified type  -     amitriptyline (ELAVIL) 25 MG tablet; Take 0.5 tablets (12.5 mg total) by mouth every evening.  Dispense: 45 tablet; Refill: 3  Restart Elavil  Depression  -     EScitalopram oxalate (LEXAPRO) 10 MG tablet; Take 1 tablet (10 mg total) by mouth once daily.  Dispense: 90 tablet; Refill: 3    Need for vaccination  -     influenza (adjuvanted) (Fluad) 45 mcg/0.5 mL IM vaccine (> or = 66 yo) 0.5 mL    Vitamin B12 deficiency  -     cyanocobalamin 1,000 mcg/mL injection; Inject 1 mL (1,000 mcg total) into the muscle every 30 days.  Dispense: 30 mL; Refill: 3      She mentions that she will be moving to live with her daughter in [place].  Referral to [facility] today for Primary care.  If unable to get in, we can see her in 6 months.    Visit today included increased complexity associated with the care of the episodic problem HTN addressed and managing the longitudinal care of the patient due to the serious and/or complex managed problem(s) as above.       Medication List with Changes/Refills   Current Medications    ACETAMINOPHEN (TYLENOL) 650 MG TBSR    Take 1 tablet (650 mg total) by mouth every 8 (eight) hours as needed (pain).    APIXABAN (ELIQUIS) 2.5 MG TAB    Take 1 tablet (2.5 mg total) by mouth 2 (two) times daily.    ATORVASTATIN (LIPITOR) 80 MG TABLET    Take 1 tablet (80 mg total) by mouth once daily.    BUMETANIDE (BUMEX) 2 MG TABLET    Take 1.5 tablets (3 mg total) by mouth 2 (two) times daily.    DAPAGLIFLOZIN PROPANEDIOL (FARXIGA) 10 MG TABLET    Take 1 tablet (10 mg total) by mouth once daily.    ERGOCALCIFEROL (ERGOCALCIFEROL) 50,000 UNIT CAP    Take 1 capsule (50,000 Units total) by mouth every 7 days.    ERYTHROMYCIN  (ROMYCIN) OPHTHALMIC OINTMENT    Place into the left eye every evening.    GABAPENTIN (NEURONTIN) 100 MG CAPSULE    Take 1 capsule (100 mg total) by mouth every evening.    HYDROCODONE-ACETAMINOPHEN (NORCO) 5-325 MG PER TABLET    Take 1 tablet by mouth every 6 (six) hours as needed for Pain.    MIDODRINE (PROAMATINE) 10 MG TABLET    Take 1 tablet (10 mg total) by mouth as needed.    MUPIROCIN (BACTROBAN) 2 % OINTMENT    Apply topically once daily.    NITROGLYCERIN (NITROSTAT) 0.4 MG SL TABLET    Place 1 tablet (0.4 mg total) under the tongue every 5 (five) minutes as needed for Chest pain.    POTASSIUM CHLORIDE SA (K-DUR,KLOR-CON) 20 MEQ TABLET    Take 20 mEq by mouth once.    PREGABALIN (LYRICA) 75 MG CAPSULE    Take 1 capsule (75 mg total) by mouth 2 (two) times daily.   Changed and/or Refilled Medications    Modified Medication Previous Medication    AMITRIPTYLINE (ELAVIL) 25 MG TABLET amitriptyline (ELAVIL) 25 MG tablet       Take 0.5 tablets (12.5 mg total) by mouth every evening.    Take 0.5 tablets (12.5 mg total) by mouth every evening.    CYANOCOBALAMIN 1,000 MCG/ML INJECTION cyanocobalamin 1,000 mcg/mL injection       Inject 1 mL (1,000 mcg total) into the muscle every 30 days.    Inject 1 mL (1,000 mcg total) into the muscle every 30 days.    ESCITALOPRAM OXALATE (LEXAPRO) 10 MG TABLET EScitalopram oxalate (LEXAPRO) 10 MG tablet       Take 1 tablet (10 mg total) by mouth once daily.    Take 1 tablet (10 mg total) by mouth once daily.

## 2024-10-01 NOTE — TELEPHONE ENCOUNTER
----- Message from Sofie sent at 10/1/2024  4:28 PM CDT -----  Type:  Sooner Appointment Request    Caller is requesting a sooner appointment.  Caller declined first available appointment listed below.  Caller will not accept being placed on the waitlist and is requesting a message be sent to doctor.  Name of Caller: Pt   When is the first available appointment? N/A  Symptoms: re-eca  Would the patient rather a call back or a response via [facility]? Call   Best Call Back Number: [phone]   Additional Information:     pt recently moved back to [facility] with her daughter, states her insurance should be accepted, prefers Dr.St Duran since she was a pt of his years ago    Last visit here was prior to [facility] Ok to add in the next few weeks or months?
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
## 2024-10-18 DIAGNOSIS — I50.33 ACUTE ON CHRONIC DIASTOLIC HEART FAILURE: ICD-10-CM

## 2024-10-18 RX ORDER — BUMETANIDE 2 MG/1
3 TABLET ORAL 2 TIMES DAILY
Qty: 90 TABLET | Refills: 3 | Status: SHIPPED | OUTPATIENT
Start: 2024-10-18

## 2024-10-25 ENCOUNTER — OFFICE VISIT (OUTPATIENT)
Dept: FAMILY MEDICINE | Facility: CLINIC | Age: 79
End: 2024-10-25
Payer: MEDICARE

## 2024-10-25 VITALS
SYSTOLIC BLOOD PRESSURE: 126 MMHG | HEIGHT: 61 IN | TEMPERATURE: 98 F | BODY MASS INDEX: 22.24 KG/M2 | WEIGHT: 117.81 LBS | HEART RATE: 91 BPM | DIASTOLIC BLOOD PRESSURE: 80 MMHG | OXYGEN SATURATION: 96 %

## 2024-10-25 DIAGNOSIS — N18.4 TYPE 2 DIABETES MELLITUS WITH STAGE 4 CHRONIC KIDNEY DISEASE, WITHOUT LONG-TERM CURRENT USE OF INSULIN: Primary | ICD-10-CM

## 2024-10-25 DIAGNOSIS — I50.22 CHRONIC HFREF (HEART FAILURE WITH REDUCED EJECTION FRACTION): ICD-10-CM

## 2024-10-25 DIAGNOSIS — I10 ESSENTIAL HYPERTENSION: ICD-10-CM

## 2024-10-25 DIAGNOSIS — E11.22 TYPE 2 DIABETES MELLITUS WITH STAGE 4 CHRONIC KIDNEY DISEASE, WITHOUT LONG-TERM CURRENT USE OF INSULIN: Primary | ICD-10-CM

## 2024-10-25 RX ORDER — VENLAFAXINE HYDROCHLORIDE 150 MG/1
150 CAPSULE, EXTENDED RELEASE ORAL DAILY
Start: 2024-10-25 | End: 2024-10-25 | Stop reason: SDUPTHER

## 2024-10-25 RX ORDER — VENLAFAXINE HYDROCHLORIDE 150 MG/1
150 CAPSULE, EXTENDED RELEASE ORAL DAILY
Qty: 30 CAPSULE | Refills: 5 | Status: SHIPPED | OUTPATIENT
Start: 2024-10-25 | End: 2025-10-25

## 2024-10-25 RX ORDER — AMITRIPTYLINE HYDROCHLORIDE 10 MG/1
10 TABLET, FILM COATED ORAL NIGHTLY PRN
Qty: 30 TABLET | Refills: 1 | Status: SHIPPED | OUTPATIENT
Start: 2024-10-25 | End: 2025-10-25

## 2024-11-03 NOTE — PROGRESS NOTES
" Patient ID: Trudy Rauch Dakin is a 79 y.o. female.    Chief Complaint: Annual Exam    HPI      Trudy Rauch Dakin is a 79 y.o. female. here for reestablishment of care.  No current complaints.  Follow-up for type 2 diabetes with stage 4 chronic kidney disease all stable.  Hypertension which is under good control.  Diastolic heart failure which is well-controlled.                Review of Symptoms    Constitutional: Negative.    HENT: Negative.    Eyes: Negative.    Respiratory: Negative.    Cardiovascular: Negative.    Gastrointestinal: Negative.    Endocrine: Negative.    Genitourinary: Negative.    Musculoskeletal: Negative.    Skin: Negative.    Allergic/Immunologic: Negative.    Neurological: Negative.    Hematological: Negative.    Psychiatric/Behavioral: Negative.      Except as above in HPI      Vitals:    10/25/24 1454   BP: 126/80   BP Location: Left arm   Patient Position: Sitting   Pulse: 91   Temp: 98.4 °F (36.9 °C)   TempSrc: Oral   SpO2: 96%   Weight: 53.4 kg (117 lb 13.4 oz)   Height: 5' 1" (1.549 m)        Physical  Exam      Constitutional:  Oriented to person, place, and time. Appears well-developed and well-nourished.     HENT:   Head: Normocephalic and atraumatic.     Right Ear: Tympanic membrane, ear canal and External ear normal     Left Ear: Tympanic membrane, ear canal and External ear normal     Nose: Nose normal. No rhinorrhea or nasal deformity.     Mouth/Throat: Uvula is midline, oropharynx is clear and moist and mucous membranes are normal.      Eyes: Conjunctivae are normal. Right eye exhibits no discharge. Left eye exhibits no discharge. No scleral icterus.     Neck:  No JVD present. No tracheal deviation  []  Neck supple.   []  No Carotid bruit    Cardiovascular:  Regular rate and rhythm with normal S1 and S2     Pulmonary/Chest:   Clear to auscultation bilaterally without wheezes, rhonchi or rales    Musculoskeletal: Normal range of motion. No edema or tenderness.   No deformity "     Lymphadenopathy:  No cervical adenopathy.     Neurological:  Alert and oriented to person, place, and time. Coordination normal.     Skin: Skin is warm and dry. No rash noted.     Psychiatric: Normal mood and affect. Speech is normal and behavior is normal. Judgment and thought content normal.     Physical Exam              Complete Blood Count  Lab Results   Component Value Date    RBC 4.09 07/23/2024    HGB 11.9 (L) 07/23/2024    HCT 36.2 (L) 07/23/2024    MCV 89 07/23/2024    MCH 29.1 07/23/2024    MCHC 32.9 07/23/2024    RDW 14.1 07/23/2024     07/23/2024    MPV 10.4 07/23/2024    GRAN 5.0 07/23/2024    GRAN 70.3 07/23/2024    LYMPH 1.4 07/23/2024    LYMPH 19.6 07/23/2024    MONO 0.5 07/23/2024    MONO 7.3 07/23/2024    EOS 0.1 07/23/2024    BASO 0.05 07/23/2024    EOSINOPHIL 1.8 07/23/2024    BASOPHIL 0.7 07/23/2024    DIFFMETHOD Automated 07/23/2024       Comprehensive Metabolic Panel  Lab Results   Component Value Date     (H) 09/16/2024    BUN 29 (H) 09/16/2024    CREATININE 1.6 (H) 09/16/2024     09/16/2024    K 3.2 (L) 09/16/2024     09/16/2024    PROT 6.6 09/16/2024    ALBUMIN 3.6 09/16/2024    BILITOT 0.6 09/16/2024    AST 17 09/16/2024    ALKPHOS 91 09/16/2024    CO2 29 09/16/2024    ALT 9 (L) 09/16/2024    ANIONGAP 12 09/16/2024       TSH  Lab Results   Component Value Date    TSH 1.660 05/25/2024       Assessment / Plan:      ICD-10-CM ICD-9-CM   1. Type 2 diabetes mellitus with stage 4 chronic kidney disease, without long-term current use of insulin  E11.22 250.40    N18.4 585.4   2. Essential hypertension  I10 401.9   3. Chronic HFrEF (heart failure with reduced ejection fraction)  I50.22 428.22     Type 2 diabetes mellitus with stage 4 chronic kidney disease, without long-term current use of insulin  -     Lipid Panel; Future; Expected date: 10/25/2024  -     TSH; Future; Expected date: 10/25/2024  -     Comprehensive Metabolic Panel; Future; Expected date:  10/25/2024  -     Microalbumin/Creatinine Ratio, Urine; Future; Expected date: 10/25/2024  -     Hemoglobin A1C; Future; Expected date: 10/25/2024  -     CBC Auto Differential; Future; Expected date: 10/25/2024    Essential hypertension  -     Lipid Panel; Future; Expected date: 10/25/2024  -     TSH; Future; Expected date: 10/25/2024  -     Comprehensive Metabolic Panel; Future; Expected date: 10/25/2024  -     Microalbumin/Creatinine Ratio, Urine; Future; Expected date: 10/25/2024  -     Hemoglobin A1C; Future; Expected date: 10/25/2024  -     CBC Auto Differential; Future; Expected date: 10/25/2024    Chronic HFrEF (heart failure with reduced ejection fraction)  -     Lipid Panel; Future; Expected date: 10/25/2024  -     TSH; Future; Expected date: 10/25/2024  -     Comprehensive Metabolic Panel; Future; Expected date: 10/25/2024  -     Microalbumin/Creatinine Ratio, Urine; Future; Expected date: 10/25/2024  -     Hemoglobin A1C; Future; Expected date: 10/25/2024  -     CBC Auto Differential; Future; Expected date: 10/25/2024    Other orders  -     amitriptyline (ELAVIL) 10 MG tablet; Take 1 tablet (10 mg total) by mouth nightly as needed for Insomnia.  Dispense: 30 tablet; Refill: 1  -     Discontinue: venlafaxine (EFFEXOR-XR) 150 MG Cp24; Take 1 capsule (150 mg total) by mouth once daily.  -     venlafaxine (EFFEXOR-XR) 150 MG Cp24; Take 1 capsule (150 mg total) by mouth once daily. For mood and neruopathy  dc lexapro  Dispense: 30 capsule; Refill: 5        Continue current medications follow-up in six months     Mood continue Effexor   Elavil for sleep

## 2024-11-04 ENCOUNTER — OFFICE VISIT (OUTPATIENT)
Dept: CARDIOLOGY | Facility: CLINIC | Age: 79
End: 2024-11-04
Payer: MEDICARE

## 2024-11-04 VITALS
WEIGHT: 121.94 LBS | SYSTOLIC BLOOD PRESSURE: 133 MMHG | BODY MASS INDEX: 23.02 KG/M2 | OXYGEN SATURATION: 98 % | DIASTOLIC BLOOD PRESSURE: 81 MMHG | HEART RATE: 74 BPM | HEIGHT: 61 IN

## 2024-11-04 DIAGNOSIS — Z95.818 S/P MITRAL VALVE CLIP IMPLANTATION: ICD-10-CM

## 2024-11-04 DIAGNOSIS — I38 VALVULAR HEART DISEASE: ICD-10-CM

## 2024-11-04 DIAGNOSIS — I34.0 MITRAL VALVE INSUFFICIENCY, UNSPECIFIED ETIOLOGY: ICD-10-CM

## 2024-11-04 DIAGNOSIS — I48.0 PAROXYSMAL ATRIAL FIBRILLATION: Primary | Chronic | ICD-10-CM

## 2024-11-04 DIAGNOSIS — Z98.890 S/P MITRAL VALVE CLIP IMPLANTATION: ICD-10-CM

## 2024-11-04 DIAGNOSIS — N18.4 TYPE 2 DIABETES MELLITUS WITH STAGE 4 CHRONIC KIDNEY DISEASE, WITHOUT LONG-TERM CURRENT USE OF INSULIN: ICD-10-CM

## 2024-11-04 DIAGNOSIS — I10 ESSENTIAL HYPERTENSION: ICD-10-CM

## 2024-11-04 DIAGNOSIS — I50.22 CHRONIC HFREF (HEART FAILURE WITH REDUCED EJECTION FRACTION): ICD-10-CM

## 2024-11-04 DIAGNOSIS — I27.20 PULMONARY HTN: ICD-10-CM

## 2024-11-04 DIAGNOSIS — E11.22 TYPE 2 DIABETES MELLITUS WITH STAGE 4 CHRONIC KIDNEY DISEASE, WITHOUT LONG-TERM CURRENT USE OF INSULIN: ICD-10-CM

## 2024-11-04 DIAGNOSIS — E78.49 OTHER HYPERLIPIDEMIA: ICD-10-CM

## 2024-11-04 DIAGNOSIS — I25.10 CORONARY ARTERY DISEASE INVOLVING NATIVE CORONARY ARTERY OF NATIVE HEART WITHOUT ANGINA PECTORIS: Chronic | ICD-10-CM

## 2024-11-04 DIAGNOSIS — N18.32 STAGE 3B CHRONIC KIDNEY DISEASE: ICD-10-CM

## 2024-11-04 PROCEDURE — 99999 PR PBB SHADOW E&M-EST. PATIENT-LVL IV: CPT | Mod: PBBFAC,HCNC,, | Performed by: INTERNAL MEDICINE

## 2024-11-04 PROCEDURE — 1126F AMNT PAIN NOTED NONE PRSNT: CPT | Mod: HCNC,CPTII,S$GLB, | Performed by: INTERNAL MEDICINE

## 2024-11-04 PROCEDURE — 1159F MED LIST DOCD IN RCRD: CPT | Mod: HCNC,CPTII,S$GLB, | Performed by: INTERNAL MEDICINE

## 2024-11-04 PROCEDURE — 1101F PT FALLS ASSESS-DOCD LE1/YR: CPT | Mod: HCNC,CPTII,S$GLB, | Performed by: INTERNAL MEDICINE

## 2024-11-04 PROCEDURE — 3075F SYST BP GE 130 - 139MM HG: CPT | Mod: HCNC,CPTII,S$GLB, | Performed by: INTERNAL MEDICINE

## 2024-11-04 PROCEDURE — 3288F FALL RISK ASSESSMENT DOCD: CPT | Mod: HCNC,CPTII,S$GLB, | Performed by: INTERNAL MEDICINE

## 2024-11-04 PROCEDURE — 99214 OFFICE O/P EST MOD 30 MIN: CPT | Mod: HCNC,S$GLB,, | Performed by: INTERNAL MEDICINE

## 2024-11-04 PROCEDURE — 3079F DIAST BP 80-89 MM HG: CPT | Mod: HCNC,CPTII,S$GLB, | Performed by: INTERNAL MEDICINE

## 2024-11-04 PROCEDURE — 1157F ADVNC CARE PLAN IN RCRD: CPT | Mod: HCNC,CPTII,S$GLB, | Performed by: INTERNAL MEDICINE

## 2024-11-04 NOTE — PROGRESS NOTES
Cardiology Clinic Visit    History of Present Illness:       Trudy Rauch Dakin is a pleasant 79 y.o. female with PMH noted below in assessment/plan referred to establish with us as she just moved to [facility]. Previously saw Dr. Gomez  Has been doing very well the last few months. Denies Chest Discomfort/ALEGRE/SOB/Palpitation/Syncope. No recently hospitalizations for HF. Living with daughter now who had b/l TKA.  Holding off on her own TKA until after holidays. Compliant with medications.        History obtained by patient interview and supplemented by nursing documentation and chart review.   Objective   PMHx:  has a past medical history of CHF (congestive heart failure), Diabetes, Diverticulitis, Hypertension, and Renal disorder.   SurgHx:  has a past surgical history that includes Appendectomy; Carpal tunnel release; Abdominal surgery (2006); Mandible fracture surgery (1970); Rotator cuff repair (Left, 11/2016); Oophorectomy (1970); Anterior cervical discectomy w/ fusion (N/A, 8/30/2018); Total Reduction Mammoplasty (Bilateral, 1990); Hysterectomy (1970); Colectomy (07/2020); Transforaminal epidural injection of steroid (Left, 12/23/2020); Epidural steroid injection into lumbar spine (N/A, 1/20/2021); Microdiscectomy of spine (Left, 4/13/2021); Laryngoscopy (N/A, 1/4/2022); Transesophageal echocardiography (N/A, 11/1/2023); mitral clip (N/A, 12/20/2023); and Transesophageal echocardiography (N/A, 12/20/2023).   FamHx: family history includes Heart disease in her father.   SocialHx:  reports that she has never smoked. She has never been exposed to tobacco smoke. She has never used smokeless tobacco. She reports that she does not drink alcohol and does not use drugs.  Home Meds:  Current Outpatient Medications   Medication Instructions    acetaminophen (TYLENOL) 650 mg, Oral, Every 8 hours PRN    amitriptyline (ELAVIL) 10 mg, Oral, Nightly PRN    apixaban (ELIQUIS) 2.5 mg, Oral, 2 times daily    atorvastatin  (LIPITOR) 80 mg, Oral, Daily    bumetanide (BUMEX) 3 mg, Oral, 2 times daily    cyanocobalamin 1,000 mcg, Intramuscular, Every 30 days    dapagliflozin propanediol (FARXIGA) 10 mg, Oral, Daily    ergocalciferol (ERGOCALCIFEROL) 50,000 Units, Oral, Every 7 days    erythromycin (ROMYCIN) ophthalmic ointment Left Eye, Nightly    gabapentin (NEURONTIN) 100 mg, Oral, Nightly    HYDROcodone-acetaminophen (NORCO) 5-325 mg per tablet 1 tablet, Oral, Every 6 hours PRN    midodrine (PROAMATINE) 10 mg, Oral, As needed (PRN)    mupirocin (BACTROBAN) 2 % ointment Daily    nitroGLYCERIN (NITROSTAT) 0.4 mg, Sublingual, Every 5 min PRN    potassium chloride SA (K-DUR,KLOR-CON) 20 MEQ tablet 20 mEq, Once    pregabalin (LYRICA) 75 mg, Oral, 2 times daily    venlafaxine (EFFEXOR-XR) 150 mg, Oral, Daily, For mood and neruopathy  dc lexapro     Objective/Exam:   LMP 01/01/1970    Wt Readings from Last 4 Encounters:   10/25/24 53.4 kg (117 lb 13.4 oz)   10/01/24 54.1 kg (119 lb 4.3 oz)   08/12/24 56.7 kg (125 lb)   07/31/24 54 kg (119 lb)      Constitutional: NAD, Atraumatic, Conversant   HEENT: MMM, Sclera anicteric, No JVD   Cardiovascular: RRR, no murmurs noted, no chest tenderness to palpation, 2+ radial pulses b/l  Pulmonary: normal respiratory effort, CTAB, no crackles, wheezes  Abdominal: S/NT/ND  Musculoskeletal: No lower extremity edema noted b/l  Neurological: No gross neurological deficits  Skin: W/D/I  Psych: Appropriate affect, normal mood  Labs/Imaging/Procedures   Personally reviewed  Lab Results   Component Value Date     09/16/2024    K 3.2 (L) 09/16/2024     09/16/2024    CO2 29 09/16/2024    BUN 29 (H) 09/16/2024    CREATININE 1.6 (H) 09/16/2024    ANIONGAP 12 09/16/2024     Lab Results   Component Value Date    HGBA1C 6.3 (H) 09/16/2024     Lab Results   Component Value Date     (H) 08/12/2024     (H) 06/10/2024     (H) 05/20/2024     (H) 05/20/2024      Lab Results   Component  "Value Date    WBC 7.13 07/23/2024    HGB 11.9 (L) 07/23/2024    HCT 36.2 (L) 07/23/2024     07/23/2024    GRAN 5.0 07/23/2024    GRAN 70.3 07/23/2024     Lab Results   Component Value Date    CHOL 126 03/21/2024    HDL 37 (L) 03/21/2024    LDLCALC 71.4 03/21/2024    LDLCALC 101.2 04/12/2023    LDLCALC 73 09/13/2022    TRIG 88 03/21/2024     Lab Results   Component Value Date    TSH 1.660 05/25/2024     No results found for: "APOLIPOPROTE"  No results found for: "LIPOA"     TTE:  Results for orders placed during the hospital encounter of 07/31/24    Echo    Interpretation Summary    Left Ventricle: Mildly increased wall thickness. Mild global hypokinesis present. There is mildly reduced systolic function with a visually estimated ejection fraction of 45 - 50%. Grade I diastolic dysfunction.    Right Ventricle: Normal right ventricular cavity size. Wall thickness is normal. Systolic function is normal.    Left Atrium: Left atrium is severely dilated.    Aortic Valve: There is mild aortic valve sclerosis. Mildly restricted motion. There is mild to moderate stenosis. Aortic valve area by VTI is 1.39 cm². Aortic valve peak velocity is 2.36 m/s. Mean gradient is 14 mmHg. The dimensionless index is 0.40. There is mild to moderate aortic regurgitation.    Mitral Valve: There is mild to moderate regurgitation.    Tricuspid Valve: There is moderate regurgitation.    Pulmonary Artery: There is mild pulmonary hypertension. The estimated pulmonary artery systolic pressure is 40 mmHg.    IVC/SVC: Normal venous pressure at 3 mmHg.    Stress Testing:   No results found for this or any previous visit.     Coronary Angiogram:  Results for orders placed during the hospital encounter of 12/20/23    Cardiac catheterization    Conclusion    The estimated blood loss was none.    The MitralClip was successfully placed.    The procedure log was documented by Documenter: Sully Alvarenga and verified by Kevin More, " MD.    Date: 2023  Time: 10:21 AM      Personal: Enjoys woodcrafts that she places in yard. Cuts own wood.   about one year ago. Recently moved in with daughter to [facility].     34 minutes were spent on this visit including time personally:  -Reviewing Medical records & lab results  -Independently reviewing and interpreting (if not documented by myself) EKGs, echocardiograms, catherizations   -Obtaining a history, performing a relevant exam, counseling/educating the patient/family  -Documenting clinical information in the EMR including ordering of tests  -Care coordination and communicating with other health care providers       Problem List:     1. Chronic HFrEF (heart failure with reduced ejection fraction)    2. Essential hypertension    3. Mitral valve insufficiency, unspecified etiology      Assessment/Plan:   Trudy Rauch Dakin is a 79 y.o. female with a PMHx of HTN, NIDDM, CKDIV, Afib on AC, CAD(s/p remote PCI), MR s/p Jazmin Clip (2023), HFmrEF, Mod AS (DI 0.40), mild-mod AR, severe LAE w/ mod MR, PAP 40. Euvolemic today w/o sx.     -bumex 2mg BID-Euvolemic today  -EF 45*-50% on TTE from 24  -Repeat TTE in 3-4 months w/ labs, f/u with me after  -cont apix 2.5 BID for parox afib, currently in SR  -Continue atorva 80, ldl at goal  -cont dapagliflozin 10  -On midodrine 10mg as needed for low BP  -has NG SL but not used in years  -Hold off on restarting further therapies as she's doing quite well      Thank you for the opportunity to care for this patient. Please don't hesitate to reach out with any questions/concerns        Vic Martínez MD  Interventional Cardiology  [facility]

## 2024-11-22 ENCOUNTER — PATIENT MESSAGE (OUTPATIENT)
Dept: PODIATRY | Facility: CLINIC | Age: 79
End: 2024-11-22
Payer: MEDICARE

## 2024-12-02 ENCOUNTER — TELEPHONE (OUTPATIENT)
Dept: FAMILY MEDICINE | Facility: CLINIC | Age: 79
End: 2024-12-02
Payer: MEDICARE

## 2024-12-02 NOTE — TELEPHONE ENCOUNTER
----- Message from Amanda sent at 12/2/2024 11:41 AM CST -----  Contact: pt  Type:  Medication     Who Called: pt   Would the patient rather a call back or a response via [facility]? call  Best Call Back Number: [phone]   Additional Information:    Pt requesting a call regarding     sleep medication amitriptyline (ELAVIL) 10 MG tablet she taking not working

## 2024-12-03 NOTE — TELEPHONE ENCOUNTER
The number listed in the message was for the pt's dIL who is no longer assisting the pt with care as much.    Attempted the pt's daughter, EboniMelida BRASWELL to call the office back.

## 2024-12-03 NOTE — TELEPHONE ENCOUNTER
Have you increase the number of tablets you take at night-increasing to two tablets or three tablets?

## 2024-12-04 NOTE — TELEPHONE ENCOUNTER
"Attempted pt's daughter, Eboni. Recording indicated "the wireless customer that you are calling, isn't available to take calls at this time".   "

## 2024-12-05 NOTE — TELEPHONE ENCOUNTER
Spoke with pt and daughter, Eboni, regarding Amitriptyline. Pt stated that she had been trying to cut Amitriptyline in half and it would just crumble. She inquired about getting a new prescription. Pt informed that she has remaining refills.     I called [facility] to confirm that pt has refills. I spoke with Amauri. He will fill rx and pt can  today.     Pt made aware.

## 2024-12-16 ENCOUNTER — TELEPHONE (OUTPATIENT)
Dept: FAMILY MEDICINE | Facility: CLINIC | Age: 79
End: 2024-12-16
Payer: MEDICARE

## 2024-12-16 DIAGNOSIS — I50.33 ACUTE ON CHRONIC DIASTOLIC HEART FAILURE: ICD-10-CM

## 2024-12-16 DIAGNOSIS — I50.33 ACUTE ON CHRONIC DIASTOLIC HEART FAILURE: Primary | ICD-10-CM

## 2024-12-16 RX ORDER — DAPAGLIFLOZIN 10 MG/1
10 TABLET, FILM COATED ORAL DAILY
Qty: 30 TABLET | Refills: 5 | OUTPATIENT
Start: 2024-12-16

## 2024-12-16 NOTE — TELEPHONE ENCOUNTER
----- Message from Halina sent at 12/16/2024 11:15 AM CST -----  Type:  Patient Returning Call    Who Called:Pt   Would the patient rather a call back or a response via [facility]? Call back   Best Call Back Number:[phone]  Additional Information: in ref to refill of fluid med... do not know the name of medication     Pt says [facility] have been trying to reach the office

## 2024-12-16 NOTE — TELEPHONE ENCOUNTER
I spoke with pharmacy staff at [facility]. I was informed that they have not been trying to contact us. However, they will contact the original prescribing physician to obtain refills. I've attempted contacting pt on the contact number provided and was informed that this is no longer that pt's contact info.

## 2024-12-16 NOTE — TELEPHONE ENCOUNTER
No care due was identified.  [facility] Embedded Care Due Messages. Reference number: [id number].   12/16/2024 2:34:43 PM CST

## 2024-12-16 NOTE — TELEPHONE ENCOUNTER
Not sure what medications she is talking about.  The medication Bumex 2 milligrams was prescribed by another physician.  This should be additional refills.

## 2024-12-17 RX ORDER — DAPAGLIFLOZIN 10 MG/1
10 TABLET, FILM COATED ORAL DAILY
Qty: 30 TABLET | Refills: 5 | Status: SHIPPED | OUTPATIENT
Start: 2024-12-17

## 2024-12-17 RX ORDER — BUMETANIDE 2 MG/1
3 TABLET ORAL 2 TIMES DAILY
Qty: 270 TABLET | Refills: 3 | Status: SHIPPED | OUTPATIENT
Start: 2024-12-17

## 2024-12-17 NOTE — TELEPHONE ENCOUNTER
Care Due:                  Date            Visit Type   Department     Provider  --------------------------------------------------------------------------------                                             [facility]  Last Visit: 10-      [facility]          MEDICINE       Derrick Caldwell                               -                              PRIMARY      [facility]  Next Visit: 04-      [facility]   MEDICINE       Derrick Caldwell                                                            Last  Test          Frequency    Reason                     Performed    Due Date  --------------------------------------------------------------------------------    Lipid Panel.  12 months..  atorvastatin.............  03- 03-    [facility] Embedded Care Due Messages. Reference number: [id number].   12/17/2024 9:48:45 AM CST

## 2024-12-17 NOTE — TELEPHONE ENCOUNTER
----- Message from Any sent at 12/17/2024  9:38 AM CST -----  Regarding: Call back  Contact: [phone] or [phone]  Type:  RX Refill Request    Who Called: PT   Refill or New Rx: Refills   RX Name and Strength: dapagliflozin propanediol (FARXIGA) 10 mg tablet 30 tablet  How is the patient currently taking it? (ex. 1XDay): 1 x a day   Is this a 30 day or 90 day RX: 30   Preferred Pharmacy with phone number: [address]  Phone: [phone]  Fax: [phone]

## 2024-12-17 NOTE — TELEPHONE ENCOUNTER
Refill Routing Note   Medication(s) are not appropriate for processing by [facility] for the following reason(s):        No active prescription written by provider  Required labs abnormal    [facility] action(s):  Defer               Appointments  past 12m or future 3m with PCP    Date Provider   Last Visit   10/25/2024 Derrick Caldwell MD   Next Visit   12/17/2024 Derrick Caldwell MD   ED visits in past 90 days: 0        Note composed:11:39 AM 12/17/2024

## 2024-12-30 DIAGNOSIS — M17.11 PRIMARY OSTEOARTHRITIS OF RIGHT KNEE: Primary | ICD-10-CM

## 2025-01-14 ENCOUNTER — RESEARCH ENCOUNTER (OUTPATIENT)
Dept: RESEARCH | Facility: HOSPITAL | Age: 80
End: 2025-01-14
Payer: MEDICARE

## 2025-01-14 ENCOUNTER — HOSPITAL ENCOUNTER (OUTPATIENT)
Dept: RADIOLOGY | Facility: HOSPITAL | Age: 80
Discharge: HOME | End: 2025-01-14
Attending: ORTHOPAEDIC SURGERY
Payer: MEDICARE

## 2025-01-14 ENCOUNTER — OFFICE VISIT (OUTPATIENT)
Dept: ORTHOPEDICS | Facility: CLINIC | Age: 80
End: 2025-01-14
Payer: MEDICARE

## 2025-01-14 VITALS — BODY MASS INDEX: 23.02 KG/M2 | WEIGHT: 121.94 LBS | HEIGHT: 61 IN

## 2025-01-14 DIAGNOSIS — G89.29 CHRONIC PAIN OF RIGHT KNEE: ICD-10-CM

## 2025-01-14 DIAGNOSIS — M25.562 CHRONIC PAIN OF LEFT KNEE: ICD-10-CM

## 2025-01-14 DIAGNOSIS — M25.561 CHRONIC PAIN OF RIGHT KNEE: ICD-10-CM

## 2025-01-14 DIAGNOSIS — M17.12 PRIMARY OSTEOARTHRITIS OF LEFT KNEE: Primary | ICD-10-CM

## 2025-01-14 DIAGNOSIS — M17.11 PRIMARY OSTEOARTHRITIS OF RIGHT KNEE: ICD-10-CM

## 2025-01-14 DIAGNOSIS — G89.29 CHRONIC PAIN OF LEFT KNEE: ICD-10-CM

## 2025-01-14 PROCEDURE — 1159F MED LIST DOCD IN RCRD: CPT | Mod: HCNC,CPTII,S$GLB, | Performed by: ORTHOPAEDIC SURGERY

## 2025-01-14 PROCEDURE — 1101F PT FALLS ASSESS-DOCD LE1/YR: CPT | Mod: HCNC,CPTII,S$GLB, | Performed by: ORTHOPAEDIC SURGERY

## 2025-01-14 PROCEDURE — 1125F AMNT PAIN NOTED PAIN PRSNT: CPT | Mod: HCNC,CPTII,S$GLB, | Performed by: ORTHOPAEDIC SURGERY

## 2025-01-14 PROCEDURE — 99214 OFFICE O/P EST MOD 30 MIN: CPT | Mod: HCNC,S$GLB,, | Performed by: ORTHOPAEDIC SURGERY

## 2025-01-14 PROCEDURE — 99999 PR PBB SHADOW E&M-EST. PATIENT-LVL IV: CPT | Mod: PBBFAC,HCNC,, | Performed by: ORTHOPAEDIC SURGERY

## 2025-01-14 PROCEDURE — 73564 X-RAY EXAM KNEE 4 OR MORE: CPT | Mod: 26,HCNC,RT, | Performed by: RADIOLOGY

## 2025-01-14 PROCEDURE — 3288F FALL RISK ASSESSMENT DOCD: CPT | Mod: HCNC,CPTII,S$GLB, | Performed by: ORTHOPAEDIC SURGERY

## 2025-01-14 PROCEDURE — 3072F LOW RISK FOR RETINOPATHY: CPT | Mod: HCNC,CPTII,S$GLB, | Performed by: ORTHOPAEDIC SURGERY

## 2025-01-14 PROCEDURE — 1157F ADVNC CARE PLAN IN RCRD: CPT | Mod: HCNC,CPTII,S$GLB, | Performed by: ORTHOPAEDIC SURGERY

## 2025-01-14 PROCEDURE — 73564 X-RAY EXAM KNEE 4 OR MORE: CPT | Mod: TC,HCNC,PN,RT

## 2025-01-14 NOTE — PROGRESS NOTES
Protocol: innovations in [employer] Outcomes Registry (Deysi)  Protocol#: Deysi  IRB#: 2021.156  Version Date: 10-Gokul-2023  PI: Eric Hines MD  Patient Initials: [email or web address]     Study Recruitment Note:     Research coordinator met with patient and daughter, in private clinic room, to discuss above mentioned protocol. Patient is alert and oriented x 3. Mood and affect appropriate to the situation. Patient states that she is not participating in any other research studies. Patient states understanding about the diagnosis of osteoarthritis of the knee and of the procedure to being done on that knee.  Purpose of study reviewed with the patient.  Patient states understanding of this information.   Length of study and number of participants reviewed with patient; patient states understanding of the length of the study.   Study procedure discussed in detail with patient. Patient states understanding of this information.  Potential benefits of study along with costs and/or payment reimbursement per insurance discussed with patient; Patient states understanding that insurance will cover cost of all standard of care medications and procedures. Patient aware of $20 payment for qualifying visits with completed questionnaires.  Alternative treatment methods discussed with patient; Patient states understanding of this information.   Study related questions/compensation for injury, and whom to contact regarding rights as a research subject all reviewed with patient; Patient verbalizes understanding of this information.   Voluntary participation and withdrawal from research stressed to patient; patient states understanding that she may withdraw consent at any time without compromise to care.   Confidentiality and HIPAA discussed with patient. Patient verbalizes understanding of this information.        Patient states her interest in study and will decide participation at a later date. Study brochure and paper copy of consent form was given  to patient for review. She was instructed to call if she has any questions or concerns.

## 2025-01-14 NOTE — PROGRESS NOTES
\[facility]\"" Orthopaedic Surgery Clinic Note    79F past medical history of congestive heart failure and type 2 diabetes (last hemoglobin A1c was 6.3) presenting to the clinic with chronic bilateral knee pain right side much worse than left.    Patient also reports her right knee hitting into her left knee causing her to almost trip and fall frequently.  She states that this is a chronic issue that she has been dealing with for many years and there was no injury that began this.  Patient denies any numbness or tingling in the right lower extremity.  Patient says she does not need a cane or a walker to ambulate independently.  Patient uses Tylenol for her pain which she says helps a little bit and has gotten multiple injections in the knees with the most recent being in the right knee about 3 months ago which only give her some relief for about 1 week.  Patient denies ever having any surgery to her knees or her hips.  Patient takes blood thinners.    KNEE PAIN: Complains of pain to the bilateral knee.   PAIN LOCATED: diffuse  ONSET: 4 years ago.  QUALITY:  Patient states the pain is worsening  HISTORY: Previous knee injury/surgery: No    ASSOCIATED SYMPTOM AND TRIGGERS:Standing/Weightbearing, walking, trouble w stairs, stiffness, swelling, limping, stiffness w sitting   Has tried and failed cardiovascular activities such as walking, biking and resistance exercises  USES ASSISTIVE DEVICE: none  RELIEVED BY:medication: tylenol, injections  PATIENT DENIES: bruising, redness    PMH:   Past Medical History:   Diagnosis Date    CHF (congestive heart failure)     Diabetes     Diverticulitis     Hypertension     Renal disorder      PSH:   Past Surgical History:   Procedure Laterality Date    ABDOMINAL SURGERY  2006    diverticulitis x3    ANTERIOR CERVICAL DISCECTOMY W/ FUSION N/A 8/30/2018    FUSION C6-7 Surgeon: Rickey Martin MD    APPENDECTOMY      CARPAL TUNNEL RELEASE      COLECTOMY  07/2020    EPIDURAL STEROID INJECTION INTO  LUMBAR SPINE N/A 1/20/2021    Procedure: Injection-steroid-epidural-lumbar--L3-4;  Surgeon: Colleen Carvajal MD;  Location: [facility] PAIN MGT;  Service: Pain Management;  Laterality: N/A;    HYSTERECTOMY  1970    @25yrs of age    LARYNGOSCOPY N/A 1/4/2022    Procedure: Suspension microlaryngoscopy with left vocal fold injection augmentation - Restylane L;  Surgeon: Yuan Mir MD;  [address]  Service: ENT;  Laterality: N/A;  Microscope, telescopes, tower, injector, Restylane-L    MANDIBLE FRACTURE SURGERY  1970    MICRODISCECTOMY OF SPINE Left 4/13/2021    Procedure: MICRODISCECTOMY, SPINE Left L3-4 far lateral Microdiscectomy;  Surgeon: Rickey Martin MD;  Location: [facility] OR;  Service: Neurosurgery;  Laterality: Left;  Procedure: Left L3-4 far lateral Microdiscectomy   Surgery Time: 1.5 hrs  LOS: 0-1  Anesthesia: General  Radiology: C-arm  SNS: EMG, SEP  Bed: [place] [id number] Poster  Position: Melissa  Dav w/ Globus confirmed 4/12/21 [place]    MITRAL CLIP N/A 12/20/2023    Procedure: Mitral clip;  Surgeon: Kevin More MD;  Location: [facility] CATH LAB;  Service: Cardiology;  Laterality: N/A;    OOPHORECTOMY  1970    ROTATOR CUFF REPAIR Left 11/2016    TOTAL REDUCTION MAMMOPLASTY Bilateral 1990    TRANSESOPHAGEAL ECHOCARDIOGRAPHY N/A 11/1/2023    Procedure: ECHOCARDIOGRAM, TRANSESOPHAGEAL;  Surgeon: Brea Melgar Diagnostic;  Location: [facility] EP LAB;  Service: Cardiology;  Laterality: N/A;    TRANSESOPHAGEAL ECHOCARDIOGRAPHY N/A 12/20/2023    Procedure: ECHOCARDIOGRAM, TRANSESOPHAGEAL;  Surgeon: Cristine Brooks MD;  Location: [facility] CATH LAB;  Service: Cardiology;  Laterality: N/A;    TRANSFORAMINAL EPIDURAL INJECTION OF STEROID Left 12/23/2020    Procedure: Injection,steroid,epidural,transforaminal approach--Left L4 and L5;  Surgeon: Colleen Carvajal MD;  Location: [facility] PAIN MGT;  Service: Pain Management;  Laterality: Left;       SH:   Social History     Socioeconomic History    Marital status:     Tobacco Use    Smoking status: Never     Passive exposure: Never    Smokeless tobacco: Never   Substance and Sexual Activity    Alcohol use: No    Drug use: No    Sexual activity: Never     Social Drivers of Health     Financial Resource Strain: Low Risk  (2/23/2024)    Overall Financial Resource Strain (CARDIA)     Difficulty of Paying Living Expenses: Not hard at all   Food Insecurity: No Food Insecurity (5/25/2024)    Hunger Vital Sign     Worried About Running Out of Food in the Last Year: Never true     Ran Out of Food in the Last Year: Never true   Transportation Needs: No Transportation Needs (5/25/2024)    TRANSPORTATION NEEDS     Transportation : No   Physical Activity: Inactive (2/23/2024)    Exercise Vital Sign     Days of Exercise per Week: 0 days     Minutes of Exercise per Session: 0 min   Stress: No Stress Concern Present (2/23/2024)    [employer] - Occupational Stress Questionnaire     Feeling of Stress : Not at all   Housing Stability: Low Risk  (5/25/2024)    Housing Stability Vital Sign     Unable to Pay for Housing in the Last Year: No     Homeless in the Last Year: No       FH:   Family History   Problem Relation Name Age of Onset    Heart disease Father      Glaucoma Neg Hx      Macular degeneration Neg Hx         Allergies: Review of patient's allergies indicates:  No Known Allergies    ROS:  Constitutional- no fever, fatigue, weakness  Eye- no vision loss, eye redness, drainage, or pain  ENMT- no sore throat, ear pain, sinus pain/congestion, nasal congestion/drainage  Respiratory- no cough, wheezing, or shortness of breath  CV- no chest pain, no palpitations, no edema  GI- no N/V/D; no abdominal pain    Physical Exam:  There were no vitals filed for this visit.    General: NAD  Cardio: RRR by peripheral pulse  Pulm: Normal WOB on room air, symmetric chest rise  Abd: Soft, NT/ND    MSK:  Right lower extremity  Patient has a visible valgus knee  deformity  No skin lesions or scars  Very tender to palpation along the patellar tendon as well as both the medial and lateral joint lines  Knee is stable to varus and valgus stress  Knee range of motion from approximately 5° to 140°  Sensation intact to light touch in the lower extremity  Motor 5/5 quadriceps/hamstring/TA/gastrocnemius soleus/FHL/EHL  2+ DP pulse    Imaging:   X-Ray knee reviewed, KL 4 changes with joint space narrowing, sclerosis, and osteophytosis.  Valgus deformity.     Assessment:  We had a lengthy discussion regarding the natural history of osteoarthritis.   The patient would like to continue nonoperative management, and I think that is Reasonable. The patient has severe bone on bone knee oa. KL score 4  We went ahead and injected the bilateral  with 1 dose of ketorolac, Marcaine, and lidocaine. We reviewed the risks (incl side effects and allergies), benefits, of all treatment options including injections.   We will see the patient back on a p.r.n. basis as their symptoms dictate.  We also did begin discussing total knee arthroplasty. The patient was given educational literature regarding knee OA and total knee arthroplasty.   Additionally, we will see if we can get Iovera approved prior to undergoing total knee arthroplasty  Patient will need to obtain Cardiology clearance prior to surgery.  I discussed a new treatment therapy called Iovera, which is cryotherapy, to provide symptomatic relief along the sensory distribution of the infrapatellar tendon branch of the saphenous nerve, AFCN, and LFCN.  The patient elected to move forward with this.  We did discuss the fact that this is a fairly novel procedure and the is very limited scientific data around this; however, it is FDA approved.  In a few patients there can be continued pain along the treated nerve but the exact risk has not been quantified but seems to be rare. The patient was given patient information and literature to review prior  to the procedure as well. Based on this, the patient feels the benefits outweigh the risks.  Zilretta if iovera denied  Tka this summer      Valentin Barajas MD  [facility]opa[facility]  1/14/2025 8:39 AM

## 2025-01-17 RX ORDER — AMITRIPTYLINE HYDROCHLORIDE 10 MG/1
10 TABLET, FILM COATED ORAL NIGHTLY
Qty: 30 TABLET | Refills: 0 | Status: SHIPPED | OUTPATIENT
Start: 2025-01-17

## 2025-01-17 NOTE — TELEPHONE ENCOUNTER
Refill Routing Note   Medication(s) are not appropriate for processing by [facility] for the following reason(s):        New or recently adjusted medication    [facility] action(s):  Defer     Requires labs : Yes             Appointments  past 12m or future 3m with PCP    Date Provider   Last Visit   10/25/2024 Derrick Caldwell MD   Next Visit   4/1/2025 Derrick Caldwell MD   ED visits in past 90 days: 0        Note composed:2:16 PM 01/17/2025

## 2025-01-17 NOTE — TELEPHONE ENCOUNTER
Care Due:                  Date            Visit Type   Department     Provider  --------------------------------------------------------------------------------                                             [facility]  Last Visit: 10-      [facility]          MEDICINE       Derrick Caldwell                               -                              PRIMARY      [facility]  Next Visit: 04-      [facility]   MEDICINE       Derrick Caldwell                                                            Last  Test          Frequency    Reason                     Performed    Due Date  --------------------------------------------------------------------------------    HBA1C.......  6 months...  dapagliflozin............  09-   03-    [facility] Embedded Care Due Messages. Reference number: [id number].   1/17/2025 9:32:58 AM CST

## 2025-01-28 ENCOUNTER — HOSPITAL ENCOUNTER (OUTPATIENT)
Dept: CARDIOLOGY | Facility: HOSPITAL | Age: 80
Discharge: HOME | End: 2025-01-28
Attending: INTERNAL MEDICINE
Payer: MEDICARE

## 2025-01-28 VITALS — HEIGHT: 61 IN | BODY MASS INDEX: 22.84 KG/M2 | WEIGHT: 121 LBS

## 2025-01-28 DIAGNOSIS — I48.0 PAROXYSMAL ATRIAL FIBRILLATION: Chronic | ICD-10-CM

## 2025-01-28 PROCEDURE — 93306 TTE W/DOPPLER COMPLETE: CPT | Mod: 26,HCNC,, | Performed by: INTERNAL MEDICINE

## 2025-01-28 PROCEDURE — 93306 TTE W/DOPPLER COMPLETE: CPT | Mod: HCNC,PN

## 2025-01-28 RX ORDER — POTASSIUM CHLORIDE 20 MEQ/1
20 TABLET, EXTENDED RELEASE ORAL
Qty: 30 TABLET | Refills: 1 | Status: SHIPPED | OUTPATIENT
Start: 2025-01-28 | End: 2025-02-03

## 2025-01-29 LAB
APICAL FOUR CHAMBER EJECTION FRACTION: 47 %
APICAL TWO CHAMBER EJECTION FRACTION: 45 %
ASCENDING AORTA: 3.23 CM
AV INDEX (PROSTH): 0.33
AV MEAN GRADIENT: 12 MMHG
AV PEAK GRADIENT: 21 MMHG
AV REGURGITATION PRESSURE HALF TIME: 491 MS
AV VALVE AREA BY VELOCITY RATIO: 1.1 CM²
AV VALVE AREA: 1 CM²
AV VELOCITY RATIO: 0.35
BSA FOR ECHO PROCEDURE: 1.54 M2
CV ECHO LV RWT: 0.29 CM
DOP CALC AO PEAK VEL: 2.3 M/S
DOP CALC AO VTI: 48.5 CM
DOP CALC LVOT AREA: 3.1 CM2
DOP CALC LVOT DIAMETER: 2 CM
DOP CALC LVOT PEAK VEL: 0.8 M/S
DOP CALC LVOT STROKE VOLUME: 49.6 CM3
DOP CALC MV VTI: 51.6 CM
DOP CALCLVOT PEAK VEL VTI: 15.8 CM
E WAVE DECELERATION TIME: 489 MSEC
E/A RATIO: 0.75
E/E' RATIO: 29 M/S
ECHO LV POSTERIOR WALL: 0.7 CM (ref 0.6–1.1)
FRACTIONAL SHORTENING: 26.5 % (ref 28–44)
INTERVENTRICULAR SEPTUM: 0.7 CM (ref 0.6–1.1)
IVC DIAMETER: 1.01 CM
IVRT: 99 MSEC
LEFT ATRIUM AREA SYSTOLIC (APICAL 2 CHAMBER): 20.65 CM2
LEFT ATRIUM AREA SYSTOLIC (APICAL 4 CHAMBER): 18.38 CM2
LEFT ATRIUM VOLUME INDEX MOD: 41 ML/M2
LEFT ATRIUM VOLUME MOD: 62 ML
LEFT INTERNAL DIMENSION IN SYSTOLE: 3.6 CM (ref 2.1–4)
LEFT VENTRICLE DIASTOLIC VOLUME INDEX: 72.47 ML/M2
LEFT VENTRICLE DIASTOLIC VOLUME: 110.88 ML
LEFT VENTRICLE END DIASTOLIC VOLUME APICAL 2 CHAMBER: 57.71 ML
LEFT VENTRICLE END DIASTOLIC VOLUME APICAL 4 CHAMBER: 53.69 ML
LEFT VENTRICLE END SYSTOLIC VOLUME APICAL 2 CHAMBER: 62.99 ML
LEFT VENTRICLE END SYSTOLIC VOLUME APICAL 4 CHAMBER: 53.77 ML
LEFT VENTRICLE MASS INDEX: 72.4 G/M2
LEFT VENTRICLE SYSTOLIC VOLUME INDEX: 35.7 ML/M2
LEFT VENTRICLE SYSTOLIC VOLUME: 54.65 ML
LEFT VENTRICULAR INTERNAL DIMENSION IN DIASTOLE: 4.9 CM (ref 3.5–6)
LEFT VENTRICULAR MASS: 110.8 G
LV LATERAL E/E' RATIO: 29 M/S
LV SEPTAL E/E' RATIO: 29 M/S
LVED V (TEICH): 110.88 ML
LVES V (TEICH): 54.65 ML
LVOT MG: 1.59 MMHG
LVOT MV: 0.6 CM/S
MV A" WAVE DURATION": 133.21 MSEC
MV MEAN GRADIENT: 6 MMHG
MV PEAK A VEL: 1.55 M/S
MV PEAK E VEL: 1.16 M/S
MV PEAK GRADIENT: 13 MMHG
MV STENOSIS PRESSURE HALF TIME: 141.76 MS
MV VALVE AREA BY CONTINUITY EQUATION: 0.96 CM2
MV VALVE AREA P 1/2 METHOD: 1.55 CM2
OHS CV RV/LV RATIO: 0.57 CM
OHS LV EJECTION FRACTION SIMPSONS BIPLANE MOD: 45 %
PISA AR MAX VEL: 4.56 M/S
PISA MRMAX VEL: 6.68 M/S
PISA TR MAX VEL: 3.1 M/S
PULM VEIN S/D RATIO: 1.34
PV PEAK D VEL: 0.32 M/S
PV PEAK GRADIENT: 4 MMHG
PV PEAK S VEL: 0.43 M/S
PV PEAK VELOCITY: 1.04 M/S
RA PRESSURE ESTIMATED: 3 MMHG
RA VOL SYS: 15.35 ML
RIGHT ATRIAL AREA: 9.2 CM2
RIGHT ATRIUM VOLUME AREA LENGTH APICAL 4 CHAMBER: 14.83 ML
RIGHT VENTRICLE DIASTOLIC BASEL DIMENSION: 2.8 CM
RV TB RVSP: 6 MMHG
RV TISSUE DOPPLER FREE WALL SYSTOLIC VELOCITY 1 (APICAL 4 CHAMBER VIEW): 12.21 CM/S
SINUS: 2.78 CM
STJ: 3.03 CM
TDI LATERAL: 0.04 M/S
TDI SEPTAL: 0.04 M/S
TDI: 0.04 M/S
TR MAX PG: 38 MMHG
TRICUSPID ANNULAR PLANE SYSTOLIC EXCURSION: 1.93 CM
TV REST PULMONARY ARTERY PRESSURE: 41 MMHG
Z-SCORE OF LEFT VENTRICULAR DIMENSION IN END DIASTOLE: 0.93
Z-SCORE OF LEFT VENTRICULAR DIMENSION IN END SYSTOLE: 2.07

## 2025-02-03 ENCOUNTER — PATIENT MESSAGE (OUTPATIENT)
Dept: CARDIOLOGY | Facility: CLINIC | Age: 80
End: 2025-02-03

## 2025-02-03 ENCOUNTER — OFFICE VISIT (OUTPATIENT)
Dept: CARDIOLOGY | Facility: CLINIC | Age: 80
End: 2025-02-03
Payer: MEDICARE

## 2025-02-03 ENCOUNTER — TELEPHONE (OUTPATIENT)
Dept: ORTHOPEDICS | Facility: CLINIC | Age: 80
End: 2025-02-03
Payer: MEDICARE

## 2025-02-03 VITALS
BODY MASS INDEX: 22.68 KG/M2 | DIASTOLIC BLOOD PRESSURE: 83 MMHG | HEART RATE: 79 BPM | SYSTOLIC BLOOD PRESSURE: 131 MMHG | WEIGHT: 120.13 LBS | OXYGEN SATURATION: 97 % | HEIGHT: 61 IN

## 2025-02-03 DIAGNOSIS — I48.0 PAROXYSMAL ATRIAL FIBRILLATION: Chronic | ICD-10-CM

## 2025-02-03 DIAGNOSIS — I25.10 CORONARY ARTERY DISEASE INVOLVING NATIVE CORONARY ARTERY OF NATIVE HEART WITHOUT ANGINA PECTORIS: Chronic | ICD-10-CM

## 2025-02-03 DIAGNOSIS — I50.22 CHRONIC HFREF (HEART FAILURE WITH REDUCED EJECTION FRACTION): ICD-10-CM

## 2025-02-03 DIAGNOSIS — E87.6 HYPOKALEMIA: Primary | ICD-10-CM

## 2025-02-03 DIAGNOSIS — I34.0 MITRAL VALVE INSUFFICIENCY, UNSPECIFIED ETIOLOGY: ICD-10-CM

## 2025-02-03 DIAGNOSIS — Z98.890 S/P MITRAL VALVE CLIP IMPLANTATION: ICD-10-CM

## 2025-02-03 DIAGNOSIS — I27.20 PULMONARY HTN: ICD-10-CM

## 2025-02-03 DIAGNOSIS — Z95.818 S/P MITRAL VALVE CLIP IMPLANTATION: ICD-10-CM

## 2025-02-03 DIAGNOSIS — E78.49 OTHER HYPERLIPIDEMIA: ICD-10-CM

## 2025-02-03 PROCEDURE — 1159F MED LIST DOCD IN RCRD: CPT | Mod: HCNC,CPTII,S$GLB, | Performed by: INTERNAL MEDICINE

## 2025-02-03 PROCEDURE — 1126F AMNT PAIN NOTED NONE PRSNT: CPT | Mod: HCNC,CPTII,S$GLB, | Performed by: INTERNAL MEDICINE

## 2025-02-03 PROCEDURE — 3079F DIAST BP 80-89 MM HG: CPT | Mod: HCNC,CPTII,S$GLB, | Performed by: INTERNAL MEDICINE

## 2025-02-03 PROCEDURE — 1101F PT FALLS ASSESS-DOCD LE1/YR: CPT | Mod: HCNC,CPTII,S$GLB, | Performed by: INTERNAL MEDICINE

## 2025-02-03 PROCEDURE — 3288F FALL RISK ASSESSMENT DOCD: CPT | Mod: HCNC,CPTII,S$GLB, | Performed by: INTERNAL MEDICINE

## 2025-02-03 PROCEDURE — 99999 PR PBB SHADOW E&M-EST. PATIENT-LVL IV: CPT | Mod: PBBFAC,HCNC,, | Performed by: INTERNAL MEDICINE

## 2025-02-03 PROCEDURE — 1157F ADVNC CARE PLAN IN RCRD: CPT | Mod: HCNC,CPTII,S$GLB, | Performed by: INTERNAL MEDICINE

## 2025-02-03 PROCEDURE — 3072F LOW RISK FOR RETINOPATHY: CPT | Mod: HCNC,CPTII,S$GLB, | Performed by: INTERNAL MEDICINE

## 2025-02-03 PROCEDURE — 99214 OFFICE O/P EST MOD 30 MIN: CPT | Mod: HCNC,S$GLB,, | Performed by: INTERNAL MEDICINE

## 2025-02-03 PROCEDURE — 3075F SYST BP GE 130 - 139MM HG: CPT | Mod: HCNC,CPTII,S$GLB, | Performed by: INTERNAL MEDICINE

## 2025-02-03 RX ORDER — POTASSIUM CHLORIDE 750 MG/1
20 TABLET, EXTENDED RELEASE ORAL NIGHTLY
Qty: 90 TABLET | Refills: 1 | Status: SHIPPED | OUTPATIENT
Start: 2025-02-03

## 2025-02-03 NOTE — TELEPHONE ENCOUNTER
----- Message from Lawrence sent at 2/3/2025  3:41 PM CST -----  Type: General Call Back     Name of Caller:pt  Reason pt appt was moved from 02/04 to 02/10 @ 3pm the pt chadwick states that 3pm doesn't work and also they weren't called an made notified of the scheduling change, please call back as soon as possible   Would the patient rather a call back or a response via [facility]? call  Best Call Back Number:[phone]  Additional Information:

## 2025-02-03 NOTE — PROGRESS NOTES
Cardiology Clinic Visit    History of Present Illness:       Trudy Rauch Dakin is a pleasant 79 y.o. female with PMH noted below in assessment/plan     2/3/2025  Doing well since I saw her.  Only issue is she has cramping in her feet at night when she is sleeping.  No claudication with exertion.  K 3.5 on most recent blood work.  Discuss results of TTE.  Is planning on having knee surgery in July, we will see me in June to do preop.  Compliant with medications.  Unclear if taking midodrine daily or p.r.n., we will check with her son-in-law who manages her medications and they will send me a message.  Planning for TKA sometime in July.    11/04/2024  referred to establish with us as she just moved to [facility]. Previously saw Dr. Gomez  Has been doing very well the last few months. Denies Chest Discomfort/ALEGRE/SOB/Palpitation/Syncope. No recently hospitalizations for HF. Living with daughter now who had b/l TKA.  Holding off on her own TKA until after holidays. Compliant with medications.        History obtained by patient interview and supplemented by nursing documentation and chart review.   Objective   PMHx:  has a past medical history of CHF (congestive heart failure), Diabetes, Diverticulitis, Hypertension, and Renal disorder.   SurgHx:  has a past surgical history that includes Appendectomy; Carpal tunnel release; Abdominal surgery (2006); Mandible fracture surgery (1970); Rotator cuff repair (Left, 11/2016); Oophorectomy (1970); Anterior cervical discectomy w/ fusion (N/A, 8/30/2018); Total Reduction Mammoplasty (Bilateral, 1990); Hysterectomy (1970); Colectomy (07/2020); Transforaminal epidural injection of steroid (Left, 12/23/2020); Epidural steroid injection into lumbar spine (N/A, 1/20/2021); Microdiscectomy of spine (Left, 4/13/2021); Laryngoscopy (N/A, 1/4/2022); Transesophageal echocardiography (N/A, 11/1/2023); mitral clip (N/A, 12/20/2023); and Transesophageal echocardiography (N/A, 12/20/2023).  "  FamHx: family history includes Heart disease in her father.   SocialHx:  reports that she has never smoked. She has never been exposed to tobacco smoke. She has never used smokeless tobacco. She reports that she does not drink alcohol and does not use drugs.  Home Meds:  Current Outpatient Medications   Medication Instructions    acetaminophen (TYLENOL) 650 mg, Oral, Every 8 hours PRN    amitriptyline (ELAVIL) 10 mg, Oral, Nightly    apixaban (ELIQUIS) 2.5 mg, Oral, 2 times daily    atorvastatin (LIPITOR) 80 mg, Oral, Daily    bumetanide (BUMEX) 3 mg, Oral, 2 times daily    cyanocobalamin 1,000 mcg, Intramuscular, Every 30 days    dapagliflozin propanediol (FARXIGA) 10 mg, Oral, Daily    ergocalciferol (ERGOCALCIFEROL) 50,000 Units, Oral, Every 7 days    erythromycin (ROMYCIN) ophthalmic ointment Left Eye, Nightly    gabapentin (NEURONTIN) 100 mg, Oral, Nightly    HYDROcodone-acetaminophen (NORCO) 5-325 mg per tablet 1 tablet, Oral, Every 6 hours PRN    midodrine (PROAMATINE) 10 mg, Oral, As needed (PRN)    mupirocin (BACTROBAN) 2 % ointment Daily    nitroGLYCERIN (NITROSTAT) 0.4 mg, Sublingual, Every 5 min PRN    potassium chloride SA (K-DUR,KLOR-CON M) 10 MEQ tablet 20 mEq, Oral, Nightly    pregabalin (LYRICA) 75 mg, Oral, 2 times daily    venlafaxine (EFFEXOR-XR) 150 mg, Oral, Daily, For mood and neruopathy  dc lexapro     Objective/Exam:   /83 (BP Location: Right arm)   Pulse 79   Ht 5' 1" (1.549 m)   Wt 54.5 kg (120 lb 2.4 oz)   LMP 01/01/1970   SpO2 97%   BMI 22.70 kg/m²    Wt Readings from Last 4 Encounters:   02/03/25 54.5 kg (120 lb 2.4 oz)   01/28/25 54.9 kg (121 lb)   01/14/25 55.3 kg (121 lb 14.6 oz)   11/04/24 55.3 kg (121 lb 14.6 oz)      Constitutional: NAD, Atraumatic, Conversant   HEENT: MMM, Sclera anicteric, No JVD   Cardiovascular: RRR, no murmurs noted, no chest tenderness to palpation, 2+ radial pulses b/l  Pulmonary: normal respiratory effort, CTAB, no crackles, " "wheezes  Abdominal: S/NT/ND  Musculoskeletal: No lower extremity edema noted b/l  Neurological: No gross neurological deficits  Skin: W/D/I  Psych: Appropriate affect, normal mood  Labs/Imaging/Procedures   Personally reviewed  Lab Results   Component Value Date     01/28/2025    K 3.5 01/28/2025     01/28/2025    CO2 27 01/28/2025    BUN 24 (H) 01/28/2025    CREATININE 1.52 (H) 01/28/2025    ANIONGAP 9 01/28/2025     Lab Results   Component Value Date    HGBA1C 6.3 (H) 09/16/2024     Lab Results   Component Value Date     (H) 08/12/2024     (H) 06/10/2024     (H) 05/20/2024     (H) 05/20/2024      Lab Results   Component Value Date    WBC 7.53 01/28/2025    HGB 12.0 01/28/2025    HCT 36.1 (L) 01/28/2025     01/28/2025    GRAN 5.5 01/28/2025    GRAN 73.0 01/28/2025     Lab Results   Component Value Date    CHOL 126 03/21/2024    HDL 37 (L) 03/21/2024    LDLCALC 71.4 03/21/2024    LDLCALC 101.2 04/12/2023    LDLCALC 73 09/13/2022    TRIG 88 03/21/2024     Lab Results   Component Value Date    TSH 1.660 05/25/2024     No results found for: "APOLIPOPROTE"  No results found for: "LIPOA"     TTE:  Results for orders placed during the hospital encounter of 07/31/24    Echo    Interpretation Summary    Left Ventricle: Mildly increased wall thickness. Mild global hypokinesis present. There is mildly reduced systolic function with a visually estimated ejection fraction of 45 - 50%. Grade I diastolic dysfunction.    Right Ventricle: Normal right ventricular cavity size. Wall thickness is normal. Systolic function is normal.    Left Atrium: Left atrium is severely dilated.    Aortic Valve: There is mild aortic valve sclerosis. Mildly restricted motion. There is mild to moderate stenosis. Aortic valve area by VTI is 1.39 cm². Aortic valve peak velocity is 2.36 m/s. Mean gradient is 14 mmHg. The dimensionless index is 0.40. There is mild to moderate aortic regurgitation.    Mitral " Valve: There is mild to moderate regurgitation.    Tricuspid Valve: There is moderate regurgitation.    Pulmonary Artery: There is mild pulmonary hypertension. The estimated pulmonary artery systolic pressure is 40 mmHg.    IVC/SVC: Normal venous pressure at 3 mmHg.    TTE 01/28/2025    Left Ventricle: The left ventricle is normal in size. Normal wall thickness. Mild global hypokinesis present. There is mildly reduced systolic function with a visually estimated ejection fraction of 45 - 50%. Quantitated ejection fraction is 45%. Diastolic function cannot be reliably determined in the presence of mitral annular calcification.    Right Ventricle: Normal right ventricular cavity size. Systolic function is normal.    Left Atrium: Left atrium is mildly dilated.    Right Atrium: Normal right atrial size.    Aortic Valve: The aortic valve is a trileaflet valve. Mildly calcified cusps. There is moderate stenosis. Aortic valve area by VTI is 1.0 cm². Aortic valve peak velocity is 2.3 m/s. Mean gradient is 12 mmHg. The dimensionless index is 0.33. There is moderate aortic regurgitation.    Mitral Valve: Severely calcified posterior leaflet. There is mitral annular calcification present. There is mild stenosis. The mean pressure gradient across the mitral valve is 6 mmHg at a heart rate of 70 bpm. There is moderate regurgitation with a posterolateral eccentrically directed jet.    Tricuspid Valve: There is moderate regurgitation.    Pulmonary Artery: The estimated pulmonary artery systolic pressure is 41 mmHg.    IVC/SVC: Normal venous pressure at 3 mmHg.    Global longitudinal strain of-14.3%.  Mildly reduced.    Stress Testing:   No results found for this or any previous visit.     Coronary Angiogram:  Results for orders placed during the hospital encounter of 12/20/23    Cardiac catheterization    Conclusion    The estimated blood loss was none.    The MitralClip was successfully placed.    The procedure log was documented  by Documenter: Sully Alvarenga and verified by Kevin More MD.    Date: 2023  Time: 10:21 AM      Personal: Enjoys woodcrafts that she places in yard. Cuts own wood.   about one year ago. Recently moved in with daughter to [facility].     34 minutes were spent on this visit including time personally:  -Reviewing Medical records & lab results  -Independently reviewing and interpreting (if not documented by myself) EKGs, echocardiograms, catherizations   -Obtaining a history, performing a relevant exam, counseling/educating the patient/family  -Documenting clinical information in the EMR including ordering of tests  -Care coordination and communicating with other health care providers       Problem List:     1. Hypokalemia    2. S/P mitral valve clip implantation    3. Pulmonary HTN    4. Paroxysmal atrial fibrillation    5. Other hyperlipidemia    6. Mitral valve insufficiency, unspecified etiology    7. CAD (coronary artery disease)    8. Chronic HFrEF (heart failure with reduced ejection fraction)        Assessment/Plan:   Trudy Rauch Dakin is a 79 y.o. female with a PMHx of HTN, NIDDM, CKDIV, Afib on AC, CAD(s/p remote PCI), MR s/p Jazmin Clip (2023), HFmrEF, Mod AS (DI 0.40), mild-mod AR, severe LAE w/ mod MR, PAP 40.  Repeat TTE 2025, largely unchanged compared to this when prior.  MV mg 6 at 70 beats per minute (however clip in place)     Euvolemic today w/o sx.  Having some cramping in her feet only while sleeping at night no exertional claudication.  Planning for TKA in July    -bumex 2mg BID-Euvolemic   -cont apix 2.5 BID for parox afib,  -Continue atorva 80, ldl at goal  -cont dapagliflozin 10  -On midodrine 10mg as needed for low BP-she is not sure if she is taking this or not.  Her and her daughter we will check her pillbox and let me know  -we will keep blood pressure log as she is a little bit hypertensive today  -has NG SL but not used in years  -Potassium 20 mEq use  q.h.s. instead of daily (she may have ran out of this medication)  -repeat BMP in 2 weeks to assess K  -Hold off on restarting further therapies as she's doing quite well    Thank you for the opportunity to care for this patient. Please don't hesitate to reach out with any questions/concerns        Vic Martínez MD  Interventional Cardiology  [facility]

## 2025-02-06 ENCOUNTER — TELEPHONE (OUTPATIENT)
Dept: FAMILY MEDICINE | Facility: CLINIC | Age: 80
End: 2025-02-06
Payer: MEDICARE

## 2025-02-06 NOTE — TELEPHONE ENCOUNTER
----- Message from Lisa sent at 2/6/2025  8:34 AM CST -----  Type:  Needs Medical Advice    Who Called:  Valentin Nurse    Symptoms (please be specific): pt asked if new prescriptions can be over to [facility] pharmacy instead of the local pharmacy   Would the patient rather a call back or a response via [facility]? Call back   Best Call Back Number: [phone]  Additional Information: Please be advised, caller states to give pt a call back

## 2025-02-07 NOTE — TELEPHONE ENCOUNTER
I have tried contacting pt's mobile number unable to LM. Voicemail is not set up    I contacted home number that was listed. Pt's daughter in law answered. I asked what medications is pt needing to be sent over to [facility]. Daughter in law stated she will contact  pt and have pt call back clinic.

## 2025-02-10 RX ORDER — AMITRIPTYLINE HYDROCHLORIDE 10 MG/1
10 TABLET, FILM COATED ORAL NIGHTLY
Qty: 30 TABLET | Refills: 0 | Status: SHIPPED | OUTPATIENT
Start: 2025-02-10

## 2025-02-10 NOTE — TELEPHONE ENCOUNTER
Pt is requesting a refill on Amitriptyline to be sent to [facility]. I've pended for approval.

## 2025-02-10 NOTE — TELEPHONE ENCOUNTER
----- Message from Jesi sent at 2/10/2025  1:53 PM CST -----  Type:  Patient Returning Call/medication     Who Called:pt  Who Left Message for Patient:MA  Does the patient know what this is regarding?:medication  Would the patient rather a call back or a response via [facility]? Call   Best Call Back Number:[phone]  Additional Information:

## 2025-02-10 NOTE — TELEPHONE ENCOUNTER
No care due was identified.  [facility] Embedded Care Due Messages. Reference number: [id number].   2/10/2025 4:07:29 PM CST

## 2025-02-12 ENCOUNTER — RESEARCH ENCOUNTER (OUTPATIENT)
Dept: RESEARCH | Facility: HOSPITAL | Age: 80
End: 2025-02-12
Payer: MEDICARE

## 2025-02-12 ENCOUNTER — TELEPHONE (OUTPATIENT)
Dept: ORTHOPEDICS | Facility: CLINIC | Age: 80
End: 2025-02-12

## 2025-02-12 ENCOUNTER — OFFICE VISIT (OUTPATIENT)
Dept: ORTHOPEDICS | Facility: CLINIC | Age: 80
End: 2025-02-12
Payer: MEDICARE

## 2025-02-12 ENCOUNTER — PATIENT MESSAGE (OUTPATIENT)
Dept: ORTHOPEDICS | Facility: CLINIC | Age: 80
End: 2025-02-12

## 2025-02-12 DIAGNOSIS — M17.12 PRIMARY OSTEOARTHRITIS OF LEFT KNEE: Primary | ICD-10-CM

## 2025-02-12 DIAGNOSIS — M17.11 PRIMARY OSTEOARTHRITIS OF RIGHT KNEE: ICD-10-CM

## 2025-02-12 PROCEDURE — 99999 PR PBB SHADOW E&M-EST. PATIENT-LVL III: CPT | Mod: PBBFAC,HCNC,, | Performed by: PHYSICIAN ASSISTANT

## 2025-02-12 PROCEDURE — 20610 DRAIN/INJ JOINT/BURSA W/O US: CPT | Mod: 50,HCNC,S$GLB, | Performed by: PHYSICIAN ASSISTANT

## 2025-02-12 PROCEDURE — 99499 UNLISTED E&M SERVICE: CPT | Mod: HCNC,S$GLB,, | Performed by: PHYSICIAN ASSISTANT

## 2025-02-12 NOTE — TELEPHONE ENCOUNTER
Spoke with patient. She stated the injections she got today did not work. Informed her that it can take up to a week to kick in.  She can take tylenol or ibuprofen, ice , rest and elevate.  If still hurting after a week or so, she will call us back.

## 2025-02-12 NOTE — PROCEDURES
Large Joint Aspiration/Injection: bilateral knee    Date/Time: 2/12/2025 9:30 AM    Performed by: Cesia Petty PA-C  Authorized by: Cesia Petty PA-C    Consent Done?:  Yes (Verbal)  Indications:  Arthritis  Site marked: the procedure site was marked    Timeout: prior to procedure the correct patient, procedure, and site was verified    Prep: patient was prepped and draped in usual sterile fashion      Local anesthesia used?: Yes    Local anesthetic:  Topical anesthetic    Details:  Needle Size:  22 G  Ultrasonic Guidance for needle placement?: No    Approach:  Anterolateral  Location:  Knee  Laterality:  Bilateral  Site:  Bilateral knee  Medications (Right):  32 mg triamcinolone acetonide 32 mg  Medications (Left):  32 mg triamcinolone acetonide 32 mg  Patient tolerance:  Patient tolerated the procedure well with no immediate complications     Fusiform Excision Additional Text (Leave Blank If You Do Not Want): The margin was drawn around the clinically apparent lesion.  A fusiform shape was then drawn on the skin incorporating the lesion and margins.  Incisions were then made along these lines to the appropriate tissue plane and the lesion was extirpated.

## 2025-02-12 NOTE — PROGRESS NOTES
Protocol: innovations in [employer] Outcomes Registry (Deysi)  Protocol#: Deysi  IRB#: 2021.156  Version Date: 10-Gokul-2023  PI: Eric Hines MD  Patient Initials: T.D.     Study Recruitment Note:     Research coordinator and patient decided to wait to consent to study. She will consent upon her return for additional treatment so that she can have help from a relative to access her email.     She proceeded with her Bilateral Zilretta injection.    She is scheduled for a Total knee surgery on July 7th of this year. She may also receive Iovera treatment before surgery.

## 2025-02-12 NOTE — PROGRESS NOTES
Subjective:      Chief Complaint: Pain of the Right Knee, Pain of the Left Knee, and Injections (Sachin Zilretta)    Patient ID: Trudy Rauch Dakin is a 79 y.o. female.  Patient is here for  Zilretta  injection(s) for bilateral knee osteoarthritis. Patient tolerated last injection well. No new complaints today.          Past Medical History:   Diagnosis Date    CHF (congestive heart failure)     Diabetes     Diverticulitis     Hypertension     Renal disorder         Past Surgical History:   Procedure Laterality Date    ABDOMINAL SURGERY  2006    diverticulitis x3    ANTERIOR CERVICAL DISCECTOMY W/ FUSION N/A 8/30/2018    FUSION C6-7 Surgeon: Rickey Martin MD    APPENDECTOMY      CARPAL TUNNEL RELEASE      COLECTOMY  07/2020    EPIDURAL STEROID INJECTION INTO LUMBAR SPINE N/A 1/20/2021    Procedure: Injection-steroid-epidural-lumbar--L3-4;  Surgeon: Colleen Carvajal MD;  Location: [facility] PAIN MGT;  Service: Pain Management;  Laterality: N/A;    HYSTERECTOMY  1970    @25yrs of age    LARYNGOSCOPY N/A 1/4/2022    Procedure: Suspension microlaryngoscopy with left vocal fold injection augmentation - Restylane L;  Surgeon: Yuan Mir MD;  Location: [address];  Service: ENT;  Laterality: N/A;  Microscope, telescopes, tower, injector, Restylane-L    MANDIBLE FRACTURE SURGERY  1970    MICRODISCECTOMY OF SPINE Left 4/13/2021    Procedure: MICRODISCECTOMY, SPINE Left L3-4 far lateral Microdiscectomy;  Surgeon: Rickey Martin MD;  Location: [facility] OR;  Service: Neurosurgery;  Laterality: Left;  Procedure: Left L3-4 far lateral Microdiscectomy   Surgery Time: 1.5 hrs  LOS: 0-1  Anesthesia: General  Radiology: C-arm  SNS: EMG, SEP  Bed: [place] [address] Poster  Position: Melissa  Dav w/ Globus confirmed 4/12/21 MN    MITRAL CLIP N/A 12/20/2023    Procedure: Mitral clip;  Surgeon: Kevin More MD;  Location: [facility] CATH LAB;  Service: Cardiology;  Laterality: N/A;    OOPHORECTOMY  1970    ROTATOR CUFF REPAIR Left 11/2016     TOTAL REDUCTION MAMMOPLASTY Bilateral 1990    TRANSESOPHAGEAL ECHOCARDIOGRAPHY N/A 11/1/2023    Procedure: ECHOCARDIOGRAM, TRANSESOPHAGEAL;  Surgeon: Provider, [facility];  Location: [facility] EP LAB;  Service: Cardiology;  Laterality: N/A;    TRANSESOPHAGEAL ECHOCARDIOGRAPHY N/A 12/20/2023    Procedure: ECHOCARDIOGRAM, TRANSESOPHAGEAL;  Surgeon: Cristine Brooks MD;  Location: [facility] CATH LAB;  Service: Cardiology;  Laterality: N/A;    TRANSFORAMINAL EPIDURAL INJECTION OF STEROID Left 12/23/2020    Procedure: Injection,steroid,epidural,transforaminal approach--Left L4 and L5;  Surgeon: Colleen Carvajal MD;  Location: [facility] PAIN [facility]GT;  Service: Pain Management;  Laterality: Left;        Current Outpatient Medications   Medication Instructions    acetaminophen (TYLENOL) 650 mg, Oral, Every 8 hours PRN    amitriptyline (ELAVIL) 10 mg, Oral, Nightly    apixaban (ELIQUIS) 2.5 mg, Oral, 2 times daily    atorvastatin (LIPITOR) 80 mg, Oral, Daily    bumetanide (BUMEX) 3 mg, Oral, 2 times daily    cyanocobalamin 1,000 mcg, Intramuscular, Every 30 days    dapagliflozin propanediol (FARXIGA) 10 mg, Oral, Daily    ergocalciferol (ERGOCALCIFEROL) 50,000 Units, Oral, Every 7 days    erythromycin (ROMYCIN) ophthalmic ointment Left Eye, Nightly    gabapentin (NEURONTIN) 100 mg, Oral, Nightly    HYDROcodone-acetaminophen (NORCO) 5-325 mg per tablet 1 tablet, Oral, Every 6 hours PRN    mupirocin (BACTROBAN) 2 % ointment Daily    nitroGLYCERIN (NITROSTAT) 0.4 mg, Sublingual, Every 5 min PRN    potassium chloride SA (K-DUR,KLOR-CON M) 10 MEQ tablet 20 mEq, Oral, Nightly    pregabalin (LYRICA) 75 mg, Oral, 2 times daily    venlafaxine (EFFEXOR-XR) 150 mg, Oral, Daily, For mood and neruopathy  dc lexapro        Review of patient's allergies indicates:  No Known Allergies    Review of Systems   Constitutional: Negative for fever and malaise/fatigue.   Eyes:  Negative for blurred vision.   Cardiovascular:  Negative for chest pain.  "  Respiratory:  Negative for shortness of breath.    Skin:  Negative for poor wound healing.   Musculoskeletal:  Positive for joint pain and myalgias.   Genitourinary:  Negative for bladder incontinence.   Neurological:  Negative for dizziness, numbness and paresthesias.   Psychiatric/Behavioral:  Negative for altered mental status.        The patient's relevant past medical, surgical, and social history was reviewed in Epic.       Objective:      VITAL SIGNS: Ht (P) 5' 1" (1.549 m)   LMP 01/01/1970   BMI (P) 22.70 kg/m²     Ortho/SPM Exam     Imaging          Assessment:       Trudy Rauch Dakin is a 79 y.o. female seen in the office today for   1. Primary osteoarthritis of left knee    2. Primary osteoarthritis of right knee           Plan:       [place] was seen today for pain, pain and injections.    Diagnoses and all orders for this visit:    Primary osteoarthritis of left knee  -     Large Joint Aspiration/Injection: bilateral knee    Primary osteoarthritis of right knee  -     Large Joint Aspiration/Injection: bilateral knee    I injected the bilateral knee with one dose of Zilretta  today.  We will see the patient back in 3-6 mons or as needed.         Diagnoses and plan discussed with the patient, as well as the expected course and duration of his symptoms.  All questions and concerns were addressed prior to the end of the visit.   Instructed patient to call office if they have any future questions/concerns or to schedule apt. Patient will return to see me if symptoms worsen or fail to improve    Note dictated with voice recognition software, please excuse any grammatical errors.        Cesia Petty PA-C      Department of Orthopedic Surgery  [facility]  Office: [phone]  02/12/2025    "

## 2025-02-12 NOTE — TELEPHONE ENCOUNTER
----- Message from Maria Elena sent at 2/12/2025 11:22 AM CST -----  Type:  Needs Medical Advice    Who Called: Patient  Best Call Back Number: [phone]  Additional Information: Patient is requesting a call back in regards to her shots today.

## 2025-02-21 ENCOUNTER — LAB VISIT (OUTPATIENT)
Dept: LAB | Facility: HOSPITAL | Age: 80
End: 2025-02-21
Attending: INTERNAL MEDICINE
Payer: MEDICARE

## 2025-02-21 DIAGNOSIS — E87.6 HYPOKALEMIA: ICD-10-CM

## 2025-02-21 LAB
ANION GAP SERPL CALC-SCNC: 10 MMOL/L (ref 8–16)
CALCIUM SERPL-MCNC: 9.5 MG/DL (ref 8.7–10.5)
CHLORIDE SERPL-SCNC: 101 MMOL/L (ref 95–110)
CO2 SERPL-SCNC: 29 MMOL/L (ref 23–29)
CREAT SERPL-MCNC: 1.49 MG/DL (ref 0.5–1.4)
EST. GFR  (NO RACE VARIABLE): 35.5 ML/MIN/1.73 M^2
GLUCOSE SERPL-MCNC: 172 MG/DL (ref 70–110)
MAGNESIUM SERPL-MCNC: 2 MG/DL (ref 1.6–2.6)
POTASSIUM SERPL-SCNC: 4 MMOL/L (ref 3.5–5.1)
SODIUM SERPL-SCNC: 140 MMOL/L (ref 136–145)
UUN UR-MCNC: 35 MG/DL (ref 7–17)

## 2025-02-21 PROCEDURE — 36415 COLL VENOUS BLD VENIPUNCTURE: CPT | Mod: HCNC,PN | Performed by: INTERNAL MEDICINE

## 2025-02-21 PROCEDURE — 83735 ASSAY OF MAGNESIUM: CPT | Mod: HCNC,PN | Performed by: INTERNAL MEDICINE

## 2025-02-21 PROCEDURE — 80048 BASIC METABOLIC PNL TOTAL CA: CPT | Mod: HCNC,PN | Performed by: INTERNAL MEDICINE

## 2025-02-24 ENCOUNTER — RESULTS FOLLOW-UP (OUTPATIENT)
Dept: CARDIOLOGY | Facility: CLINIC | Age: 80
End: 2025-02-24

## 2025-02-27 ENCOUNTER — TELEPHONE (OUTPATIENT)
Dept: ORTHOPEDICS | Facility: CLINIC | Age: 80
End: 2025-02-27
Payer: MEDICARE

## 2025-02-27 NOTE — TELEPHONE ENCOUNTER
----- Message from Any sent at 2/27/2025  8:29 AM CST -----  Regarding: Call back  Contact: [phone]  Type:  Needs Medical AdviceWho Called:  PT Symptoms (please be specific): Patient had a fall on Tuesday and her right knee is swollen and painful How long has patient had these symptoms:  Tuesday Would the patient rather a call back or a response via [facility]? Call back Best Call Back Number:  [phone] Additional Information:

## 2025-02-27 NOTE — TELEPHONE ENCOUNTER
Spoke with patient regarding knee pain. Patient states that the swelling has gone down and her will monitor it. Patient will call the office back if knee pain get any worse. Patient verbally understand.

## 2025-03-20 ENCOUNTER — OFFICE VISIT (OUTPATIENT)
Dept: FAMILY MEDICINE | Facility: CLINIC | Age: 80
End: 2025-03-20
Payer: MEDICARE

## 2025-03-20 VITALS
OXYGEN SATURATION: 97 % | SYSTOLIC BLOOD PRESSURE: 120 MMHG | DIASTOLIC BLOOD PRESSURE: 62 MMHG | HEART RATE: 76 BPM | WEIGHT: 117.81 LBS | BODY MASS INDEX: 22.24 KG/M2 | HEIGHT: 61 IN | TEMPERATURE: 98 F

## 2025-03-20 DIAGNOSIS — R07.9 CHEST PAIN, UNSPECIFIED TYPE: ICD-10-CM

## 2025-03-20 DIAGNOSIS — I48.0 PAROXYSMAL ATRIAL FIBRILLATION: Chronic | ICD-10-CM

## 2025-03-20 DIAGNOSIS — N18.4 TYPE 2 DIABETES MELLITUS WITH STAGE 4 CHRONIC KIDNEY DISEASE, WITHOUT LONG-TERM CURRENT USE OF INSULIN: ICD-10-CM

## 2025-03-20 DIAGNOSIS — L08.9 TOE INFECTION: Primary | ICD-10-CM

## 2025-03-20 DIAGNOSIS — M20.42 HAMMER TOE OF LEFT FOOT: ICD-10-CM

## 2025-03-20 DIAGNOSIS — J43.9 PULMONARY EMPHYSEMA, UNSPECIFIED EMPHYSEMA TYPE: ICD-10-CM

## 2025-03-20 DIAGNOSIS — E11.22 TYPE 2 DIABETES MELLITUS WITH STAGE 4 CHRONIC KIDNEY DISEASE, WITHOUT LONG-TERM CURRENT USE OF INSULIN: ICD-10-CM

## 2025-03-20 PROCEDURE — 3074F SYST BP LT 130 MM HG: CPT | Mod: CPTII,S$GLB,, | Performed by: FAMILY MEDICINE

## 2025-03-20 PROCEDURE — G2211 COMPLEX E/M VISIT ADD ON: HCPCS | Mod: S$GLB,,, | Performed by: FAMILY MEDICINE

## 2025-03-20 PROCEDURE — 99214 OFFICE O/P EST MOD 30 MIN: CPT | Mod: S$GLB,,, | Performed by: FAMILY MEDICINE

## 2025-03-20 PROCEDURE — 3072F LOW RISK FOR RETINOPATHY: CPT | Mod: CPTII,S$GLB,, | Performed by: FAMILY MEDICINE

## 2025-03-20 PROCEDURE — 1101F PT FALLS ASSESS-DOCD LE1/YR: CPT | Mod: CPTII,S$GLB,, | Performed by: FAMILY MEDICINE

## 2025-03-20 PROCEDURE — 3078F DIAST BP <80 MM HG: CPT | Mod: CPTII,S$GLB,, | Performed by: FAMILY MEDICINE

## 2025-03-20 PROCEDURE — 1125F AMNT PAIN NOTED PAIN PRSNT: CPT | Mod: CPTII,S$GLB,, | Performed by: FAMILY MEDICINE

## 2025-03-20 PROCEDURE — 1159F MED LIST DOCD IN RCRD: CPT | Mod: CPTII,S$GLB,, | Performed by: FAMILY MEDICINE

## 2025-03-20 PROCEDURE — 3288F FALL RISK ASSESSMENT DOCD: CPT | Mod: CPTII,S$GLB,, | Performed by: FAMILY MEDICINE

## 2025-03-20 PROCEDURE — 1157F ADVNC CARE PLAN IN RCRD: CPT | Mod: CPTII,S$GLB,, | Performed by: FAMILY MEDICINE

## 2025-03-20 RX ORDER — NITROGLYCERIN 0.4 MG/1
0.4 TABLET SUBLINGUAL EVERY 5 MIN PRN
Qty: 25 TABLET | Refills: 5 | Status: SHIPPED | OUTPATIENT
Start: 2025-03-20

## 2025-03-20 RX ORDER — CEPHALEXIN 500 MG/1
1000 CAPSULE ORAL 2 TIMES DAILY
Qty: 28 CAPSULE | Refills: 0 | Status: SHIPPED | OUTPATIENT
Start: 2025-03-20

## 2025-03-24 NOTE — PROGRESS NOTES
" Patient ID: Trudy Rauch Dakin is a 79 y.o. female.    Chief Complaint: Follow-up and Toe Pain (Pt states pain is at a 10)    HPI    Trudy Rauch Dakin is a 79 y.o. female complains of pain in her toe left foot.  Has a nail that is yellow and thick that is contracted and the distal part the toe has moved downward and she is rubbing it.  Painful today.                Vitals:    03/20/25 1049   BP: 120/62   BP Location: Right arm   Patient Position: Sitting   Pulse: 76   Temp: 98 °F (36.7 °C)   TempSrc: Oral   SpO2: 97%   Weight: 53.4 kg (117 lb 13.4 oz)   Height: 5' 1" (1.549 m)            Review of Symptoms      Physical Exam    Constitutional:  Oriented to person, place, and time.appears well-developed and well-nourished.  No distress.      HENT  Head: Normocephalic and atraumatic  Right Ear: External ear normal.   Left Ear: External ear normal.   Nose: External nose normal.   Mouth:  Moist mucus membranes.    Eyes:  Conjunctivae are normal. Right eye exhibits no discharge.  Left eye exhibits no discharge. No scleral icterus.  No periorbital edema    Cardiovascular:  Regular rate and rhythm with normal S1 and S2     Pulmonary/Chest:   Clear to auscultation bilaterally without wheezes, rhonchi or rales      Musculoskeletal:  No edema. No obvious deformity No wasting       Neurological:  Alert and oriented to person, place, and time.   Coordination normal.     Skin:   Skin is warm and dry.  No diaphoresis.   3rd toe left foot distal part of the toe is callused-pus expressed with palpation.      Psychiatric: Normal mood and affect. Behavior is normal.  Judgment and thought content normal.     Physical Exam              Complete Blood Count  Lab Results   Component Value Date    RBC 4.01 01/28/2025    HGB 12.0 01/28/2025    HCT 36.1 (L) 01/28/2025    MCV 90 01/28/2025    MCH 29.9 01/28/2025    MCHC 33.2 01/28/2025    RDW 12.9 01/28/2025     01/28/2025    MPV 10.2 01/28/2025    GRAN 5.5 01/28/2025    GRAN 73.0 " "01/28/2025    LYMPH 1.3 01/28/2025    LYMPH 17.1 (L) 01/28/2025    MONO 0.5 01/28/2025    MONO 6.2 01/28/2025    EOS 0.2 01/28/2025    BASO 0.05 01/28/2025    EOSINOPHIL 2.7 01/28/2025    BASOPHIL 0.7 01/28/2025    DIFFMETHOD Automated 01/28/2025       Comprehensive Metabolic Panel  Lab Results   Component Value Date     (H) 02/21/2025    BUN 35 (H) 02/21/2025    CREATININE 1.49 (H) 02/21/2025     02/21/2025    K 4.0 02/21/2025     02/21/2025    PROT 7.1 01/28/2025    ALBUMIN 4.1 01/28/2025    BILITOT 0.5 01/28/2025    AST 22 01/28/2025    ALKPHOS 99 01/28/2025    CO2 29 02/21/2025    ALT 11 01/28/2025    ANIONGAP 10 02/21/2025       TSH  No results found for: "TSH"    Assessment / Plan:      ICD-10-CM ICD-9-CM   1. Toe infection  L08.9 686.9   2. Chest pain, unspecified type  R07.9 786.50   3. Paroxysmal atrial fibrillation  I48.0 427.31   4. Pulmonary emphysema, unspecified emphysema type  J43.9 492.8   5. Type 2 diabetes mellitus with stage 4 chronic kidney disease, without long-term current use of insulin  E11.22 250.40    N18.4 585.4   6. Hammer toe of left foot  M20.42 735.4     Toe infection  -     Ambulatory referral/consult to Podiatry; Future; Expected date: 03/27/2025    Chest pain, unspecified type  -     nitroGLYCERIN (NITROSTAT) 0.4 MG SL tablet; Place 1 tablet (0.4 mg total) under the tongue every 5 (five) minutes as needed for Chest pain.  Dispense: 25 tablet; Refill: 5    Paroxysmal atrial fibrillation  -     apixaban (ELIQUIS) 2.5 mg Tab; Take 1 tablet (2.5 mg total) by mouth 2 (two) times daily.  Dispense: 180 tablet; Refill: 3    Pulmonary emphysema, unspecified emphysema type    Type 2 diabetes mellitus with stage 4 chronic kidney disease, without long-term current use of insulin    Hammer toe of left foot  -     Ambulatory referral/consult to Podiatry; Future; Expected date: 03/27/2025    Other orders  -     cephALEXin (KEFLEX) 500 MG capsule; Take 2 capsules (1,000 mg total) " by mouth 2 (two) times a day.  Dispense: 28 capsule; Refill: 0        Assessment & Plan

## 2025-03-25 ENCOUNTER — LAB VISIT (OUTPATIENT)
Dept: LAB | Facility: HOSPITAL | Age: 80
End: 2025-03-25
Payer: MEDICARE

## 2025-03-25 ENCOUNTER — PATIENT MESSAGE (OUTPATIENT)
Dept: ORTHOPEDICS | Facility: CLINIC | Age: 80
End: 2025-03-25
Payer: MEDICARE

## 2025-03-25 ENCOUNTER — TELEPHONE (OUTPATIENT)
Dept: ORTHOPEDICS | Facility: CLINIC | Age: 80
End: 2025-03-25
Payer: MEDICARE

## 2025-03-25 ENCOUNTER — HOSPITAL ENCOUNTER (EMERGENCY)
Facility: HOSPITAL | Age: 80
Discharge: HOME | End: 2025-03-25
Attending: EMERGENCY MEDICINE
Payer: MEDICARE

## 2025-03-25 VITALS
SYSTOLIC BLOOD PRESSURE: 149 MMHG | HEIGHT: 61 IN | BODY MASS INDEX: 22.09 KG/M2 | HEART RATE: 80 BPM | OXYGEN SATURATION: 100 % | RESPIRATION RATE: 18 BRPM | TEMPERATURE: 98 F | WEIGHT: 117 LBS | DIASTOLIC BLOOD PRESSURE: 77 MMHG

## 2025-03-25 DIAGNOSIS — E11.22 TYPE 2 DIABETES MELLITUS WITH STAGE 4 CHRONIC KIDNEY DISEASE, WITHOUT LONG-TERM CURRENT USE OF INSULIN: ICD-10-CM

## 2025-03-25 DIAGNOSIS — S82.045A CLOSED NONDISPLACED COMMINUTED FRACTURE OF LEFT PATELLA, INITIAL ENCOUNTER: Primary | ICD-10-CM

## 2025-03-25 DIAGNOSIS — N18.4 TYPE 2 DIABETES MELLITUS WITH STAGE 4 CHRONIC KIDNEY DISEASE, WITHOUT LONG-TERM CURRENT USE OF INSULIN: ICD-10-CM

## 2025-03-25 DIAGNOSIS — I10 ESSENTIAL HYPERTENSION: ICD-10-CM
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
## 2025-03-25 DIAGNOSIS — I50.22 CHRONIC HFREF (HEART FAILURE WITH REDUCED EJECTION FRACTION): ICD-10-CM

## 2025-03-25 LAB
ABSOLUTE EOSINOPHIL (OHS): 0 K/UL
ABSOLUTE MONOCYTE (OHS): 0.37 K/UL (ref 0.3–1)
ABSOLUTE NEUTROPHIL COUNT (OHS): 4.74 K/UL (ref 1.8–7.7)
ALBUMIN SERPL BCP-MCNC: 4.3 G/DL (ref 3.5–5.2)
ALBUMIN/CREAT UR: 20.7 UG/MG
ALP SERPL-CCNC: 93 UNIT/L (ref 38–126)
ALT SERPL W/O P-5'-P-CCNC: 15 UNIT/L (ref 10–44)
ANION GAP (OHS): 11 MMOL/L (ref 8–16)
AST SERPL-CCNC: 25 UNIT/L (ref 15–46)
BASOPHILS # BLD AUTO: 0.04 K/UL
BASOPHILS NFR BLD AUTO: 0.6 %
BILIRUB SERPL-MCNC: 0.6 MG/DL (ref 0.1–1)
BUN SERPL-MCNC: 36 MG/DL (ref 7–17)
CALCIUM SERPL-MCNC: 9.5 MG/DL (ref 8.7–10.5)
CHLORIDE SERPL-SCNC: 100 MMOL/L (ref 95–110)
CHOLEST SERPL-MCNC: 135 MG/DL (ref 120–199)
CHOLEST/HDLC SERPL: 3.4 {RATIO} (ref 2–5)
CO2 SERPL-SCNC: 29 MMOL/L (ref 23–29)
CREAT SERPL-MCNC: 1.5 MG/DL (ref 0.5–1.4)
CREAT UR-MCNC: 111 MG/DL (ref 15–325)
EAG (OHS): 143 MG/DL (ref 68–131)
ERYTHROCYTE [DISTWIDTH] IN BLOOD BY AUTOMATED COUNT: 13.2 % (ref 11.5–14.5)
GFR SERPLBLD CREATININE-BSD FMLA CKD-EPI: 35 ML/MIN/1.73/M2
GLUCOSE SERPL-MCNC: 139 MG/DL (ref 70–110)
HBA1C MFR BLD: 6.6 % (ref 4–5.6)
HCT VFR BLD AUTO: 37.6 % (ref 37–48.5)
HDLC SERPL-MCNC: 40 MG/DL (ref 40–75)
HDLC SERPL: 29.6 % (ref 20–50)
HGB BLD-MCNC: 12.1 GM/DL (ref 12–16)
IMM GRANULOCYTES # BLD AUTO: 0.02 K/UL (ref 0–0.04)
IMM GRANULOCYTES NFR BLD AUTO: 0.3 % (ref 0–0.5)
LDLC SERPL CALC-MCNC: 76.4 MG/DL (ref 63–159)
LYMPHOCYTES # BLD AUTO: 1.22 K/UL (ref 1–4.8)
MCH RBC QN AUTO: 29.7 PG (ref 27–50)
MCHC RBC AUTO-ENTMCNC: 32.2 G/DL (ref 32–36)
MCV RBC AUTO: 92 FL (ref 82–98)
MICROALBUMIN UR-MCNC: 23 UG/ML (ref ?–5000)
NONHDLC SERPL-MCNC: 95 MG/DL
NUCLEATED RBC (/100WBC) (OHS): 0 /100 WBC
PLATELET # BLD AUTO: 242 K/UL (ref 150–450)
PMV BLD AUTO: 10.2 FL (ref 9.2–12.9)
POTASSIUM SERPL-SCNC: 3.6 MMOL/L (ref 3.5–5.1)
PROT SERPL-MCNC: 6.9 GM/DL (ref 6–8.4)
RBC # BLD AUTO: 4.07 M/UL (ref 4–5.4)
RELATIVE EOSINOPHIL (OHS): 0 %
RELATIVE LYMPHOCYTE (OHS): 19.1 % (ref 18–48)
RELATIVE MONOCYTE (OHS): 5.8 % (ref 4–15)
RELATIVE NEUTROPHIL (OHS): 74.2 % (ref 38–73)
SODIUM SERPL-SCNC: 140 MMOL/L (ref 136–145)
TRIGL SERPL-MCNC: 93 MG/DL (ref 30–150)
TSH SERPL-ACNC: 2.06 UIU/ML (ref 0.4–4)
WBC # BLD AUTO: 6.39 K/UL (ref 3.9–12.7)

## 2025-03-25 PROCEDURE — 85025 COMPLETE CBC W/AUTO DIFF WBC: CPT | Mod: HCNC,PN

## 2025-03-25 PROCEDURE — 83036 HEMOGLOBIN GLYCOSYLATED A1C: CPT | Mod: HCNC,PN

## 2025-03-25 PROCEDURE — 36415 COLL VENOUS BLD VENIPUNCTURE: CPT | Mod: HCNC,PN

## 2025-03-25 PROCEDURE — 84443 ASSAY THYROID STIM HORMONE: CPT | Mod: HCNC,PN

## 2025-03-25 PROCEDURE — 82043 UR ALBUMIN QUANTITATIVE: CPT | Mod: HCNC,PN

## 2025-03-25 PROCEDURE — 84520 ASSAY OF UREA NITROGEN: CPT | Mod: HCNC,PN

## 2025-03-25 PROCEDURE — 80053 COMPREHEN METABOLIC PANEL: CPT | Mod: HCNC,PN

## 2025-03-25 PROCEDURE — 25000003 PHARM REV CODE 250: Mod: HCNC,ER | Performed by: EMERGENCY MEDICINE

## 2025-03-25 PROCEDURE — 80061 LIPID PANEL: CPT | Mod: HCNC,PN

## 2025-03-25 PROCEDURE — 99283 EMERGENCY DEPT VISIT LOW MDM: CPT | Mod: 25,HCNC,ER

## 2025-03-25 PROCEDURE — 29505 APPLICATION LONG LEG SPLINT: CPT | Mod: HCNC,LT,ER

## 2025-03-25 RX ORDER — HYDROCODONE BITARTRATE AND ACETAMINOPHEN 5; 325 MG/1; MG/1
1 TABLET ORAL EVERY 8 HOURS PRN
Qty: 9 TABLET | Refills: 0 | Status: SHIPPED | OUTPATIENT
Start: 2025-03-25 | End: 2025-03-28

## 2025-03-25 RX ORDER — HYDROCODONE BITARTRATE AND ACETAMINOPHEN 5; 325 MG/1; MG/1
2 TABLET ORAL
Refills: 0 | Status: COMPLETED | OUTPATIENT
Start: 2025-03-25 | End: 2025-03-25

## 2025-03-25 RX ADMIN — HYDROCODONE BITARTRATE AND ACETAMINOPHEN 2 TABLET: 5; 325 TABLET ORAL at 08:03

## 2025-03-25 NOTE — DISCHARGE INSTRUCTIONS

## 2025-03-25 NOTE — ED NOTES
Pt was due to have blood drawn in out patient today. Blood was collected via straight stick and was sent to out patient.

## 2025-03-25 NOTE — ED PROVIDER NOTES
"Encounter Date: 3/25/2025       History     Chief Complaint   Patient presents with    Fall     Pt was in out patient to have blood work done for an upcoming surgery. Pt tripped and fell injuring her both knees. Pt has abrasions on both knees.      Trudy Rauch Dakin is a 79 y.o. female who  has a past medical history of CHF (congestive heart failure), Diabetes, Diverticulitis, Hypertension, and Renal disorder.    The patient presents to the ED due to fall.  Patient was on her way to get lab work for knee surgery when she states her right knee "gave out on her" and she fell onto her left knee and scraped both of her elbows.  She reports pain to her left knee.  She denies any associated shortness of breath chest pain or dizziness prior to the fall.  She did not hit her head.    The history is provided by the patient.     Review of patient's allergies indicates:  No Known Allergies  Past Medical History:   Diagnosis Date    CHF (congestive heart failure)     Diabetes     Diverticulitis     Hypertension     Renal disorder      Past Surgical History:   Procedure Laterality Date    ABDOMINAL SURGERY  2006    diverticulitis x3    ANTERIOR CERVICAL DISCECTOMY W/ FUSION N/A 8/30/2018    FUSION C6-7 Surgeon: Rickey Martin MD    APPENDECTOMY      CARPAL TUNNEL RELEASE      COLECTOMY  07/2020    EPIDURAL STEROID INJECTION INTO LUMBAR SPINE N/A 1/20/2021    Procedure: Injection-steroid-epidural-lumbar--L3-4;  Surgeon: Colleen Carvajal MD;  Location: [facility];  Service: Pain Management;  Laterality: N/A;    HYSTERECTOMY  1970    @25yrs of age    LARYNGOSCOPY N/A 1/4/2022    Procedure: Suspension microlaryngoscopy with left vocal fold injection augmentation - Restylane L;  Surgeon: Yuan Mir MD;  Location: [address];  Service: ENT;  Laterality: N/A;  Microscope, telescopes, tower, injector, Restylane-L    MANDIBLE FRACTURE SURGERY  1970    MICRODISCECTOMY OF SPINE Left 4/13/2021    Procedure: MICRODISCECTOMY, " SPINE Left L3-4 far lateral Microdiscectomy;  Surgeon: Rickey Martin MD;  Location: [facility] OR;  Service: Neurosurgery;  Laterality: Left;  Procedure: Left L3-4 far lateral Microdiscectomy   Surgery Time: 1.5 hrs  LOS: 0-1  Anesthesia: General  Radiology: C-arm  SNS: EMG, SEP  Bed: [place] [address] Poster  Position: Melissa  Dav w/ Globus confirmed 4/12/21 [place]    MITRAL CLIP N/A 12/20/2023    Procedure: Mitral clip;  Surgeon: Kevin More MD;  Location: [facility] CATH LAB;  Service: Cardiology;  Laterality: N/A;    OOPHORECTOMY  1970    ROTATOR CUFF REPAIR Left 11/2016    TOTAL REDUCTION MAMMOPLASTY Bilateral 1990    TRANSESOPHAGEAL ECHOCARDIOGRAPHY N/A 11/1/2023    Procedure: ECHOCARDIOGRAM, TRANSESOPHAGEAL;  Surgeon: Brea Melgar Diagnostic;  Location: [facility] EP LAB;  Service: Cardiology;  Laterality: N/A;    TRANSESOPHAGEAL ECHOCARDIOGRAPHY N/A 12/20/2023    Procedure: ECHOCARDIOGRAM, TRANSESOPHAGEAL;  Surgeon: Cristine Brooks MD;  Location: [facility] CATH LAB;  Service: Cardiology;  Laterality: N/A;    TRANSFORAMINAL EPIDURAL INJECTION OF STEROID Left 12/23/2020    Procedure: Injection,steroid,epidural,transforaminal approach--Left L4 and L5;  Surgeon: Colleen Carvajal MD;  Location: [facility] PAIN MGT;  Service: Pain Management;  Laterality: Left;     Family History   Problem Relation Name Age of Onset    Heart disease Father      Glaucoma Neg Hx      Macular degeneration Neg Hx       Social History[1]  Review of Systems   Constitutional:  Negative for chills and fever.   HENT:  Negative for sore throat.    Respiratory:  Negative for cough and shortness of breath.    Cardiovascular:  Negative for chest pain.   Gastrointestinal:  Negative for nausea and vomiting.   Genitourinary:  Negative for dysuria, frequency and urgency.   Musculoskeletal:  Positive for arthralgias. Negative for back pain, neck pain and neck stiffness.   Skin:  Positive for wound. Negative for rash.   Neurological:  Negative for syncope and weakness.    Hematological:  Does not bruise/bleed easily.   Psychiatric/Behavioral:  Negative for agitation, behavioral problems and confusion.        Physical Exam     Initial Vitals   BP Pulse Resp Temp SpO2   03/25/25 0830 03/25/25 0832 03/25/25 0830 03/25/25 0830 03/25/25 0832   135/65 70 18 97.5 °F (36.4 °C) 100 %      MAP       --                Physical Exam    Constitutional:  Non-toxic appearance. No distress.   HENT:   Head: Normocephalic and atraumatic.   Cardiovascular:  Regular rhythm, S1 normal and S2 normal.           Murmur heard.  Pulmonary/Chest: Breath sounds normal. No respiratory distress.   Abdominal: Abdomen is soft. She exhibits no distension. There is no abdominal tenderness.   Musculoskeletal:      Comments: Abrasions noted to bilateral for proximal forearms.  No point tenderness or swelling.  Full range of motion to elbows    Diffuse tenderness to palpation of the patient's left knee.      LUE: silt no wrist, snuff box, elbow or shoulder tenderness  RUE: silt no wrist, snuff box, elbow or shoulder tenderness  LLE: SILT dp/pt pulses palpable no mid foot ankle or hip tenderness  RLE: SILT dp/pt pulses palpable no mid foot ankle knee or hip tenderness  No cervical thoracic or lumbar point tenderness to palpation no step offs            Neurological: She is alert.   Skin: Skin is warm. No rash noted.   Psychiatric: She has a normal mood and affect.         ED Course   Procedures  Labs Reviewed - No data to display       Imaging Results              X-Ray Knee 3 View Left (Final result)  Result time 03/25/25 09:12:58      Final result by Ranjit Robles MD (03/25/25 09:12:58)                   Impression:      1.  Comminuted patellar fracture with knee joint effusion.      Electronically signed by: Ranjit Robles MD  Date:    03/25/2025  Time:    09:12               Narrative:    EXAMINATION:  XR KNEE 3 VIEW LEFT    CLINICAL HISTORY:  Unspecified fall, initial encounter    TECHNIQUE:  AP, lateral, and  Merchant views of the left knee were performed.    COMPARISON:  July 10, 2024    FINDINGS:  There is a comminuted patellar fracture.  Small knee joint effusion.  No other acute fracture seen.  Tricompartment osteophyte formation and joint space narrowing most significantly involving the medial tibiofemoral compartment again seen.                                       Medications   HYDROcodone-acetaminophen 5-325 mg per tablet 2 tablet (2 tablets Oral Given 3/25/25 0859)     Medical Decision Making  Differential Diagnosis includes, but is not limited to:  Polytrauma, fall/syncope, traumatic SAH/intracranial bleed, skull/c-spine/facial fracture, concussion, neck injury, chest trauma, intraabdominal bleed, solid organ injury, pelvic fracture, long bone fracture/dislocation, nerve injury/palsy, vascular injury, hemarthrosis, septic joint, osteoarthritis, compartment syndrome, rhabdomyolysis, soft tissue contusion, muscle strain, ligament tear/sprain, foreign body, laceration, abrasion.      Amount and/or Complexity of Data Reviewed  Radiology: ordered. Decision-making details documented in ED Course.    Risk  Prescription drug management.  Decision regarding hospitalization.  Diagnosis or treatment significantly limited by social determinants of health.  Risk Details: Patient is a 79 woman here with a mechanical fall.  She as abrasions to her elbows without signs to suggest associated fracture.  Unfortunately x-ray of her left knee demonstrates a patellar fracture.  It is close.  She is neurovascularly intact.  She will be placed in a knee immobilizer.  Patient's family members at bedside and can assist patient.  She was advised to follow-up closely with her orthopedist and PCP for further assistance.  Discussed indications to return especially for worsening symptoms or new symptoms of concern.    After taking into careful account the historical factors and physical exam findings of the patient's presentation today, in  conjunction with the empirical and objective data obtained on ED workup, no acute emergent medical condition has been identified. The patient appears to be low risk for an emergent medical condition and I feel it is safe and appropriate at this time for the patient to be discharged to follow-up as detailed in their discharge instructions for reevaluation and possible continued outpatient workup and management. I have discussed the specifics of the workup with the patient and the patient has verbalized understanding of the details of the workup, the diagnosis, the treatment plan, and the need for outpatient follow-up.  Although the patient has no emergent etiology today this does not preclude the development of an emergent condition so in addition, I have advised the patient that they can return to the ED and/or activate EMS at any time with worsening of their symptoms, change of their symptoms, or with any other medical complaint.  The patient remained comfortable and stable during their visit in the ED.  Discharge and follow-up instructions discussed with the patient who expressed understanding and willingness to comply with my recommendations.                 ED Course as of 03/27/25 0722   Tue Mar 25, 2025   0913 X-Ray Knee 3 View Left  Suspected patellar fracture, awaiting formal radiology read [RN]      ED Course User Index  [RN] Blanco Webber Jr., MD                           Clinical Impression:  Final diagnoses:  [W19.XXXA] Fall  [S82.045A] Closed nondisplaced comminuted fracture of left patella, initial encounter (Primary)          ED Disposition Condition    Discharge Stable          ED Prescriptions       Medication Sig Dispense Start Date End Date Auth. Provider    HYDROcodone-acetaminophen (NORCO) 5-325 mg per tablet Take 1 tablet by mouth every 8 (eight) hours as needed. 9 tablet 3/25/2025 3/28/2025 Blanco Webber Jr., MD          Follow-up Information       Follow up With Specialties Details  Why Contact Info    Derrick Caldwell MD Family Medicine In 3 days  [address] [facility] [address]  [phone]              Portions of this note were dictated using voice recognition software and may contain dictation related errors in spelling/grammar/syntax not found on text review         [1]   Social History  Tobacco Use    Smoking status: Never     Passive exposure: Never    Smokeless tobacco: Never   Substance Use Topics    Alcohol use: No    Drug use: No        Blanco Webber Jr., MD  03/27/25 0731

## 2025-03-27 ENCOUNTER — HOSPITAL ENCOUNTER (OUTPATIENT)
Facility: HOSPITAL | Age: 80
Discharge: HOME | End: 2025-03-27
Attending: ORTHOPAEDIC SURGERY
Payer: MEDICARE

## 2025-03-27 ENCOUNTER — OFFICE VISIT (OUTPATIENT)
Dept: ORTHOPEDICS | Facility: CLINIC | Age: 80
End: 2025-03-27
Payer: MEDICARE

## 2025-03-27 ENCOUNTER — RESEARCH ENCOUNTER (OUTPATIENT)
Dept: RESEARCH | Facility: HOSPITAL | Age: 80
End: 2025-03-27
Payer: MEDICARE

## 2025-03-27 VITALS — HEIGHT: 61 IN | BODY MASS INDEX: 22.1 KG/M2 | WEIGHT: 117.06 LBS

## 2025-03-27 DIAGNOSIS — S82.045A CLOSED NONDISPLACED COMMINUTED FRACTURE OF LEFT PATELLA, INITIAL ENCOUNTER: Primary | ICD-10-CM

## 2025-03-27 DIAGNOSIS — S82.035A CLOSED NONDISPLACED TRANSVERSE FRACTURE OF LEFT PATELLA, INITIAL ENCOUNTER: Primary | ICD-10-CM

## 2025-03-27 DIAGNOSIS — S82.045A CLOSED NONDISPLACED COMMINUTED FRACTURE OF LEFT PATELLA, INITIAL ENCOUNTER: ICD-10-CM

## 2025-03-27 DIAGNOSIS — M17.12 PRIMARY OSTEOARTHRITIS OF LEFT KNEE: ICD-10-CM

## 2025-03-27 DIAGNOSIS — M17.12 PRIMARY OSTEOARTHRITIS OF LEFT KNEE: Primary | ICD-10-CM

## 2025-03-27 PROCEDURE — 1101F PT FALLS ASSESS-DOCD LE1/YR: CPT | Mod: HCNC,CPTII,S$GLB, | Performed by: ORTHOPAEDIC SURGERY

## 2025-03-27 PROCEDURE — 1159F MED LIST DOCD IN RCRD: CPT | Mod: HCNC,CPTII,S$GLB, | Performed by: ORTHOPAEDIC SURGERY

## 2025-03-27 PROCEDURE — 73562 X-RAY EXAM OF KNEE 3: CPT | Mod: 26,HCNC,LT, | Performed by: RADIOLOGY

## 2025-03-27 PROCEDURE — 3288F FALL RISK ASSESSMENT DOCD: CPT | Mod: HCNC,CPTII,S$GLB, | Performed by: ORTHOPAEDIC SURGERY

## 2025-03-27 PROCEDURE — 3072F LOW RISK FOR RETINOPATHY: CPT | Mod: HCNC,CPTII,S$GLB, | Performed by: ORTHOPAEDIC SURGERY

## 2025-03-27 PROCEDURE — 99214 OFFICE O/P EST MOD 30 MIN: CPT | Mod: HCNC,S$GLB,, | Performed by: ORTHOPAEDIC SURGERY

## 2025-03-27 PROCEDURE — G2211 COMPLEX E/M VISIT ADD ON: HCPCS | Mod: HCNC,S$GLB,, | Performed by: ORTHOPAEDIC SURGERY

## 2025-03-27 PROCEDURE — 1157F ADVNC CARE PLAN IN RCRD: CPT | Mod: HCNC,CPTII,S$GLB, | Performed by: ORTHOPAEDIC SURGERY

## 2025-03-27 PROCEDURE — 73562 X-RAY EXAM OF KNEE 3: CPT | Mod: TC,HCNC,PN,LT

## 2025-03-27 PROCEDURE — 1125F AMNT PAIN NOTED PAIN PRSNT: CPT | Mod: HCNC,CPTII,S$GLB, | Performed by: ORTHOPAEDIC SURGERY

## 2025-03-27 PROCEDURE — 99999 PR PBB SHADOW E&M-EST. PATIENT-LVL IV: CPT | Mod: PBBFAC,HCNC,, | Performed by: ORTHOPAEDIC SURGERY

## 2025-03-27 NOTE — PROGRESS NOTES
Subjective:      Patient ID: Trudy Rauch Dakin is a 79 y.o. female.    Chief Complaint: Pain of the Left Knee    Patient fell onto L knee 4 d ago  ED showed patella fx  Placed in knee immobilizer  Presents for initial eval        Social History     Occupational History    Not on file   Tobacco Use    Smoking status: Never     Passive exposure: Never    Smokeless tobacco: Never   Substance and Sexual Activity    Alcohol use: No    Drug use: No    Sexual activity: Never      Social Drivers of Health     Tobacco Use: Low Risk  (3/27/2025)    Patient History     Smoking Tobacco Use: Never     Smokeless Tobacco Use: Never     Passive Exposure: Never   Alcohol Use: Not At Risk (2/23/2024)    AUDIT-C     Frequency of Alcohol Consumption: Never     Average Number of Drinks: Not on file     Frequency of Binge Drinking: Not on file   Financial Resource Strain: Low Risk  (2/23/2024)    Overall Financial Resource Strain (CARDIA)     Difficulty of Paying Living Expenses: Not hard at all   Food Insecurity: No Food Insecurity (5/25/2024)    Hunger Vital Sign     Worried About Running Out of Food in the Last Year: Never true     Ran Out of Food in the Last Year: Never true   Transportation Needs: No Transportation Needs (5/25/2024)    TRANSPORTATION NEEDS     Transportation : No   Physical Activity: Inactive (2/23/2024)    Exercise Vital Sign     Days of Exercise per Week: 0 days     Minutes of Exercise per Session: 0 min   Stress: No Stress Concern Present (2/23/2024)    [employer] [place] [facility] [employer] - Occupational Stress Questionnaire     Feeling of Stress : Not at all   Housing Stability: Unknown (5/25/2024)    Housing Stability Vital Sign     Unable to Pay for Housing in the Last Year: No     Number of Times Moved in the Last Year: Not on file     Homeless in the Last Year: No   Depression: Low Risk  (2/12/2025)    Depression     Last PHQ-4: Flowsheet Data: 0   Utilities: Not At Risk (5/25/2024)    [facility] Utilities      Threatened with loss of utilities: No   Health Literacy: Not on file   Social Isolation: Not on file      Review of Systems   Constitutional: Negative for diaphoresis.   HENT:  Negative for ear discharge, nosebleeds and stridor.    Eyes:  Negative for photophobia.   Cardiovascular:  Negative for syncope.   Respiratory:  Negative for hemoptysis, shortness of breath and wheezing.    Neurological:  Negative for tremors.   Psychiatric/Behavioral: Negative.           Objective:    Ortho/SPM Exam   Intact SLR  + patella tenderness  Superficial bruising      Assessment:       1. Closed nondisplaced transverse fracture of left patella, initial encounter    2. Closed nondisplaced comminuted fracture of left patella, initial encounter          Plan:   Wbat w knee immobilizer  F/u 2 weeks for xrays

## 2025-03-27 NOTE — PROGRESS NOTES
Protocol: innovations in [employer]icular Outcomes Registry (Deysi)  Protocol#: Deysi  IRB#: 2021.156  Version Date: 10-Gokul-2023  PI: Eric Hines MD  Patient Initials: T.D.     Declined Study Note    Patient is in today to discuss treatment plan with Dr. Hines for knee fracture and osteoarthritis condition in knees. Patient was approached about participation in Deysi Study. Patient declined study.

## 2025-03-28 ENCOUNTER — RESULTS FOLLOW-UP (OUTPATIENT)
Dept: FAMILY MEDICINE | Facility: CLINIC | Age: 80
End: 2025-03-28
Payer: MEDICARE

## 2025-04-02 DIAGNOSIS — S82.045A CLOSED NONDISPLACED COMMINUTED FRACTURE OF LEFT PATELLA, INITIAL ENCOUNTER: ICD-10-CM

## 2025-04-02 DIAGNOSIS — M17.12 PRIMARY OSTEOARTHRITIS OF LEFT KNEE: Primary | ICD-10-CM

## 2025-04-10 ENCOUNTER — HOSPITAL ENCOUNTER (OUTPATIENT)
Dept: RADIOLOGY | Facility: HOSPITAL | Age: 80
Discharge: HOME | End: 2025-04-10
Attending: ORTHOPAEDIC SURGERY
Payer: MEDICARE

## 2025-04-10 ENCOUNTER — OFFICE VISIT (OUTPATIENT)
Dept: ORTHOPEDICS | Facility: CLINIC | Age: 80
End: 2025-04-10
Payer: MEDICARE

## 2025-04-10 VITALS — BODY MASS INDEX: 22.08 KG/M2 | WEIGHT: 116.88 LBS

## 2025-04-10 DIAGNOSIS — M17.12 PRIMARY OSTEOARTHRITIS OF LEFT KNEE: ICD-10-CM

## 2025-04-10 DIAGNOSIS — S82.045A CLOSED NONDISPLACED COMMINUTED FRACTURE OF LEFT PATELLA, INITIAL ENCOUNTER: ICD-10-CM

## 2025-04-10 DIAGNOSIS — S82.045A CLOSED NONDISPLACED COMMINUTED FRACTURE OF LEFT PATELLA, INITIAL ENCOUNTER: Primary | ICD-10-CM

## 2025-04-10 DIAGNOSIS — S82.035D CLOSED NONDISPLACED TRANSVERSE FRACTURE OF LEFT PATELLA WITH ROUTINE HEALING, SUBSEQUENT ENCOUNTER: Primary | ICD-10-CM

## 2025-04-10 PROCEDURE — 1159F MED LIST DOCD IN RCRD: CPT | Mod: HCNC,CPTII,S$GLB, | Performed by: ORTHOPAEDIC SURGERY

## 2025-04-10 PROCEDURE — 73562 X-RAY EXAM OF KNEE 3: CPT | Mod: 26,HCNC,LT, | Performed by: RADIOLOGY

## 2025-04-10 PROCEDURE — 3288F FALL RISK ASSESSMENT DOCD: CPT | Mod: HCNC,CPTII,S$GLB, | Performed by: ORTHOPAEDIC SURGERY

## 2025-04-10 PROCEDURE — G2211 COMPLEX E/M VISIT ADD ON: HCPCS | Mod: HCNC,S$GLB,, | Performed by: ORTHOPAEDIC SURGERY

## 2025-04-10 PROCEDURE — 73562 X-RAY EXAM OF KNEE 3: CPT | Mod: TC,HCNC,FY,LT

## 2025-04-10 PROCEDURE — 1125F AMNT PAIN NOTED PAIN PRSNT: CPT | Mod: HCNC,CPTII,S$GLB, | Performed by: ORTHOPAEDIC SURGERY

## 2025-04-10 PROCEDURE — 1157F ADVNC CARE PLAN IN RCRD: CPT | Mod: HCNC,CPTII,S$GLB, | Performed by: ORTHOPAEDIC SURGERY

## 2025-04-10 PROCEDURE — 99214 OFFICE O/P EST MOD 30 MIN: CPT | Mod: HCNC,S$GLB,, | Performed by: ORTHOPAEDIC SURGERY

## 2025-04-10 PROCEDURE — 99999 PR PBB SHADOW E&M-EST. PATIENT-LVL III: CPT | Mod: PBBFAC,HCNC,, | Performed by: ORTHOPAEDIC SURGERY

## 2025-04-10 PROCEDURE — 1101F PT FALLS ASSESS-DOCD LE1/YR: CPT | Mod: HCNC,CPTII,S$GLB, | Performed by: ORTHOPAEDIC SURGERY

## 2025-04-10 PROCEDURE — 3072F LOW RISK FOR RETINOPATHY: CPT | Mod: HCNC,CPTII,S$GLB, | Performed by: ORTHOPAEDIC SURGERY

## 2025-04-10 NOTE — PROGRESS NOTES
[facility] [address]          Subjective:     Patient ID: Trudy Rauch Dakin is a 79 y.o. female.    Chief Complaint: Pain and Post-op Evaluation of the Left Knee      Patient is here for follow-up of L patella fracture sustained on 3/25/25 after fall directly onto left knee. Seen in clinic on 3/27/25. Placed in a locked hinge knee brace and told to WBAT thru the left lower extremity. Using RW to ambulate. Complains of throbbing pain at night. Taking tylenol at night as needed for pain.     Past Medical History:   Diagnosis Date    CHF (congestive heart failure)     Diabetes     Diverticulitis     Hypertension     Renal disorder         Past Surgical History:   Procedure Laterality Date    ABDOMINAL SURGERY  2006    diverticulitis x3    ANTERIOR CERVICAL DISCECTOMY W/ FUSION N/A 8/30/2018    FUSION C6-7 Surgeon: Rickey Martin MD    APPENDECTOMY      CARPAL TUNNEL RELEASE      COLECTOMY  07/2020    EPIDURAL STEROID INJECTION INTO LUMBAR SPINE N/A 1/20/2021    Procedure: Injection-steroid-epidural-lumbar--L3-4;  Surgeon: Colleen Carvajal MD;  Location: [facility] PAIN MGT;  Service: Pain Management;  Laterality: N/A;    HYSTERECTOMY  1970    @25yrs of age    LARYNGOSCOPY N/A 1/4/2022    Procedure: Suspension microlaryngoscopy with left vocal fold injection augmentation - Restylane L;  Surgeon: Yuan Mir MD;  Location: [address];  Service: ENT;  Laterality: N/A;  Microscope, telescopes, tower, injector, Restylane-L    MANDIBLE FRACTURE SURGERY  1970    MICRODISCECTOMY OF SPINE Left 4/13/2021    Procedure: MICRODISCECTOMY, SPINE Left L3-4 far lateral Microdiscectomy;  Surgeon: Rickey Martin MD;  Location: [facility] OR;  Service: Neurosurgery;  Laterality: Left;  Procedure: Left L3-4 far lateral Microdiscectomy   Surgery Time: 1.5 hrs  LOS: 0-1  Anesthesia: General  Radiology: C-arm  SNS: EMG, SEP  Bed: [place] [id number] Poster  Position: Melissa Demarco w/ Globus confirmed 4/12/21 [place]    MITRAL CLIP N/A 12/20/2023     Procedure: Mitral clip;  Surgeon: Kevin More MD;  Location: [facility] CATH LAB;  Service: Cardiology;  Laterality: N/A;    OOPHORECTOMY  1970    ROTATOR CUFF REPAIR Left 11/2016    TOTAL REDUCTION MAMMOPLASTY Bilateral 1990    TRANSESOPHAGEAL ECHOCARDIOGRAPHY N/A 11/1/2023    Procedure: ECHOCARDIOGRAM, TRANSESOPHAGEAL;  Surgeon: Brea Melgar Diagnostic;  Location: [facility] EP LAB;  Service: Cardiology;  Laterality: N/A;    TRANSESOPHAGEAL ECHOCARDIOGRAPHY N/A 12/20/2023    Procedure: ECHOCARDIOGRAM, TRANSESOPHAGEAL;  Surgeon: Cristine Brooks MD;  Location: [facility] CATH LAB;  Service: Cardiology;  Laterality: N/A;    TRANSFORAMINAL EPIDURAL INJECTION OF STEROID Left 12/23/2020    Procedure: Injection,steroid,epidural,transforaminal approach--Left L4 and L5;  Surgeon: Colleen Carvajal MD;  Location: [facility] PAIN MGT;  Service: Pain Management;  Laterality: Left;        Current Outpatient Medications   Medication Instructions    acetaminophen (TYLENOL) 650 mg, Oral, Every 8 hours PRN    amitriptyline (ELAVIL) 10 mg, Oral, Nightly    apixaban (ELIQUIS) 2.5 mg, Oral, 2 times daily    atorvastatin (LIPITOR) 80 mg, Oral, Daily    bumetanide (BUMEX) 3 mg, Oral, 2 times daily    cephALEXin (KEFLEX) 1,000 mg, Oral, 2 times daily    cyanocobalamin 1,000 mcg, Intramuscular, Every 30 days    dapagliflozin propanediol (FARXIGA) 10 mg, Oral, Daily    ergocalciferol (ERGOCALCIFEROL) 50,000 Units, Oral, Every 7 days    erythromycin (ROMYCIN) ophthalmic ointment Left Eye, Nightly    gabapentin (NEURONTIN) 100 mg, Oral, Nightly    nitroGLYCERIN (NITROSTAT) 0.4 mg, Sublingual, Every 5 min PRN    potassium chloride SA (K-DUR,KLOR-CON M) 10 MEQ tablet 20 mEq, Oral, Nightly    venlafaxine (EFFEXOR-XR) 150 mg, Oral, Daily, For mood and neruopathy  dc lexapro        Review of patient's allergies indicates:  No Known Allergies    Social History[1]    Family History   Problem Relation Name Age of Onset    Heart disease Father      Glaucoma  Neg Hx      Macular degeneration Neg Hx           Review of systems negative except for noted in HPI    Objective:   Physical Exam:     Left knee  Skin atraumatic   No effusion  TTP diffusely over patella but improved compared to prior visits  ROM not tested  Intact straight leg raise  Grossly NVI distally    Imaging: Left knee x-ray  X-Ray knee reviewed, re-demonstrates left patella fracture with unchanged alignment compared to previous films      Assessment:        Trudy Rauch Dakin is a 79 y.o. female with a non-displaced fracture of the left patella    No diagnosis found.    Plan :    Continue WBAT in hinged knee brace locked in extension   RTC in 2 weeks for repeat exam, xrays, and possible advancing to 30 degrees of flexion in brace   Okay to continue tylenol as needed for pain         No orders of the defined types were placed in this encounter.       Bryon Wilhelm MD   04/10/2025             [1]   Social History  Socioeconomic History    Marital status:    Tobacco Use    Smoking status: Never     Passive exposure: Never    Smokeless tobacco: Never   Substance and Sexual Activity    Alcohol use: No    Drug use: No    Sexual activity: Never     Social Drivers of Health     Financial Resource Strain: Low Risk  (2/23/2024)    Overall Financial Resource Strain (CARDIA)     Difficulty of Paying Living Expenses: Not hard at all   Food Insecurity: No Food Insecurity (5/25/2024)    Hunger Vital Sign     Worried About Running Out of Food in the Last Year: Never true     Ran Out of Food in the Last Year: Never true   Transportation Needs: No Transportation Needs (5/25/2024)    TRANSPORTATION NEEDS     Transportation : No   Physical Activity: Inactive (2/23/2024)    Exercise Vital Sign     Days of Exercise per Week: 0 days     Minutes of Exercise per Session: 0 min   Stress: No Stress Concern Present (2/23/2024)    [employer] - Occupational Stress Questionnaire     Feeling of Stress :  Not at all   Housing Stability: Unknown (5/25/2024)    Housing Stability Vital Sign     Unable to Pay for Housing in the Last Year: No     Homeless in the Last Year: No

## 2025-04-23 RX ORDER — VENLAFAXINE HYDROCHLORIDE 150 MG/1
CAPSULE, EXTENDED RELEASE ORAL
Qty: 30 CAPSULE | Refills: 4 | Status: SHIPPED | OUTPATIENT
Start: 2025-04-23

## 2025-04-23 NOTE — TELEPHONE ENCOUNTER
Care Due:                  Date            Visit Type   Department     Provider  --------------------------------------------------------------------------------                                EP -                              PRIMARY      [facility]  Last Visit: 03-      CARE ([facility])   MEDICINE       Derrick Caldwell                              EP -                              PRIMARY      [facility]  Next Visit: 06-      CARE ([facility])   MEDICINE       Derrick Caldwell                                                            Last  Test          Frequency    Reason                     Performed    Due Date  --------------------------------------------------------------------------------    Vitamin D...  12 months..  ergocalciferol...........  05- 05-    [facility] Embedded Care Due Messages. Reference number: [id number].   4/23/2025 10:55:01 AM CDT

## 2025-04-24 ENCOUNTER — PATIENT MESSAGE (OUTPATIENT)
Dept: FAMILY MEDICINE | Facility: CLINIC | Age: 80
End: 2025-04-24
Payer: MEDICARE

## 2025-04-24 DIAGNOSIS — E78.00 HIGH CHOLESTEROL: ICD-10-CM

## 2025-04-24 RX ORDER — CEPHALEXIN 500 MG/1
1000 CAPSULE ORAL 2 TIMES DAILY
Qty: 28 CAPSULE | Refills: 0 | Status: SHIPPED | OUTPATIENT
Start: 2025-04-24

## 2025-04-24 NOTE — TELEPHONE ENCOUNTER
Please see portal message.    Trudy Rauch Dakin to [facility] (supporting Derrick Caldwell MD)        4/24/25  1:43 PM  My toe seems to becoming infected again.  Can we do another round of antibiotics?

## 2025-04-25 ENCOUNTER — PATIENT MESSAGE (OUTPATIENT)
Dept: FAMILY MEDICINE | Facility: CLINIC | Age: 80
End: 2025-04-25
Payer: MEDICARE

## 2025-04-25 RX ORDER — ATORVASTATIN CALCIUM 80 MG/1
80 TABLET, FILM COATED ORAL DAILY
Qty: 90 TABLET | Refills: 1 | Status: SHIPPED | OUTPATIENT
Start: 2025-04-25

## 2025-04-28 DIAGNOSIS — Z00.00 ENCOUNTER FOR MEDICARE ANNUAL WELLNESS EXAM: ICD-10-CM

## 2025-04-29 ENCOUNTER — OFFICE VISIT (OUTPATIENT)
Dept: ORTHOPEDICS | Facility: CLINIC | Age: 80
End: 2025-04-29
Payer: MEDICARE

## 2025-04-29 ENCOUNTER — HOSPITAL ENCOUNTER (OUTPATIENT)
Facility: HOSPITAL | Age: 80
Discharge: HOME | End: 2025-04-29
Attending: ORTHOPAEDIC SURGERY
Payer: MEDICARE

## 2025-04-29 VITALS — BODY MASS INDEX: 22.07 KG/M2 | HEIGHT: 61 IN | WEIGHT: 116.88 LBS

## 2025-04-29 DIAGNOSIS — S82.045A CLOSED NONDISPLACED COMMINUTED FRACTURE OF LEFT PATELLA, INITIAL ENCOUNTER: ICD-10-CM

## 2025-04-29 DIAGNOSIS — S82.022D: ICD-10-CM

## 2025-04-29 DIAGNOSIS — S82.022D CLOSED DISPLACED LONGITUDINAL FRACTURE OF LEFT PATELLA WITH ROUTINE HEALING, SUBSEQUENT ENCOUNTER: Primary | ICD-10-CM

## 2025-04-29 PROCEDURE — 99214 OFFICE O/P EST MOD 30 MIN: CPT | Mod: HCNC,S$GLB,, | Performed by: ORTHOPAEDIC SURGERY

## 2025-04-29 PROCEDURE — 3072F LOW RISK FOR RETINOPATHY: CPT | Mod: CPTII,HCNC,S$GLB, | Performed by: ORTHOPAEDIC SURGERY

## 2025-04-29 PROCEDURE — 99999 PR PBB SHADOW E&M-EST. PATIENT-LVL III: CPT | Mod: PBBFAC,HCNC,, | Performed by: ORTHOPAEDIC SURGERY

## 2025-04-29 PROCEDURE — 1157F ADVNC CARE PLAN IN RCRD: CPT | Mod: CPTII,HCNC,S$GLB, | Performed by: ORTHOPAEDIC SURGERY

## 2025-04-29 PROCEDURE — 1159F MED LIST DOCD IN RCRD: CPT | Mod: CPTII,HCNC,S$GLB, | Performed by: ORTHOPAEDIC SURGERY

## 2025-04-29 PROCEDURE — G2211 COMPLEX E/M VISIT ADD ON: HCPCS | Mod: HCNC,S$GLB,, | Performed by: ORTHOPAEDIC SURGERY

## 2025-04-29 PROCEDURE — 1101F PT FALLS ASSESS-DOCD LE1/YR: CPT | Mod: CPTII,HCNC,S$GLB, | Performed by: ORTHOPAEDIC SURGERY

## 2025-04-29 PROCEDURE — 73562 X-RAY EXAM OF KNEE 3: CPT | Mod: TC,HCNC,PN,LT

## 2025-04-29 PROCEDURE — 1126F AMNT PAIN NOTED NONE PRSNT: CPT | Mod: CPTII,HCNC,S$GLB, | Performed by: ORTHOPAEDIC SURGERY

## 2025-04-29 PROCEDURE — 3288F FALL RISK ASSESSMENT DOCD: CPT | Mod: CPTII,HCNC,S$GLB, | Performed by: ORTHOPAEDIC SURGERY

## 2025-04-29 PROCEDURE — 73562 X-RAY EXAM OF KNEE 3: CPT | Mod: 26,HCNC,LT, | Performed by: RADIOLOGY

## 2025-04-29 NOTE — PROGRESS NOTES
[facility] [address]          Subjective:     Patient ID: Trudy Rauch Dakin is a 79 y.o. female.    Chief Complaint: Pain of the Left Knee      Patient is here for follow-up of L patella fracture sustained on 3/25/25 after fall directly onto left knee.  Last seen in clinic on 04/10/2025.  At that point we put her in a hinged knee brace locked in extension.  She states her pain is doing well at this point.  She says that the hinged knee brace is a little big for.  She has been ambulating without any problems using her rolling walker.    Past Medical History:   Diagnosis Date    CHF (congestive heart failure)     Diabetes     Diverticulitis     Hypertension     Renal disorder         Past Surgical History:   Procedure Laterality Date    ABDOMINAL SURGERY  2006    diverticulitis x3    ANTERIOR CERVICAL DISCECTOMY W/ FUSION N/A 8/30/2018    FUSION C6-7 Surgeon: Rickey Martin MD    APPENDECTOMY      CARPAL TUNNEL RELEASE      COLECTOMY  07/2020    EPIDURAL STEROID INJECTION INTO LUMBAR SPINE N/A 1/20/2021    Procedure: Injection-steroid-epidural-lumbar--L3-4;  Surgeon: Colleen Carvajal MD;  Location: [facility] PAIN MGT;  Service: Pain Management;  Laterality: N/A;    HYSTERECTOMY  1970    @25yrs of age    LARYNGOSCOPY N/A 1/4/2022    Procedure: Suspension microlaryngoscopy with left vocal fold injection augmentation - Restylane L;  Surgeon: Yuan Mir MD;  Location: [address];  Service: ENT;  Laterality: N/A;  Microscope, telescopes, tower, injector, Restylane-L    MANDIBLE FRACTURE SURGERY  1970    MICRODISCECTOMY OF SPINE Left 4/13/2021    Procedure: MICRODISCECTOMY, SPINE Left L3-4 far lateral Microdiscectomy;  Surgeon: Rickey Martin MD;  Location: [facility] OR;  Service: Neurosurgery;  Laterality: Left;  Procedure: Left L3-4 far lateral Microdiscectomy   Surgery Time: 1.5 hrs  LOS: 0-1  Anesthesia: General  Radiology: C-arm  SNS: EMG, SEP  Bed: [place] [id number] Poster  Position: Melissa darling/ Niharika  confirmed 4/12/21 [place]    MITRAL CLIP N/A 12/20/2023    Procedure: Mitral clip;  Surgeon: Kevin More MD;  Location: [facility] CATH LAB;  Service: Cardiology;  Laterality: N/A;    OOPHORECTOMY  1970    ROTATOR CUFF REPAIR Left 11/2016    TOTAL REDUCTION MAMMOPLASTY Bilateral 1990    TRANSESOPHAGEAL ECHOCARDIOGRAPHY N/A 11/1/2023    Procedure: ECHOCARDIOGRAM, TRANSESOPHAGEAL;  Surgeon: Ramón, Brea Diagnostic;  Location: [facility] EP LAB;  Service: Cardiology;  Laterality: N/A;    TRANSESOPHAGEAL ECHOCARDIOGRAPHY N/A 12/20/2023    Procedure: ECHOCARDIOGRAM, TRANSESOPHAGEAL;  Surgeon: Cristine Brooks MD;  Location: [facility] CATH LAB;  Service: Cardiology;  Laterality: N/A;    TRANSFORAMINAL EPIDURAL INJECTION OF STEROID Left 12/23/2020    Procedure: Injection,steroid,epidural,transforaminal approach--Left L4 and L5;  Surgeon: Colleen Carvajal MD;  Location: [facility] PAIN MGT;  Service: Pain Management;  Laterality: Left;        Current Outpatient Medications   Medication Instructions    acetaminophen (TYLENOL) 650 mg, Oral, Every 8 hours PRN    amitriptyline (ELAVIL) 10 mg, Oral, Nightly    apixaban (ELIQUIS) 2.5 mg, Oral, 2 times daily    atorvastatin (LIPITOR) 80 mg, Oral, Daily    bumetanide (BUMEX) 3 mg, Oral, 2 times daily    cephALEXin (KEFLEX) 1,000 mg, Oral, 2 times daily    cyanocobalamin 1,000 mcg, Intramuscular, Every 30 days    dapagliflozin propanediol (FARXIGA) 10 mg, Oral, Daily    ergocalciferol (ERGOCALCIFEROL) 50,000 Units, Oral, Every 7 days    erythromycin (ROMYCIN) ophthalmic ointment Left Eye, Nightly    gabapentin (NEURONTIN) 100 mg, Oral, Nightly    nitroGLYCERIN (NITROSTAT) 0.4 mg, Sublingual, Every 5 min PRN    potassium chloride SA (K-DUR,KLOR-CON M) 10 MEQ tablet 20 mEq, Oral, Nightly    venlafaxine (EFFEXOR-XR) 150 MG Cp24 TAKE 1 CAPSULE BY MOUTH ONCE DAILY FOR MOOD AND NEUROPATHY        Review of patient's allergies indicates:  No Known Allergies    Social History[1]    Family History   Problem  Relation Name Age of Onset    Heart disease Father      Glaucoma Neg Hx      Macular degeneration Neg Hx           Review of systems negative except for noted in HPI    Objective:   Physical Exam:     Left knee  Skin atraumatic   No effusion  No tenderness to palpation throughout the patella  ROM not tested  Intact straight leg raise  Grossly NVI distally    Imaging: Left knee x-ray  X-Ray knee reviewed, re-demonstrates left patella fracture with minimal change in alignment of lateral facet fragment compared to previous films      Assessment:        Trudy Rauch Dakin is a 79 y.o. female with a minimally-displaced fracture of the left patella    No diagnosis found.    Plan :    Continue WBAT in hinged knee  We will progress her to 30° of flexion in her hinged knee brace  RTC in 2 weeks for repeat exam, xrays, and possible advancing to 60 degrees of flexion in brace   Okay to continue tylenol as needed for pain         No orders of the defined types were placed in this encounter.       Bryon Wilhelm MD   04/29/2025               [1]   Social History  Socioeconomic History    Marital status:    Tobacco Use    Smoking status: Never     Passive exposure: Never    Smokeless tobacco: Never   Substance and Sexual Activity    Alcohol use: No    Drug use: No    Sexual activity: Never     Social Drivers of Health     Financial Resource Strain: Low Risk  (2/23/2024)    Overall Financial Resource Strain (CARDIA)     Difficulty of Paying Living Expenses: Not hard at all   Food Insecurity: No Food Insecurity (5/25/2024)    Hunger Vital Sign     Worried About Running Out of Food in the Last Year: Never true     Ran Out of Food in the Last Year: Never true   Transportation Needs: No Transportation Needs (5/25/2024)    TRANSPORTATION NEEDS     Transportation : No   Physical Activity: Inactive (2/23/2024)    Exercise Vital Sign     Days of Exercise per Week: 0 days     Minutes of Exercise per Session: 0 min   Stress: No Stress  Concern Present (2/23/2024)    [facility] [employer] - Occupational Stress Questionnaire     Feeling of Stress : Not at all   Housing Stability: Unknown (5/25/2024)    Housing Stability Vital Sign     Unable to Pay for Housing in the Last Year: No     Homeless in the Last Year: No

## 2025-04-30 ENCOUNTER — OFFICE VISIT (OUTPATIENT)
Dept: FAMILY MEDICINE | Facility: CLINIC | Age: 80
End: 2025-04-30
Payer: MEDICARE

## 2025-04-30 VITALS
WEIGHT: 115 LBS | DIASTOLIC BLOOD PRESSURE: 78 MMHG | SYSTOLIC BLOOD PRESSURE: 126 MMHG | BODY MASS INDEX: 21.71 KG/M2 | TEMPERATURE: 98 F | HEIGHT: 61 IN | OXYGEN SATURATION: 97 % | HEART RATE: 86 BPM

## 2025-04-30 DIAGNOSIS — I50.22 CHRONIC HFREF (HEART FAILURE WITH REDUCED EJECTION FRACTION): ICD-10-CM

## 2025-04-30 DIAGNOSIS — N18.32 TYPE 2 DIABETES MELLITUS WITH STAGE 3B CHRONIC KIDNEY DISEASE, WITHOUT LONG-TERM CURRENT USE OF INSULIN: ICD-10-CM

## 2025-04-30 DIAGNOSIS — Z86.0101 HX OF ADENOMATOUS COLONIC POLYPS: ICD-10-CM

## 2025-04-30 DIAGNOSIS — E11.22 TYPE 2 DIABETES MELLITUS WITH STAGE 3B CHRONIC KIDNEY DISEASE, WITHOUT LONG-TERM CURRENT USE OF INSULIN: ICD-10-CM

## 2025-04-30 DIAGNOSIS — K59.09 CHRONIC CONSTIPATION: Primary | ICD-10-CM

## 2025-04-30 PROBLEM — D63.1 ANEMIA DUE TO STAGE 4 CHRONIC KIDNEY DISEASE: Status: RESOLVED | Noted: 2024-05-25 | Resolved: 2025-04-30

## 2025-04-30 PROBLEM — N18.4 ANEMIA DUE TO STAGE 4 CHRONIC KIDNEY DISEASE: Status: RESOLVED | Noted: 2024-05-25 | Resolved: 2025-04-30

## 2025-04-30 RX ORDER — DOCUSATE SODIUM 100 MG/1
100 CAPSULE, LIQUID FILLED ORAL 2 TIMES DAILY
Qty: 60 CAPSULE | Refills: 11 | Status: SHIPPED | OUTPATIENT
Start: 2025-04-30

## 2025-04-30 RX ORDER — POLYETHYLENE GLYCOL 3350 17 G/17G
17 POWDER, FOR SOLUTION ORAL DAILY
Qty: 100 EACH | Refills: 1 | Status: SHIPPED | OUTPATIENT
Start: 2025-04-30

## 2025-04-30 NOTE — PATIENT INSTRUCTIONS
Increase stool softeners to 2-3 daily  Start miralax 1-2x daily for the next 3 days  If no relief, start miralax 3x daily  If no relief, up to 4x daily  If no relief, use fleet enema  If you have not had a bowel movement in 1 week, follow back in clinic

## 2025-04-30 NOTE — ASSESSMENT & PLAN NOTE
- Increase stool softeners to 2-3 daily  - Start miralax 1-2x daily for the next 3 days  - If no relief, start miralax 3x daily  - If no relief, up to 4x daily  - If no relief, use fleet enema  - If you have not had a bowel movement in 1 week, follow back in clinic

## 2025-04-30 NOTE — PROGRESS NOTES
" Patient ID: Trudy Rauch Dakin is a 79 y.o. female.     Chief Complaint: Constipation    Ms. Dakin is a 79 year old female with history of DM2, CKD, CHF, chronic constipation, and self-reported previous SBO with ex-lap who presents today with complaints of constipation and hemorrhoids for at least 1 month.    She reports one month of difficulty passing stool. Recently, she took three stool softeners and three laxatives simultaneously, resulting in adverse reactions including shaking, cramping, and anxiety severe enough to prompt a 911 call. She can feel hemorrhoids externally but denies associated pain.    One month ago, her knee gave out while walking to a desk at a medical facility, resulting in a fall that caused bruising and a hairline fracture. She is taking Tylenol for pain management. Denies taking opioids. She reports increased fluid intake, including water and soft drinks, attributing increased thirst and dry throat to her medications. Denies fever, severe abdominal pain, failure to pass gas per rectum, CP, SOB, N/V/D.        Review of Systems  Review of Systems   Constitutional:  Negative for fever.   HENT:  Negative for ear pain and sinus pain.    Eyes:  Negative for discharge.   Respiratory:  Negative for cough and wheezing.    Cardiovascular:  Negative for chest pain and leg swelling.   Gastrointestinal:  Positive for constipation. Negative for diarrhea, nausea and vomiting.   Genitourinary:  Negative for urgency.   Musculoskeletal:  Negative for myalgias.   Skin:  Negative for rash.   Neurological:  Negative for weakness and headaches.   Psychiatric/Behavioral:  Negative for depression.        Currently Medications  Medications Ordered Prior to Encounter[1]    Physical  Exam  Vitals:    04/30/25 1027   BP: 126/78   BP Location: Right arm   Patient Position: Sitting   Pulse: 86   Temp: 98.2 °F (36.8 °C)   SpO2: 97%   Weight: 52.2 kg (115 lb)   Height: 5' 1" (1.549 m)      Body mass index is 21.73 " kg/m².  Wt Readings from Last 3 Encounters:   04/30/25 52.2 kg (115 lb)   04/29/25 53 kg (116 lb 13.5 oz)   04/10/25 53 kg (116 lb 13.5 oz)       Physical Exam  Vitals and nursing note reviewed.   Constitutional:       General: She is not in acute distress.     Appearance: She is not ill-appearing.   HENT:      Head: Normocephalic and atraumatic.      Right Ear: External ear normal.      Left Ear: External ear normal.      Nose: Nose normal.      Mouth/Throat:      Mouth: Mucous membranes are moist.   Eyes:      Extraocular Movements: Extraocular movements intact.      Conjunctiva/sclera: Conjunctivae normal.   Cardiovascular:      Rate and Rhythm: Normal rate and regular rhythm.      Pulses: Normal pulses.      Heart sounds: No murmur heard.  Pulmonary:      Effort: Pulmonary effort is normal. No respiratory distress.      Breath sounds: No wheezing.   Abdominal:      General: There is no distension.      Palpations: Abdomen is soft. There is no mass.      Tenderness: There is generalized abdominal tenderness.   Musculoskeletal:         General: No swelling.      Cervical back: Normal range of motion.   Skin:     Coloration: Skin is not jaundiced.      Findings: No rash.   Neurological:      General: No focal deficit present.      Mental Status: She is alert and oriented to person, place, and time.   Psychiatric:         Mood and Affect: Mood normal.         Thought Content: Thought content normal.         Labs:    Complete Blood Count  Lab Results   Component Value Date    RBC 4.07 03/25/2025    HGB 12.1 03/25/2025    HCT 37.6 03/25/2025    MCV 92 03/25/2025    MCH 29.7 03/25/2025    MCHC 32.2 03/25/2025    RDW 13.2 03/25/2025     03/25/2025    MPV 10.2 03/25/2025    GRAN 5.5 01/28/2025    GRAN 73.0 01/28/2025    LYMPH 19.1 03/25/2025    LYMPH 1.22 03/25/2025    MONO 5.8 03/25/2025    MONO 0.37 03/25/2025    EOS 0.0 03/25/2025    EOS 0.00 03/25/2025    BASO 0.05 01/28/2025    EOSINOPHIL 2.7 01/28/2025     BASOPHIL 0.6 03/25/2025    BASOPHIL 0.04 03/25/2025    DIFFMETHOD Automated 01/28/2025       Comprehensive Metabolic Panel  Lab Results   Component Value Date     (H) 03/25/2025    BUN 36 (H) 03/25/2025    CREATININE 1.5 (H) 03/25/2025     03/25/2025    K 3.6 03/25/2025     03/25/2025    PROT 6.9 03/25/2025    ALBUMIN 4.3 03/25/2025    BILITOT 0.6 03/25/2025    AST 25 03/25/2025    ALKPHOS 93 03/25/2025    CO2 29 03/25/2025    ALT 15 03/25/2025    ANIONGAP 11 03/25/2025       TSH  Lab Results   Component Value Date    TSH 2.060 03/25/2025       Imaging:  X-Ray Knee 3 View Left  Narrative: EXAMINATION:  XR KNEE 3 VIEW LEFT    CLINICAL HISTORY:  Nondisplaced comminuted fracture of left patella, initial encounter for closed fracture    TECHNIQUE:  XR KNEE 3 VIEW LEFT    COMPARISON:  04/10/2025    FINDINGS:  There is generalized bone demineralization and severe medial femorotibial compartment osteoarthritis.  No definite interval healing of comminuted patellar fracture.  Lateral femorotibial compartment chondrocalcinosis.  Impression: See above    Electronically signed by: Juan J Diaz Jr  Date:    04/29/2025  Time:    09:59      Assessment/Plan:    1. Chronic constipation  Assessment & Plan:  - Increase stool softeners to 2-3 daily  - Start miralax 1-2x daily for the next 3 days  - If no relief, start miralax 3x daily  - If no relief, up to 4x daily  - If no relief, use fleet enema  - If you have not had a bowel movement in 1 week, follow back in clinic    Orders:  -     polyethylene glycol (GLYCOLAX) 17 gram PwPk; Take 17 g by mouth once daily.  Dispense: 100 each; Refill: 1  -     docusate sodium (COLACE) 100 MG capsule; Take 1 capsule (100 mg total) by mouth 2 (two) times daily.  Dispense: 60 capsule; Refill: 11    2. Type 2 diabetes mellitus with stage 3b chronic kidney disease, without long-term current use of insulin  Assessment & Plan:  - Chronic, stable  - Continue farxiga  - Follow with  PCP and nephrology        3. Hx of adenomatous colonic polyps  Overview:  Colonoscopy 6/12/20 Dr. Joshi - 3mm cecal polyp  Surgery scheduled for 6/30/20 - robotic lower anterior resection (stenotic anastomosis)      4. Chronic HFrEF (heart failure with reduced ejection fraction)  Assessment & Plan:  - Chronic, stable  - Continue fluid medication  - Follow with cardiology           Assessment & Plan    K59.04 Chronic idiopathic constipation  K64.9 Unspecified hemorrhoids  I50.9 Heart failure, unspecified  S82.91XA Unspecified fracture of right lower leg, initial encounter for closed fracture  T47.4X5A Adverse effect of other laxatives, initial encounter  Z95.4 Presence of other heart-valve replacement  Z93.3 Colostomy status    IMPRESSION:  - Assessed constipation and hemorrhoids, likely caused by intra-abdominal pressure.  - Determined significant constipation without complete obstruction, as evidenced by ability to pass gas.  - Developed treatment plan focusing on gradual bowel movement stimulation to avoid sudden evacuation.  - Recommend conservative measures including dietary changes, stool softeners, and gentle laxatives.    CHRONIC IDIOPATHIC CONSTIPATION:  - Ms. Dakin has been experiencing difficulty passing stool for over a month despite adequate fluid intake.  - Assessment shows constipation without blockage as patient can pass gas.  - Recommend natural remedies including prunes, apple juice, pear juice, and prune juice.  - Prescribed Colace (docusate sodium) stool softener, 2 tablets daily, and MiraLAX (polyethylene glycol 3350) starting with daily dosing, with instructions to increase to twice daily if no effect after 2 days, and to 3 times daily if still ineffective after 1 day.  - Advised use of Fleet enema if other measures are ineffective: 1 enema initially, with a second if needed.  - Instructed patient to increase water intake, limit time on toilet to no more than 5 minutes per attempt, avoid  straining during bowel movements, and contact the office if no relief after 1 week of gradually increasing MiraLAX dosage.  - Educated patient on warning signs requiring immediate medical attention, such as inability to pass gas and fecal-smelling vomit.    HEMORRHOIDS:  - Ms. Dakin reports feeling hemorrhoids externally, likely caused by constipation.  - Explained the relationship between constipation and hemorrhoids.  - Advised against straining and prolonged toilet sitting.  - Informed patient that treating the underlying constipation will improve the hemorrhoid symptoms.    HEART FAILURE:  - Noted patient's history of congestive heart failure.  - Acknowledged improvement in cardiac condition.  - Continued current medication regimen for heart condition.    FRACTURE OF RIGHT LOWER LEG:  - Ms. Dakin reports recent knee injury resulting in bruising.  - Confirmed presence of a hairline fracture in the right lower leg.  - Continued Tylenol for pain management.    ADVERSE EFFECT OF LAXATIVES:  - Noted patient's previous adverse reaction to laxatives.  - Discussed the difference between water-drawing laxatives and those that cause intestinal contractions.  - Recommend gentler laxatives to avoid adverse effects.    HEART VALVE REPLACEMENT:  - Documented patient's history of heart valve replacement.    COLOSTOMY STATUS:  - Documented patient's history of colostomy bag placement and subsequent reversal.          Discussed how to stay healthy including: diet, exercise, refraining from smoking and discussed screening exams / tests needed for age, sex and family Hx.    This note was generated with the assistance of ambient listening technology. Verbal consent was obtained by the patient and accompanying visitor(s) for the recording of patient appointment to facilitate this note. I attest to having reviewed and edited the generated note for accuracy, though some syntax or spelling errors may persist. Please contact the author  of this note for any clarification.       RTC every 3-6 months with PCP, or PRN      Ese Helms PA-C             [1]   Current Outpatient Medications on File Prior to Visit   Medication Sig Dispense Refill    acetaminophen (TYLENOL) 650 MG TbSR Take 1 tablet (650 mg total) by mouth every 8 (eight) hours as needed (pain).      amitriptyline (ELAVIL) 10 MG tablet Take 1 tablet (10 mg total) by mouth every evening. 30 tablet 0    apixaban (ELIQUIS) 2.5 mg Tab Take 1 tablet (2.5 mg total) by mouth 2 (two) times daily. 180 tablet 3    atorvastatin (LIPITOR) 80 MG tablet Take 1 tablet (80 mg total) by mouth once daily. 90 tablet 1    bumetanide (BUMEX) 2 MG tablet TAKE 1 AND 1/2 TABLETS BY MOUTH TWICE DAILY 270 tablet 3    cephALEXin (KEFLEX) 500 MG capsule TAKE 2 CAPSULES BY MOUTH TWICE DAILY 28 capsule 0    cyanocobalamin 1,000 mcg/mL injection Inject 1 mL (1,000 mcg total) into the muscle every 30 days. 30 mL 3    dapagliflozin propanediol (FARXIGA) 10 mg tablet Take 1 tablet (10 mg total) by mouth once daily. 30 tablet 5    ergocalciferol (ERGOCALCIFEROL) 50,000 unit Cap Take 1 capsule (50,000 Units total) by mouth every 7 days. 12 capsule 3    erythromycin (ROMYCIN) ophthalmic ointment Place into the left eye every evening. 1 g 3    gabapentin (NEURONTIN) 100 MG capsule Take 1 capsule (100 mg total) by mouth every evening. 90 capsule 3    nitroGLYCERIN (NITROSTAT) 0.4 MG SL tablet Place 1 tablet (0.4 mg total) under the tongue every 5 (five) minutes as needed for Chest pain. 25 tablet 5    potassium chloride SA (K-DUR,KLOR-CON M) 10 MEQ tablet Take 2 tablets (20 mEq total) by mouth every evening. 90 tablet 1    venlafaxine (EFFEXOR-XR) 150 MG Cp24 TAKE 1 CAPSULE BY MOUTH ONCE DAILY FOR MOOD AND NEUROPATHY 30 capsule 4     No current facility-administered medications on file prior to visit.

## 2025-05-05 ENCOUNTER — PATIENT OUTREACH (OUTPATIENT)
Dept: ADMINISTRATIVE | Facility: HOSPITAL | Age: 80
End: 2025-05-05
Payer: MEDICARE

## 2025-05-05 NOTE — PROGRESS NOTES
Population Health Chart Review & Patient Outreach Details      Additional [facility] Notes:               Updates Requested / Reviewed:      Updated Care Coordination Note, Care Everywhere, , Care Team Updated, and Immunizations Reconciliation Completed or Queried: [facility] Topics Overdue:      [facility] Score: 0     Patient is not due for any topics at this time.    Shingles/Zoster Vaccine  RSV Vaccine                  Health Maintenance Topic(s) Outreach Outcomes & Actions Taken:    Eye Exam - Outreach Outcomes & Actions Taken  : Pt Will Schedule with External Provider / Order Routed & Care Team Updated if Applicable and will schedule with [facility]. MARGE uploaded to media

## 2025-05-06 ENCOUNTER — CLINICAL SUPPORT (OUTPATIENT)
Dept: LAB | Facility: HOSPITAL | Age: 80
End: 2025-05-06
Attending: ORTHOPAEDIC SURGERY
Payer: MEDICARE

## 2025-05-06 ENCOUNTER — HOSPITAL ENCOUNTER (OUTPATIENT)
Facility: HOSPITAL | Age: 80
Discharge: HOME | End: 2025-05-06
Attending: ORTHOPAEDIC SURGERY
Payer: MEDICARE

## 2025-05-06 ENCOUNTER — HOSPITAL ENCOUNTER (OUTPATIENT)
Dept: RADIOLOGY | Facility: HOSPITAL | Age: 80
Discharge: HOME | End: 2025-05-06
Attending: ORTHOPAEDIC SURGERY
Payer: MEDICARE

## 2025-05-06 ENCOUNTER — OFFICE VISIT (OUTPATIENT)
Dept: ORTHOPEDICS | Facility: CLINIC | Age: 80
End: 2025-05-06
Payer: MEDICARE

## 2025-05-06 VITALS — HEIGHT: 61 IN | WEIGHT: 115.06 LBS | BODY MASS INDEX: 21.72 KG/M2

## 2025-05-06 DIAGNOSIS — I34.0 MITRAL VALVE INSUFFICIENCY, UNSPECIFIED ETIOLOGY: ICD-10-CM

## 2025-05-06 DIAGNOSIS — F32.0 MAJOR DEPRESSIVE DISORDER, SINGLE EPISODE, MILD: ICD-10-CM

## 2025-05-06 DIAGNOSIS — I48.0 PAROXYSMAL ATRIAL FIBRILLATION: Chronic | ICD-10-CM

## 2025-05-06 DIAGNOSIS — J43.9 PULMONARY EMPHYSEMA, UNSPECIFIED EMPHYSEMA TYPE: ICD-10-CM

## 2025-05-06 DIAGNOSIS — M17.12 PRIMARY OSTEOARTHRITIS OF LEFT KNEE: ICD-10-CM

## 2025-05-06 DIAGNOSIS — M25.562 CHRONIC PAIN OF LEFT KNEE: ICD-10-CM

## 2025-05-06 DIAGNOSIS — I10 ESSENTIAL HYPERTENSION: Chronic | ICD-10-CM

## 2025-05-06 DIAGNOSIS — I27.20 PULMONARY HTN: ICD-10-CM

## 2025-05-06 DIAGNOSIS — E21.3 HYPERPARATHYROIDISM: ICD-10-CM

## 2025-05-06 DIAGNOSIS — G89.29 CHRONIC PAIN OF LEFT KNEE: ICD-10-CM

## 2025-05-06 DIAGNOSIS — I67.82 CEREBRAL ISCHEMIA: ICD-10-CM

## 2025-05-06 DIAGNOSIS — N18.32 TYPE 2 DIABETES MELLITUS WITH STAGE 3B CHRONIC KIDNEY DISEASE, WITHOUT LONG-TERM CURRENT USE OF INSULIN: ICD-10-CM

## 2025-05-06 DIAGNOSIS — I38 VALVULAR HEART DISEASE: ICD-10-CM

## 2025-05-06 DIAGNOSIS — N18.32 STAGE 3B CHRONIC KIDNEY DISEASE: ICD-10-CM

## 2025-05-06 DIAGNOSIS — M17.11 PRIMARY OSTEOARTHRITIS OF RIGHT KNEE: ICD-10-CM

## 2025-05-06 DIAGNOSIS — M17.12 PRIMARY OSTEOARTHRITIS OF LEFT KNEE: Primary | ICD-10-CM

## 2025-05-06 DIAGNOSIS — Z79.01 CHRONIC ANTICOAGULATION: ICD-10-CM

## 2025-05-06 DIAGNOSIS — M62.81 QUADRICEPS WEAKNESS: ICD-10-CM

## 2025-05-06 DIAGNOSIS — M25.561 CHRONIC PAIN OF RIGHT KNEE: ICD-10-CM

## 2025-05-06 DIAGNOSIS — E11.22 TYPE 2 DIABETES MELLITUS WITH STAGE 3B CHRONIC KIDNEY DISEASE, WITHOUT LONG-TERM CURRENT USE OF INSULIN: ICD-10-CM

## 2025-05-06 DIAGNOSIS — G89.29 CHRONIC PAIN OF RIGHT KNEE: ICD-10-CM

## 2025-05-06 DIAGNOSIS — I35.0 MODERATE AORTIC STENOSIS: ICD-10-CM

## 2025-05-06 DIAGNOSIS — M17.11 PRIMARY OSTEOARTHRITIS OF RIGHT KNEE: Primary | ICD-10-CM

## 2025-05-06 PROCEDURE — 93005 ELECTROCARDIOGRAM TRACING: CPT | Mod: HCNC

## 2025-05-06 PROCEDURE — 71045 X-RAY EXAM CHEST 1 VIEW: CPT | Mod: 26,HCNC,, | Performed by: RADIOLOGY

## 2025-05-06 PROCEDURE — 93010 ELECTROCARDIOGRAM REPORT: CPT | Mod: HCNC,,, | Performed by: INTERNAL MEDICINE

## 2025-05-06 PROCEDURE — 99215 OFFICE O/P EST HI 40 MIN: CPT | Mod: HCNC,S$GLB,, | Performed by: ORTHOPAEDIC SURGERY

## 2025-05-06 PROCEDURE — 3288F FALL RISK ASSESSMENT DOCD: CPT | Mod: CPTII,HCNC,S$GLB, | Performed by: ORTHOPAEDIC SURGERY

## 2025-05-06 PROCEDURE — 71045 X-RAY EXAM CHEST 1 VIEW: CPT | Mod: TC,HCNC,FY

## 2025-05-06 PROCEDURE — 1157F ADVNC CARE PLAN IN RCRD: CPT | Mod: CPTII,HCNC,S$GLB, | Performed by: ORTHOPAEDIC SURGERY

## 2025-05-06 PROCEDURE — 77073 BONE LENGTH STUDIES: CPT | Mod: 26,HCNC,, | Performed by: RADIOLOGY

## 2025-05-06 PROCEDURE — 1101F PT FALLS ASSESS-DOCD LE1/YR: CPT | Mod: CPTII,HCNC,S$GLB, | Performed by: ORTHOPAEDIC SURGERY

## 2025-05-06 PROCEDURE — 77073 BONE LENGTH STUDIES: CPT | Mod: TC,HCNC,PN

## 2025-05-06 PROCEDURE — 1125F AMNT PAIN NOTED PAIN PRSNT: CPT | Mod: CPTII,HCNC,S$GLB, | Performed by: ORTHOPAEDIC SURGERY

## 2025-05-06 PROCEDURE — 99999 PR PBB SHADOW E&M-EST. PATIENT-LVL IV: CPT | Mod: PBBFAC,HCNC,, | Performed by: ORTHOPAEDIC SURGERY

## 2025-05-06 PROCEDURE — 3072F LOW RISK FOR RETINOPATHY: CPT | Mod: CPTII,HCNC,S$GLB, | Performed by: ORTHOPAEDIC SURGERY

## 2025-05-06 PROCEDURE — G2211 COMPLEX E/M VISIT ADD ON: HCPCS | Mod: HCNC,S$GLB,, | Performed by: ORTHOPAEDIC SURGERY

## 2025-05-06 PROCEDURE — 1159F MED LIST DOCD IN RCRD: CPT | Mod: CPTII,HCNC,S$GLB, | Performed by: ORTHOPAEDIC SURGERY

## 2025-05-06 NOTE — PROGRESS NOTES
[facility] [address]          Subjective:     Patient ID: Trudy Rauch Dakin is a 79 y.o. female.    Chief Complaint: Pain of the Left Knee      Patient is here for follow-up of L patella fracture sustained on 3/25/25 after fall directly onto left knee.  Last seen in clinic on 04/29/2025.  At last visit, we unlocked her hinged knee brace to allow for 30° of flexion.  She has been ambulating without any problems using her rolling walker.  She states the hinged knee brace is too big for.    We will also plan to get her set up for right total knee arthroplasty on 06/16/2025.  She complains of pain mostly in the lateral aspect of the right knee.    Past Medical History:   Diagnosis Date    CHF (congestive heart failure)     Diabetes     Diverticulitis     Hypertension     Renal disorder         Past Surgical History:   Procedure Laterality Date    ABDOMINAL SURGERY  2006    diverticulitis x3    ANTERIOR CERVICAL DISCECTOMY W/ FUSION N/A 8/30/2018    FUSION C6-7 Surgeon: Rickey Martin MD    APPENDECTOMY      CARPAL TUNNEL RELEASE      COLECTOMY  07/2020    EPIDURAL STEROID INJECTION INTO LUMBAR SPINE N/A 1/20/2021    Procedure: Injection-steroid-epidural-lumbar--L3-4;  Surgeon: Colleen Carvajal MD;  Location: [facility] PAIN MGT;  Service: Pain Management;  Laterality: N/A;    HYSTERECTOMY  1970    @25yrs of age    LARYNGOSCOPY N/A 1/4/2022    Procedure: Suspension microlaryngoscopy with left vocal fold injection augmentation - Restylane L;  Surgeon: Yuan Mir MD;  Location: [address];  Service: ENT;  Laterality: N/A;  Microscope, telescopes, tower, injector, Restylane-L    MANDIBLE FRACTURE SURGERY  1970    MICRODISCECTOMY OF SPINE Left 4/13/2021    Procedure: MICRODISCECTOMY, SPINE Left L3-4 far lateral Microdiscectomy;  Surgeon: Rickey Martin MD;  Location: [facility] OR;  Service: Neurosurgery;  Laterality: Left;  Procedure: Left L3-4 far lateral Microdiscectomy   Surgery Time: 1.5 hrs  LOS:  0-1  Anesthesia: General  Radiology: C-arm  SNS: EMG, SEP  Bed: [place] [address] Poster  Position: Melissa  Dav w/ Globus confirmed 4/12/21 [place]    MITRAL CLIP N/A 12/20/2023    Procedure: Mitral clip;  Surgeon: Kevin More MD;  Location: [facility] CATH LAB;  Service: Cardiology;  Laterality: N/A;    OOPHORECTOMY  1970    ROTATOR CUFF REPAIR Left 11/2016    TOTAL REDUCTION MAMMOPLASTY Bilateral 1990    TRANSESOPHAGEAL ECHOCARDIOGRAPHY N/A 11/1/2023    Procedure: ECHOCARDIOGRAM, TRANSESOPHAGEAL;  Surgeon: [facility];  Location: [facility] EP LAB;  Service: Cardiology;  Laterality: N/A;    TRANSESOPHAGEAL ECHOCARDIOGRAPHY N/A 12/20/2023    Procedure: ECHOCARDIOGRAM, TRANSESOPHAGEAL;  Surgeon: Cristine Brooks MD;  Location: [facility] CATH LAB;  Service: Cardiology;  Laterality: N/A;    TRANSFORAMINAL EPIDURAL INJECTION OF STEROID Left 12/23/2020    Procedure: Injection,steroid,epidural,transforaminal approach--Left L4 and L5;  Surgeon: Colleen Carvajal MD;  Location: [facility] PAIN MGT;  Service: Pain Management;  Laterality: Left;        Current Outpatient Medications   Medication Instructions    acetaminophen (TYLENOL) 650 mg, Oral, Every 8 hours PRN    amitriptyline (ELAVIL) 10 mg, Oral, Nightly    apixaban (ELIQUIS) 2.5 mg, Oral, 2 times daily    atorvastatin (LIPITOR) 80 mg, Oral, Daily    bumetanide (BUMEX) 3 mg, Oral, 2 times daily    cephALEXin (KEFLEX) 1,000 mg, Oral, 2 times daily    cyanocobalamin 1,000 mcg, Intramuscular, Every 30 days    dapagliflozin propanediol (FARXIGA) 10 mg, Oral, Daily    docusate sodium (COLACE) 100 mg, Oral, 2 times daily    ergocalciferol (ERGOCALCIFEROL) 50,000 Units, Oral, Every 7 days    erythromycin (ROMYCIN) ophthalmic ointment Left Eye, Nightly    gabapentin (NEURONTIN) 100 mg, Oral, Nightly    nitroGLYCERIN (NITROSTAT) 0.4 mg, Sublingual, Every 5 min PRN    polyethylene glycol (GLYCOLAX) 17 g, Oral, Daily    potassium chloride SA (K-DUR,KLOR-CON M) 10 MEQ tablet 20 mEq, Oral,  Nightly    venlafaxine (EFFEXOR-XR) 150 MG Cp24 TAKE 1 CAPSULE BY MOUTH ONCE DAILY FOR MOOD AND NEUROPATHY        Review of patient's allergies indicates:  No Known Allergies    Social History[1]    Family History   Problem Relation Name Age of Onset    Heart disease Father      Glaucoma Neg Hx      Macular degeneration Neg Hx           Review of systems negative except for noted in HPI    Objective:   Physical Exam:     Left knee  Skin atraumatic   No effusion  No tenderness to palpation throughout the patella  ROM not tested  Intact straight leg raise  Grossly NVI distally    Right knee  Skin atraumatic  No effusion  Tender to palpation over the lateral joint line  Knee range of motion from 0-120  Grossly neurovascularly intact      Imaging: Left knee x-ray  Hip to knee x-rays of the bilateral knees demonstrate a valgus right knee with kl 4 osteoarthritic changes    Assessment:        Trudy Rauch Dakin is a 79 y.o. female with a minimally-displaced fracture of the left patella; primary osteoarthritis of bilateral knees    Encounter Diagnoses   Name Primary?    Primary osteoarthritis of left knee Yes    Chronic pain of left knee     Quadriceps weakness     Cerebral ischemia     Major depressive disorder, single episode, mild     Pulmonary emphysema, unspecified emphysema type     Valvular heart disease     Paroxysmal atrial fibrillation     Pulmonary HTN     Moderate aortic stenosis     Mitral valve insufficiency, unspecified etiology     Essential hypertension     Stage 3b chronic kidney disease     Type 2 diabetes mellitus with stage 3b chronic kidney disease, without long-term current use of insulin     Hyperparathyroidism     Chronic anticoagulation        Plan :    Okay to discontinue hinged knee brace at this point for the left knee  Okay to weightbear as tolerated on the left lower extremity  Okay to continue tylenol as needed for pain   We will see her back on an as needed basis in regards to her left  patella    We will get patient set up for right total knee arthroplasty on 06/16/2025    The patient has failed non operative management, has painful crepitus, and has swelling. The natural history of severe degenerative arthritis was discussed at length and nonoperative and operative treatment was reviewed. Due to the pain and associated disability, I recommend right total knee replacement for KL 4.  The risks, benefits, convalescence, and alternatives were reviewed.  Numerous questions were asked and answered.  Models were used as an education tool.  Surgery will be scheduled at a convenient time.  The discussion with the patient included a description of a total knee replacement.  A total knee replacement (TKR) means a resurfacing of all three surfaces-the patella, femur, and tibia, with metal and plastic parts. The prosthetic parts are usually cemented into position and will allow a patient a full range of motion from. The postoperative motion, however, is determined by multiple factors, the most important of which is preoperative motion. In general, the better the motion preoperatively, the better the motion postoperatively.  In patients with good bone stock and bone quality (ie males, younger patients) I will generally use an uncemented tibial component and leave the patella unresurfaced, in an effort to preserve bone stock for any future revision surgeries. In those patients we discuss the risk of anterior knee pain in a small subset of patients (5-10%) with an unresurfaced patella. The operation, pending medical clearance, generally requires a hospitalization of 0-3 days for one knee (possibly longer for a bilateral replacement). In general, we prefer to perform the procedure under epidural/spinal anesthesia.  Patient blood donation will be based on the individual patient but is not common and based on preoperative hemoglobin/hematocrit levels.The operation will be done preferably in a minimally invasive  fashion which will facilitate recovery.  While performing the procedure in a minimally invasive manner is our preference, some patients are not candidates.  In that situation, a non-minimally invasive technique will be performed.  This will not adversely affect the long term success of the TKR.  Postoperatively, the patient is  walking the day of surgery and may be discahrged the day of surgery if stable with a walker or cane.  The first couple of days can be painful and the pain medication will alleviate but not eliminate the pain.  Thus, the patient must really push hard to get the range of motion.   The cane is to be dispensed with once the patient is secure enough.  In general, there is no cast or brace required with a routine knee replacement. In the long term, we do not encourage our patients to run for the sake of running; although, pending their preoperative status, we often allow patients to play doubles tennis or comparable activities.  We also allow them to do gentle intermediate downhill skiing if they are truly an expert skier.  Biking is encouraged as well as swimming.  The follow-up periods are usually at 2 weeks, 3 months, six months, and yearly intervals.  Potential complications with total knee replacements include infection (less than 2%), which is much higher in patients with obesity, diabetes, or other conditions which adversely affect the immune system.  (Patients with a previous history of osteomyelitis can have infection rates as high as 15%).  By nerve damage we mean a peroneal nerve palsy with a foot drop (a flapping foot with ambulation).  This is particularly more apt to occur with a bad valgus (knock knee) deformity.  The incidence can be quite high in this particular patient population.  There are always areas of skin numbness, but this is not an untoward effect, nor do we consider it a complication.  Other potential complications include dislocation of the patellar component (less than  2%).  The loosening of either the tibial or femoral component is much more infrequent.  It usually occurs with infection or long-term use in a patient population that is at extreme risk (e.g., markedly overweight and not conscientious about their exercises).  Major blood vessel damage is also extremely rare.  Theoretically, because of the anatomic proximity of the popliteal artery, it could be lacerated with subsequent repairs required.  Albeit unlikely, a disruption of the popliteal artery could theoretically result in an amputation.  Similarly, infection could theoretically result in an amputation if one were to grow out an organism that cannot be controlled with antibiotics.  General medical complications include phlebitis for which we prophylactically anticoagulate, but it could still occur and fatal pulmonary embolus has been reported.  Cardiovascular problems, such as a heart attack or ischemia, are always a concern with such hemodynamic changes in the blood vascular system.  Our goal is to improve at least 80% of the pain and hopefully 100% but some aspects of the patients anatomy or other factors may not provide complete pain relief. Other general complications are very rare but in medicine anything could theoretically happen.  Patient is on chronic anticoagulation, we would like the patient off any anticoagulation at least 72 hours before surgery to enable us to perform neuraxial anesthesia.  We also discussed performing a pre-operative peripheral sensory block of the bracnhes of the AFCN and ISN of the same knee using iovera to help with pain control post surgery. This is a relatively new technology with early data demonstrating significant pain improvement and functional recovery. However the science defining all the associated risks is still developing. From what we know thus far, a small number of patients can have nerve pain along the areas of the treated nerves. I think the patient understands the  risk benefit ratio of total knee replacement and the iovera treatment and would like to pursue the total knee replacement and iovera treatment. we will begin the pre-operative process.  The mobility limitation cannot be sufficiently resolved by the use of a cane. Patient's functional mobility deficit can be sufficiently resolved with the use of a (Rollator, Rolling Walker or Walker). Patient's mobility limitation significantly impairs their ability to participate in one of more activities of daily living. The use of a (Rollator, RW or Walker) will significantly improve the patient's ability to participate in MRADLS and the patient will use it on regular basis in the home.          Orders Placed This Encounter   Procedures    WALKER FOR HOME USE    COMMODE FOR HOME USE    BATH/SHOWER CHAIR FOR HOME USE    X-Ray Chest 1 View Pre-OP    CBC Auto Differential    Comprehensive Metabolic Panel    Protime-INR    APTT    Albumin    Prealbumin    Sedimentation rate    C-Reactive Protein    Hemoglobin A1C    Ambulatory referral/consult to Home Health    EKG 12-lead    Javier Wilhelm MD   05/06/2025                 [1]   Social History  Socioeconomic History    Marital status:    Tobacco Use    Smoking status: Never     Passive exposure: Never    Smokeless tobacco: Never   Substance and Sexual Activity    Alcohol use: No    Drug use: No    Sexual activity: Never     Social Drivers of Health     Financial Resource Strain: Low Risk  (4/29/2025)    Overall Financial Resource Strain (CARDIA)     Difficulty of Paying Living Expenses: Not very hard   Food Insecurity: No Food Insecurity (4/29/2025)    Hunger Vital Sign     Worried About Running Out of Food in the Last Year: Never true     Ran Out of Food in the Last Year: Never true   Transportation Needs: No Transportation Needs (4/29/2025)    PRAPARE - Transportation     Lack of Transportation (Medical): No     Lack of Transportation (Non-Medical): No   Physical  Activity: Inactive (4/29/2025)    Exercise Vital Sign     Days of Exercise per Week: 0 days     Minutes of Exercise per Session: 0 min   Stress: No Stress Concern Present (4/29/2025)    [employer] - Occupational Stress Questionnaire     Feeling of Stress : Not at all   Housing Stability: Low Risk  (4/29/2025)    Housing Stability Vital Sign     Unable to Pay for Housing in the Last Year: No     Number of Times Moved in the Last Year: 1     Homeless in the Last Year: No

## 2025-05-07 ENCOUNTER — E-CONSULT (OUTPATIENT)
Dept: CARDIOLOGY | Facility: CLINIC | Age: 80
End: 2025-05-07
Payer: MEDICARE

## 2025-05-07 DIAGNOSIS — I48.0 PAROXYSMAL ATRIAL FIBRILLATION: Primary | ICD-10-CM

## 2025-05-07 DIAGNOSIS — I34.0 MITRAL VALVE INSUFFICIENCY, UNSPECIFIED ETIOLOGY: ICD-10-CM

## 2025-05-07 DIAGNOSIS — I50.22 CHRONIC HFREF (HEART FAILURE WITH REDUCED EJECTION FRACTION): ICD-10-CM

## 2025-05-07 DIAGNOSIS — N18.32 STAGE 3B CHRONIC KIDNEY DISEASE: ICD-10-CM

## 2025-05-07 DIAGNOSIS — I35.0 MODERATE AORTIC STENOSIS: ICD-10-CM

## 2025-05-07 NOTE — CONSULTS
[facility] - Cardiology  Response for E-Consult     Patient Name: Trudy Rauch Dakin  MRN: [id number]  Primary Care Provider: Derrick Caldwell MD   Requesting Provider: Hao Cheek DNP  E-Consult to General Cardiology  Consult performed by: Vic Martníez MD  Consult ordered by: Hao Cheek DNP          Recommendation: Okay to hold apixaban up to 5 days prior to knee surgery and restart as soon as cleared from surgical perspective     Additional future steps to consider: N/A    Total time of Consultation: 15 minute    I did not speak to the requesting provider verbally about this.     *This eConsult is based on the clinical data available to me and is furnished without benefit of a physical examination. The eConsult will need to be interpreted in light of any clinical issues or changes in patient status not available to me at the time of filing this eConsults. Significant changes in patient condition or level of acuity should result in immediate formal consultation and reevaluation. Please alert me if you have further questions.    Thank you for this eConsult referral.     MD [facility] - Cardiology

## 2025-05-12 ENCOUNTER — PATIENT MESSAGE (OUTPATIENT)
Dept: FAMILY MEDICINE | Facility: CLINIC | Age: 80
End: 2025-05-12
Payer: MEDICARE

## 2025-05-12 DIAGNOSIS — L08.9 TOE INFECTION: Primary | ICD-10-CM

## 2025-05-12 RX ORDER — CEPHALEXIN 500 MG/1
1000 CAPSULE ORAL 2 TIMES DAILY
Qty: 28 CAPSULE | Refills: 0 | Status: SHIPPED | OUTPATIENT
Start: 2025-05-12

## 2025-05-13 ENCOUNTER — TELEPHONE (OUTPATIENT)
Dept: PODIATRY | Facility: CLINIC | Age: 80
End: 2025-05-13
Payer: MEDICARE

## 2025-05-13 NOTE — TELEPHONE ENCOUNTER
Called and spoke to patient's daughter silvino. Scheduled appointment with patient for ingrown infection 5/15/25. Patient's daughter silvino understood date, time, and location.

## 2025-05-13 NOTE — TELEPHONE ENCOUNTER
----- Message from Mayteacimanjit sent at 5/13/2025  8:03 AM CDT -----  .Type:  Sooner Apoointment RequestCaller is requesting a sooner appointment.  Caller declined first available appointment listed below.  Caller will not accept being placed on the waitlist and is requesting a message be sent to doctor.Name of Caller:pt daughter Catrina is the first available appointment?Symptoms:Toe infectionWould the patient rather a call back or a response via [facility]? Call [facility] Call Back Number:[phone] Information:  Pt has a referral in the system

## 2025-05-15 ENCOUNTER — OFFICE VISIT (OUTPATIENT)
Dept: PODIATRY | Facility: CLINIC | Age: 80
End: 2025-05-15
Payer: MEDICARE

## 2025-05-15 ENCOUNTER — HOSPITAL ENCOUNTER (EMERGENCY)
Facility: HOSPITAL | Age: 80
Discharge: HOME | End: 2025-05-15
Attending: EMERGENCY MEDICINE
Payer: MEDICARE

## 2025-05-15 VITALS
SYSTOLIC BLOOD PRESSURE: 110 MMHG | OXYGEN SATURATION: 98 % | HEART RATE: 102 BPM | DIASTOLIC BLOOD PRESSURE: 52 MMHG | RESPIRATION RATE: 16 BRPM

## 2025-05-15 DIAGNOSIS — L97.529 NEUROPATHIC ULCER OF LEFT FOOT, UNSPECIFIED ULCER STAGE: Primary | ICD-10-CM

## 2025-05-15 DIAGNOSIS — I73.9 PAD (PERIPHERAL ARTERY DISEASE): ICD-10-CM

## 2025-05-15 DIAGNOSIS — R06.02 SHORTNESS OF BREATH: Primary | ICD-10-CM

## 2025-05-15 DIAGNOSIS — N18.32 TYPE 2 DIABETES MELLITUS WITH STAGE 3B CHRONIC KIDNEY DISEASE, WITHOUT LONG-TERM CURRENT USE OF INSULIN: ICD-10-CM

## 2025-05-15 DIAGNOSIS — E11.22 TYPE 2 DIABETES MELLITUS WITH STAGE 3B CHRONIC KIDNEY DISEASE, WITHOUT LONG-TERM CURRENT USE OF INSULIN: ICD-10-CM

## 2025-05-15 DIAGNOSIS — I50.22 CHRONIC HFREF (HEART FAILURE WITH REDUCED EJECTION FRACTION): ICD-10-CM

## 2025-05-15 LAB
ABSOLUTE EOSINOPHIL (OHS): 0.08 K/UL
ABSOLUTE MONOCYTE (OHS): 0.37 K/UL (ref 0.3–1)
ABSOLUTE NEUTROPHIL COUNT (OHS): 4.22 K/UL (ref 1.8–7.7)
ALBUMIN SERPL BCP-MCNC: 3.6 G/DL (ref 3.5–5.2)
ALP SERPL-CCNC: 77 UNIT/L (ref 40–150)
ALT SERPL W/O P-5'-P-CCNC: 8 UNIT/L (ref 10–44)
ANION GAP (OHS): 13 MMOL/L (ref 8–16)
AST SERPL-CCNC: 14 UNIT/L (ref 11–45)
BASOPHILS # BLD AUTO: 0.04 K/UL
BASOPHILS NFR BLD AUTO: 0.7 %
BILIRUB SERPL-MCNC: 0.4 MG/DL (ref 0.1–1)
BNP SERPL-MCNC: 205 PG/ML (ref 0–99)
BUN SERPL-MCNC: 25 MG/DL (ref 8–23)
CALCIUM SERPL-MCNC: 9.2 MG/DL (ref 8.7–10.5)
CHLORIDE SERPL-SCNC: 106 MMOL/L (ref 95–110)
CO2 SERPL-SCNC: 23 MMOL/L (ref 23–29)
CREAT SERPL-MCNC: 1.4 MG/DL (ref 0.5–1.4)
ERYTHROCYTE [DISTWIDTH] IN BLOOD BY AUTOMATED COUNT: 13.6 % (ref 11.5–14.5)
GFR SERPLBLD CREATININE-BSD FMLA CKD-EPI: 38 ML/MIN/1.73/M2
GLUCOSE SERPL-MCNC: 118 MG/DL (ref 70–110)
HCT VFR BLD AUTO: 32.9 % (ref 37–48.5)
HGB BLD-MCNC: 11 GM/DL (ref 12–16)
HOLD SPECIMEN: NORMAL
HOLD SPECIMEN: NORMAL
IMM GRANULOCYTES # BLD AUTO: 0.01 K/UL (ref 0–0.04)
IMM GRANULOCYTES NFR BLD AUTO: 0.2 % (ref 0–0.5)
LYMPHOCYTES # BLD AUTO: 1.16 K/UL (ref 1–4.8)
MCH RBC QN AUTO: 30.4 PG (ref 27–31)
MCHC RBC AUTO-ENTMCNC: 33.4 G/DL (ref 32–36)
MCV RBC AUTO: 91 FL (ref 82–98)
NUCLEATED RBC (/100WBC) (OHS): 0 /100 WBC
PLATELET # BLD AUTO: 234 K/UL (ref 150–450)
PMV BLD AUTO: 10.2 FL (ref 9.2–12.9)
POTASSIUM SERPL-SCNC: 3.3 MMOL/L (ref 3.5–5.1)
PROT SERPL-MCNC: 6.6 GM/DL (ref 6–8.4)
RBC # BLD AUTO: 3.62 M/UL (ref 4–5.4)
RELATIVE EOSINOPHIL (OHS): 1.4 %
RELATIVE LYMPHOCYTE (OHS): 19.7 % (ref 18–48)
RELATIVE MONOCYTE (OHS): 6.3 % (ref 4–15)
RELATIVE NEUTROPHIL (OHS): 71.7 % (ref 38–73)
SODIUM SERPL-SCNC: 142 MMOL/L (ref 136–145)
TROPONIN I SERPL DL<=0.01 NG/ML-MCNC: 0.02 NG/ML
WBC # BLD AUTO: 5.88 K/UL (ref 3.9–12.7)

## 2025-05-15 PROCEDURE — 93005 ELECTROCARDIOGRAM TRACING: CPT | Mod: HCNC

## 2025-05-15 PROCEDURE — 82040 ASSAY OF SERUM ALBUMIN: CPT | Mod: HCNC

## 2025-05-15 PROCEDURE — 87070 CULTURE OTHR SPECIMN AEROBIC: CPT | Mod: HCNC | Performed by: PODIATRIST

## 2025-05-15 PROCEDURE — 99999 PR PBB SHADOW E&M-EST. PATIENT-LVL III: CPT | Mod: PBBFAC,HCNC,, | Performed by: PODIATRIST

## 2025-05-15 PROCEDURE — 84484 ASSAY OF TROPONIN QUANT: CPT | Mod: HCNC

## 2025-05-15 PROCEDURE — 83880 ASSAY OF NATRIURETIC PEPTIDE: CPT | Mod: HCNC

## 2025-05-15 PROCEDURE — 25000003 PHARM REV CODE 250: Mod: HCNC

## 2025-05-15 PROCEDURE — 93010 ELECTROCARDIOGRAM REPORT: CPT | Mod: HCNC,,, | Performed by: INTERNAL MEDICINE

## 2025-05-15 PROCEDURE — 99285 EMERGENCY DEPT VISIT HI MDM: CPT | Mod: 25,HCNC

## 2025-05-15 PROCEDURE — 85025 COMPLETE CBC W/AUTO DIFF WBC: CPT | Mod: HCNC

## 2025-05-15 RX ORDER — POTASSIUM CHLORIDE 20 MEQ/1
40 TABLET, EXTENDED RELEASE ORAL
Status: COMPLETED | OUTPATIENT
Start: 2025-05-15 | End: 2025-05-15

## 2025-05-15 RX ADMIN — POTASSIUM CHLORIDE 40 MEQ: 1500 TABLET, EXTENDED RELEASE ORAL at 06:05

## 2025-05-15 NOTE — ED NOTES
"Trudy Rauch Dakin, a 79 y.o. female presents to the ED w/ complaint of "I only had shortness of breath because I had a bad dream I was in some water drowning earlier. I am good and I don't have any chest pain." Appears anxious.    Triage note:  Chief Complaint   Patient presents with    Shortness of Breath     Pt presents to ED today via [facility] from  home   Hx of CHF , pt laying completely supine while taking a nap   Pt became SOB   Pt self administered 3 SL nitro with no relief        Review of patient's allergies indicates:  No Known Allergies  Past Medical History:   Diagnosis Date    CHF (congestive heart failure)     Diabetes     Diverticulitis     Hypertension     Renal disorder        "

## 2025-05-15 NOTE — PROGRESS NOTES
"[facility] PODIATRY  [address] [address]  [address]  [facility] [address]  Dept: [phone]  Dept Fax: [phone]    Scott Garcia Jr., DPM     Assessment:   MDM    Coding  1. Neuropathic ulcer of left foot, unspecified ulcer stage  Ambulatory referral/consult to Podiatry    US Lower Extremity Arteries Bilateral    Aerobic culture    X-Ray Foot Complete Left    Prealbumin    Comprehensive Metabolic Panel    CBC Auto Differential    Procalcitonin    C-Reactive Protein    Sedimentation rate    Wound Debridement      2. Type 2 diabetes mellitus with stage 3b chronic kidney disease, without long-term current use of insulin        3. Chronic HFrEF (heart failure with reduced ejection fraction)        4. PAD (peripheral artery disease)  US Lower Extremity Arteries Bilateral          Plan:     Wound Debridement    Date/Time: 5/15/2025 8:00 AM    Performed by: Scott Garcia Jr., DPM  Authorized by: Scott Garcia Jr., DPM    Time out: Immediately prior to procedure a "time out" was called to verify the correct patient, procedure, equipment, support staff and site/side marked as required.    Consent Done?:  Yes (Verbal)  Local anesthesia used?: No      Wound Details:    Location:  Left foot    Location:  Left 3rd Toe    Type of Debridement:  Excisional       Length (cm):  0.5       Width (cm):  0.5       Depth (cm):  0.5       Area (sq cm):  0.2       Percent Debrided (%):  100       Total Area Debrided (sq cm):  0.2    Depth of debridement:  Subcutaneous tissue    Tissue debrided:  Hypergranulation, Epidermis and Subcutaneous    Devitalized tissue debrided:  Biofilm, Callus, Clots and Fibrin    Instruments:  Curette  Bleeding:  Minimal  Hemostasis Achieved: Yes  Method Used:  Pressure and Silver Nitrate  Patient tolerance:  Patient tolerated the procedure well with no immediate complications  Specimen Collected: Specimen sent to microbiology    Diagnoses and all orders for this " visit:    Neuropathic ulcer of left foot, unspecified ulcer stage  -     Ambulatory referral/consult to Podiatry  -     US Lower Extremity Arteries Bilateral; Future  -     Aerobic culture  -     X-Ray Foot Complete Left; Future  -     Prealbumin; Future  -     Comprehensive Metabolic Panel; Future  -     CBC Auto Differential; Future  -     Procalcitonin; Future  -     C-Reactive Protein; Future  -     Sedimentation rate; Future  -     Wound Debridement    Type 2 diabetes mellitus with stage 3b chronic kidney disease, without long-term current use of insulin    Chronic HFrEF (heart failure with reduced ejection fraction)    PAD (peripheral artery disease)  -     US Lower Extremity Arteries Bilateral; Future        -pt seen, evaluated, and managed  -dx discussed in detail. All questions/concerns addressed  -all tx options discussed. All alternatives, risks, benefits of all txs discussed  -the patient was educated about the diagnosis in particular that ulcerations can lead to infection and possible amputation even with the best of care and pt verbalized understanding  -xr/imaging on way out--> will review at nxt visit  -labs on way out--> will review at nxt visit  -art US on way out  -chairside culture obtained and sent for micro analysis  -betadine DSD applied. Pt to change QD at home    -rxs dispensed: none  -referrals: none  -WB: wbat    Short-term goals include but are not limited to: maintaining good offloading and minimizing bioburden, promoting granulation and epithelialization to healing.  Wound healing is a medically reasonable expectation based on the clinical circumstances documented.    Long-term goals include but are not limited to: keeping the wound healed by good offloading and medical management under the direction of internist.    Advised pt of risks of current condition including but not limited to: worsening of infection, potential for limb loss    Follow-up: Patient is to return to the clinic in 3  week for follow-up but [facility] immediately if any signs of infection, such as fever, chills, sweats, increased redness or pain.    Follow up in about 3 weeks (around 6/5/2025).    Subjective:      Patient ID: Trudy Rauch Dakin is a 79 y.o. female.    Chief Complaint: No chief complaint on file.      CC - foot ulcer: Patient presents to the clinic for evaluation and treatment of high risk feet. The patient's chief complaint is foot ulcer, L foot. duration: several wks. denies n/v/f/c.      HPI    Last Podiatry Enc: Visit date not found  Last Enc w/ Me: Visit date not found    Outside reports reviewed: historical medical records.  Family hx: as below  Past Medical History:   Diagnosis Date    CHF (congestive heart failure)     Diabetes     Diverticulitis     Hypertension     Renal disorder      Past Surgical History:   Procedure Laterality Date    ABDOMINAL SURGERY  2006    diverticulitis x3    ANTERIOR CERVICAL DISCECTOMY W/ FUSION N/A 8/30/2018    FUSION C6-7 Surgeon: Rickey Martin MD    APPENDECTOMY      CARPAL TUNNEL RELEASE      COLECTOMY  07/2020    EPIDURAL STEROID INJECTION INTO LUMBAR SPINE N/A 1/20/2021    Procedure: Injection-steroid-epidural-lumbar--L3-4;  Surgeon: Colleen Carvajal MD;  [facility];  Service: Pain Management;  Laterality: N/A;    HYSTERECTOMY  1970    @25yrs of age    LARYNGOSCOPY N/A 1/4/2022    Procedure: Suspension microlaryngoscopy with left vocal fold injection augmentation - Restylane L;  Surgeon: Yuan Mir MD;  Location: [address];  Service: ENT;  Laterality: N/A;  Microscope, telescopes, tower, injector, Restylane-L    MANDIBLE FRACTURE SURGERY  1970    MICRODISCECTOMY OF SPINE Left 4/13/2021    Procedure: MICRODISCECTOMY, SPINE Left L3-4 far lateral Microdiscectomy;  Surgeon: Rickey Martin MD;  Location: [facility] OR;  Service: Neurosurgery;  Laterality: Left;  Procedure: Left L3-4 far lateral Microdiscectomy   Surgery Time: 1.5 hrs  LOS:  0-1  Anesthesia: General  Radiology: C-arm  SNS: EMG, SEP  Bed: [place] [address] Poster  Position: Melissa  Dva w/ Globus confirmed 4/12/21 [place]    MITRAL CLIP N/A 12/20/2023    Procedure: Mitral clip;  Surgeon: Kevin More MD;  Location: [facility] CATH LAB;  Service: Cardiology;  Laterality: N/A;    OOPHORECTOMY  1970    ROTATOR CUFF REPAIR Left 11/2016    TOTAL REDUCTION MAMMOPLASTY Bilateral 1990    TRANSESOPHAGEAL ECHOCARDIOGRAPHY N/A 11/1/2023    Procedure: ECHOCARDIOGRAM, TRANSESOPHAGEAL;  Surgeon: [facility];  Location: [facility] EP LAB;  Service: Cardiology;  Laterality: N/A;    TRANSESOPHAGEAL ECHOCARDIOGRAPHY N/A 12/20/2023    Procedure: ECHOCARDIOGRAM, TRANSESOPHAGEAL;  Surgeon: Cristine Brooks MD;  Location: [facility] CATH LAB;  Service: Cardiology;  Laterality: N/A;    TRANSFORAMINAL EPIDURAL INJECTION OF STEROID Left 12/23/2020    Procedure: Injection,steroid,epidural,transforaminal approach--Left L4 and L5;  Surgeon: Colleen Carvajal MD;  Location: [facility] PAIN MGT;  Service: Pain Management;  Laterality: Left;     Family History   Problem Relation Name Age of Onset    Heart disease Father      Glaucoma Neg Hx      Macular degeneration Neg Hx       Current Medications[1]  Review of patient's allergies indicates:  No Known Allergies  Social History[2]    ROS    REVIEW OF SYSTEMS: Negative as documented below as well as positive findings in bold.       Constitutional  Respiratory  Gastrointestinal  Skin   - Fever - Cough - Heartburn - Rash   - Chills - Spit blood - Nausea - Itching   - Weight Loss - Shortness of breath - Vomiting - Nail pain   - Malaise/Fatigue - Wheezing - Abdominal Pain  Wound/Ulcer   - Weight Gain   - Blood in Stool  Poor wound healing       - Diarrhea          Cardiovascular  Genitourinary  Neurological  HEENT   - Chest Pain - Dysuria - Burning Sensation of feet - Headache   - Palpitations - Hematuria - Tingling / Paresthesia - Congestion   - Pain at night in legs - Flank Pain -  Dizziness - Sore Throat   - Cramping   - Tremor - Blurred Vision   - Leg Swelling   - Sensory Change - Double Vision   - Dizzy when standing   - Speech Change - Eye Redness       - Focal Weakness - Dry Eyes       - Loss of Consciousness          Endocrine  Musculoskeletal  Psychiatric   - Cold intolerance - Muscle Pain - Depression   - Heat intolerance - Neck Pain - Insomnia   - Anemia - Joint Pain - Memory Loss   -  Easy bruising, bleeding - Heel pain - Anxiety      Toe Pain        Leg/Ankle/Foot Pain         Objective:     LMP 01/01/1970   Vitals:    05/15/25 0808   PainSc: 0-No pain       Physical Exam    General Appearance:   Patient appears well developed, well nourished  Patient appears stated age    Psychiatric:   Patient is oriented to time, place, and person.  Patient has appropriate mood and affect    Neck:  Trachea Midline  No visible masses    Respiratory/Ears:  No distress or labored breathing.  Able to differentiate between normal talking voice and whisper.  Able to follow commands    Eyes:  Visual Acuity intact  Lids and conjunctivae normal. No discoloration noted.    Physical Exam  Ortho Exam  Ortho/SPM Exam  Foot Exam  Physical Exam  Neurological Exam    L LE exam con't:  V:  DP 1/4, PT 1/4   CRT< 3s to all digits tested   Tibial and popliteal lymph nodes are w/o abnormality    N:  Patient displays normal ankle reflexes   sensory deficit present    Ortho: +Motor EHL/FHL/TA/GA   no TTP of foot or ankles   Compartments soft/compressible. No pain on passive stretch of big toe. No calf  Pain.     Derm:  skin not intact, ulceration present, ulcer probes to subQ    Ulcer Location: L foot 3rd toe  Measurements (post-debridement): 0.5x0.5x0.5cm  Periwound: hyperkeratotic  Drainage: none  Malodor: none  Base:  fibrotic  Signs of infection: none    Imaging / Labs:    Hemoglobin A1C   Date Value Ref Range Status   09/16/2024 6.3 (H) 4.0 - 5.6 % Final     Comment:     ADA Screening Guidelines:  5.7-6.4%   Consistent with prediabetes  >or=6.5%  Consistent with diabetes    High levels of fetal hemoglobin interfere with the HbA1C  assay. Heterozygous hemoglobin variants (HbS, HgC, etc)do  not significantly interfere with this assay.   However, presence of multiple variants may affect accuracy.     03/21/2024 5.9 (H) 4.0 - 5.6 % Final     Comment:     ADA Screening Guidelines:  5.7-6.4%  Consistent with prediabetes  >or=6.5%  Consistent with diabetes    High levels of fetal hemoglobin interfere with the HbA1C  assay. Heterozygous hemoglobin variants (HbS, HgC, etc)do  not significantly interfere with this assay.   However, presence of multiple variants may affect accuracy.     10/03/2023 6.1 (H) 0.0 - 5.6 % Final     Comment:     Reference Interval:  5.0 - 5.6 Normal   5.7 - 6.4 High Risk   > 6.5 Diabetic      Hgb A1c results are standardized based on the (NGSP) National   Glycohemoglobin Standardization Program.      Hemoglobin A1C levels are related to mean serum/plasma glucose   during the preceding 2-3 months.          Hemoglobin A1c   Date Value Ref Range Status   05/06/2025 6.3 (H) 4.0 - 5.6 % Final     Comment:     ADA Screening Guidelines:  5.7-6.4%  Consistent with prediabetes  >=6.5%  Consistent with diabetes    High levels of fetal hemoglobin interfere with the HbA1C  assay. Heterozygous hemoglobin variants (HbS, HgC, etc)do  not significantly interfere with this assay.   However, presence of multiple variants may affect accuracy.   03/25/2025 6.6 (H) 4.0 - 5.6 % Final     Comment:     ADA Screening Guidelines:  5.7-6.4%  Consistent with prediabetes  >=6.5%  Consistent with diabetes    High levels of fetal hemoglobin interfere with the HbA1C  assay. Heterozygous hemoglobin variants (HbS, HgC, etc)do  not significantly interfere with this assay.   However, presence of multiple variants may affect accuracy.       X-Ray Chest 1 View Pre-OP  Result Date: 5/6/2025  EXAMINATION: XR CHEST 1 VIEW PRE-OP CLINICAL  HISTORY: Unilateral primary osteoarthritis, left knee TECHNIQUE: Single frontal view of the chest was performed. COMPARISON: Radiograph 07/23/2024. FINDINGS: Mediastinal structures are midline. Hilar contours are unremarkable.  Atherosclerotic calcification of the aorta.  Cardiac silhouette is normal in size.  Coronary artery stent.  Mitral valve clip.  Lung volumes are normal and symmetric.  No consolidation.  No pneumothorax or pleural effusion.  No free air beneath the diaphragm. No acute osseous abnormalities.  Degenerative changes of the spine and shoulders.     No acute radiographic findings in the chest on this single view. Electronically signed by: Lukasz German MD Date:    05/06/2025 Time:    12:45    X-Ray Hip to Ankle  Result Date: 5/6/2025  EXAMINATION: XR HIP TO ANKLE CLINICAL HISTORY: Unilateral primary osteoarthritis, right knee TECHNIQUE: AP views of the bilateral lower extremities. COMPARISON: Radiographs 04/29/2025, 01/14/2025. FINDINGS: Osteopenia.  Left patellar fracture is not well evaluated on this exam.  No new fractures.  No suspicious lytic or blastic lesions.  Advanced osteoarthritis of the bilateral knees.  Atherosclerotic calcification.  Stable soft tissue calcification projecting over the lateral aspect of the right knee, better evaluated on previous radiographs.     As above. Electronically signed by: Lukasz German MD Date:    05/06/2025 Time:    10:59    X-Ray Knee 3 View Left  Result Date: 4/29/2025  EXAMINATION: XR KNEE 3 VIEW LEFT CLINICAL HISTORY: Nondisplaced comminuted fracture of left patella, initial encounter for closed fracture TECHNIQUE: XR KNEE 3 VIEW LEFT COMPARISON: 04/10/2025 FINDINGS: There is generalized bone demineralization and severe medial femorotibial compartment osteoarthritis.  No definite interval healing of comminuted patellar fracture.  Lateral femorotibial compartment chondrocalcinosis.     See above Electronically signed by: Juan J Diaz Jr  Date:    04/29/2025 Time:    09:59        Note: This was dictated using a computer transcription program. Although proofread, it may contain computer transcription errors and phonetic errors. Other human proofreading errors may also exist. Corrections may be performed at a later time. Please contact us for any clarification if needed.    Scott Garcia DPM  [facility]           [1]   Current Outpatient Medications   Medication Sig Dispense Refill    acetaminophen (TYLENOL) 650 MG TbSR Take 1 tablet (650 mg total) by mouth every 8 (eight) hours as needed (pain).      amitriptyline (ELAVIL) 10 MG tablet Take 1 tablet (10 mg total) by mouth every evening. 30 tablet 0    apixaban (ELIQUIS) 2.5 mg Tab Take 1 tablet (2.5 mg total) by mouth 2 (two) times daily. 180 tablet 3    atorvastatin (LIPITOR) 80 MG tablet Take 1 tablet (80 mg total) by mouth once daily. 90 tablet 1    bumetanide (BUMEX) 2 MG tablet TAKE 1 AND 1/2 TABLETS BY MOUTH TWICE DAILY 270 tablet 3    cephALEXin (KEFLEX) 500 MG capsule Take 2 capsules (1,000 mg total) by mouth 2 (two) times daily. 28 capsule 0    cyanocobalamin 1,000 mcg/mL injection Inject 1 mL (1,000 mcg total) into the muscle every 30 days. 30 mL 3    dapagliflozin propanediol (FARXIGA) 10 mg tablet Take 1 tablet (10 mg total) by mouth once daily. 30 tablet 5    docusate sodium (COLACE) 100 MG capsule Take 1 capsule (100 mg total) by mouth 2 (two) times daily. 60 capsule 11    ergocalciferol (ERGOCALCIFEROL) 50,000 unit Cap Take 1 capsule (50,000 Units total) by mouth every 7 days. 12 capsule 3    erythromycin (ROMYCIN) ophthalmic ointment Place into the left eye every evening. 1 g 3    gabapentin (NEURONTIN) 100 MG capsule Take 1 capsule (100 mg total) by mouth every evening. 90 capsule 3    nitroGLYCERIN (NITROSTAT) 0.4 MG SL tablet Place 1 tablet (0.4 mg total) under the tongue every 5 (five) minutes as needed for Chest pain. 25 tablet 5    polyethylene  glycol (GLYCOLAX) 17 gram PwPk Take 17 g by mouth once daily. 100 each 1    potassium chloride SA (K-DUR,KLOR-CON M) 10 MEQ tablet Take 2 tablets (20 mEq total) by mouth every evening. 90 tablet 1    venlafaxine (EFFEXOR-XR) 150 MG Cp24 TAKE 1 CAPSULE BY MOUTH ONCE DAILY FOR MOOD AND NEUROPATHY 30 capsule 4     No current facility-administered medications for this visit.   [2]   Social History  Socioeconomic History    Marital status:    Tobacco Use    Smoking status: Never     Passive exposure: Never    Smokeless tobacco: Never   Substance and Sexual Activity    Alcohol use: No    Drug use: No    Sexual activity: Never     Social Drivers of Health     Financial Resource Strain: Low Risk  (4/29/2025)    Overall Financial Resource Strain (CARDIA)     Difficulty of Paying Living Expenses: Not very hard   Food Insecurity: No Food Insecurity (4/29/2025)    Hunger Vital Sign     Worried About Running Out of Food in the Last Year: Never true     Ran Out of Food in the Last Year: Never true   Transportation Needs: No Transportation Needs (4/29/2025)    PRAPARE - Transportation     Lack of Transportation (Medical): No     Lack of Transportation (Non-Medical): No   Physical Activity: Inactive (4/29/2025)    Exercise Vital Sign     Days of Exercise per Week: 0 days     Minutes of Exercise per Session: 0 min   Stress: No Stress Concern Present (4/29/2025)    [employer] [place] [employer] - Occupational Stress Questionnaire     Feeling of Stress : Not at all   Housing Stability: Low Risk  (4/29/2025)    Housing Stability Vital Sign     Unable to Pay for Housing in the Last Year: No     Number of Times Moved in the Last Year: 1     Homeless in the Last Year: No

## 2025-05-15 NOTE — DISCHARGE INSTRUCTIONS
Thank you for allowing me and my emergency team to take care of you here today! I hope you feel better soon. Please do not hesitate to return with any additional concerns that may arise from this or any new problem you encounter.    Our goal in the emergency department is to always give you outstanding care and exceptional service. If you receive a survey by mail or e-mail in the next week regarding your experience in our ED, we would greatly appreciate you completing it. Your feedback provides us with a way to recognize our staff who give very good care and it helps us learn how to improve when your experience was below the excellence we aspire to be!    Brook Juneau, PA-C [facility] [place]   Emergency Room Physician Assistant

## 2025-05-16 ENCOUNTER — PATIENT OUTREACH (OUTPATIENT)
Facility: OTHER | Age: 80
End: 2025-05-16
Payer: MEDICARE

## 2025-05-16 LAB
OHS QRS DURATION: 84 MS
OHS QTC CALCULATION: 472 MS

## 2025-05-16 NOTE — PROGRESS NOTES
Dory Edwards  ED Navigator  Emergency Department    Project: [employer] [facility] [employer] ED Navigator  Role: Community Health Worker    Date: 05/16/2025  Patient Name: Mrs. Trudy Dakin  MRN: [id number]  PCP: Derrick Caldwell MD    Assessment:     Trudy Rauch Dakin is a 79 y.o. female who has presented to ED for SOB. Patient has visited the ED 2 times in the past 3 months. Patient did not contact PCP.     ED Navigator Initial Assessment    ED Navigator Enrollment Documentation  Consent to Services  Does patient consent to completing the assessment?: Yes  Contact  Method of Initial Contact: Phone  Transportation  Insurance Coverage  Do you have coverage/adequate coverage?: Yes  Specialist Appointment  Did the patient come to the ED to see a specialist?: No  Does the patient have a pending specialist referral?: No  Does the patient have a specialist appointment made?: No  PCP Follow Up Appointment  Medications  Is patient able to afford medication?: Yes  Psychological  Food  Communication/Education  Other Financial Concerns  Other Social Barriers/Concerns  Primary Barrier         Social History     Socioeconomic History    Marital status:    Tobacco Use    Smoking status: Never     Passive exposure: Never    Smokeless tobacco: Never   Substance and Sexual Activity    Alcohol use: No    Drug use: No    Sexual activity: Never     Social Drivers of Health     Financial Resource Strain: Low Risk  (4/29/2025)    Overall Financial Resource Strain (CARDIA)     Difficulty of Paying Living Expenses: Not very hard   Food Insecurity: No Food Insecurity (4/29/2025)    Hunger Vital Sign     Worried About Running Out of Food in the Last Year: Never true     Ran Out of Food in the Last Year: Never true   Transportation Needs: No Transportation Needs (4/29/2025)    PRAPARE - Transportation     Lack of Transportation (Medical): No     Lack of Transportation (Non-Medical): No   Physical Activity: Inactive (4/29/2025)    Exercise Vital Sign     Days of  Exercise per Week: 0 days     Minutes of Exercise per Session: 0 min   Stress: No Stress Concern Present (4/29/2025)    [employer] [place] [employer] - Occupational Stress Questionnaire     Feeling of Stress : Not at all   Housing Stability: Low Risk  (4/29/2025)    Housing Stability Vital Sign     Unable to Pay for Housing in the Last Year: No     Number of Times Moved in the Last Year: 1     Homeless in the Last Year: No       Plan:   Patient was contacted for post ED discharge navigation. They have an appointment scheduled with Dr. Derrick Caldwell on 6/2/25 at 8:40. ED navigator will remind patient of appointment

## 2025-05-16 NOTE — ED PROVIDER NOTES
"Encounter Date: 5/15/2025       History     Chief Complaint   Patient presents with    Shortness of Breath     Pt presents to ED today via [facility] from  home   Hx of CHF , pt laying completely supine while taking a nap   Pt became SOB   Pt self administered 3 SL nitro with no relief        Patient is a 79-year-old female with a past medical history of CHF, diabetes, hypertension, and renal disorder who presents to emergency room for an episode of shortness of breath that occurred today.  Patient states that she was lying supine and "was having a dream that she was drowning." She woke up suddenly and felt short of breath.  No chest pain.  She called EMS who states that she was in the high 80s on room air whenever they arrived.  She was placed on 3 L O2.  On evaluation, patient states that she feels back to baseline.  Believes it was due to the dream.  Denies chest pain, headache, nausea, vomiting, abdominal pain, or shortness of breath at this time.  No medications taken prior to arrival.    The history is provided by the patient and the EMS personnel. No  was used.     Review of patient's allergies indicates:  No Known Allergies  Past Medical History:   Diagnosis Date    CHF (congestive heart failure)     Diabetes     Diverticulitis     Hypertension     Renal disorder      Past Surgical History:   Procedure Laterality Date    ABDOMINAL SURGERY  2006    diverticulitis x3    ANTERIOR CERVICAL DISCECTOMY W/ FUSION N/A 8/30/2018    FUSION C6-7 Surgeon: Rickey Martin MD    APPENDECTOMY      CARPAL TUNNEL RELEASE      COLECTOMY  07/2020    EPIDURAL STEROID INJECTION INTO LUMBAR SPINE N/A 1/20/2021    Procedure: Injection-steroid-epidural-lumbar--L3-4;  Surgeon: Colleen Carvajal MD;  Location: [facility] PAIN T;  Service: Pain Management;  Laterality: N/A;    HYSTERECTOMY  1970    @25yrs of age    LARYNGOSCOPY N/A 1/4/2022    Procedure: Suspension microlaryngoscopy with left vocal fold injection " augmentation - Restylane L;  Surgeon: Yuan Mir MD;  Location: [facility] 2ND FLR;  Service: ENT;  Laterality: N/A;  Microscope, telescopes, tower, injector, Restylane-L    MANDIBLE FRACTURE SURGERY  1970    MICRODISCECTOMY OF SPINE Left 4/13/2021    Procedure: MICRODISCECTOMY, SPINE Left L3-4 far lateral Microdiscectomy;  Surgeon: Rickey Martin MD;  Location: [facility] OR;  Service: Neurosurgery;  Laterality: Left;  Procedure: Left L3-4 far lateral Microdiscectomy   Surgery Time: 1.5 hrs  LOS: 0-1  Anesthesia: General  Radiology: C-arm  SNS: EMG, SEP  Bed: [place] [address] Poster  Position: Melissa  Dav w/ Globus confirmed 4/12/21 [place]    MITRAL CLIP N/A 12/20/2023    Procedure: Mitral clip;  Surgeon: Kevin More MD;  Location: [facility] CATH LAB;  Service: Cardiology;  Laterality: N/A;    OOPHORECTOMY  1970    ROTATOR CUFF REPAIR Left 11/2016    TOTAL REDUCTION MAMMOPLASTY Bilateral 1990    TRANSESOPHAGEAL ECHOCARDIOGRAPHY N/A 11/1/2023    Procedure: ECHOCARDIOGRAM, TRANSESOPHAGEAL;  Surgeon: Homero Melgar Diagnostic;  Location: [facility] EP LAB;  Service: Cardiology;  Laterality: N/A;    TRANSESOPHAGEAL ECHOCARDIOGRAPHY N/A 12/20/2023    Procedure: ECHOCARDIOGRAM, TRANSESOPHAGEAL;  Surgeon: Cristine Brooks MD;  Location: [facility] CATH LAB;  Service: Cardiology;  Laterality: N/A;    TRANSFORAMINAL EPIDURAL INJECTION OF STEROID Left 12/23/2020    Procedure: Injection,steroid,epidural,transforaminal approach--Left L4 and L5;  Surgeon: Colleen Carvajal MD;  Location: [facility] PAIN MGT;  Service: Pain Management;  Laterality: Left;     Family History   Problem Relation Name Age of Onset    Heart disease Father      Glaucoma Neg Hx      Macular degeneration Neg Hx       Social History[1]  Review of Systems   Constitutional:  Negative for chills, diaphoresis, fatigue and fever.   HENT:  Negative for congestion, sore throat and trouble swallowing.    Respiratory:  Positive for shortness of breath (resolved). Negative for cough.     Cardiovascular:  Negative for chest pain and palpitations.   Gastrointestinal:  Negative for abdominal pain, blood in stool, constipation, diarrhea, nausea and vomiting.   Genitourinary:  Negative for difficulty urinating, dysuria, frequency and urgency.   Musculoskeletal:  Negative for back pain and myalgias.   Skin:  Negative for rash and wound.   Neurological:  Negative for weakness, light-headedness, numbness and headaches.       Physical Exam     Initial Vitals [05/15/25 1603]   BP Pulse Resp Temp SpO2   (!) 110/52 102 16 -- 98 %      MAP       --         Physical Exam    Nursing note and vitals reviewed.  Constitutional: She appears well-developed and well-nourished. She is not diaphoretic. No distress.   HENT:   Head: Normocephalic and atraumatic.   Right Ear: External ear normal.   Left Ear: External ear normal.   Eyes: Conjunctivae and EOM are normal. Pupils are equal, round, and reactive to light.   Neck: Neck supple.   Normal range of motion.  Cardiovascular:  Normal rate, regular rhythm and normal heart sounds.     Exam reveals no friction rub.       No murmur heard.  Pulmonary/Chest: Breath sounds normal. No respiratory distress. She has no wheezes. She has no rhonchi. She has no rales.   Abdominal: Abdomen is soft. Bowel sounds are normal. She exhibits no distension. There is no abdominal tenderness. There is no rebound and no guarding.   Musculoskeletal:         General: No tenderness or edema. Normal range of motion.      Cervical back: Normal range of motion and neck supple.     Neurological: She is alert and oriented to person, place, and time. She has normal strength.   Skin: Skin is warm and dry.   Psychiatric: She has a normal mood and affect. Her behavior is normal. Thought content normal.         ED Course   Procedures  Labs Reviewed   COMPREHENSIVE METABOLIC PANEL - Abnormal       Result Value    Sodium 142      Potassium 3.3 (*)     Chloride 106      CO2 23      Glucose 118 (*)     BUN 25  (*)     Creatinine 1.4      Calcium 9.2      Protein Total 6.6      Albumin 3.6      Bilirubin Total 0.4      ALP 77      AST 14      ALT 8 (*)     Anion Gap 13      eGFR 38 (*)    B-TYPE NATRIURETIC PEPTIDE - Abnormal     (*)    CBC WITH DIFFERENTIAL - Abnormal    WBC 5.88      RBC 3.62 (*)     HGB 11.0 (*)     HCT 32.9 (*)     MCV 91      MCH 30.4      MCHC 33.4      RDW 13.6      Platelet Count 234      MPV 10.2      Nucleated RBC 0      Neut % 71.7      Lymph % 19.7      Mono % 6.3      Eos % 1.4      Basophil % 0.7      Imm Grans % 0.2      Neut # 4.22      Lymph # 1.16      Mono # 0.37      Eos # 0.08      Baso # 0.04      Imm Grans # 0.01     TROPONIN I - Normal    Troponin-I 0.019     CBC W/ AUTO DIFFERENTIAL    Narrative:     The following orders were created for panel order CBC Auto Differential.  Procedure                               Abnormality         Status                     ---------                               -----------         ------                     CBC with Differential[[id number]]       Abnormal            Final result                 Please view results for these tests on the individual orders.   EXTRA TUBES    Narrative:     The following orders were created for panel order EXTRA TUBES.  Procedure                               Abnormality         Status                     ---------                               -----------         ------                     Light Blue Top Hold[[id number]]                             Final result               Light Green Top Hold[[id number]]                            Final result                 Please view results for these tests on the individual orders.   LIGHT BLUE TOP HOLD    Extra Tube Hold for add-ons.     LIGHT GREEN TOP HOLD    Extra Tube Hold for add-ons.            Imaging Results              X-Ray Chest 1 View (Final result)  Result time 05/15/25 18:00:48      Final result by Neo Johnson DO (05/15/25 18:00:48)                    Impression:      No acute abnormality.      Electronically signed by: Neo Johnson  Date:    05/15/2025  Time:    18:00               Narrative:    EXAMINATION:  XR CHEST 1 VIEW    CLINICAL HISTORY:  shortness of breath;    TECHNIQUE:  Single frontal view of the chest was performed.    COMPARISON:  05/06/2025.    FINDINGS:  The lungs are well expanded and clear. No focal opacities are seen. The pleural spaces are clear. The cardiac silhouette is unremarkable.  There are calcifications of the aortic arch.  The visualized osseous structures are intact.  Cervical spine hardware noted.  There are overlying leads.                                       Medications   potassium chloride SA CR tablet 40 mEq (40 mEq Oral Given 5/15/25 1810)     Medical Decision Making  Patient presents to emergency room for shortness of breath.  Vital signs stable.  Physical exam as stated above.    Differential Diagnosis includes, but is not limited to PE, MI/ACS, pneumothorax, pericardial effusion/tamonade, pneumonia, lung abscess, pericarditis/myocarditis, pleural effusion, lung mass, CHF exacerbation, asthma exacerbation, COPD exacerbation, aspirated/ingested foreign body, airway obstruction, anemia, metabolic derangement, allergy/atopy, influenza, viral URI, or viral syndrome.  Patient is not tachycardic.  Maintaining O2 saturation greater than 95%.  No shortness of breath at this time.  Low suspicion for pulmonary embolism. No history of aspirated/ingested foreign body.  Chest x-ray without evidence of effusion. Lab work grossly unremarkable.  No evidence of anemia.  Mild hypokalemia noted.  Orally repleted in the emergency room.  No other metabolic derangement.  EKG grossly unremarkable.  Shortness of breath unlikely due to CHF at this time.  She was taken off of supplemental O2 and was maintaining oxygen saturation of 100% on room air.  Clinical presentation and physical exam most suggestive of anxiety versus other  nonemergent etiology.  Advised on conservative management such as breathing exercises.    I see no indication of an emergent process beyond that addressed during our encounter. Patient stable for discharge at this time. I have counseled the patient regarding follow up with cardiologist and gave strict return precautions. I have discussed the final diagnosis and gave instructions regarding home medications. Patient verbalized understanding and is agreeable.     Problems Addressed:  Chronic HFrEF (heart failure with reduced ejection fraction): chronic illness or injury  Shortness of breath: acute illness or injury    Amount and/or Complexity of Data Reviewed  External Data Reviewed: notes.     Details: Last seen by primary care in 04/2025.  Also seen by Podiatry this morning.  Labs: ordered. Decision-making details documented in ED Course.  Radiology: ordered. Decision-making details documented in ED Course.  ECG/medicine tests: ordered. Decision-making details documented in ED Course.    Risk  Prescription drug management.  Risk Details: Comorbidities taken into consideration during the patient's evaluation and treatment include CHF, diabetes, hypertension, and renal disorder.    Social determinants of health taken into consideration during development of our treatment plan include difficulty in obtaining follow-up, obtaining medications, health literacy, access to healthy options for preventative/conservative management, and/or support systems due to, but not limited to, transportation limitations, socioeconomic status, and environmental factors.                ED Course as of 05/15/25 2036   Thu May 15, 2025   1709 CBC Auto Differential(!)  CBC relatively unremarkable.  No leukocytosis.  Hemoglobin stable at 11.0.  Platelet count within normal limits. [BJ]   1714 Comprehensive Metabolic Panel(!)  CMP grossly unremarkable.  Potassium slightly decreased to 3.3.  Creatinine within normal limits.  LFTs grossly  unremarkable.  GFR of 38. [BJ]   1716 Troponin I: 0.019  Troponin within normal limits. [BJ]   1728 BNP(!): 205  Slightly elevated, though stable from baseline. [BJ]   1804 X-Ray Chest 1 View  Independent interpretation of chest x-ray without effusion, consolidation, or pneumothorax.  Trachea midline. No cardiomegaly. Agree with radiology reading.  [BJ]   1820 Lab work grossly unremarkable.  X-ray unremarkable.  Pending trial without supplemental oxygen. [BJ]   2032 EKG 12-lead  Independent interpretation of EKG normal sinus rhythm at 93 beats per minute.  SD interval of 178.  No ST elevations.   [BJ]      ED Course User Index  [BJ] Eleanor Menendez PA-C                           Clinical Impression:  Final diagnoses:  [R06.02] Shortness of breath (Primary)  [I50.22] Chronic HFrEF (heart failure with reduced ejection fraction)          ED Disposition Condition    Discharge Stable          ED Prescriptions    None       Follow-up Information       Follow up With Specialties Details Why Contact Info    Derrick Caldwell MD Family Medicine   [address] [place] [id number]  [phone]      [facility] Emergency Dept Emergency Medicine Go to  If new or worsening symptoms occur [address] [facility] [address] [phone]          This note was partially created using Telestream Voice Recognition software. Typographical and content errors may occur with this process. While efforts are made to detect and correct such errors, in some cases errors will persist. For this reason, wording in this document should be considered in the proper context and not strictly verbatim.          [1]   Social History  Tobacco Use    Smoking status: Never     Passive exposure: Never    Smokeless tobacco: Never   Substance Use Topics    Alcohol use: No    Drug use: No        Eleanor Menendez PA-C  05/15/25 2036

## 2025-05-17 LAB — BACTERIA SPEC AEROBE CULT: NORMAL

## 2025-05-20 ENCOUNTER — HOSPITAL ENCOUNTER (OUTPATIENT)
Dept: RADIOLOGY | Facility: HOSPITAL | Age: 80
Discharge: HOME | End: 2025-05-20
Attending: PODIATRIST
Payer: MEDICARE

## 2025-05-20 DIAGNOSIS — I73.9 PAD (PERIPHERAL ARTERY DISEASE): ICD-10-CM

## 2025-05-20 DIAGNOSIS — L97.529 NEUROPATHIC ULCER OF LEFT FOOT, UNSPECIFIED ULCER STAGE: ICD-10-CM

## 2025-05-20 PROCEDURE — 93925 LOWER EXTREMITY STUDY: CPT | Mod: 26,HCNC,, | Performed by: RADIOLOGY

## 2025-05-20 PROCEDURE — 93925 LOWER EXTREMITY STUDY: CPT | Mod: TC,HCNC,PN

## 2025-05-21 DIAGNOSIS — I50.22 CHRONIC HFREF (HEART FAILURE WITH REDUCED EJECTION FRACTION): Primary | ICD-10-CM

## 2025-05-21 RX ORDER — POTASSIUM CHLORIDE 750 MG/1
20 TABLET, EXTENDED RELEASE ORAL NIGHTLY
Qty: 90 TABLET | Refills: 1 | Status: SHIPPED | OUTPATIENT
Start: 2025-05-21

## 2025-05-28 DIAGNOSIS — E55.9 VITAMIN D DEFICIENCY: ICD-10-CM

## 2025-05-28 RX ORDER — ERGOCALCIFEROL 1.25 MG/1
50000 CAPSULE ORAL
Qty: 12 CAPSULE | Refills: 3 | Status: SHIPPED | OUTPATIENT
Start: 2025-05-28 | End: 2026-05-28

## 2025-05-28 NOTE — TELEPHONE ENCOUNTER
Refill Routing Note   Medication(s) are not appropriate for processing by [facility] for the following reason(s):        Outside of protocol    [facility] action(s):  Route               Appointments  past 12m or future 3m with PCP    Date Provider   Last Visit   3/20/2025 Derrick Caldwell MD   Next Visit   6/2/2025 Derrick Caldwell MD   ED visits in past 90 days: 2        Note composed:6:23 PM 05/28/2025

## 2025-05-28 NOTE — TELEPHONE ENCOUNTER
No care due was identified.  [facility] Embedded Care Due Messages. Reference number: [id number].   5/28/2025 3:18:32 PM CDT

## 2025-06-02 ENCOUNTER — PATIENT MESSAGE (OUTPATIENT)
Dept: FAMILY MEDICINE | Facility: CLINIC | Age: 80
End: 2025-06-02

## 2025-06-02 ENCOUNTER — TELEPHONE (OUTPATIENT)
Dept: FAMILY MEDICINE | Facility: CLINIC | Age: 80
End: 2025-06-02

## 2025-06-02 ENCOUNTER — OFFICE VISIT (OUTPATIENT)
Dept: FAMILY MEDICINE | Facility: CLINIC | Age: 80
End: 2025-06-02
Payer: MEDICARE

## 2025-06-02 VITALS
TEMPERATURE: 98 F | BODY MASS INDEX: 22.39 KG/M2 | SYSTOLIC BLOOD PRESSURE: 122 MMHG | HEART RATE: 81 BPM | DIASTOLIC BLOOD PRESSURE: 76 MMHG | WEIGHT: 118.5 LBS | OXYGEN SATURATION: 98 %

## 2025-06-02 DIAGNOSIS — Z01.818 PRE-OP EXAM: Primary | ICD-10-CM

## 2025-06-02 DIAGNOSIS — Z01.818 PREOPERATIVE EXAMINATION: ICD-10-CM

## 2025-06-02 PROCEDURE — 1101F PT FALLS ASSESS-DOCD LE1/YR: CPT | Mod: CPTII,S$GLB,, | Performed by: FAMILY MEDICINE

## 2025-06-02 PROCEDURE — 1160F RVW MEDS BY RX/DR IN RCRD: CPT | Mod: CPTII,S$GLB,, | Performed by: FAMILY MEDICINE

## 2025-06-02 PROCEDURE — 1157F ADVNC CARE PLAN IN RCRD: CPT | Mod: CPTII,S$GLB,, | Performed by: FAMILY MEDICINE

## 2025-06-02 PROCEDURE — 99214 OFFICE O/P EST MOD 30 MIN: CPT | Mod: S$GLB,,, | Performed by: FAMILY MEDICINE

## 2025-06-02 PROCEDURE — 3078F DIAST BP <80 MM HG: CPT | Mod: CPTII,S$GLB,, | Performed by: FAMILY MEDICINE

## 2025-06-02 PROCEDURE — 3288F FALL RISK ASSESSMENT DOCD: CPT | Mod: CPTII,S$GLB,, | Performed by: FAMILY MEDICINE

## 2025-06-02 PROCEDURE — 3074F SYST BP LT 130 MM HG: CPT | Mod: CPTII,S$GLB,, | Performed by: FAMILY MEDICINE

## 2025-06-02 PROCEDURE — 1159F MED LIST DOCD IN RCRD: CPT | Mod: CPTII,S$GLB,, | Performed by: FAMILY MEDICINE

## 2025-06-02 PROCEDURE — 3072F LOW RISK FOR RETINOPATHY: CPT | Mod: CPTII,S$GLB,, | Performed by: FAMILY MEDICINE

## 2025-06-02 PROCEDURE — 1126F AMNT PAIN NOTED NONE PRSNT: CPT | Mod: CPTII,S$GLB,, | Performed by: FAMILY MEDICINE

## 2025-06-03 ENCOUNTER — PATIENT MESSAGE (OUTPATIENT)
Dept: FAMILY MEDICINE | Facility: CLINIC | Age: 80
End: 2025-06-03
Payer: MEDICARE

## 2025-06-03 DIAGNOSIS — E55.9 VITAMIN D DEFICIENCY: ICD-10-CM

## 2025-06-03 RX ORDER — ERGOCALCIFEROL 1.25 MG/1
50000 CAPSULE ORAL
Qty: 12 CAPSULE | Refills: 3 | Status: SHIPPED | OUTPATIENT
Start: 2025-06-03 | End: 2026-06-03

## 2025-06-04 ENCOUNTER — TELEPHONE (OUTPATIENT)
Dept: FAMILY MEDICINE | Facility: CLINIC | Age: 80
End: 2025-06-04
Payer: MEDICARE

## 2025-06-04 DIAGNOSIS — D64.9 ANEMIA, UNSPECIFIED TYPE: Primary | ICD-10-CM

## 2025-06-04 DIAGNOSIS — E87.5 HYPERKALEMIA: ICD-10-CM

## 2025-06-05 ENCOUNTER — TELEPHONE (OUTPATIENT)
Dept: PODIATRY | Facility: CLINIC | Age: 80
End: 2025-06-05

## 2025-06-05 ENCOUNTER — HOSPITAL ENCOUNTER (OUTPATIENT)
Dept: PREADMISSION TESTING | Facility: HOSPITAL | Age: 80
Discharge: HOME | End: 2025-06-05
Payer: MEDICARE

## 2025-06-05 ENCOUNTER — TELEPHONE (OUTPATIENT)
Dept: ORTHOPEDICS | Facility: CLINIC | Age: 80
End: 2025-06-05
Payer: MEDICARE

## 2025-06-05 ENCOUNTER — OFFICE VISIT (OUTPATIENT)
Dept: PODIATRY | Facility: CLINIC | Age: 80
End: 2025-06-05
Payer: MEDICARE

## 2025-06-05 VITALS — WEIGHT: 118.63 LBS | HEIGHT: 61 IN | BODY MASS INDEX: 22.4 KG/M2

## 2025-06-05 DIAGNOSIS — I50.22 CHRONIC HFREF (HEART FAILURE WITH REDUCED EJECTION FRACTION): ICD-10-CM

## 2025-06-05 DIAGNOSIS — Z87.2 HEALED ULCER OF LEFT FOOT: ICD-10-CM

## 2025-06-05 DIAGNOSIS — N18.32 TYPE 2 DIABETES MELLITUS WITH STAGE 3B CHRONIC KIDNEY DISEASE, WITHOUT LONG-TERM CURRENT USE OF INSULIN: ICD-10-CM

## 2025-06-05 DIAGNOSIS — E11.22 TYPE 2 DIABETES MELLITUS WITH STAGE 3B CHRONIC KIDNEY DISEASE, WITHOUT LONG-TERM CURRENT USE OF INSULIN: ICD-10-CM

## 2025-06-05 DIAGNOSIS — I73.9 PAD (PERIPHERAL ARTERY DISEASE): ICD-10-CM

## 2025-06-05 DIAGNOSIS — L97.512 ULCER OF RIGHT FOOT WITH FAT LAYER EXPOSED: Primary | ICD-10-CM

## 2025-06-05 PROCEDURE — 99999 PR PBB SHADOW E&M-EST. PATIENT-LVL IV: CPT | Mod: PBBFAC,HCNC,, | Performed by: PODIATRIST

## 2025-06-05 PROCEDURE — 87070 CULTURE OTHR SPECIMN AEROBIC: CPT | Mod: HCNC | Performed by: PODIATRIST

## 2025-06-05 RX ORDER — DOXYCYCLINE 100 MG/1
100 CAPSULE ORAL 2 TIMES DAILY
Qty: 14 CAPSULE | Refills: 0 | Status: SHIPPED | OUTPATIENT
Start: 2025-06-05 | End: 2025-06-12

## 2025-06-05 NOTE — DISCHARGE INSTRUCTIONS

## 2025-06-09 ENCOUNTER — TELEPHONE (OUTPATIENT)
Dept: PODIATRY | Facility: CLINIC | Age: 80
End: 2025-06-09
Payer: MEDICARE

## 2025-06-09 NOTE — TELEPHONE ENCOUNTER
Patient's daughter Eboni message with concerns of when to unwrap her mom's foot due to pain that the patient is having and the daughter expressed that she was unsure if there were any swelling. I told ms Eboni to unwrap her moms foot and follow the iodosorb dressing to her moms toes, and if there is swelling and continue to complain of pain to take her the E.R.                             ----- Message from Chandrika sent at 6/9/2025  9:08 AM CDT -----  Type: Patient Call Back Who called: pt's daughter  What is the request in detail: pt recently received a wrap on their right foot due to treatment of an abscess. Pt's daughter is callng to be advised if they can remove the wrap, and pt also has some pain.  Can the clinic reply by [facility]? Would the patient rather a call back or a response via [facility]? Call back  Best call back number: [phone] Additional Information:

## 2025-06-09 NOTE — TELEPHONE ENCOUNTER
Spoke to patient, advised by nurse if there is swelling and continue to complain of pain to take her the E.R.

## 2025-06-09 NOTE — TELEPHONE ENCOUNTER
----- Message from Sofie sent at 6/9/2025  1:16 PM CDT -----  Type:  Sooner Apoointment RequestCaller is requesting a sooner appointment.  Caller declined first available appointment listed below.  Caller will not accept being placed on the waitlist and is requesting a message be sent to doctor.Name of Caller: Pt's daughter When is the first available appointment? 06/26 Symptoms: right foot woundsWould the patient rather a call back or a response via [facility]? Call Best Call Back Number: [phone] Additional Information:

## 2025-06-10 ENCOUNTER — OFFICE VISIT (OUTPATIENT)
Dept: FAMILY MEDICINE | Facility: CLINIC | Age: 80
End: 2025-06-10
Payer: MEDICARE

## 2025-06-10 VITALS
HEART RATE: 108 BPM | SYSTOLIC BLOOD PRESSURE: 118 MMHG | BODY MASS INDEX: 22.24 KG/M2 | WEIGHT: 117.81 LBS | DIASTOLIC BLOOD PRESSURE: 74 MMHG | HEIGHT: 61 IN | TEMPERATURE: 98 F | OXYGEN SATURATION: 97 %

## 2025-06-10 DIAGNOSIS — L03.031 CELLULITIS AND ABSCESS OF TOE OF RIGHT FOOT: ICD-10-CM

## 2025-06-10 DIAGNOSIS — N18.32 TYPE 2 DIABETES MELLITUS WITH STAGE 3B CHRONIC KIDNEY DISEASE, WITHOUT LONG-TERM CURRENT USE OF INSULIN: ICD-10-CM

## 2025-06-10 DIAGNOSIS — I48.0 PAROXYSMAL ATRIAL FIBRILLATION: Chronic | ICD-10-CM

## 2025-06-10 DIAGNOSIS — L02.611 CELLULITIS AND ABSCESS OF TOE OF RIGHT FOOT: ICD-10-CM

## 2025-06-10 DIAGNOSIS — L89.893 PRESSURE INJURY OF DORSUM OF RIGHT FOOT, STAGE 3: Primary | ICD-10-CM

## 2025-06-10 DIAGNOSIS — E11.22 TYPE 2 DIABETES MELLITUS WITH STAGE 3B CHRONIC KIDNEY DISEASE, WITHOUT LONG-TERM CURRENT USE OF INSULIN: ICD-10-CM

## 2025-06-10 RX ORDER — CEFTRIAXONE 250 MG/1
500 INJECTION, POWDER, FOR SOLUTION INTRAMUSCULAR; INTRAVENOUS
Status: COMPLETED | OUTPATIENT
Start: 2025-06-10 | End: 2025-06-10

## 2025-06-10 RX ORDER — CEFTRIAXONE 500 MG/1
500 INJECTION, POWDER, FOR SOLUTION INTRAMUSCULAR; INTRAVENOUS
Status: COMPLETED | OUTPATIENT
Start: 2025-06-10 | End: 2025-06-10

## 2025-06-10 RX ORDER — LIDOCAINE HYDROCHLORIDE 20 MG/ML
2 INJECTION, SOLUTION INFILTRATION; PERINEURAL
Status: COMPLETED | OUTPATIENT
Start: 2025-06-10 | End: 2025-06-10

## 2025-06-10 RX ADMIN — CEFTRIAXONE 500 MG: 500 INJECTION, POWDER, FOR SOLUTION INTRAMUSCULAR; INTRAVENOUS at 12:06

## 2025-06-10 RX ADMIN — CEFTRIAXONE 500 MG: 250 INJECTION, POWDER, FOR SOLUTION INTRAMUSCULAR; INTRAVENOUS at 12:06

## 2025-06-10 RX ADMIN — LIDOCAINE HYDROCHLORIDE 2 ML: 20 INJECTION, SOLUTION INFILTRATION; PERINEURAL at 12:06

## 2025-06-10 NOTE — PROGRESS NOTES
" Patient ID: Trudy Rauch Dakin is a 79 y.o. female.     Chief Complaint: Foot Swelling    Ms. Dakin is a 79 year old female with history of HTN, PAF, DM2, hammer toes both feet who presents today with complaints of blisters, swelling, and erythema to the right foot.    She has a history of curled toes. Recently, she used toe separators for two days (worn during daytime only), which resulted in burning sensation, blistering, and swelling of the foot. Pt admits to reporting to podiatrist 5 days ago for similar symptoms. She was given doxycycline twice daily, which she states she has been taking. Not improved. He prescribed betadine inter-digital cream, though this caused burning, so she started to soak in epsom salt soaks instead. States she cannot get into podiatry for 2 more weeks. She currently experiences shooting pains in the affected foot with skin breakdown and significant swelling. She denies fever, SOB, CP, N/V/D.        Review of Systems  Review of Systems   Constitutional:  Negative for fever.   HENT:  Negative for ear pain and sinus pain.    Eyes:  Negative for discharge.   Respiratory:  Negative for cough and wheezing.    Cardiovascular:  Negative for chest pain and leg swelling.   Gastrointestinal:  Negative for diarrhea, nausea and vomiting.   Genitourinary:  Negative for urgency.   Musculoskeletal:  Positive for joint pain. Negative for myalgias.   Skin:  Negative for rash.   Neurological:  Negative for weakness and headaches.   Psychiatric/Behavioral:  Negative for depression.        Currently Medications  Medications Ordered Prior to Encounter[1]    Physical  Exam  Vitals:    06/10/25 1100   BP: 118/74   BP Location: Right arm   Patient Position: Sitting   Pulse: 108   Temp: 98 °F (36.7 °C)   SpO2: 97%   Weight: 53.4 kg (117 lb 13.4 oz)   Height: 5' 1" (1.549 m)      Body mass index is 22.26 kg/m².  Wt Readings from Last 3 Encounters:   06/10/25 53.4 kg (117 lb 13.4 oz)   06/05/25 53.8 kg (118 lb 9.7 " oz)   06/02/25 53.8 kg (118 lb 8 oz)       Physical Exam  Vitals and nursing note reviewed.   Constitutional:       General: She is not in acute distress.     Appearance: She is not ill-appearing.   HENT:      Head: Normocephalic and atraumatic.      Right Ear: External ear normal.      Left Ear: External ear normal.      Nose: Nose normal.      Mouth/Throat:      Mouth: Mucous membranes are moist.   Eyes:      Extraocular Movements: Extraocular movements intact.      Conjunctiva/sclera: Conjunctivae normal.   Cardiovascular:      Rate and Rhythm: Normal rate and regular rhythm.      Pulses: Normal pulses.           Dorsalis pedis pulses are 2+ on the right side and 2+ on the left side.        Posterior tibial pulses are 2+ on the right side and 2+ on the left side.      Heart sounds: No murmur heard.  Pulmonary:      Effort: Pulmonary effort is normal. No respiratory distress.      Breath sounds: No wheezing.   Abdominal:      General: There is no distension.      Palpations: Abdomen is soft. There is no mass.      Tenderness: There is no abdominal tenderness.   Musculoskeletal:         General: No swelling.      Cervical back: Normal range of motion.      Right foot: Normal range of motion. Deformity present. No foot drop.      Left foot: Deformity present. No foot drop.   Feet:      Right foot:      Protective Sensation: 10 sites tested.  10 sites sensed.      Skin integrity: Ulcer, skin breakdown, erythema and warmth present.      Toenail Condition: Right toenails are abnormally thick. Fungal disease present.     Left foot:      Protective Sensation: 10 sites tested.  10 sites sensed.      Skin integrity: No ulcer, blister, skin breakdown, erythema or warmth.      Toenail Condition: Left toenails are abnormally thick. Fungal disease present.     Comments: See picture below  Skin:     Coloration: Skin is not jaundiced.      Findings: No rash.   Neurological:      General: No focal deficit present.      Mental  Status: She is alert and oriented to person, place, and time.   Psychiatric:         Mood and Affect: Mood normal.         Thought Content: Thought content normal.         Labs:    Complete Blood Count  Lab Results   Component Value Date    RBC 3.62 (L) 05/15/2025    HGB 11.0 (L) 05/15/2025    HCT 32.9 (L) 05/15/2025    MCV 91 05/15/2025    MCH 30.4 05/15/2025    MCHC 33.4 05/15/2025    RDW 13.6 05/15/2025     05/15/2025    MPV 10.2 05/15/2025    GRAN 5.5 01/28/2025    GRAN 73.0 01/28/2025    LYMPH 19.7 05/15/2025    LYMPH 1.16 05/15/2025    MONO 6.3 05/15/2025    MONO 0.37 05/15/2025    EOS 1.4 05/15/2025    EOS 0.08 05/15/2025    BASO 0.05 01/28/2025    EOSINOPHIL 2.7 01/28/2025    BASOPHIL 0.7 05/15/2025    BASOPHIL 0.04 05/15/2025    DIFFMETHOD Automated 01/28/2025       Comprehensive Metabolic Panel  Lab Results   Component Value Date     (H) 05/15/2025    BUN 25 (H) 05/15/2025    CREATININE 1.4 05/15/2025     05/15/2025    K 3.3 (L) 05/15/2025     05/15/2025    PROT 6.6 05/15/2025    ALBUMIN 3.6 05/15/2025    BILITOT 0.4 05/15/2025    AST 14 05/15/2025    ALKPHOS 77 05/15/2025    CO2 23 05/15/2025    ALT 8 (L) 05/15/2025    ANIONGAP 13 05/15/2025       TSH  Lab Results   Component Value Date    TSH 2.060 03/25/2025       Imaging:  US Lower Extremity Arteries Bilateral  Narrative: EXAMINATION:  US LOWER EXTREMITY ARTERIES BILATERAL    CLINICAL HISTORY:  Non-pressure chronic ulcer of other part of left foot with unspecified severity    TECHNIQUE:  Bilateral spectral, color and grayscale images of the large arteries of both lower extremities were performed.    COMPARISON:  None    FINDINGS:  Right lower extremity velocities (cm/sec) and waveforms are as follows:    CFA: 130, triphasic    Profundal: 79, triphasic    Proximal SFA: 88, biphasic    Mid SFA: 76, biphasic    Distal ossific: 58, triphasic    Proximal popliteal: 71, triphasic    Distal popliteal: 67, triphasic    PTA: 51,  biphasic    SELENE: 67, triphasic    DPA: 70, triphasic    Peroneal: 55, triphasic    Left lower extremity velocities (cm/sec) and waveforms are as follows:    CFA: 93, biphasic    Profundal: 65, triphasic    Proximal SFA: 98, triphasic    Mid SFA: 75, triphasic    Distal os effect: 83, biphasic    Proximal popliteal: 54, triphasic    Distal popliteal: 63, triphasic    PTA: 33, biphasic    SELENE: 46, biphasic    DPA: 64, biphasic    Peroneal: 38, biphasic  Impression: Patent bilateral lower extremity arteries without sonographic evidence of a hemodynamically significant stenosis.    Electronically signed by: Derrick Leyva  Date:    05/20/2025  Time:    10:45      Assessment/Plan:    1. Pressure injury of dorsum of right foot, stage 3  Assessment & Plan:  - STOP epsom salt soaks  - Wound care ordered  - Message to podiatry, waiting to hear back  - Rocephin 1g given in clinic  - Audio visit tomorrow to check status  - ED precautions given     Orders:  -     Ambulatory referral/consult to Home Health; Future; Expected date: 06/11/2025  -     cefTRIAXone injection 500 mg  -     cefTRIAXone injection 500 mg  -     LIDOcaine HCL 20 mg/ml (2%) injection 2 mL    2. Cellulitis and abscess of toe of right foot  -     cefTRIAXone injection 500 mg  -     cefTRIAXone injection 500 mg  -     LIDOcaine HCL 20 mg/ml (2%) injection 2 mL    3. Paroxysmal atrial fibrillation  Assessment & Plan:  - NSR today  - Continue metoprolol  - Follow with cardiology      4. Type 2 diabetes mellitus with stage 3b chronic kidney disease, without long-term current use of insulin  Assessment & Plan:  - Chronic, stable  - Continue farxiga  - Follow with PCP and nephrology         Assessment & Plan    M20.40 Other hammer toe(s) (acquired), unspecified foot  L03.031 Cellulitis of right toe  L30.8 Other specified dermatitis  L02.611 Cutaneous abscess of right foot  R60.0 Localized edema  L30.4 Erythema intertrigo  M79.671 Pain in right foot    HAMMER TOE  "(ACQUIRED):  - Monitored and evaluated the patient's condition of toes curled under, described as "camel toes," which has been present for an extended period.  - Assessed that the condition may be related to footwear choices.  - Will contact podiatrist (Dr. Chika Nuno) to request earlier appointment due to worsening condition.  - Advised the patient to avoid wearing the boot provided by podiatrist due to swelling and recommended using slipper with cut-out to allow toes to be exposed for comfort.    CELLULITIS OF RIGHT TOE:  - Monitored and evaluated the patient's right toe which is swollen, blistered, with shooting pains.  - Determined need for additional antibiotic treatment via intramuscular injection due to lack of improvement with current oral antibiotic (doxycycline).  - Administered intramuscular antibiotic injection.  - Instructed the patient to continue daily foot soaking with Epsom salt and warm water and add OTC betadine to foot soak solution.  - Referred the patient to wound care services and will message podiatrist for earlier appointment.    DERMATITIS AND INTERTRIGO:  - Monitored and evaluated the foot, confirming blistering and swelling likely caused by irritation from toe separators.  - Assessed that skin breakdown is causing debris during foot soaking.  - Recommend cleaning foot with Epsom salt and baby betadine.    CUTANEOUS ABSCESS OF RIGHT FOOT:  - Evaluated the right foot, confirming blistering and swelling consistent with possible abscess.  - Determined need for additional antibiotic treatment via intramuscular injection due to lack of improvement with current oral antibiotic (doxycycline).  - Administered intramuscular antibiotic injection.  - Referred the patient to wound care services and will message podiatrist for earlier appointment.    LOCALIZED EDEMA:  - Monitored and evaluated the foot, confirming swelling likely caused by toe separators.  - Advised the patient to avoid wearing the " boot provided by podiatrist due to swelling.  - Recommend using slipper with cut-out to allow toes to be exposed.    PAIN IN RIGHT FOOT:  - Monitored and evaluated the right foot, confirming shooting pains.  - Administered intramuscular antibiotic injection to address underlying infection causing pain.  - Will message podiatrist for earlier appointment.    GENERAL FOOT CARE:  - Discussed proper foot care techniques, including daily cleaning and soaking.  - Ms. Dakin to continue daily foot soaking with Epsom salt and warm water and add OTC betadine to foot soak solution.  - Instructed the patient to clean foot with Epsom salt and baby betadine.          Discussed how to stay healthy including: diet, exercise, refraining from smoking and discussed screening exams / tests needed for age, sex and family Hx.    I spent a total of 46 minutes on the day of the visit.This includes face to face time and non-face to face time preparing to see the patient (eg, review of tests), obtaining and/or reviewing separately obtained history, documenting clinical information in the electronic or other health record, independently interpreting results and communicating results to the patient/family/caregiver, or care coordinator.    This note was generated with the assistance of ambient listening technology. Verbal consent was obtained by the patient and accompanying visitor(s) for the recording of patient appointment to facilitate this note. I attest to having reviewed and edited the generated note for accuracy, though some syntax or spelling errors may persist. Please contact the author of this note for any clarification.       RTC 1 week, audio tomorrow      Ese Helms PA-C             [1]   Current Outpatient Medications on File Prior to Visit   Medication Sig Dispense Refill    acetaminophen (TYLENOL) 650 MG TbSR Take 1 tablet (650 mg total) by mouth every 8 (eight) hours as needed (pain).      apixaban (ELIQUIS) 2.5 mg Tab Take  1 tablet (2.5 mg total) by mouth 2 (two) times daily. 180 tablet 3    atorvastatin (LIPITOR) 80 MG tablet Take 1 tablet (80 mg total) by mouth once daily. 90 tablet 1    bumetanide (BUMEX) 2 MG tablet TAKE 1 AND 1/2 TABLETS BY MOUTH TWICE DAILY 270 tablet 3    cyanocobalamin 1,000 mcg/mL injection Inject 1 mL (1,000 mcg total) into the muscle every 30 days. 30 mL 3    dapagliflozin propanediol (FARXIGA) 10 mg tablet Take 1 tablet (10 mg total) by mouth once daily. 30 tablet 5    docusate sodium (COLACE) 100 MG capsule Take 1 capsule (100 mg total) by mouth 2 (two) times daily. 60 capsule 11    doxycycline (MONODOX) 100 MG capsule Take 1 capsule (100 mg total) by mouth 2 (two) times daily. for 7 days 14 capsule 0    ergocalciferol (ERGOCALCIFEROL) 50,000 unit Cap Take 1 capsule (50,000 Units total) by mouth every 7 days. 12 capsule 3    erythromycin (ROMYCIN) ophthalmic ointment Place into the left eye every evening. 1 g 3    gabapentin (NEURONTIN) 100 MG capsule Take 1 capsule (100 mg total) by mouth every evening. 90 capsule 3    nitroGLYCERIN (NITROSTAT) 0.4 MG SL tablet Place 1 tablet (0.4 mg total) under the tongue every 5 (five) minutes as needed for Chest pain. 25 tablet 5    polyethylene glycol (GLYCOLAX) 17 gram PwPk Take 17 g by mouth once daily. 100 each 1    potassium chloride SA (K-DUR,KLOR-CON M) 10 MEQ tablet Take 2 tablets (20 mEq total) by mouth every evening. 90 tablet 1    venlafaxine (EFFEXOR-XR) 150 MG Cp24 TAKE 1 CAPSULE BY MOUTH ONCE DAILY FOR MOOD AND NEUROPATHY 30 capsule 4    [DISCONTINUED] amitriptyline (ELAVIL) 10 MG tablet Take 1 tablet (10 mg total) by mouth every evening. (Patient not taking: Reported on 6/10/2025) 30 tablet 0    [DISCONTINUED] cephALEXin (KEFLEX) 500 MG capsule Take 2 capsules (1,000 mg total) by mouth 2 (two) times daily. (Patient not taking: Reported on 6/10/2025) 28 capsule 0     No current facility-administered medications on file prior to visit.

## 2025-06-10 NOTE — ASSESSMENT & PLAN NOTE
- STOP epsom salt soaks  - Wound care ordered  - Message to podiatry, waiting to hear back  - Rocephin 1g given in clinic  - Audio visit tomorrow to check status  - ED precautions given

## 2025-06-11 ENCOUNTER — PATIENT MESSAGE (OUTPATIENT)
Dept: FAMILY MEDICINE | Facility: CLINIC | Age: 80
End: 2025-06-11

## 2025-06-11 ENCOUNTER — OFFICE VISIT (OUTPATIENT)
Dept: FAMILY MEDICINE | Facility: CLINIC | Age: 80
End: 2025-06-11
Payer: MEDICARE

## 2025-06-11 DIAGNOSIS — M20.42 HAMMER TOES OF BOTH FEET: ICD-10-CM

## 2025-06-11 DIAGNOSIS — L03.031 CELLULITIS AND ABSCESS OF TOE OF RIGHT FOOT: ICD-10-CM

## 2025-06-11 DIAGNOSIS — M20.41 HAMMER TOES OF BOTH FEET: ICD-10-CM

## 2025-06-11 DIAGNOSIS — L89.893 PRESSURE INJURY OF DORSUM OF RIGHT FOOT, STAGE 3: Primary | ICD-10-CM

## 2025-06-11 DIAGNOSIS — L02.611 CELLULITIS AND ABSCESS OF TOE OF RIGHT FOOT: ICD-10-CM

## 2025-06-11 PROCEDURE — 3072F LOW RISK FOR RETINOPATHY: CPT | Mod: CPTII,93,, | Performed by: PHYSICIAN ASSISTANT

## 2025-06-11 PROCEDURE — 98012 SYNCH AUDIO-ONLY EST SF 10: CPT | Mod: 93,,, | Performed by: PHYSICIAN ASSISTANT

## 2025-06-11 PROCEDURE — 1160F RVW MEDS BY RX/DR IN RCRD: CPT | Mod: CPTII,93,, | Performed by: PHYSICIAN ASSISTANT

## 2025-06-11 PROCEDURE — 1159F MED LIST DOCD IN RCRD: CPT | Mod: CPTII,93,, | Performed by: PHYSICIAN ASSISTANT

## 2025-06-11 PROCEDURE — 1157F ADVNC CARE PLAN IN RCRD: CPT | Mod: CPTII,93,, | Performed by: PHYSICIAN ASSISTANT

## 2025-06-11 NOTE — PROGRESS NOTES
Audio Only Telehealth Visit     The patient location is: [facility]  The chief complaint leading to consultation is: cellulitis foot/pressure wound  Visit type: Virtual visit with audio only (telephone)  Total time spent in medical discussion with patient: 13 min minutes  Total time spent on date of the encounter: 20 minutes       The reason for the audio only service rather than synchronous audio and video virtual visit was related to technical difficulties or patient preference/necessity.       Each patient to whom I provide medical services by telemedicine is:  (1) informed of the relationship between the physician and patient and the respective role of any other health care provider with respect to management of the patient; and (2) notified that they may decline to receive medical services by telemedicine and may withdraw from such care at any time. Patient verbally consented to receive this service via voice-only telephone call.       HPI: 79 year old female with presents via audio visit accompanied by daughter. Pt reports improvement. Erythema and swelling down. Pt currently on doxycycline. Given 1g rocephin at last visit.     Assessment and plan:         Cellulitis  Improving, continue antibiotics, follow with wound care  Pressure injury  Follow with wound care  Hammer toe  Follow with podiatry                   This service was not originating from a related E/M service provided within the previous 7 days nor will  to an E/M service or procedure within the next 24 hours or my soonest available appointment.  Prevailing standard of care was able to be met in this audio-only visit.

## 2025-06-16 ENCOUNTER — PATIENT MESSAGE (OUTPATIENT)
Dept: FAMILY MEDICINE | Facility: CLINIC | Age: 80
End: 2025-06-16
Payer: MEDICARE

## 2025-06-20 RX ORDER — DAPAGLIFLOZIN 10 MG/1
10 TABLET, FILM COATED ORAL
Qty: 90 TABLET | Refills: 1 | Status: SHIPPED | OUTPATIENT
Start: 2025-06-20

## 2025-06-20 NOTE — TELEPHONE ENCOUNTER
Refill Decision Note   Argentinay Dakin  is requesting a refill authorization.  Brief Assessment and Rationale for Refill:  Approve     Medication Therapy Plan: EGFR REVIEWED      Extended chart review required: Yes   Comments:     Note composed:4:12 PM 06/20/2025

## 2025-06-20 NOTE — TELEPHONE ENCOUNTER
No care due was identified.  [facility] Embedded Care Due Messages. Reference number: [id number].   6/20/2025 9:33:06 AM CDT

## 2025-06-23 ENCOUNTER — TELEPHONE (OUTPATIENT)
Dept: ORTHOPEDICS | Facility: CLINIC | Age: 80
End: 2025-06-23
Payer: MEDICARE

## 2025-06-23 NOTE — TELEPHONE ENCOUNTER
"Copied from CRM #[id number]. Topic: General Inquiry - Patient Advice  >> Jun 23, 2025  8:16 AM Robyn wrote:  Pt Requesting Call Back    Who called: pt's daughter  Who called for pt:  Best call back #: [phone]  Add notes: caller requests Mica call her; said they need to know where to stand with rescheduling pt's surgery ("knee freeze")  "

## 2025-06-26 ENCOUNTER — OFFICE VISIT (OUTPATIENT)
Dept: PODIATRY | Facility: CLINIC | Age: 80
End: 2025-06-26
Payer: MEDICARE

## 2025-06-26 VITALS — HEIGHT: 61 IN | WEIGHT: 117.75 LBS | BODY MASS INDEX: 22.23 KG/M2

## 2025-06-26 DIAGNOSIS — L97.512 ULCER OF RIGHT FOOT WITH FAT LAYER EXPOSED: Primary | ICD-10-CM

## 2025-06-26 DIAGNOSIS — E11.22 TYPE 2 DIABETES MELLITUS WITH STAGE 3B CHRONIC KIDNEY DISEASE, WITHOUT LONG-TERM CURRENT USE OF INSULIN: ICD-10-CM

## 2025-06-26 DIAGNOSIS — N18.32 TYPE 2 DIABETES MELLITUS WITH STAGE 3B CHRONIC KIDNEY DISEASE, WITHOUT LONG-TERM CURRENT USE OF INSULIN: ICD-10-CM

## 2025-06-26 PROCEDURE — 99999 PR PBB SHADOW E&M-EST. PATIENT-LVL IV: CPT | Mod: PBBFAC,HCNC,, | Performed by: PODIATRIST

## 2025-06-26 RX ORDER — SULFAMETHOXAZOLE AND TRIMETHOPRIM 800; 160 MG/1; MG/1
1 TABLET ORAL 2 TIMES DAILY
Qty: 14 TABLET | Refills: 0 | Status: SHIPPED | OUTPATIENT
Start: 2025-06-26 | End: 2025-07-03

## 2025-06-26 NOTE — PROGRESS NOTES
"[facility]  [place] - PODIATRY  [address] [facility]  [address]  [facility] [address]  Dept: [phone]  Dept Fax: [phone]    Scott Garcia Jr., DPM     Assessment:   MDM    MDM  Reviewed: previous chart, nursing note and vitals  Reviewed previous: labs and x-ray  Interpretation: labs and x-ray  Consults: orthopedics      1. Ulcer of right foot with fat layer exposed  Wound Debridement    sulfamethoxazole-trimethoprim 800-160mg (BACTRIM DS) 800-160 mg Tab      2. Type 2 diabetes mellitus with stage 3b chronic kidney disease, without long-term current use of insulin            Plan:     Wound Debridement    Date/Time: 6/26/2025 11:30 AM    Performed by: Scott Garcia Jr., DPM  Authorized by: Scott Garcia Jr., DPM    Time out: Immediately prior to procedure a "time out" was called to verify the correct patient, procedure, equipment, support staff and site/side marked as required.    Consent Done?:  Yes (Verbal)  Local anesthesia used?: No      Wound Details:    Location:  Right foot    Type of Debridement:  Excisional       Length (cm):  0.5       Width (cm):  0.5       Depth (cm):  0.5       Area (sq cm):  0.2       Percent Debrided (%):  100       Total Area Debrided (sq cm):  0.2    Depth of debridement:  Subcutaneous tissue    Tissue debrided:  Hypergranulation, Epidermis and Subcutaneous    Devitalized tissue debrided:  Biofilm, Callus, Clots and Fibrin    Instruments:  Curette  Bleeding:  Minimal  Patient tolerance:  Patient tolerated the procedure well with no immediate complications    [place] was seen today for wound care.    Diagnoses and all orders for this visit:    Ulcer of right foot with fat layer exposed  -     Wound Debridement  -     sulfamethoxazole-trimethoprim 800-160mg (BACTRIM DS) 800-160 mg Tab; Take 1 tablet by mouth 2 (two) times daily. for 7 days    Type 2 diabetes mellitus with stage 3b chronic kidney disease, without long-term current use of insulin        -pt seen, " evaluated, and managed  -dx discussed in detail. All questions/concerns addressed  -all tx options discussed. All alternatives, risks, benefits of all txs discussed  -the patient was educated about the diagnosis in particular that ulcerations can lead to infection and possible amputation even with the best of care and pt verbalized understanding  -L foot ulceration healed. Has new R foot ulceration  -xr/imaging on way out--> will review at nxt visit  -labs on way out--> will review at nxt visit  -chairside culture reviewed: MRSA, enterobacter and escheria - all sensitive to bactrim  -betadine DSD applied. Pt to change QD at home    -would rec post-pone of knee replacement until her ulceration has healed. Staff Message to ortho MD    -rxs dispensed: bactrim x 7 days  -referrals: none  -WB: wbat    Short-term goals include but are not limited to: maintaining good offloading and minimizing bioburden, promoting granulation and epithelialization to healing.  Wound healing is a medically reasonable expectation based on the clinical circumstances documented.    Long-term goals include but are not limited to: keeping the wound healed by good offloading and medical management under the direction of internist.    Advised pt of risks of current condition including but not limited to: worsening of infection, potential for limb loss    Follow-up: Patient is to return to the clinic in 3 week for follow-up but should call [facility] immediately if any signs of infection, such as fever, chills, sweats, increased redness or pain.    Follow up in about 2 weeks (around 7/10/2025).    Subjective:      Patient ID: Trudy Rauch Dakin is a 79 y.o. female.    Chief Complaint:   Chief Complaint   Patient presents with    Wound Care     Right 3rd toe maceration and left 3rd toe small opening       CC - foot ulcer: Patient presents to the clinic for evaluation and treatment of high risk feet. The patient's chief complaint is foot ulcer, L foot.  duration: several wks. denies n/v/f/c.      6/26/25:  Hx as above. F/u for L foot ulceration. Performing betadine wet to dry dressings at home    Toe Pain         Last Podiatry Enc: 6/5/2025  Last Enc w/ Me: Visit date not found    Outside reports reviewed: historical medical records.  Family hx: as below  Past Medical History:   Diagnosis Date    CHF (congestive heart failure)     Diabetes     Diverticulitis     Hypertension     Renal disorder      Past Surgical History:   Procedure Laterality Date    ABDOMINAL SURGERY  2006    diverticulitis x3    ANTERIOR CERVICAL DISCECTOMY W/ FUSION N/A 8/30/2018    FUSION C6-7 Surgeon: Rickey Martin MD    APPENDECTOMY      CARPAL TUNNEL RELEASE      COLECTOMY  07/2020    EPIDURAL STEROID INJECTION INTO LUMBAR SPINE N/A 1/20/2021    Procedure: Injection-steroid-epidural-lumbar--L3-4;  Surgeon: Colleen Carvajal MD;  Location: [facility] PAIN MGT;  Service: Pain Management;  Laterality: N/A;    HYSTERECTOMY  1970    @25yrs of age    LARYNGOSCOPY N/A 1/4/2022    Procedure: Suspension microlaryngoscopy with left vocal fold injection augmentation - Restylane L;  Surgeon: Yuan Mir MD;  Location: [address];  Service: ENT;  Laterality: N/A;  Microscope, telescopes, tower, injector, Restylane-L    MANDIBLE FRACTURE SURGERY  1970    MICRODISCECTOMY OF SPINE Left 4/13/2021    Procedure: MICRODISCECTOMY, SPINE Left L3-4 far lateral Microdiscectomy;  Surgeon: Rickey Martin MD;  Location: [facility] OR;  Service: Neurosurgery;  Laterality: Left;  Procedure: Left L3-4 far lateral Microdiscectomy   Surgery Time: 1.5 hrs  LOS: 0-1  Anesthesia: General  Radiology: C-arm  SNS: EMG, SEP  Bed: [place] [address] Poster  Position: Melissa  Dav w/ Globus confirmed 4/12/21 [place]    MITRAL CLIP N/A 12/20/2023    Procedure: Mitral clip;  Surgeon: Kevin More MD;  Location: [facility] CATH LAB;  Service: Cardiology;  Laterality: N/A;    OOPHORECTOMY  1970    ROTATOR CUFF REPAIR Left 11/2016    TOTAL  REDUCTION MAMMOPLASTY Bilateral 1990    TRANSESOPHAGEAL ECHOCARDIOGRAPHY N/A 11/1/2023    Procedure: ECHOCARDIOGRAM, TRANSESOPHAGEAL;  Surgeon: Ramón, [facility];  Location: [facility] EP LAB;  Service: Cardiology;  Laterality: N/A;    TRANSESOPHAGEAL ECHOCARDIOGRAPHY N/A 12/20/2023    Procedure: ECHOCARDIOGRAM, TRANSESOPHAGEAL;  Surgeon: Cristine Brooks MD;  Location: [facility] CATH LAB;  Service: Cardiology;  Laterality: N/A;    TRANSFORAMINAL EPIDURAL INJECTION OF STEROID Left 12/23/2020    Procedure: Injection,steroid,epidural,transforaminal approach--Left L4 and L5;  Surgeon: Colleen Carvajal MD;  Location: [facility] PAIN MGT;  Service: Pain Management;  Laterality: Left;     Family History   Problem Relation Name Age of Onset    Heart disease Father      Glaucoma Neg Hx      Macular degeneration Neg Hx       Current Medications[1]  Review of patient's allergies indicates:  No Known Allergies  Social History[2]    ROS    REVIEW OF SYSTEMS: Negative as documented below as well as positive findings in bold.       Constitutional  Respiratory  Gastrointestinal  Skin   - Fever - Cough - Heartburn - Rash   - Chills - Spit blood - Nausea - Itching   - Weight Loss - Shortness of breath - Vomiting - Nail pain   - Malaise/Fatigue - Wheezing - Abdominal Pain  Wound/Ulcer   - Weight Gain   - Blood in Stool  Poor wound healing       - Diarrhea          Cardiovascular  Genitourinary  Neurological  HEENT   - Chest Pain - Dysuria - Burning Sensation of feet - Headache   - Palpitations - Hematuria - Tingling / Paresthesia - Congestion   - Pain at night in legs - Flank Pain - Dizziness - Sore Throat   - Cramping   - Tremor - Blurred Vision   - Leg Swelling   - Sensory Change - Double Vision   - Dizzy when standing   - Speech Change - Eye Redness       - Focal Weakness - Dry Eyes       - Loss of Consciousness          Endocrine  Musculoskeletal  Psychiatric   - Cold intolerance - Muscle Pain - Depression   - Heat intolerance - Neck  "Pain - Insomnia   - Anemia - Joint Pain - Memory Loss   -  Easy bruising, bleeding - Heel pain - Anxiety      Toe Pain        Leg/Ankle/Foot Pain         Objective:     Ht 5' 1" (1.549 m)   Wt 53.4 kg (117 lb 11.6 oz)   LMP 01/01/1970   BMI 22.24 kg/m²   Vitals:    06/26/25 1123   Weight: 53.4 kg (117 lb 11.6 oz)   Height: 5' 1" (1.549 m)   PainSc: 0-No pain       Physical Exam    General Appearance:   Patient appears well developed, well nourished  Patient appears stated age    Psychiatric:   Patient is oriented to time, place, and person.  Patient has appropriate mood and affect    Neck:  Trachea Midline  No visible masses    Respiratory/Ears:  No distress or labored breathing.  Able to differentiate between normal talking voice and whisper.  Able to follow commands    Eyes:  Visual Acuity intact  Lids and conjunctivae normal. No discoloration noted.    Physical Exam  Ortho Exam  Ortho/SPM Exam  Foot Exam  Physical Exam  Neurological Exam    L LE exam con't:  V:  DP 1/4, PT 1/4   CRT< 3s to all digits tested   Tibial and popliteal lymph nodes are w/o abnormality    N:  Patient displays normal ankle reflexes   sensory deficit present    Ortho: +Motor EHL/FHL/TA/GA   no TTP of foot or ankles   Compartments soft/compressible. No pain on passive stretch of big toe. No calf  Pain.     Derm:  skin not intact, ulceration present, ulcer probes to subQ            Ulcer Location: R foot 3rd toe  Measurements (post-debridement): 0.5x0.5x0.5cm  Periwound: hyperkeratotic  Drainage: none  Malodor: none  Base:  fibrotic  Signs of infection: none        L foot ulceration has healed:      Imaging / Labs:    Hemoglobin A1C   Date Value Ref Range Status   09/16/2024 6.3 (H) 4.0 - 5.6 % Final     Comment:     ADA Screening Guidelines:  5.7-6.4%  Consistent with prediabetes  >or=6.5%  Consistent with diabetes    High levels of fetal hemoglobin interfere with the HbA1C  assay. Heterozygous hemoglobin variants (HbS, HgC, etc)do  not " significantly interfere with this assay.   However, presence of multiple variants may affect accuracy.     03/21/2024 5.9 (H) 4.0 - 5.6 % Final     Comment:     ADA Screening Guidelines:  5.7-6.4%  Consistent with prediabetes  >or=6.5%  Consistent with diabetes    High levels of fetal hemoglobin interfere with the HbA1C  assay. Heterozygous hemoglobin variants (HbS, HgC, etc)do  not significantly interfere with this assay.   However, presence of multiple variants may affect accuracy.     10/03/2023 6.1 (H) 0.0 - 5.6 % Final     Comment:     Reference Interval:  5.0 - 5.6 Normal   5.7 - 6.4 High Risk   > 6.5 Diabetic      Hgb A1c results are standardized based on the (NGSP) National   Glycohemoglobin Standardization Program.      Hemoglobin A1C levels are related to mean serum/plasma glucose   during the preceding 2-3 months.          Hemoglobin A1c   Date Value Ref Range Status   05/06/2025 6.3 (H) 4.0 - 5.6 % Final     Comment:     ADA Screening Guidelines:  5.7-6.4%  Consistent with prediabetes  >=6.5%  Consistent with diabetes    High levels of fetal hemoglobin interfere with the HbA1C  assay. Heterozygous hemoglobin variants (HbS, HgC, etc)do  not significantly interfere with this assay.   However, presence of multiple variants may affect accuracy.   03/25/2025 6.6 (H) 4.0 - 5.6 % Final     Comment:     ADA Screening Guidelines:  5.7-6.4%  Consistent with prediabetes  >=6.5%  Consistent with diabetes    High levels of fetal hemoglobin interfere with the HbA1C  assay. Heterozygous hemoglobin variants (HbS, HgC, etc)do  not significantly interfere with this assay.   However, presence of multiple variants may affect accuracy.       No results found.        Note: This was dictated using a computer transcription program. Although proofread, it may contain computer transcription errors and phonetic errors. Other human proofreading errors may also exist. Corrections may be performed at a later time. Please contact us  for any clarification if needed.    Scott Garcia DPM  [facility]                  [1]   Current Outpatient Medications   Medication Sig Dispense Refill    acetaminophen (TYLENOL) 650 MG TbSR Take 1 tablet (650 mg total) by mouth every 8 (eight) hours as needed (pain).      apixaban (ELIQUIS) 2.5 mg Tab Take 1 tablet (2.5 mg total) by mouth 2 (two) times daily. 180 tablet 3    atorvastatin (LIPITOR) 80 MG tablet Take 1 tablet (80 mg total) by mouth once daily. 90 tablet 1    bumetanide (BUMEX) 2 MG tablet TAKE 1 AND 1/2 TABLETS BY MOUTH TWICE DAILY 270 tablet 3    cyanocobalamin 1,000 mcg/mL injection Inject 1 mL (1,000 mcg total) into the muscle every 30 days. 30 mL 3    docusate sodium (COLACE) 100 MG capsule Take 1 capsule (100 mg total) by mouth 2 (two) times daily. 60 capsule 11    ergocalciferol (ERGOCALCIFEROL) 50,000 unit Cap Take 1 capsule (50,000 Units total) by mouth every 7 days. 12 capsule 3    erythromycin (ROMYCIN) ophthalmic ointment Place into the left eye every evening. 1 g 3    FARXIGA 10 mg tablet TAKE 1 TABLET BY MOUTH EVERY DAY 90 tablet 1    gabapentin (NEURONTIN) 100 MG capsule Take 1 capsule (100 mg total) by mouth every evening. 90 capsule 3    nitroGLYCERIN (NITROSTAT) 0.4 MG SL tablet Place 1 tablet (0.4 mg total) under the tongue every 5 (five) minutes as needed for Chest pain. 25 tablet 5    polyethylene glycol (GLYCOLAX) 17 gram PwPk Take 17 g by mouth once daily. 100 each 1    potassium chloride SA (K-DUR,KLOR-CON M) 10 MEQ tablet Take 2 tablets (20 mEq total) by mouth every evening. 90 tablet 1    sulfamethoxazole-trimethoprim 800-160mg (BACTRIM DS) 800-160 mg Tab Take 1 tablet by mouth 2 (two) times daily. for 7 days 14 tablet 0    venlafaxine (EFFEXOR-XR) 150 MG Cp24 TAKE 1 CAPSULE BY MOUTH ONCE DAILY FOR MOOD AND NEUROPATHY 30 capsule 4     No current facility-administered medications for this visit.   [2]   Social History  Socioeconomic History     Marital status:    Tobacco Use    Smoking status: Never     Passive exposure: Never    Smokeless tobacco: Never   Substance and Sexual Activity    Alcohol use: No    Drug use: No    Sexual activity: Never     Social Drivers of Health     Financial Resource Strain: Low Risk  (4/29/2025)    Overall Financial Resource Strain (CARDIA)     Difficulty of Paying Living Expenses: Not very hard   Food Insecurity: No Food Insecurity (4/29/2025)    Hunger Vital Sign     Worried About Running Out of Food in the Last Year: Never true     Ran Out of Food in the Last Year: Never true   Transportation Needs: No Transportation Needs (4/29/2025)    PRAPARE - Transportation     Lack of Transportation (Medical): No     Lack of Transportation (Non-Medical): No   Physical Activity: Inactive (4/29/2025)    Exercise Vital Sign     Days of Exercise per Week: 0 days     Minutes of Exercise per Session: 0 min   Stress: No Stress Concern Present (4/29/2025)    [employer] - Occupational Stress Questionnaire     Feeling of Stress : Not at all   Housing Stability: Low Risk  (4/29/2025)    Housing Stability Vital Sign     Unable to Pay for Housing in the Last Year: No     Number of Times Moved in the Last Year: 1     Homeless in the Last Year: No

## 2025-06-28 ENCOUNTER — RESULTS FOLLOW-UP (OUTPATIENT)
Dept: FAMILY MEDICINE | Facility: CLINIC | Age: 80
End: 2025-06-28

## 2025-06-30 ENCOUNTER — PATIENT MESSAGE (OUTPATIENT)
Dept: ADMINISTRATIVE | Facility: CLINIC | Age: 80
End: 2025-06-30
Payer: MEDICARE

## 2025-07-01 DIAGNOSIS — I50.22 CHRONIC HFREF (HEART FAILURE WITH REDUCED EJECTION FRACTION): ICD-10-CM

## 2025-07-01 DIAGNOSIS — I50.33 ACUTE ON CHRONIC DIASTOLIC HEART FAILURE: ICD-10-CM

## 2025-07-01 DIAGNOSIS — M54.16 LUMBAR RADICULOPATHY: Chronic | ICD-10-CM

## 2025-07-01 RX ORDER — BUMETANIDE 2 MG/1
3 TABLET ORAL 2 TIMES DAILY
Qty: 270 TABLET | Refills: 3 | Status: SHIPPED | OUTPATIENT
Start: 2025-07-01

## 2025-07-01 RX ORDER — GABAPENTIN 100 MG/1
100 CAPSULE ORAL NIGHTLY
Qty: 90 CAPSULE | Refills: 3 | Status: SHIPPED | OUTPATIENT
Start: 2025-07-01 | End: 2026-07-01

## 2025-07-01 NOTE — TELEPHONE ENCOUNTER
No care due was identified.  [facility] Embedded Care Due Messages. Reference number: [id number].   7/01/2025 2:49:25 PM CDT

## 2025-07-01 NOTE — TELEPHONE ENCOUNTER
Care Due:                  Date            Visit Type   Department     Provider  --------------------------------------------------------------------------------                                EP -                              PRIMARY      [facility]  Last Visit: 06-      CARE ([facility])   MEDICINE       Derrick Caldwell                              EP -                              PRIMARY      [facility]  Next Visit: 07-      CARE ([facility])   MEDICINE       Derrick Caldwell                                                            Last  Test          Frequency    Reason                     Performed    Due Date  --------------------------------------------------------------------------------    Vitamin D...  12 months..  ergocalciferol...........  05- 05-    [facility] Embedded Care Due Messages. Reference number: [id number].   7/01/2025 2:47:43 PM CDT

## 2025-07-02 ENCOUNTER — TELEPHONE (OUTPATIENT)
Dept: PREADMISSION TESTING | Facility: HOSPITAL | Age: 80
End: 2025-07-02
Payer: MEDICARE

## 2025-07-02 RX ORDER — POTASSIUM CHLORIDE 750 MG/1
20 TABLET, EXTENDED RELEASE ORAL NIGHTLY
Qty: 90 TABLET | Refills: 1 | Status: SHIPPED | OUTPATIENT
Start: 2025-07-02

## 2025-07-02 NOTE — TELEPHONE ENCOUNTER
Good morning, Mrs. Odonnell is scheduled for knee replacement on 8/11/25 with Dr. Hines. Medication list indicates she is currently taking Eliquis. She will need recommendations on when to stop prior to surgery. Please advise. Thanks

## 2025-07-09 ENCOUNTER — PATIENT MESSAGE (OUTPATIENT)
Dept: FAMILY MEDICINE | Facility: CLINIC | Age: 80
End: 2025-07-09
Payer: MEDICARE

## 2025-07-09 ENCOUNTER — TELEPHONE (OUTPATIENT)
Dept: FAMILY MEDICINE | Facility: CLINIC | Age: 80
End: 2025-07-09
Payer: MEDICARE

## 2025-07-09 RX ORDER — MIRTAZAPINE 7.5 MG/1
7.5 TABLET, FILM COATED ORAL NIGHTLY
Qty: 30 TABLET | Refills: 1 | Status: SHIPPED | OUTPATIENT
Start: 2025-07-09 | End: 2026-07-09

## 2025-07-09 NOTE — TELEPHONE ENCOUNTER
Copied from CRM #[id number]. Topic: General Inquiry - Patient Advice  >> Jul 9, 2025 11:46 AM Sofie wrote:  Type:  Needs Medical Advice    Who Called: pt's daughter   Symptoms (please be specific):  trouble sleeping, wide awake   How long has patient had these symptoms:  a week   Pharmacy name and phone #:  [facility] - [place], [place] - [address]  [address] [place] [address]  Phone: [phone] Fax: [phone]  Would the patient rather a call back or a response via [facility]? Call   Best Call Back Number: [phone]   Additional Information: requesting Ese Helms

## 2025-07-11 ENCOUNTER — TELEPHONE (OUTPATIENT)
Dept: FAMILY MEDICINE | Facility: CLINIC | Age: 80
End: 2025-07-11
Payer: MEDICARE

## 2025-07-11 NOTE — TELEPHONE ENCOUNTER
I have notified pt that mirtazapine 7.5 mg has been sent to [facility] [place]. Pt to take medication nightly

## 2025-07-11 NOTE — TELEPHONE ENCOUNTER
Copied from CRM #[id number]. Topic: General Inquiry - Return Call  >> Jul 11, 2025 11:38 AM Maria Elena wrote:  Patient Returning a call    Who is calling?  Pt daughter    Who called patient?  Office, [place]    Call back or [facility] message?  Call     Call back number: [phone]

## 2025-07-11 NOTE — TELEPHONE ENCOUNTER
I have notified pt's daughter that mirtazapine 7.5 mg has been sent to [facility] [place]. Pt to take medication nightly

## 2025-07-15 ENCOUNTER — OFFICE VISIT (OUTPATIENT)
Dept: PODIATRY | Facility: CLINIC | Age: 80
End: 2025-07-15
Payer: MEDICARE

## 2025-07-15 ENCOUNTER — PATIENT MESSAGE (OUTPATIENT)
Dept: ORTHOPEDICS | Facility: CLINIC | Age: 80
End: 2025-07-15
Payer: MEDICARE

## 2025-07-15 DIAGNOSIS — Z87.2 HEALED ULCER OF RIGHT FOOT ON EXAMINATION: Primary | ICD-10-CM

## 2025-07-15 DIAGNOSIS — N18.32 TYPE 2 DIABETES MELLITUS WITH STAGE 3B CHRONIC KIDNEY DISEASE, WITHOUT LONG-TERM CURRENT USE OF INSULIN: ICD-10-CM

## 2025-07-15 DIAGNOSIS — I73.9 PAD (PERIPHERAL ARTERY DISEASE): ICD-10-CM

## 2025-07-15 DIAGNOSIS — E11.22 TYPE 2 DIABETES MELLITUS WITH STAGE 3B CHRONIC KIDNEY DISEASE, WITHOUT LONG-TERM CURRENT USE OF INSULIN: ICD-10-CM

## 2025-07-15 DIAGNOSIS — L97.529 NEUROPATHIC ULCER OF LEFT FOOT, UNSPECIFIED ULCER STAGE: ICD-10-CM

## 2025-07-15 PROCEDURE — 99999 PR PBB SHADOW E&M-EST. PATIENT-LVL III: CPT | Mod: PBBFAC,HCNC,, | Performed by: PODIATRIST

## 2025-07-15 NOTE — PROGRESS NOTES
"[facility]  [address] [facility] [facility] [address]  [address]  [facility] [address]  Dept: [phone]  Dept Fax: [phone]    Scott Garcia Jr., DPM     Assessment:   MDM    MDM  Reviewed: previous chart, nursing note and vitals  Reviewed previous: labs and x-ray  Interpretation: labs and x-ray  Consults: orthopedics      1. Healed ulcer of right foot on examination        2. Type 2 diabetes mellitus with stage 3b chronic kidney disease, without long-term current use of insulin        3. PAD (peripheral artery disease)        4. Neuropathic ulcer of left foot, unspecified ulcer stage  Wound Debridement          Plan:     Wound Debridement    Date/Time: 7/15/2025 2:15 PM    Performed by: Scott Garcia Jr., DPM  Authorized by: Scott Garcia Jr., DPM    Time out: Immediately prior to procedure a "time out" was called to verify the correct patient, procedure, equipment, support staff and site/side marked as required.    Consent Done?:  Yes (Verbal)  Local anesthesia used?: No      Wound Details:    Location:  Left foot    Type of Debridement:  Excisional       Length (cm):  0.5       Width (cm):  0.5       Depth (cm):  0.5       Area (sq cm):  0.2       Percent Debrided (%):  100       Total Area Debrided (sq cm):  0.2    Depth of debridement:  Subcutaneous tissue    Tissue debrided:  Hypergranulation, Epidermis and Subcutaneous    Devitalized tissue debrided:  Biofilm, Callus, Clots and Fibrin    Instruments:  Curette  Bleeding:  Minimal  Patient tolerance:  Patient tolerated the procedure well with no immediate complications    [place] was seen today for diabetic foot ulcer.    Diagnoses and all orders for this visit:    Healed ulcer of right foot on examination    Type 2 diabetes mellitus with stage 3b chronic kidney disease, without long-term current use of insulin    PAD (peripheral artery disease)    Neuropathic ulcer of left foot, unspecified ulcer stage  -     Wound Debridement        -pt " seen, evaluated, and managed  -dx discussed in detail. All questions/concerns addressed  -all tx options discussed. All alternatives, risks, benefits of all txs discussed  -the patient was educated about the diagnosis in particular that ulcerations can lead to infection and possible amputation even with the best of care and pt verbalized understanding  -R foot ulceration healed. L foot ulceration recurred  -art us reviewed: adequate flow  -xr reviewed: no acute OM on xr  -labs reviewed: esr 36, crp and wbc are normal  -chairside culture reviewed: MRSA, enterobacter and escheria - all sensitive to bactrim   Has finished abx  -betadine DSD applied. Pt to change QD at home    -would rec post-pone of knee replacement until her ulceration has healed. Staff Message to ortho MD    -rxs dispensed: none  -referrals: none  -WB: wbat    Short-term goals include but are not limited to: maintaining good offloading and minimizing bioburden, promoting granulation and epithelialization to healing.  Wound healing is a medically reasonable expectation based on the clinical circumstances documented.    Long-term goals include but are not limited to: keeping the wound healed by good offloading and medical management under the direction of internist.    Advised pt of risks of current condition including but not limited to: worsening of infection, potential for limb loss    Follow-up: Patient is to return to the clinic in 3 week for follow-up but should call [facility] immediately if any signs of infection, such as fever, chills, sweats, increased redness or pain.    Follow up in about 3 weeks (around 8/5/2025).    Subjective:      Patient ID: Trudy Rauch Dakin is a 79 y.o. female.    Chief Complaint:   Chief Complaint   Patient presents with    Diabetic Foot Ulcer     Left 3rd toe       CC - foot ulcer: Patient presents to the clinic for evaluation and treatment of high risk feet. The patient's chief complaint is foot ulcer, L foot.  duration: several wks. denies n/v/f/c.      7/15/25:  Hx as above. F/u for L foot ulceration. Performing betadine wet to dry dressings at home. Finished bactrim    Toe Pain         Last Podiatry Enc: 6/26/2025  Last Enc w/ Me: Visit date not found    Outside reports reviewed: historical medical records.  Family hx: as below  Past Medical History:   Diagnosis Date    CHF (congestive heart failure)     Diabetes     Diverticulitis     Hypertension     Renal disorder      Past Surgical History:   Procedure Laterality Date    ABDOMINAL SURGERY  2006    diverticulitis x3    ANTERIOR CERVICAL DISCECTOMY W/ FUSION N/A 8/30/2018    FUSION C6-7 Surgeon: Rickey Martin MD    APPENDECTOMY      CARPAL TUNNEL RELEASE      COLECTOMY  07/2020    EPIDURAL STEROID INJECTION INTO LUMBAR SPINE N/A 1/20/2021    Procedure: Injection-steroid-epidural-lumbar--L3-4;  Surgeon: Colleen Carvajal MD;  Location: [facility] MGT;  Service: Pain Management;  Laterality: N/A;    HYSTERECTOMY  1970    @25yrs of age    LARYNGOSCOPY N/A 1/4/2022    Procedure: Suspension microlaryngoscopy with left vocal fold injection augmentation - Restylane L;  Surgeon: Yuan Mir MD;  Location: [address];  Service: ENT;  Laterality: N/A;  Microscope, telescopes, tower, injector, Restylane-L    MANDIBLE FRACTURE SURGERY  1970    MICRODISCECTOMY OF SPINE Left 4/13/2021    Procedure: MICRODISCECTOMY, SPINE Left L3-4 far lateral Microdiscectomy;  Surgeon: Rickey Martin MD;  Location: [facility] OR;  Service: Neurosurgery;  Laterality: Left;  Procedure: Left L3-4 far lateral Microdiscectomy   Surgery Time: 1.5 hrs  LOS: 0-1  Anesthesia: General  Radiology: C-arm  SNS: EMG, SEP  Bed: [place] [address] Poster  Position: Melissa  Dav w/ Globus confirmed 4/12/21 MN    MITRAL CLIP N/A 12/20/2023    Procedure: Mitral clip;  Surgeon: Kevin More MD;  Location: [facility] CATH LAB;  Service: Cardiology;  Laterality: N/A;    OOPHORECTOMY  1970    ROTATOR CUFF REPAIR Left  11/2016    TOTAL REDUCTION MAMMOPLASTY Bilateral 1990    TRANSESOPHAGEAL ECHOCARDIOGRAPHY N/A 11/1/2023    Procedure: ECHOCARDIOGRAM, TRANSESOPHAGEAL;  Surgeon: Brea Melgar Diagnostic;  Location: [facility] EP LAB;  Service: Cardiology;  Laterality: N/A;    TRANSESOPHAGEAL ECHOCARDIOGRAPHY N/A 12/20/2023    Procedure: ECHOCARDIOGRAM, TRANSESOPHAGEAL;  Surgeon: Cristine Brooks MD;  Location: [facility] CATH LAB;  Service: Cardiology;  Laterality: N/A;    TRANSFORAMINAL EPIDURAL INJECTION OF STEROID Left 12/23/2020    Procedure: Injection,steroid,epidural,transforaminal approach--Left L4 and L5;  Surgeon: Colleen Carvajal MD;  Location: [facility] PAIN MGT;  Service: Pain Management;  Laterality: Left;     Family History   Problem Relation Name Age of Onset    Heart disease Father      Glaucoma Neg Hx      Macular degeneration Neg Hx       Current Medications[1]  Review of patient's allergies indicates:  No Known Allergies  Social History[2]    ROS    REVIEW OF SYSTEMS: Negative as documented below as well as positive findings in bold.       Constitutional  Respiratory  Gastrointestinal  Skin   - Fever - Cough - Heartburn - Rash   - Chills - Spit blood - Nausea - Itching   - Weight Loss - Shortness of breath - Vomiting - Nail pain   - Malaise/Fatigue - Wheezing - Abdominal Pain  Wound/Ulcer   - Weight Gain   - Blood in Stool  Poor wound healing       - Diarrhea          Cardiovascular  Genitourinary  Neurological  HEENT   - Chest Pain - Dysuria - Burning Sensation of feet - Headache   - Palpitations - Hematuria - Tingling / Paresthesia - Congestion   - Pain at night in legs - Flank Pain - Dizziness - Sore Throat   - Cramping   - Tremor - Blurred Vision   - Leg Swelling   - Sensory Change - Double Vision   - Dizzy when standing   - Speech Change - Eye Redness       - Focal Weakness - Dry Eyes       - Loss of Consciousness          Endocrine  Musculoskeletal  Psychiatric   - Cold intolerance - Muscle Pain - Depression   - Heat  intolerance - Neck Pain - Insomnia   - Anemia - Joint Pain - Memory Loss   -  Easy bruising, bleeding - Heel pain - Anxiety      Toe Pain        Leg/Ankle/Foot Pain         Objective:     LMP 01/01/1970   Vitals:    07/15/25 1357   PainSc: 0-No pain         Physical Exam    General Appearance:   Patient appears well developed, well nourished  Patient appears stated age    Psychiatric:   Patient is oriented to time, place, and person.  Patient has appropriate mood and affect    Neck:  Trachea Midline  No visible masses    Respiratory/Ears:  No distress or labored breathing.  Able to differentiate between normal talking voice and whisper.  Able to follow commands    Eyes:  Visual Acuity intact  Lids and conjunctivae normal. No discoloration noted.    Physical Exam  Ortho Exam  Ortho/SPM Exam  Foot Exam  Physical Exam  Neurological Exam    L LE exam con't:  V:  DP 1/4, PT 1/4   CRT< 3s to all digits tested   Tibial and popliteal lymph nodes are w/o abnormality    N:  Patient displays normal ankle reflexes   sensory deficit present    Ortho: +Motor EHL/FHL/TA/GA   no TTP of foot or ankles   Compartments soft/compressible. No pain on passive stretch of big toe. No calf  Pain.     Derm:  skin not intact, ulceration present, ulcer probes to subQ      Ulcer Location: L foot 3rd toe  Measurements (post-debridement): 0.5x0.5x0.5cm  Periwound: hyperkeratotic  Drainage: none  Malodor: none  Base:  fibrotic  Signs of infection: none        R foot ulceration has healed:      Imaging / Labs:    Hemoglobin A1C   Date Value Ref Range Status   09/16/2024 6.3 (H) 4.0 - 5.6 % Final     Comment:     ADA Screening Guidelines:  5.7-6.4%  Consistent with prediabetes  >or=6.5%  Consistent with diabetes    High levels of fetal hemoglobin interfere with the HbA1C  assay. Heterozygous hemoglobin variants (HbS, HgC, etc)do  not significantly interfere with this assay.   However, presence of multiple variants may affect accuracy.     03/21/2024  5.9 (H) 4.0 - 5.6 % Final     Comment:     ADA Screening Guidelines:  5.7-6.4%  Consistent with prediabetes  >or=6.5%  Consistent with diabetes    High levels of fetal hemoglobin interfere with the HbA1C  assay. Heterozygous hemoglobin variants (HbS, HgC, etc)do  not significantly interfere with this assay.   However, presence of multiple variants may affect accuracy.     10/03/2023 6.1 (H) 0.0 - 5.6 % Final     Comment:     Reference Interval:  5.0 - 5.6 Normal   5.7 - 6.4 High Risk   > 6.5 Diabetic      Hgb A1c results are standardized based on the (NGSP) National   Glycohemoglobin Standardization Program.      Hemoglobin A1C levels are related to mean serum/plasma glucose   during the preceding 2-3 months.          Hemoglobin A1c   Date Value Ref Range Status   05/06/2025 6.3 (H) 4.0 - 5.6 % Final     Comment:     ADA Screening Guidelines:  5.7-6.4%  Consistent with prediabetes  >=6.5%  Consistent with diabetes    High levels of fetal hemoglobin interfere with the HbA1C  assay. Heterozygous hemoglobin variants (HbS, HgC, etc)do  not significantly interfere with this assay.   However, presence of multiple variants may affect accuracy.   03/25/2025 6.6 (H) 4.0 - 5.6 % Final     Comment:     ADA Screening Guidelines:  5.7-6.4%  Consistent with prediabetes  >=6.5%  Consistent with diabetes    High levels of fetal hemoglobin interfere with the HbA1C  assay. Heterozygous hemoglobin variants (HbS, HgC, etc)do  not significantly interfere with this assay.   However, presence of multiple variants may affect accuracy.       X-Ray Foot Complete Right  Result Date: 6/26/2025  EXAM: XR FOOT COMPLETE 3 VIEW RIGHT CLINICAL HISTORY: Right foot pain. FINDINGS: Pes planus deformity.  Postoperative changes first metatarsal and second metatarsal heads.  Chronic subluxation interphalangeal joint of the first toe.  Small heel spur.  No fracture is identified.      No acute radiographic abnormality of the right foot. Finalized on:  6/26/2025 8:09 PM By:  Juan J Jackson MD [facility]# [id number]      2025-06-26 20:11:[id number]     [facility]          Note: This was dictated using a computer transcription program. Although proofread, it may contain computer transcription errors and phonetic errors. Other human proofreading errors may also exist. Corrections may be performed at a later time. Please contact us for any clarification if needed.    Scott Garcia DPM  [facility]                      [1]   Current Outpatient Medications   Medication Sig Dispense Refill    acetaminophen (TYLENOL) 650 MG TbSR Take 1 tablet (650 mg total) by mouth every 8 (eight) hours as needed (pain).      apixaban (ELIQUIS) 2.5 mg Tab Take 1 tablet (2.5 mg total) by mouth 2 (two) times daily. 180 tablet 3    atorvastatin (LIPITOR) 80 MG tablet Take 1 tablet (80 mg total) by mouth once daily. 90 tablet 1    bumetanide (BUMEX) 2 MG tablet Take 1.5 tablets (3 mg total) by mouth 2 (two) times daily. 270 tablet 3    cyanocobalamin 1,000 mcg/mL injection Inject 1 mL (1,000 mcg total) into the muscle every 30 days. 30 mL 3    docusate sodium (COLACE) 100 MG capsule Take 1 capsule (100 mg total) by mouth 2 (two) times daily. 60 capsule 11    ergocalciferol (ERGOCALCIFEROL) 50,000 unit Cap Take 1 capsule (50,000 Units total) by mouth every 7 days. 12 capsule 3    erythromycin (ROMYCIN) ophthalmic ointment Place into the left eye every evening. 1 g 3    FARXIGA 10 mg tablet TAKE 1 TABLET BY MOUTH EVERY DAY 90 tablet 1    gabapentin (NEURONTIN) 100 MG capsule Take 1 capsule (100 mg total) by mouth every evening. 90 capsule 3    mirtazapine (REMERON) 7.5 MG Tab Take 1 tablet (7.5 mg total) by mouth every evening. For insomnia 30 tablet 1    nitroGLYCERIN (NITROSTAT) 0.4 MG SL tablet Place 1 tablet (0.4 mg total) under the tongue every 5 (five) minutes as needed for Chest pain. 25 tablet 5    polyethylene glycol (GLYCOLAX) 17 gram PwPk Take 17 g by mouth once  daily. 100 each 1    potassium chloride SA (K-DUR,KLOR-CON M) 10 MEQ tablet Take 2 tablets (20 mEq total) by mouth every evening. 90 tablet 1    venlafaxine (EFFEXOR-XR) 150 MG Cp24 TAKE 1 CAPSULE BY MOUTH ONCE DAILY FOR MOOD AND NEUROPATHY 30 capsule 4     No current facility-administered medications for this visit.   [2]   Social History  Socioeconomic History    Marital status:    Tobacco Use    Smoking status: Never     Passive exposure: Never    Smokeless tobacco: Never   Substance and Sexual Activity    Alcohol use: No    Drug use: No    Sexual activity: Never     Social Drivers of Health     Financial Resource Strain: Low Risk  (4/29/2025)    Overall Financial Resource Strain (CARDIA)     Difficulty of Paying Living Expenses: Not very hard   Food Insecurity: No Food Insecurity (4/29/2025)    Hunger Vital Sign     Worried About Running Out of Food in the Last Year: Never true     Ran Out of Food in the Last Year: Never true   Transportation Needs: No Transportation Needs (4/29/2025)    PRAPARE - Transportation     Lack of Transportation (Medical): No     Lack of Transportation (Non-Medical): No   Physical Activity: Inactive (4/29/2025)    Exercise Vital Sign     Days of Exercise per Week: 0 days     Minutes of Exercise per Session: 0 min   Stress: No Stress Concern Present (4/29/2025)    [employer] - Occupational Stress Questionnaire     Feeling of Stress : Not at all   Housing Stability: Low Risk  (4/29/2025)    Housing Stability Vital Sign     Unable to Pay for Housing in the Last Year: No     Number of Times Moved in the Last Year: 1     Homeless in the Last Year: No

## 2025-07-30 ENCOUNTER — OFFICE VISIT (OUTPATIENT)
Dept: FAMILY MEDICINE | Facility: CLINIC | Age: 80
End: 2025-07-30
Payer: MEDICARE

## 2025-07-30 ENCOUNTER — OFFICE VISIT (OUTPATIENT)
Dept: CARDIOLOGY | Facility: CLINIC | Age: 80
End: 2025-07-30
Payer: MEDICARE

## 2025-07-30 VITALS
BODY MASS INDEX: 22.33 KG/M2 | SYSTOLIC BLOOD PRESSURE: 122 MMHG | HEIGHT: 61 IN | DIASTOLIC BLOOD PRESSURE: 74 MMHG | TEMPERATURE: 98 F | HEART RATE: 88 BPM | WEIGHT: 118.25 LBS | OXYGEN SATURATION: 98 %

## 2025-07-30 VITALS
DIASTOLIC BLOOD PRESSURE: 75 MMHG | WEIGHT: 113.63 LBS | HEIGHT: 61 IN | SYSTOLIC BLOOD PRESSURE: 114 MMHG | OXYGEN SATURATION: 96 % | BODY MASS INDEX: 21.45 KG/M2 | HEART RATE: 86 BPM

## 2025-07-30 DIAGNOSIS — I38 VALVULAR HEART DISEASE: ICD-10-CM

## 2025-07-30 DIAGNOSIS — I27.20 PULMONARY HTN: ICD-10-CM

## 2025-07-30 DIAGNOSIS — Z01.818 PRE-OP EXAM: Primary | ICD-10-CM

## 2025-07-30 DIAGNOSIS — N18.32 TYPE 2 DIABETES MELLITUS WITH STAGE 3B CHRONIC KIDNEY DISEASE, WITHOUT LONG-TERM CURRENT USE OF INSULIN: ICD-10-CM

## 2025-07-30 DIAGNOSIS — I48.0 PAROXYSMAL ATRIAL FIBRILLATION: Chronic | ICD-10-CM

## 2025-07-30 DIAGNOSIS — Z95.818 S/P MITRAL VALVE CLIP IMPLANTATION: Primary | ICD-10-CM

## 2025-07-30 DIAGNOSIS — I25.10 CORONARY ARTERY DISEASE INVOLVING NATIVE CORONARY ARTERY OF NATIVE HEART WITHOUT ANGINA PECTORIS: Chronic | ICD-10-CM

## 2025-07-30 DIAGNOSIS — E11.22 TYPE 2 DIABETES MELLITUS WITH STAGE 3B CHRONIC KIDNEY DISEASE, WITHOUT LONG-TERM CURRENT USE OF INSULIN: ICD-10-CM

## 2025-07-30 DIAGNOSIS — E78.49 OTHER HYPERLIPIDEMIA: ICD-10-CM

## 2025-07-30 DIAGNOSIS — I50.22 CHRONIC HFREF (HEART FAILURE WITH REDUCED EJECTION FRACTION): ICD-10-CM

## 2025-07-30 DIAGNOSIS — Z98.890 S/P MITRAL VALVE CLIP IMPLANTATION: Primary | ICD-10-CM

## 2025-07-30 DIAGNOSIS — I34.0 MITRAL VALVE INSUFFICIENCY, UNSPECIFIED ETIOLOGY: ICD-10-CM

## 2025-07-30 LAB
OHS QRS DURATION: 94 MS
OHS QTC CALCULATION: 486 MS

## 2025-07-30 PROCEDURE — 3288F FALL RISK ASSESSMENT DOCD: CPT | Mod: CPTII,S$GLB,, | Performed by: FAMILY MEDICINE

## 2025-07-30 PROCEDURE — 1157F ADVNC CARE PLAN IN RCRD: CPT | Mod: CPTII,S$GLB,, | Performed by: FAMILY MEDICINE

## 2025-07-30 PROCEDURE — 3074F SYST BP LT 130 MM HG: CPT | Mod: CPTII,S$GLB,, | Performed by: FAMILY MEDICINE

## 2025-07-30 PROCEDURE — 99999 PR PBB SHADOW E&M-EST. PATIENT-LVL IV: CPT | Mod: PBBFAC,HCNC,, | Performed by: INTERNAL MEDICINE

## 2025-07-30 PROCEDURE — 3078F DIAST BP <80 MM HG: CPT | Mod: CPTII,S$GLB,, | Performed by: FAMILY MEDICINE

## 2025-07-30 PROCEDURE — 93000 ELECTROCARDIOGRAM COMPLETE: CPT | Mod: HCNC,S$GLB,, | Performed by: STUDENT IN AN ORGANIZED HEALTH CARE EDUCATION/TRAINING PROGRAM

## 2025-07-30 PROCEDURE — 1101F PT FALLS ASSESS-DOCD LE1/YR: CPT | Mod: CPTII,S$GLB,, | Performed by: FAMILY MEDICINE

## 2025-07-30 PROCEDURE — 1125F AMNT PAIN NOTED PAIN PRSNT: CPT | Mod: CPTII,S$GLB,, | Performed by: FAMILY MEDICINE

## 2025-07-30 PROCEDURE — 99214 OFFICE O/P EST MOD 30 MIN: CPT | Mod: S$GLB,,, | Performed by: FAMILY MEDICINE

## 2025-07-30 PROCEDURE — 3072F LOW RISK FOR RETINOPATHY: CPT | Mod: CPTII,S$GLB,, | Performed by: FAMILY MEDICINE

## 2025-07-30 RX ORDER — BUSPIRONE HYDROCHLORIDE 5 MG/1
TABLET ORAL
Qty: 60 TABLET | Refills: 0 | Status: SHIPPED | OUTPATIENT
Start: 2025-07-30

## 2025-07-30 NOTE — PROGRESS NOTES
Health Maintenance    Eye: MARGE signed, pt not sure of who she has an appt with. Pt states she will call us and let us know once she gets the name of the place. Daughter states it could be [facility].

## 2025-07-30 NOTE — PROGRESS NOTES
Cardiology Clinic Visit    History of Present Illness:       Trudy Rauch Dakin is a pleasant 79 y.o. female with PMH noted below in assessment/plan     7/30/2025  - Scheduled for knee replacement surgery on August 11th  - Reports dyspnea when using walker only otherwise doing well  -Denies Chest Discomfort/Palpitation/Syncope  - History of fluid retention issues; past incident of excess fluid administration during a procedure led to an ED visit  - Wound on left foot under care of Dr. Garcia, nearly healed  - Recent USs of leg show good blood flow, should not cause issues for knee replacement  - Denies chest discomfort, chest pain, chest pressure, chest tightness, additional dyspnea, orthopnea, syncope, or problems with current medications    2/3/2025  Doing well since I saw her.  Only issue is she has cramping in her feet at night when she is sleeping.  No claudication with exertion.  K 3.5 on most recent blood work.  Discuss results of TTE.  Is planning on having knee surgery in July, we will see me in June to do preop.  Compliant with medications.  Unclear if taking midodrine daily or p.r.n., we will check with her son-in-law who manages her medications and they will send me a message.  Planning for TKA sometime in July.    11/04/2024  referred to establish with us as she just moved to [facility]. Previously saw Dr. Gomez  Has been doing very well the last few months. Denies Chest Discomfort/ALEGRE/SOB/Palpitation/Syncope. No recently hospitalizations for HF. Living with daughter now who had b/l TKA.  Holding off on her own TKA until after holidays. Compliant with medications.        History obtained by patient interview and supplemented by nursing documentation and chart review.   Objective   PMHx:  has a past medical history of CHF (congestive heart failure), Diabetes, Diverticulitis, Hypertension, and Renal disorder.   SurgHx:  has a past surgical history that includes Appendectomy; Carpal tunnel release;  Has 2 pills left.   Abdominal surgery (2006); Mandible fracture surgery (1970); Rotator cuff repair (Left, 11/2016); Oophorectomy (1970); Anterior cervical discectomy w/ fusion (N/A, 8/30/2018); Total Reduction Mammoplasty (Bilateral, 1990); Hysterectomy (1970); Colectomy (07/2020); Transforaminal epidural injection of steroid (Left, 12/23/2020); Epidural steroid injection into lumbar spine (N/A, 1/20/2021); Microdiscectomy of spine (Left, 4/13/2021); Laryngoscopy (N/A, 1/4/2022); Transesophageal echocardiography (N/A, 11/1/2023); mitral clip (N/A, 12/20/2023); and Transesophageal echocardiography (N/A, 12/20/2023).   FamHx: family history includes Heart disease in her father.   SocialHx:  reports that she has never smoked. She has never been exposed to tobacco smoke. She has never used smokeless tobacco. She reports that she does not drink alcohol and does not use drugs.  Home Meds:  Current Outpatient Medications   Medication Instructions    acetaminophen (TYLENOL) 650 mg, Oral, Every 8 hours PRN    apixaban (ELIQUIS) 2.5 mg, Oral, 2 times daily    atorvastatin (LIPITOR) 80 mg, Oral, Daily    bumetanide (BUMEX) 3 mg, Oral, 2 times daily    cyanocobalamin 1,000 mcg, Intramuscular, Every 30 days    docusate sodium (COLACE) 100 mg, Oral, 2 times daily    ergocalciferol (ERGOCALCIFEROL) 50,000 Units, Oral, Every 7 days    erythromycin (ROMYCIN) ophthalmic ointment Left Eye, Nightly    FARXIGA 10 mg, Oral    gabapentin (NEURONTIN) 100 mg, Oral, Nightly    mirtazapine (REMERON) 7.5 mg, Oral, Nightly, For insomnia    nitroGLYCERIN (NITROSTAT) 0.4 mg, Sublingual, Every 5 min PRN    polyethylene glycol (GLYCOLAX) 17 g, Oral, Daily    potassium chloride SA (K-DUR,KLOR-CON M) 10 MEQ tablet 20 mEq, Oral, Nightly    venlafaxine (EFFEXOR-XR) 150 MG Cp24 TAKE 1 CAPSULE BY MOUTH ONCE DAILY FOR MOOD AND NEUROPATHY     Objective/Exam:   LMP 01/01/1970    Wt Readings from Last 4 Encounters:   06/26/25 53.4 kg (117 lb 11.6 oz)   06/10/25 53.4 kg (117 lb  "13.4 oz)   06/05/25 53.8 kg (118 lb 9.7 oz)   06/02/25 53.8 kg (118 lb 8 oz)      Constitutional: NAD, Atraumatic, Conversant   HEENT: MMM, Sclera anicteric, No JVD   Cardiovascular: RRR, no murmurs noted, no chest tenderness to palpation, 2+ radial pulses b/l  Pulmonary: normal respiratory effort, CTAB, no crackles, wheezes  Abdominal: S/NT/ND  Musculoskeletal: No lower extremity edema noted b/l  Neurological: No gross neurological deficits  Skin: W/D/I  Psych: Appropriate affect, normal mood  Labs/Imaging/Procedures   Personally reviewed  Lab Results   Component Value Date     06/26/2025     02/21/2025    K 3.7 06/26/2025    K 4.0 02/21/2025     06/26/2025     02/21/2025    CO2 29 06/26/2025    CO2 29 02/21/2025    BUN 23 06/26/2025    CREATININE 1.4 06/26/2025    ANIONGAP 10 06/26/2025     Lab Results   Component Value Date    HGBA1C 6.3 (H) 05/06/2025    HGBA1C 6.3 (H) 09/16/2024     Lab Results   Component Value Date     (H) 05/15/2025     (H) 08/12/2024     (H) 06/10/2024     (H) 05/20/2024     (H) 05/20/2024      Lab Results   Component Value Date    WBC 7.17 06/26/2025    HGB 12.8 06/26/2025    HGB 12.0 01/28/2025    HCT 40.2 06/26/2025    HCT 36.1 (L) 01/28/2025     06/26/2025     01/28/2025    GRAN 5.5 01/28/2025    GRAN 73.0 01/28/2025     Lab Results   Component Value Date    CHOL 135 03/25/2025    CHOL 126 03/21/2024    HDL 40 03/25/2025    LDLCALC 76.4 03/25/2025    LDLCALC 71.4 03/21/2024    LDLCALC 101.2 04/12/2023    LDLCALC 73 09/13/2022    TRIG 93 03/25/2025    TRIG 88 03/21/2024     Lab Results   Component Value Date    TSH 2.060 03/25/2025    TSH 1.660 05/25/2024     No results found for: "APOLIPOPROTE"  No results found for: "LIPOA"     TTE:  Results for orders placed during the hospital encounter of 07/31/24    Echo    Interpretation Summary    Left Ventricle: Mildly increased wall thickness. Mild global hypokinesis " present. There is mildly reduced systolic function with a visually estimated ejection fraction of 45 - 50%. Grade I diastolic dysfunction.    Right Ventricle: Normal right ventricular cavity size. Wall thickness is normal. Systolic function is normal.    Left Atrium: Left atrium is severely dilated.    Aortic Valve: There is mild aortic valve sclerosis. Mildly restricted motion. There is mild to moderate stenosis. Aortic valve area by VTI is 1.39 cm². Aortic valve peak velocity is 2.36 m/s. Mean gradient is 14 mmHg. The dimensionless index is 0.40. There is mild to moderate aortic regurgitation.    Mitral Valve: There is mild to moderate regurgitation.    Tricuspid Valve: There is moderate regurgitation.    Pulmonary Artery: There is mild pulmonary hypertension. The estimated pulmonary artery systolic pressure is 40 mmHg.    IVC/SVC: Normal venous pressure at 3 mmHg.    TTE 01/28/2025    Left Ventricle: The left ventricle is normal in size. Normal wall thickness. Mild global hypokinesis present. There is mildly reduced systolic function with a visually estimated ejection fraction of 45 - 50%. Quantitated ejection fraction is 45%. Diastolic function cannot be reliably determined in the presence of mitral annular calcification.    Right Ventricle: Normal right ventricular cavity size. Systolic function is normal.    Left Atrium: Left atrium is mildly dilated.    Right Atrium: Normal right atrial size.    Aortic Valve: The aortic valve is a trileaflet valve. Mildly calcified cusps. There is moderate stenosis. Aortic valve area by VTI is 1.0 cm². Aortic valve peak velocity is 2.3 m/s. Mean gradient is 12 mmHg. The dimensionless index is 0.33. There is moderate aortic regurgitation.    Mitral Valve: Severely calcified posterior leaflet. There is mitral annular calcification present. There is mild stenosis. The mean pressure gradient across the mitral valve is 6 mmHg at a heart rate of 70 bpm. There is moderate  regurgitation with a posterolateral eccentrically directed jet.    Tricuspid Valve: There is moderate regurgitation.    Pulmonary Artery: The estimated pulmonary artery systolic pressure is 41 mmHg.    IVC/SVC: Normal venous pressure at 3 mmHg.    Global longitudinal strain of-14.3%.  Mildly reduced.    Stress Testing:   No results found for this or any previous visit.     Coronary Angiogram:  Results for orders placed during the hospital encounter of 23    Cardiac catheterization    Conclusion    The estimated blood loss was none.    The MitralClip was successfully placed.    The procedure log was documented by Documenter: Sully Alvarenga and verified by Kevin More MD.    Date: 2023  Time: 10:21 AM      Personal: Enjoys woodcrafts that she places in yard. Cuts own wood.   about one year ago. Recently moved in with daughter to [facility].     34 minutes were spent on this visit including time personally:  -Reviewing Medical records & lab results  -Independently reviewing and interpreting (if not documented by myself) EKGs, echocardiograms, catherizations   -Obtaining a history, performing a relevant exam, counseling/educating the patient/family  -Documenting clinical information in the EMR including ordering of tests  -Care coordination and communicating with other health care providers       Problem List:     1. S/P mitral valve clip implantation    2. Pulmonary HTN    3. Paroxysmal atrial fibrillation    4. Other hyperlipidemia    5. Mitral valve insufficiency, unspecified etiology    6. Chronic HFrEF (heart failure with reduced ejection fraction)    7. CAD (coronary artery disease)    8. Valvular heart disease        Assessment/Plan:   Trudy Rauch Dakin is a 79 y.o. female with a PMHx of HTN, NIDDM, CKDIV, Afib on AC, CAD(s/p remote PCI), MR s/p Jazmin Clip (2023), HFmrEF 45-50% 2025, Mod AS (DI 0.33), mild-mod AR, severe LAE w/ mod MR, PAP 40.  MV mg 6 at 70 beats per minute  (however clip in place). B/L art US no 6/2025 no obs    Euvolemic today w/o sx.  TKA planned for August 11. Euvolemic. Tolerating medications without issues. VSS. ECG SR w/ PVC, . Exam wnl.     -Patient is intermediate risk, for intermediate risk surgery  -No further CV workup needed prior to surgery.   -bumex 3mg BID-Euvolemic -- please be judicious with fluid administration during periop period--if signs of volume overload with administer IV bumex (2-3mg) or Lasix 80-120mg prn  -okay to hold apixaban 3 days prior to surgery, restart when cleared from surgical perspective    -Continue atorva 80, ldl at goal  -cont dapagliflozin 10--okay to hold 3 days prior to surgery--restart on discharge  -Potassium 20 mEq use q.h.s.   -will plan for repeat TTE 2026 to monitor mod AS progression      Thank you for the opportunity to care for this patient. Please don't hesitate to reach out with any questions/concerns        Vic Martínez MD  Interventional Cardiology  [facility]

## 2025-07-31 ENCOUNTER — OFFICE VISIT (OUTPATIENT)
Dept: FAMILY MEDICINE | Facility: CLINIC | Age: 80
End: 2025-07-31
Payer: MEDICARE

## 2025-07-31 ENCOUNTER — OFFICE VISIT (OUTPATIENT)
Dept: ORTHOPEDICS | Facility: CLINIC | Age: 80
End: 2025-07-31
Payer: MEDICARE

## 2025-07-31 VITALS
SYSTOLIC BLOOD PRESSURE: 120 MMHG | TEMPERATURE: 98 F | DIASTOLIC BLOOD PRESSURE: 72 MMHG | BODY MASS INDEX: 22.46 KG/M2 | OXYGEN SATURATION: 97 % | WEIGHT: 118.94 LBS | HEART RATE: 84 BPM | HEIGHT: 61 IN

## 2025-07-31 DIAGNOSIS — J38.01 PARALYSIS OF LEFT VOCAL CORD: Chronic | ICD-10-CM

## 2025-07-31 DIAGNOSIS — M47.12 CERVICAL SPONDYLOSIS WITH MYELOPATHY: ICD-10-CM

## 2025-07-31 DIAGNOSIS — R53.1 GENERALIZED WEAKNESS: ICD-10-CM

## 2025-07-31 DIAGNOSIS — I48.0 PAROXYSMAL ATRIAL FIBRILLATION: Chronic | ICD-10-CM

## 2025-07-31 DIAGNOSIS — N18.32 TYPE 2 DIABETES MELLITUS WITH STAGE 3B CHRONIC KIDNEY DISEASE, WITHOUT LONG-TERM CURRENT USE OF INSULIN: ICD-10-CM

## 2025-07-31 DIAGNOSIS — M20.42 HAMMER TOES OF BOTH FEET: ICD-10-CM

## 2025-07-31 DIAGNOSIS — E55.9 VITAMIN D DEFICIENCY: ICD-10-CM

## 2025-07-31 DIAGNOSIS — I25.10 CORONARY ARTERY DISEASE INVOLVING NATIVE CORONARY ARTERY OF NATIVE HEART WITHOUT ANGINA PECTORIS: Chronic | ICD-10-CM

## 2025-07-31 DIAGNOSIS — Z00.00 ENCOUNTER FOR MEDICARE ANNUAL WELLNESS EXAM: Primary | ICD-10-CM

## 2025-07-31 DIAGNOSIS — Z74.09 IMPAIRED MOBILITY: ICD-10-CM

## 2025-07-31 DIAGNOSIS — I70.0 AORTIC ATHEROSCLEROSIS: ICD-10-CM

## 2025-07-31 DIAGNOSIS — Z00.00 ENCOUNTER FOR PREVENTIVE HEALTH EXAMINATION: ICD-10-CM

## 2025-07-31 DIAGNOSIS — M54.16 LUMBAR RADICULOPATHY: Chronic | ICD-10-CM

## 2025-07-31 DIAGNOSIS — N18.32 STAGE 3B CHRONIC KIDNEY DISEASE: ICD-10-CM

## 2025-07-31 DIAGNOSIS — M20.41 HAMMER TOES OF BOTH FEET: ICD-10-CM

## 2025-07-31 DIAGNOSIS — M17.10 PRIMARY OSTEOARTHRITIS OF KNEE, UNSPECIFIED LATERALITY: ICD-10-CM

## 2025-07-31 DIAGNOSIS — F32.0 MAJOR DEPRESSIVE DISORDER, SINGLE EPISODE, MILD: ICD-10-CM

## 2025-07-31 DIAGNOSIS — E21.3 HYPERPARATHYROIDISM: ICD-10-CM

## 2025-07-31 DIAGNOSIS — I50.22 CHRONIC HFREF (HEART FAILURE WITH REDUCED EJECTION FRACTION): ICD-10-CM

## 2025-07-31 DIAGNOSIS — J43.9 PULMONARY EMPHYSEMA, UNSPECIFIED EMPHYSEMA TYPE: ICD-10-CM

## 2025-07-31 DIAGNOSIS — I34.0 MITRAL VALVE INSUFFICIENCY, UNSPECIFIED ETIOLOGY: ICD-10-CM

## 2025-07-31 DIAGNOSIS — M17.11 PRIMARY OSTEOARTHRITIS OF RIGHT KNEE: Primary | ICD-10-CM

## 2025-07-31 DIAGNOSIS — E53.8 VITAMIN B12 DEFICIENCY: ICD-10-CM

## 2025-07-31 DIAGNOSIS — E11.22 TYPE 2 DIABETES MELLITUS WITH STAGE 3B CHRONIC KIDNEY DISEASE, WITHOUT LONG-TERM CURRENT USE OF INSULIN: ICD-10-CM

## 2025-07-31 DIAGNOSIS — Z95.818 S/P MITRAL VALVE CLIP IMPLANTATION: ICD-10-CM

## 2025-07-31 DIAGNOSIS — I27.20 PULMONARY HTN: ICD-10-CM

## 2025-07-31 DIAGNOSIS — Z98.890 S/P MITRAL VALVE CLIP IMPLANTATION: ICD-10-CM

## 2025-07-31 DIAGNOSIS — M48.062 SPINAL STENOSIS OF LUMBAR REGION WITH NEUROGENIC CLAUDICATION: Chronic | ICD-10-CM

## 2025-07-31 DIAGNOSIS — R91.8 LUNG NODULES: ICD-10-CM

## 2025-07-31 DIAGNOSIS — E78.49 OTHER HYPERLIPIDEMIA: ICD-10-CM

## 2025-07-31 DIAGNOSIS — I35.0 MODERATE AORTIC STENOSIS: ICD-10-CM

## 2025-07-31 DIAGNOSIS — I10 ESSENTIAL HYPERTENSION: Chronic | ICD-10-CM

## 2025-07-31 PROCEDURE — 99213 OFFICE O/P EST LOW 20 MIN: CPT | Mod: HCNC,S$GLB,, | Performed by: ORTHOPAEDIC SURGERY

## 2025-07-31 PROCEDURE — 1101F PT FALLS ASSESS-DOCD LE1/YR: CPT | Mod: CPTII,HCNC,S$GLB, | Performed by: ORTHOPAEDIC SURGERY

## 2025-07-31 PROCEDURE — 3288F FALL RISK ASSESSMENT DOCD: CPT | Mod: CPTII,HCNC,S$GLB, | Performed by: ORTHOPAEDIC SURGERY

## 2025-07-31 PROCEDURE — 3072F LOW RISK FOR RETINOPATHY: CPT | Mod: CPTII,HCNC,S$GLB, | Performed by: ORTHOPAEDIC SURGERY

## 2025-07-31 PROCEDURE — G2211 COMPLEX E/M VISIT ADD ON: HCPCS | Mod: HCNC,S$GLB,, | Performed by: ORTHOPAEDIC SURGERY

## 2025-07-31 PROCEDURE — 99999 PR PBB SHADOW E&M-EST. PATIENT-LVL II: CPT | Mod: PBBFAC,HCNC,, | Performed by: ORTHOPAEDIC SURGERY

## 2025-07-31 PROCEDURE — 1159F MED LIST DOCD IN RCRD: CPT | Mod: CPTII,HCNC,S$GLB, | Performed by: ORTHOPAEDIC SURGERY

## 2025-07-31 PROCEDURE — 1157F ADVNC CARE PLAN IN RCRD: CPT | Mod: CPTII,HCNC,S$GLB, | Performed by: ORTHOPAEDIC SURGERY

## 2025-07-31 NOTE — PROGRESS NOTES
Subjective:      Patient ID: Trudy Rauch Dakin is a 79 y.o. female.    Chief Complaint: Pain of the Left Knee and Pain of the Right Knee    Patient had her check in preoperatively.  She is scheduled for total knee replacement and a proximally 2 weeks.  She states she was cleared by her cardiologist.  She will be stopping her blood thinner and diuretic proximally 3 days before surgery.  Other than that she is excited and anxious to have      Social History     Occupational History    Not on file   Tobacco Use    Smoking status: Never     Passive exposure: Never    Smokeless tobacco: Never   Substance and Sexual Activity    Alcohol use: No    Drug use: No    Sexual activity: Never      Social Drivers of Health     Tobacco Use: Low Risk  (7/31/2025)    Patient History     Smoking Tobacco Use: Never     Smokeless Tobacco Use: Never     Passive Exposure: Never   Alcohol Use: Not At Risk (4/29/2025)    AUDIT-C     Frequency of Alcohol Consumption: Never     Average Number of Drinks: Patient does not drink     Frequency of Binge Drinking: Never   Financial Resource Strain: Low Risk  (4/29/2025)    Overall Financial Resource Strain (CARDIA)     Difficulty of Paying Living Expenses: Not very hard   Food Insecurity: No Food Insecurity (4/29/2025)    Hunger Vital Sign     Worried About Running Out of Food in the Last Year: Never true     Ran Out of Food in the Last Year: Never true   Transportation Needs: No Transportation Needs (4/29/2025)    PRAPARE - Transportation     Lack of Transportation (Medical): No     Lack of Transportation (Non-Medical): No   Physical Activity: Inactive (4/29/2025)    Exercise Vital Sign     Days of Exercise per Week: 0 days     Minutes of Exercise per Session: 0 min   Stress: No Stress Concern Present (4/29/2025)    [employer] [place] [employer] - Occupational Stress Questionnaire     Feeling of Stress : Not at all   Housing Stability: Low Risk  (4/29/2025)    Housing Stability Vital  Sign     Unable to Pay for Housing in the Last Year: No     Number of Times Moved in the Last Year: 1     Homeless in the Last Year: No   Depression: Low Risk  (4/10/2025)    Depression     Last PHQ-4: Flowsheet Data: 0   Utilities: Not At Risk (4/29/2025)    AHC Utilities     Threatened with loss of utilities: No   Health Literacy: Inadequate Health Literacy (4/29/2025)     Health Literacy     Frequency of need for help with medical instructions: Always   Social Isolation: Not on file      ROS      Objective:    Ortho/SPM Exam       Assessment:       1. Primary osteoarthritis of right knee          Plan:   Patient is progressing through all her preoperative clearances.  She is on schedule surgery in 2 weeks.

## 2025-07-31 NOTE — PROGRESS NOTES
"  Trudy Dakin presented for a follow-up Medicare AWV today. The following components were reviewed and updated:    Medical history  Family History  Social history  Allergies and Current Medications  Health Risk Assessment  Health Maintenance  Care Team    **See Completed Assessments for Annual Wellness visit with in the encounter summary    The following assessments were completed:  Depression Screening  Cognitive function Screening  Timed Get Up Test  Whisper Test      Opioid documentation:      Patient does not have a current opioid prescription.          Vitals:    07/31/25 1451   BP: 120/72   BP Location: Right arm   Patient Position: Sitting   Pulse: 84   Temp: 97.6 °F (36.4 °C)   SpO2: 97%   Weight: 53.9 kg (118 lb 15 oz)   Height: 5' 1" (1.549 m)     Body mass index is 22.47 kg/m².       Physical Exam  Constitutional:       General: She is not in acute distress.     Appearance: Normal appearance.   HENT:      Head: Normocephalic and atraumatic.   Eyes:      General: Lids are normal. Gaze aligned appropriately.      Pupils: Pupils are equal, round, and reactive to light.   Neck:      Trachea: Trachea normal.   Cardiovascular:      Rate and Rhythm: Normal rate and regular rhythm.      Heart sounds: S1 normal and S2 normal.   Pulmonary:      Effort: Pulmonary effort is normal.      Breath sounds: Normal breath sounds.   Abdominal:      General: Bowel sounds are normal. There is no distension.      Palpations: Abdomen is soft.      Tenderness: There is no abdominal tenderness.   Musculoskeletal:      Cervical back: Neck supple.      Right lower leg: No edema.      Left lower leg: No edema.   Lymphadenopathy:      Cervical: No cervical adenopathy.   Skin:     General: Skin is cool and dry.   Neurological:      Mental Status: She is alert and oriented to person, place, and time.   Psychiatric:         Attention and Perception: Attention normal.         Mood and Affect: Mood normal.         Behavior: Behavior is " cooperative.         Thought Content: Thought content normal.         Diagnoses and health risks identified today and associated recommendations/orders:    1. Encounter for Medicare annual wellness exam  - Chart reviewed. Problem list updated. Discussed current medical diagnosis, current medications, medical/surgical/family/social history; updated provider list; documented vital signs; identified any cognitive impairment; and updated risk factor list. Addressed any outstanding health maintenance. Provided patient with personalized health advice. Continue to follow up with PCP and any specialists.     2. Encounter for preventive health examination  - Chart reviewed. Problem list updated. Discussed current medical diagnosis, current medications, medical/surgical/family/social history; updated provider list; documented vital signs; identified any cognitive impairment; and updated risk factor list. Addressed any outstanding health maintenance. Provided patient with personalized health advice. Continue to follow up with PCP and any specialists.     3. Lumbar radiculopathy  - Chronic, stable  - Continue medications  - Follow with PCP    4. Spinal stenosis of lumbar region with neurogenic claudication  - Chronic, stable  - Continue medications  - Follow with PCP    5. Cervical spondylosis with myelopathy  - Chronic, stable  - Continue medications  - Follow with PCP    6. Major depressive disorder, single episode, mild  - Chronic, stable  - Continue medications  - Follow with PCP    7. Paralysis of left vocal cord  - Chronic, stable  - Continue medications  - Follow with PCP    8. Pulmonary emphysema, unspecified emphysema type  - Chronic, stable  - Continue medications  - Follow with PCP    9. Lung nodules  - Chronic, stable  - Continue medications  - Follow with PCP    10. CAD (coronary artery disease)  - Chronic, stable  - Continue medications  - Follow with PCP    11. Aortic atherosclerosis  - Chronic, stable  -  Continue medications  - Follow with PCP    12. Chronic HFrEF (heart failure with reduced ejection fraction)  - Chronic, stable  - Continue medications  - Follow with PCP    13. Essential hypertension  - Chronic, stable  - Continue medications  - Follow with PCP    14. Mitral valve insufficiency, unspecified etiology  - Chronic, stable  - Continue medications  - Follow with PCP    15. Moderate aortic stenosis  - Chronic, stable  - Continue medications  - Follow with PCP    16. Other hyperlipidemia  - Chronic, stable  - Continue medications  - Follow with PCP    17. Paroxysmal atrial fibrillation  - Chronic, stable  - Continue medications  - Follow with PCP    18. Pulmonary HTN  - Chronic, stable  - Continue medications  - Follow with PCP    19. S/P mitral valve clip implantation  - Chronic, stable  - Continue medications  - Follow with PCP    20. Stage 3b chronic kidney disease  - Chronic, stable  - Continue medications  - Follow with PCP    21. Hyperparathyroidism  - Chronic, stable  - Continue medications  - Follow with PCP    22. Type 2 diabetes mellitus with stage 3b chronic kidney disease, without long-term current use of insulin  - Chronic, stable  - Continue medications  - Follow with PCP    23. Vitamin B12 deficiency  - Chronic, stable  - Continue medications  - Follow with PCP    24. Vitamin D deficiency  - Chronic, stable  - Continue medications  - Follow with PCP    25. Hammer toes of both feet  - Chronic, stable  - Continue medications  - Follow with PCP    26. Primary osteoarthritis of knee, unspecified laterality  - Chronic, stable  - Continue medications  - Follow with PCP    27. Generalized weakness  - Chronic, stable  - Continue medications  - Follow with PCP    28. Impaired mobility  - Chronic, stable  - Continue medications  - Follow with PCP    29. Body mass index (BMI) of 22.0-22.9 in adult  - Body mass index is 22.47 kg/m².  - Recommendation for healthy diet and increasing exercise as tolerated  with goal of 150min/week.     Provided [place] with a 5-10 year written screening schedule and personal prevention plan. Recommendations were developed using the USPSTF age appropriate recommendations. Education, counseling, and referrals were provided as needed.  After Visit Summary printed and given to patient which includes a list of additional screenings\tests needed.    No follow-ups on file.      Ese Helms PA-C      Advance Care Planning   I offered to discuss advanced care planning, including how to pick a person who would make decisions for you if you were unable to make them for yourself, called a health care power of , and what kind of decisions you might make such as use of life sustaining treatments such as ventilators and tube feeding when faced with a life limiting illness recorded on a living will that they will need to know. (How you want to be cared for as you near the end of your natural life)     X Patient is interested in learning more about how to make advanced directives.  I provided them paperwork and offered to discuss this with them.

## 2025-07-31 NOTE — PATIENT INSTRUCTIONS
Counseling and Referral of Other Preventative  (Italic type indicates deductible and co-insurance are waived)    Patient Name: Trudy Dakin  Today's Date: 7/31/2025    Health Maintenance       Date Due Completion Date    Shingles Vaccine (2 of 3) 03/20/2014 1/23/2014    RSV Vaccine (Age 60+ and Pregnant patients) (1 - 1-dose 75+ series) Never done ---    COVID-19 Vaccine (5 - 2024-25 season) 09/01/2024 3/20/2021    Diabetic Eye Exam 09/03/2025 9/3/2024    Influenza Vaccine (1) 09/01/2025 10/1/2024    Hemoglobin A1c 11/06/2025 5/6/2025    Diabetes Urine Screening 03/25/2026 3/25/2025    Lipid Panel 03/25/2026 3/25/2025    DEXA Scan 10/30/2028 10/30/2023    TETANUS VACCINE 02/15/2032 2/15/2022        No orders of the defined types were placed in this encounter.    The following information is provided to all patients.  This information is to help you find resources for any of the problems found today that may be affecting your health:                  Living healthy guide: www.[facility].[place].gov      Understanding Diabetes: www.diabetes.org      Eating healthy: www.cdc.gov/healthyweight      CDC home safety checklist: www.cdc.gov/steadi/patient.html      Agency on Aging: www.goea.[place].[facility]      Alcoholics anonymous (AA): www.aa.org      Physical Activity: www.letitia.nih.gov/eu0iubf      Tobacco use: www.quitwithusla.org

## 2025-08-04 ENCOUNTER — PATIENT MESSAGE (OUTPATIENT)
Dept: ANESTHESIOLOGY | Facility: HOSPITAL | Age: 80
End: 2025-08-04
Payer: MEDICARE

## 2025-08-04 ENCOUNTER — HOSPITAL ENCOUNTER (OUTPATIENT)
Dept: PREADMISSION TESTING | Facility: HOSPITAL | Age: 80
Discharge: HOME | End: 2025-08-04
Attending: NURSE PRACTITIONER
Payer: MEDICARE

## 2025-08-04 ENCOUNTER — TELEPHONE (OUTPATIENT)
Dept: FAMILY MEDICINE | Facility: CLINIC | Age: 80
End: 2025-08-04

## 2025-08-04 NOTE — PROGRESS NOTES
" Patient ID: Trudy Rauch Dakin is a 79 y.o. female.    Chief Complaint: Pre-op Exam    HPI    Trudy Rauch Dakin is a 79 y.o. female     History of Present Illness    CHIEF COMPLAINT:  Patient presents today for medication adjustment of sleeping pills.    SLEEP:  She reports ongoing sleep disturbances despite current Remeron 7.5 mg. She describes the medication as "too strong", experiencing dizziness and disorientation. She continues to have significant difficulty sleeping at night with frequent tossing and turning. She takes daytime naps ranging from 1-2 hours and has recently been sedentary, spending most of the day sitting in a chair. She expresses dissatisfaction with current sleep quality and medication effectiveness.    NEUROPATHY:  She reports ongoing neuropathic pain in her legs and takes Effexor and Gabapentin 100 mg at night for management.    FOOT/TOE ISSUES:  She reports significant limitations in mobility due to foot/toe issues. She is currently wearing medical boots and wraps that restrict her walking ability and describes difficulty ambulating. She has a follow-up visit scheduled this week to assess potential resolution of foot problems.    GRIEF:  She reports experiencing emotional distress related to grief, specifically describing a recent emotional breakdown while mourning the loss of John. She notes this was her first significant emotional meltdown in three years since the loss. She is exploring potential medication options to help manage emotional symptoms, expressing interest in sedative properties that might provide mood stabilization.    CURRENT MEDICATIONS:  She is currently taking Eliquis for atrial fibrillation, Atorvastatin 10 mg for cholesterol management, self-administered B12 injections, Colace for bowel movements, Vitamin D for bone health, Farxiga for fluid reduction, Effexor for neuropathy, Gabapentin 100 mg at night, OTC Tylenol as needed, and potassium supplement. She appears to " "understand the purpose of most medications and is compliant with self-administered B12 injections.    LABS:  Recent lab results were described by her physician as "perfect", indicating normal baseline lab results.         Vitals:    07/30/25 1333   BP: 122/74   BP Location: Left arm   Patient Position: Sitting   Pulse: 88   Temp: 98 °F (36.7 °C)   TempSrc: Oral   SpO2: 98%   Weight: 53.7 kg (118 lb 4.4 oz)   Height: 5' 1" (1.549 m)            Review of Symptoms      Physical Exam    Constitutional:  Oriented to person, place, and time.appears well-developed and well-nourished.  No distress.      HENT  Head: Normocephalic and atraumatic  Right Ear: External ear normal.   Left Ear: External ear normal.   Nose: External nose normal.   Mouth:  Moist mucus membranes.    Eyes:  Conjunctivae are normal. Right eye exhibits no discharge.  Left eye exhibits no discharge. No scleral icterus.  No periorbital edema    Cardiovascular:  Regular rate and rhythm with normal S1 and S2     Pulmonary/Chest:   Clear to auscultation bilaterally without wheezes, rhonchi or rales      Musculoskeletal:  No edema. No obvious deformity No wasting       Neurological:  Alert and oriented to person, place, and time.   Coordination normal.     Skin:   Skin is warm and dry.  No diaphoresis.   No rash noted.     Psychiatric: Normal mood and affect. Behavior is normal.  Judgment and thought content normal.     Physical Exam    Lungs: All normal.  Eyes: All normal.  Ears: All normal.  Nose: All normal.  Throat: All normal.  Cardiovascular: All normal.         Complete Blood Count  Lab Results   Component Value Date    RBC 4.33 06/26/2025    HGB 12.8 06/26/2025    HCT 40.2 06/26/2025    MCV 93 06/26/2025    MCH 29.6 06/26/2025    MCHC 31.8 (L) 06/26/2025    RDW 12.5 06/26/2025     06/26/2025    MPV 10.3 06/26/2025    GRAN 5.5 01/28/2025    GRAN 73.0 01/28/2025    LYMPH 18.7 06/26/2025    LYMPH 1.34 06/26/2025    MONO 6.7 06/26/2025    MONO 0.48 " 06/26/2025    EOS 1.1 06/26/2025    EOS 0.08 06/26/2025    BASO 0.05 01/28/2025    EOSINOPHIL 2.7 01/28/2025    BASOPHIL 0.7 06/26/2025    BASOPHIL 0.05 06/26/2025    DIFFMETHOD Automated 01/28/2025       Comprehensive Metabolic Panel  Lab Results   Component Value Date     (H) 06/26/2025    BUN 23 06/26/2025    CREATININE 1.4 06/26/2025     06/26/2025    K 3.7 06/26/2025     06/26/2025    PROT 7.7 06/26/2025    ALBUMIN 3.8 06/26/2025    BILITOT 0.5 06/26/2025    AST 17 06/26/2025    ALKPHOS 84 06/26/2025    CO2 29 06/26/2025    ALT 6 (L) 06/26/2025    ANIONGAP 10 06/26/2025       TSH  Lab Results   Component Value Date    TSH 2.060 03/25/2025       Assessment / Plan:      ICD-10-CM ICD-9-CM   1. Pre-op exam  Z01.818 V72.84   2. Type 2 diabetes mellitus with stage 3b chronic kidney disease, without long-term current use of insulin  E11.22 250.40    N18.32 585.3   3. Paroxysmal atrial fibrillation  I48.0 427.31     Pre-op exam    Type 2 diabetes mellitus with stage 3b chronic kidney disease, without long-term current use of insulin    Paroxysmal atrial fibrillation    Other orders  -     busPIRone (BUSPAR) 5 MG Tab; One or two each evening for sleep.  Dispense: 60 tablet; Refill: 0        Assessment & Plan    - Evaluated current medication regimen, focusing on sleep issues and neuropathy management.  - Assessed overall health status, noting good cardiac health and normal labs.    ATRIAL FIBRILLATION:  - Continued Eliquis (blood thinner) for management of atrial fibrillation.    TYPE 2 DIABETES WITH DIABETIC POLYNEUROPATHY:  - Continued Farxiga for diabetes management and fluid reduction.  - Continued Effexor for neuropathy.  - Discontinued Gabapentin 100 mg as patient reports no current leg pain issues.    HYPERLIPIDEMIA:  - Continued atorvastatin for cholesterol management.    INSOMNIA:  - Patient reports difficulty sleeping, tossing and turning at night.  - Started Buspar (buspirone) as an  alternative to Remeron for sleep management.  - Instructed to take 1 tablet at night, may increase to 2 tablets if needed.  - Advised not to take medication during the day.    ADJUSTMENT DISORDER WITH DEPRESSED MOOD:  - Discontinued Remeron (mirtazapine) 7.5 mg due to excessive sedation, which was primarily used for its sedative properties but can also treat depression.    GAIT AND MOBILITY ISSUES:  - Patient has been unable to walk due to wearing boots for foot problems.    VITAMIN B DEFICIENCY:  - Continued B12 injections, which patient self-administers.    CONSTIPATION:  - Continued Colace for bowel movements.  - Advised patient that Colace can be discontinued if desired.    VITAMIN D DEFICIENCY:  - Continued vitamin D supplementation for bone health.    HYPOKALEMIA:  - Continued potassium supplementation to address hypokalemia.    FOLLOW-UP:  - Follow up in 3 months.  - Contact the office using the portal to send messages, as it is the fastest way to communicate.         This note was generated with the assistance of ambient listening technology. Verbal consent was obtained by the patient and accompanying visitor(s) for the recording of patient appointment to facilitate this note. I attest to having reviewed and edited the generated note for accuracy, though some syntax or spelling errors may persist. Please contact the author of this note for any clarification.

## 2025-08-05 ENCOUNTER — PATIENT MESSAGE (OUTPATIENT)
Dept: ORTHOPEDICS | Facility: CLINIC | Age: 80
End: 2025-08-05
Payer: MEDICARE

## 2025-08-05 ENCOUNTER — OFFICE VISIT (OUTPATIENT)
Dept: PODIATRY | Facility: CLINIC | Age: 80
End: 2025-08-05
Payer: MEDICARE

## 2025-08-05 ENCOUNTER — TELEPHONE (OUTPATIENT)
Dept: ORTHOPEDICS | Facility: CLINIC | Age: 80
End: 2025-08-05
Payer: MEDICARE

## 2025-08-05 DIAGNOSIS — L97.529 NEUROPATHIC ULCER OF LEFT FOOT, UNSPECIFIED ULCER STAGE: Primary | ICD-10-CM

## 2025-08-05 DIAGNOSIS — Z87.2 HEALED ULCER OF RIGHT FOOT ON EXAMINATION: ICD-10-CM

## 2025-08-05 PROCEDURE — 99999 PR PBB SHADOW E&M-EST. PATIENT-LVL III: CPT | Mod: PBBFAC,HCNC,, | Performed by: PODIATRIST

## 2025-08-05 NOTE — PROGRESS NOTES
"[facility]  [address]  [address]  [facility] [place] [address]  Dept: [phone]  Dept Fax: [phone]    Scott Garcia Jr., DPM     Assessment:   MDM    MDM  Reviewed: previous chart, nursing note and vitals  Reviewed previous: labs and x-ray  Interpretation: labs and x-ray  Consults: orthopedics      1. Neuropathic ulcer of left foot, unspecified ulcer stage  Wound Debridement      2. Healed ulcer of right foot on examination              Plan:     Wound Debridement    Date/Time: 8/5/2025 1:45 PM    Performed by: Scott Garcia Jr., DPM  Authorized by: Scott Garcia Jr., DPM    Time out: Immediately prior to procedure a "time out" was called to verify the correct patient, procedure, equipment, support staff and site/side marked as required.    Local anesthesia used?: No      Wound Details:    Location:  Left foot    Type of Debridement:  Excisional       Length (cm):  0.5       Width (cm):  0.5       Depth (cm):  0.5       Area (sq cm):  0.2       Percent Debrided (%):  100       Total Area Debrided (sq cm):  0.2    Depth of debridement:  Subcutaneous tissue    Tissue debrided:  Hypergranulation, Epidermis and Subcutaneous    Devitalized tissue debrided:  Biofilm, Callus, Clots and Fibrin    Instruments:  Curette  Bleeding:  Minimal  Patient tolerance:  Patient tolerated the procedure well with no immediate complications    [place] was seen today for foot ulcer.    Diagnoses and all orders for this visit:    Neuropathic ulcer of left foot, unspecified ulcer stage  -     Wound Debridement    Healed ulcer of right foot on examination          -pt seen, evaluated, and managed  -dx discussed in detail. All questions/concerns addressed  -all tx options discussed. All alternatives, risks, benefits of all txs discussed  -the patient was educated about the diagnosis in particular that ulcerations can lead to infection and possible amputation even with the best of care and pt " verbalized understanding  -R foot ulceration healed. L foot ulceration is stable/improved but not healed  -betadine DSD applied. Pt to change QD at home    -would rec post-pone of knee replacement until her ulceration has healed    -rxs dispensed: none  -referrals: none  -WB: wbat    Short-term goals include but are not limited to: maintaining good offloading and minimizing bioburden, promoting granulation and epithelialization to healing.  Wound healing is a medically reasonable expectation based on the clinical circumstances documented.    Long-term goals include but are not limited to: keeping the wound healed by good offloading and medical management under the direction of internist.    Advised pt of risks of current condition including but not limited to: worsening of infection, potential for limb loss    Follow-up: Patient is to return to the clinic in 3 week for follow-up but should call [facility] immediately if any signs of infection, such as fever, chills, sweats, increased redness or pain.    Follow up in about 4 weeks (around 9/2/2025).    Subjective:      Patient ID: Trudy Rauch Dakin is a 79 y.o. female.    Chief Complaint:   Chief Complaint   Patient presents with    Right Foot - Foot Ulcer       CC - foot ulcer: Patient presents to the clinic for evaluation and treatment of high risk feet. The patient's chief complaint is foot ulcer, L foot. duration: several wks. denies n/v/f/c.      8/5/25:  Hx as above. F/u for L foot ulceration. Performing betadine wet to dry dressings at home.     Toe Pain         Last Podiatry Enc: 7/15/2025  Last Enc w/ Me: Visit date not found    Outside reports reviewed: historical medical records.  Family hx: as below  Past Medical History:   Diagnosis Date    CHF (congestive heart failure)     Diabetes     Diverticulitis     Hypertension     Renal disorder      Past Surgical History:   Procedure Laterality Date    ABDOMINAL SURGERY  2006    diverticulitis x3    ANTERIOR  CERVICAL DISCECTOMY W/ FUSION N/A 8/30/2018    FUSION C6-7 Surgeon: Rickey Martin MD    APPENDECTOMY      CARPAL TUNNEL RELEASE      COLECTOMY  07/2020    EPIDURAL STEROID INJECTION INTO LUMBAR SPINE N/A 1/20/2021    Procedure: Injection-steroid-epidural-lumbar--L3-4;  Surgeon: Colleen Carvajal MD;  Location: [facility] PAIN MGT;  Service: Pain Management;  Laterality: N/A;    HYSTERECTOMY  1970    @25yrs of age    LARYNGOSCOPY N/A 1/4/2022    Procedure: Suspension microlaryngoscopy with left vocal fold injection augmentation - Restylane L;  Surgeon: Yuan Mir MD;  Location: [address] FLR;  Service: ENT;  Laterality: N/A;  Microscope, telescopes, tower, injector, Restylane-L    MANDIBLE FRACTURE SURGERY  1970    MICRODISCECTOMY OF SPINE Left 4/13/2021    Procedure: MICRODISCECTOMY, SPINE Left L3-4 far lateral Microdiscectomy;  Surgeon: Rickey Martin MD;  Location: [facility] OR;  Service: Neurosurgery;  Laterality: Left;  Procedure: Left L3-4 far lateral Microdiscectomy   Surgery Time: 1.5 hrs  LOS: 0-1  Anesthesia: General  Radiology: C-arm  SNS: EMG, SEP  Bed: [place] [address] Poster  Position: Melissa  Dav w/ Globus confirmed 4/12/21 [place]    MITRAL CLIP N/A 12/20/2023    Procedure: Mitral clip;  Surgeon: Kevin More MD;  Location: [facility] CATH LAB;  Service: Cardiology;  Laterality: N/A;    OOPHORECTOMY  1970    ROTATOR CUFF REPAIR Left 11/2016    TOTAL REDUCTION MAMMOPLASTY Bilateral 1990    TRANSESOPHAGEAL ECHOCARDIOGRAPHY N/A 11/1/2023    Procedure: ECHOCARDIOGRAM, TRANSESOPHAGEAL;  Surgeon: Brea Melgar Diagnostic;  Location: [facility] EP LAB;  Service: Cardiology;  Laterality: N/A;    TRANSESOPHAGEAL ECHOCARDIOGRAPHY N/A 12/20/2023    Procedure: ECHOCARDIOGRAM, TRANSESOPHAGEAL;  Surgeon: Cristine Brooks MD;  Location: [facility] CATH LAB;  Service: Cardiology;  Laterality: N/A;    TRANSFORAMINAL EPIDURAL INJECTION OF STEROID Left 12/23/2020    Procedure: Injection,steroid,epidural,transforaminal approach--Left  L4 and L5;  Surgeon: Colleen Carvajal MD;  Location: [facility] PAIN MGT;  Service: Pain Management;  Laterality: Left;     Family History   Problem Relation Name Age of Onset    Alcohol abuse Mother      Heart disease Father      Arthritis Daughter Eboni     Hypertension Daughter Eboni     Cancer Daughter Hope     Arthritis Son Bryon     Glaucoma Neg Hx      Macular degeneration Neg Hx       Current Medications[1]  Review of patient's allergies indicates:  No Known Allergies  Social History[2]    ROS    REVIEW OF SYSTEMS: Negative as documented below as well as positive findings in bold.       Constitutional  Respiratory  Gastrointestinal  Skin   - Fever - Cough - Heartburn - Rash   - Chills - Spit blood - Nausea - Itching   - Weight Loss - Shortness of breath - Vomiting - Nail pain   - Malaise/Fatigue - Wheezing - Abdominal Pain  Wound/Ulcer   - Weight Gain   - Blood in Stool  Poor wound healing       - Diarrhea          Cardiovascular  Genitourinary  Neurological  HEENT   - Chest Pain - Dysuria - Burning Sensation of feet - Headache   - Palpitations - Hematuria - Tingling / Paresthesia - Congestion   - Pain at night in legs - Flank Pain - Dizziness - Sore Throat   - Cramping   - Tremor - Blurred Vision   - Leg Swelling   - Sensory Change - Double Vision   - Dizzy when standing   - Speech Change - Eye Redness       - Focal Weakness - Dry Eyes       - Loss of Consciousness          Endocrine  Musculoskeletal  Psychiatric   - Cold intolerance - Muscle Pain - Depression   - Heat intolerance - Neck Pain - Insomnia   - Anemia - Joint Pain - Memory Loss   -  Easy bruising, bleeding - Heel pain - Anxiety      Toe Pain        Leg/Ankle/Foot Pain         Objective:     LMP 01/01/1970   Vitals:    08/05/25 1333   PainSc: 0-No pain   PainLoc: Foot           Physical Exam    General Appearance:   Patient appears well developed, well nourished  Patient appears stated age    Psychiatric:   Patient is oriented to time, place, and  person.  Patient has appropriate mood and affect    Neck:  Trachea Midline  No visible masses    Respiratory/Ears:  No distress or labored breathing.  Able to differentiate between normal talking voice and whisper.  Able to follow commands    Eyes:  Visual Acuity intact  Lids and conjunctivae normal. No discoloration noted.    Physical Exam  Ortho Exam  Ortho/SPM Exam  Foot Exam  Physical Exam  Neurological Exam    L LE exam con't:  V:  DP 1/4, PT 1/4   CRT< 3s to all digits tested   Tibial and popliteal lymph nodes are w/o abnormality    N:  Patient displays normal ankle reflexes   sensory deficit present    Ortho: +Motor EHL/FHL/TA/GA   no TTP of foot or ankles   Compartments soft/compressible. No pain on passive stretch of big toe. No calf  Pain.     Derm:  skin not intact, ulceration present, ulcer probes to subQ      Ulcer Location: L foot 3rd toe  Measurements (post-debridement): 0.5x0.5x0.5cm  Periwound: hyperkeratotic  Drainage: none  Malodor: none  Base:  fibrotic  Signs of infection: none        R foot ulceration remains healed          Imaging / Labs:    Hemoglobin A1C   Date Value Ref Range Status   09/16/2024 6.3 (H) 4.0 - 5.6 % Final     Comment:     ADA Screening Guidelines:  5.7-6.4%  Consistent with prediabetes  >or=6.5%  Consistent with diabetes    High levels of fetal hemoglobin interfere with the HbA1C  assay. Heterozygous hemoglobin variants (HbS, HgC, etc)do  not significantly interfere with this assay.   However, presence of multiple variants may affect accuracy.     03/21/2024 5.9 (H) 4.0 - 5.6 % Final     Comment:     ADA Screening Guidelines:  5.7-6.4%  Consistent with prediabetes  >or=6.5%  Consistent with diabetes    High levels of fetal hemoglobin interfere with the HbA1C  assay. Heterozygous hemoglobin variants (HbS, HgC, etc)do  not significantly interfere with this assay.   However, presence of multiple variants may affect accuracy.     10/03/2023 6.1 (H) 0.0 - 5.6 % Final      Comment:     Reference Interval:  5.0 - 5.6 Normal   5.7 - 6.4 High Risk   > 6.5 Diabetic      Hgb A1c results are standardized based on the (NGSP) National   Glycohemoglobin Standardization Program.      Hemoglobin A1C levels are related to mean serum/plasma glucose   during the preceding 2-3 months.          Hemoglobin A1c   Date Value Ref Range Status   05/06/2025 6.3 (H) 4.0 - 5.6 % Final     Comment:     ADA Screening Guidelines:  5.7-6.4%  Consistent with prediabetes  >=6.5%  Consistent with diabetes    High levels of fetal hemoglobin interfere with the HbA1C  assay. Heterozygous hemoglobin variants (HbS, HgC, etc)do  not significantly interfere with this assay.   However, presence of multiple variants may affect accuracy.   03/25/2025 6.6 (H) 4.0 - 5.6 % Final     Comment:     ADA Screening Guidelines:  5.7-6.4%  Consistent with prediabetes  >=6.5%  Consistent with diabetes    High levels of fetal hemoglobin interfere with the HbA1C  assay. Heterozygous hemoglobin variants (HbS, HgC, etc)do  not significantly interfere with this assay.   However, presence of multiple variants may affect accuracy.       No results found.          Note: This was dictated using a computer transcription program. Although proofread, it may contain computer transcription errors and phonetic errors. Other human proofreading errors may also exist. Corrections may be performed at a later time. Please contact us for any clarification if needed.    Scott Garcia DPM  [facility]                        [1]   Current Outpatient Medications   Medication Sig Dispense Refill    acetaminophen (TYLENOL) 650 MG TbSR Take 1 tablet (650 mg total) by mouth every 8 (eight) hours as needed (pain).      apixaban (ELIQUIS) 2.5 mg Tab Take 1 tablet (2.5 mg total) by mouth 2 (two) times daily. 180 tablet 3    atorvastatin (LIPITOR) 80 MG tablet Take 1 tablet (80 mg total) by mouth once daily. 90 tablet 1    bumetanide  (BUMEX) 2 MG tablet Take 1.5 tablets (3 mg total) by mouth 2 (two) times daily. 270 tablet 3    busPIRone (BUSPAR) 5 MG Tab One or two each evening for sleep. 60 tablet 0    cyanocobalamin 1,000 mcg/mL injection Inject 1 mL (1,000 mcg total) into the muscle every 30 days. 30 mL 3    docusate sodium (COLACE) 100 MG capsule Take 1 capsule (100 mg total) by mouth 2 (two) times daily. 60 capsule 11    ergocalciferol (ERGOCALCIFEROL) 50,000 unit Cap Take 1 capsule (50,000 Units total) by mouth every 7 days. 12 capsule 3    FARXIGA 10 mg tablet TAKE 1 TABLET BY MOUTH EVERY DAY 90 tablet 1    nitroGLYCERIN (NITROSTAT) 0.4 MG SL tablet Place 1 tablet (0.4 mg total) under the tongue every 5 (five) minutes as needed for Chest pain. 25 tablet 5    potassium chloride SA (K-DUR,KLOR-CON M) 10 MEQ tablet Take 2 tablets (20 mEq total) by mouth every evening. 90 tablet 1    venlafaxine (EFFEXOR-XR) 150 MG Cp24 TAKE 1 CAPSULE BY MOUTH ONCE DAILY FOR MOOD AND NEUROPATHY 30 capsule 4     No current facility-administered medications for this visit.   [2]   Social History  Socioeconomic History    Marital status:    Tobacco Use    Smoking status: Never     Passive exposure: Never    Smokeless tobacco: Never   Substance and Sexual Activity    Alcohol use: No    Drug use: No    Sexual activity: Not Currently     Partners: Male     Social Drivers of Health     Financial Resource Strain: Low Risk  (7/31/2025)    Overall Financial Resource Strain (CARDIA)     Difficulty of Paying Living Expenses: Not very hard   Food Insecurity: No Food Insecurity (7/31/2025)    Hunger Vital Sign     Worried About Running Out of Food in the Last Year: Never true     Ran Out of Food in the Last Year: Never true   Transportation Needs: No Transportation Needs (7/31/2025)    PRAPARE - Transportation     Lack of Transportation (Medical): No     Lack of Transportation (Non-Medical): No   Physical Activity: Inactive (7/31/2025)    Exercise Vital Sign      Days of Exercise per Week: 0 days     Minutes of Exercise per Session: 0 min   Stress: No Stress Concern Present (7/31/2025)    [employer] - Occupational Stress Questionnaire     Feeling of Stress : Not at all   Housing Stability: Low Risk  (7/31/2025)    Housing Stability Vital Sign     Unable to Pay for Housing in the Last Year: No     Number of Times Moved in the Last Year: 1     Homeless in the Last Year: No

## 2025-08-05 NOTE — TELEPHONE ENCOUNTER
Spoke with patient and has to reschedule her surgery from 8/11/25 to 10/6/25 due to infection in her foot. Patient PAID for her IOVERA procedure that was schedule on 8/7/25. Will reschedule her IOVERA to 10/2/25. Patient verbally understand.             Copied from CRM #[id number]. Topic: General Inquiry - Patient Advice  >> Aug 5, 2025  1:56 PM Robyn wrote:  Pt Requesting Call Back    Who called: pt  Who called for pt:  Best call back #: [phone]   Add notes: pt said she would like to speak to Mica as soon as juan alberto regarding rescheduling her surgery

## 2025-08-12 ENCOUNTER — PATIENT MESSAGE (OUTPATIENT)
Dept: PODIATRY | Facility: CLINIC | Age: 80
End: 2025-08-12
Payer: MEDICARE

## 2025-08-12 ENCOUNTER — TELEPHONE (OUTPATIENT)
Dept: PODIATRY | Facility: CLINIC | Age: 80
End: 2025-08-12
Payer: MEDICARE

## 2025-08-13 ENCOUNTER — TELEPHONE (OUTPATIENT)
Dept: PREADMISSION TESTING | Facility: HOSPITAL | Age: 80
End: 2025-08-13
Payer: MEDICARE

## 2025-08-13 ENCOUNTER — PATIENT MESSAGE (OUTPATIENT)
Dept: PODIATRY | Facility: CLINIC | Age: 80
End: 2025-08-13
Payer: MEDICARE

## 2025-08-18 ENCOUNTER — PATIENT MESSAGE (OUTPATIENT)
Dept: PODIATRY | Facility: CLINIC | Age: 80
End: 2025-08-18
Payer: MEDICARE

## 2025-08-21 ENCOUNTER — TELEPHONE (OUTPATIENT)
Dept: ORTHOPEDICS | Facility: CLINIC | Age: 80
End: 2025-08-21
Payer: MEDICARE

## 2025-08-21 ENCOUNTER — OFFICE VISIT (OUTPATIENT)
Dept: ORTHOPEDICS | Facility: CLINIC | Age: 80
End: 2025-08-21
Payer: MEDICARE

## 2025-08-21 VITALS — HEIGHT: 61 IN | BODY MASS INDEX: 21.94 KG/M2 | WEIGHT: 116.19 LBS

## 2025-08-21 DIAGNOSIS — G89.29 CHRONIC PAIN OF LEFT KNEE: ICD-10-CM

## 2025-08-21 DIAGNOSIS — M25.562 CHRONIC PAIN OF LEFT KNEE: ICD-10-CM

## 2025-08-21 DIAGNOSIS — M17.12 PRIMARY OSTEOARTHRITIS OF LEFT KNEE: Primary | ICD-10-CM

## 2025-08-21 PROCEDURE — 99999 PR PBB SHADOW E&M-EST. PATIENT-LVL III: CPT | Mod: PBBFAC,HCNC,, | Performed by: PHYSICIAN ASSISTANT

## 2025-08-21 PROCEDURE — 1125F AMNT PAIN NOTED PAIN PRSNT: CPT | Mod: CPTII,HCNC,S$GLB, | Performed by: PHYSICIAN ASSISTANT

## 2025-08-21 PROCEDURE — 99213 OFFICE O/P EST LOW 20 MIN: CPT | Mod: 25,HCNC,S$GLB, | Performed by: PHYSICIAN ASSISTANT

## 2025-08-21 PROCEDURE — 3072F LOW RISK FOR RETINOPATHY: CPT | Mod: CPTII,HCNC,S$GLB, | Performed by: PHYSICIAN ASSISTANT

## 2025-08-21 PROCEDURE — 1159F MED LIST DOCD IN RCRD: CPT | Mod: CPTII,HCNC,S$GLB, | Performed by: PHYSICIAN ASSISTANT

## 2025-08-21 PROCEDURE — 3288F FALL RISK ASSESSMENT DOCD: CPT | Mod: CPTII,HCNC,S$GLB, | Performed by: PHYSICIAN ASSISTANT

## 2025-08-21 PROCEDURE — 20610 DRAIN/INJ JOINT/BURSA W/O US: CPT | Mod: HCNC,LT,S$GLB, | Performed by: PHYSICIAN ASSISTANT

## 2025-08-21 PROCEDURE — 1157F ADVNC CARE PLAN IN RCRD: CPT | Mod: CPTII,HCNC,S$GLB, | Performed by: PHYSICIAN ASSISTANT

## 2025-08-21 PROCEDURE — 1101F PT FALLS ASSESS-DOCD LE1/YR: CPT | Mod: CPTII,HCNC,S$GLB, | Performed by: PHYSICIAN ASSISTANT

## 2025-08-21 RX ORDER — KETOROLAC TROMETHAMINE 30 MG/ML
30 INJECTION, SOLUTION INTRAMUSCULAR; INTRAVENOUS
Status: DISCONTINUED | OUTPATIENT
Start: 2025-08-21 | End: 2025-08-21 | Stop reason: HOSPADM

## 2025-08-21 RX ADMIN — KETOROLAC TROMETHAMINE 30 MG: 30 INJECTION, SOLUTION INTRAMUSCULAR; INTRAVENOUS at 03:08

## 2025-08-24 DIAGNOSIS — I50.22 CHRONIC HFREF (HEART FAILURE WITH REDUCED EJECTION FRACTION): ICD-10-CM

## 2025-08-24 DIAGNOSIS — E53.8 VITAMIN B12 DEFICIENCY: ICD-10-CM

## 2025-08-24 DIAGNOSIS — K59.09 CHRONIC CONSTIPATION: ICD-10-CM

## 2025-08-25 RX ORDER — CYANOCOBALAMIN 1000 UG/ML
1000 INJECTION, SOLUTION INTRAMUSCULAR; SUBCUTANEOUS
Qty: 30 ML | Refills: 3 | Status: SHIPPED | OUTPATIENT
Start: 2025-08-25

## 2025-08-25 RX ORDER — DOCUSATE SODIUM 100 MG/1
100 CAPSULE, LIQUID FILLED ORAL 2 TIMES DAILY
Qty: 60 CAPSULE | Refills: 11 | Status: SHIPPED | OUTPATIENT
Start: 2025-08-25

## 2025-08-26 RX ORDER — POTASSIUM CHLORIDE 750 MG/1
20 TABLET, EXTENDED RELEASE ORAL NIGHTLY
Qty: 90 TABLET | Refills: 3 | Status: SHIPPED | OUTPATIENT
Start: 2025-08-26

## 2025-08-28 ENCOUNTER — PATIENT MESSAGE (OUTPATIENT)
Dept: FAMILY MEDICINE | Facility: CLINIC | Age: 80
End: 2025-08-28
Payer: MEDICARE

## 2025-08-28 ENCOUNTER — TELEPHONE (OUTPATIENT)
Dept: FAMILY MEDICINE | Facility: CLINIC | Age: 80
End: 2025-08-28
Payer: MEDICARE

## 2025-08-28 ENCOUNTER — OFFICE VISIT (OUTPATIENT)
Dept: ORTHOPEDICS | Facility: CLINIC | Age: 80
End: 2025-08-28
Payer: MEDICARE

## 2025-08-28 VITALS — HEIGHT: 61 IN | WEIGHT: 116.19 LBS | BODY MASS INDEX: 21.94 KG/M2

## 2025-08-28 DIAGNOSIS — G89.29 CHRONIC PAIN OF LEFT KNEE: ICD-10-CM

## 2025-08-28 DIAGNOSIS — M17.12 PRIMARY OSTEOARTHRITIS OF LEFT KNEE: Primary | ICD-10-CM

## 2025-08-28 DIAGNOSIS — M25.562 CHRONIC PAIN OF LEFT KNEE: ICD-10-CM

## 2025-08-28 PROCEDURE — 99999 PR PBB SHADOW E&M-EST. PATIENT-LVL III: CPT | Mod: PBBFAC,HCNC,, | Performed by: PHYSICIAN ASSISTANT

## 2025-08-28 PROCEDURE — 99499 UNLISTED E&M SERVICE: CPT | Mod: 25,HCNC,S$GLB, | Performed by: PHYSICIAN ASSISTANT

## 2025-08-28 PROCEDURE — 20610 DRAIN/INJ JOINT/BURSA W/O US: CPT | Mod: HCNC,LT,S$GLB, | Performed by: PHYSICIAN ASSISTANT

## 2025-08-29 ENCOUNTER — TELEPHONE (OUTPATIENT)
Dept: ORTHOPEDICS | Facility: CLINIC | Age: 80
End: 2025-08-29
Payer: MEDICARE

## 2025-08-29 ENCOUNTER — TELEPHONE (OUTPATIENT)
Dept: FAMILY MEDICINE | Facility: CLINIC | Age: 80
End: 2025-08-29
Payer: MEDICARE

## 2025-08-29 ENCOUNTER — TELEPHONE (OUTPATIENT)
Dept: CARDIOLOGY | Facility: CLINIC | Age: 80
End: 2025-08-29
Payer: MEDICARE

## 2025-09-03 ENCOUNTER — OFFICE VISIT (OUTPATIENT)
Dept: PODIATRY | Facility: CLINIC | Age: 80
End: 2025-09-03
Payer: MEDICARE

## 2025-09-03 VITALS — WEIGHT: 116.19 LBS | BODY MASS INDEX: 21.94 KG/M2 | HEIGHT: 61 IN

## 2025-09-03 DIAGNOSIS — N18.32 TYPE 2 DIABETES MELLITUS WITH STAGE 3B CHRONIC KIDNEY DISEASE, WITHOUT LONG-TERM CURRENT USE OF INSULIN: ICD-10-CM

## 2025-09-03 DIAGNOSIS — I73.9 PAD (PERIPHERAL ARTERY DISEASE): ICD-10-CM

## 2025-09-03 DIAGNOSIS — E11.22 TYPE 2 DIABETES MELLITUS WITH STAGE 3B CHRONIC KIDNEY DISEASE, WITHOUT LONG-TERM CURRENT USE OF INSULIN: ICD-10-CM

## 2025-09-03 DIAGNOSIS — I50.22 CHRONIC HFREF (HEART FAILURE WITH REDUCED EJECTION FRACTION): ICD-10-CM

## 2025-09-03 DIAGNOSIS — L97.529 NEUROPATHIC ULCER OF LEFT FOOT, UNSPECIFIED ULCER STAGE: Primary | ICD-10-CM

## 2025-09-03 PROCEDURE — 99999 PR PBB SHADOW E&M-EST. PATIENT-LVL III: CPT | Mod: PBBFAC,HCNC,, | Performed by: PODIATRIST

## (undated) DEVICE — BURR MIS CURVED 3.0MM

## (undated) DEVICE — FLUID DELIVERY SET WITH FILTER

## (undated) DEVICE — SKINMARKER W/RULER DEVON

## (undated) DEVICE — TRANSDUCER ADULT DISP

## (undated) DEVICE — SEE MEDLINE ITEM 157150

## (undated) DEVICE — ADHESIVE MASTISOL VIAL 48/BX

## (undated) DEVICE — DRAPE STERI-DRAPE 1000 17X11IN

## (undated) DEVICE — SUT ETHIBOND XTRA 1 CTX

## (undated) DEVICE — TRAY CATH LAB OMC

## (undated) DEVICE — COVER OVERHEAD SURG LT BLUE

## (undated) DEVICE — BLADE ELECTRO EDGE INSULATED

## (undated) DEVICE — SEE MEDLINE ITEM 156905

## (undated) DEVICE — DRESSING TELFA N ADH 3X8

## (undated) DEVICE — TUBING HPCIL ROT M/F ADPT 10IN

## (undated) DEVICE — GLOVE 7.5 PROTEXIS PI BLUE

## (undated) DEVICE — Device

## (undated) DEVICE — STOPCOCK 3-WAY

## (undated) DEVICE — DRESSING AQUACEL FOAM 5 X 5

## (undated) DEVICE — PAD DEFIB CADENCE ADULT R2

## (undated) DEVICE — DRESSING LEUKOPLAST FLEX 1X3IN

## (undated) DEVICE — COVER INSTR ELASTIC BAND 40X20

## (undated) DEVICE — DRAPE C-ARM/MOBILE XRAY 44X80

## (undated) DEVICE — SEE MEDLINE ITEM 146292

## (undated) DEVICE — TOWEL OR NONABSORB ADH 17X26

## (undated) DEVICE — SEE MEDLINE ITEM 157148

## (undated) DEVICE — SEE MEDLINE ITEM 157117

## (undated) DEVICE — SEE MEDLINE ITEM 157125

## (undated) DEVICE — KIT SPINAL PATIENT CARE JACK

## (undated) DEVICE — DRAPE OPMI STERILE

## (undated) DEVICE — NDL SPINAL SPINOCAN 22GX3.5

## (undated) DEVICE — NDL HYPO REG 25G X 1 1/2

## (undated) DEVICE — SUT MONOCRYL 3-0 PS-2 UND

## (undated) DEVICE — DRESSING SURGICAL 1/2X1/2

## (undated) DEVICE — COVER TABLE 44X90 STERILE

## (undated) DEVICE — APPLICATOR CHLORAPREP ORN 26ML

## (undated) DEVICE — CLOSURE SKIN STERI STRIP 1/2X4

## (undated) DEVICE — SUT MCRYL PLUS 4-0 PS2 27IN

## (undated) DEVICE — SUT VICRYL 2 0 CT 2

## (undated) DEVICE — DRESSING TEGADERM 2 3/8 X 2.75

## (undated) DEVICE — GLOVE PROTEXIS HYDROGEL SZ7

## (undated) DEVICE — TRAY ENT 4/CS

## (undated) DEVICE — SUT CTD VICRYL 3-0 CR/SH

## (undated) DEVICE — DRESSING AQUACEL SACRAL 9 X 9

## (undated) DEVICE — KIT ANTIFOG W/SPONG & FLUID

## (undated) DEVICE — SHEET THYROID W/ISO-BAC

## (undated) DEVICE — DRESSING TELFA PAD N ADH 2X3

## (undated) DEVICE — TRAY FOLEY 16FR INFECTION CONT

## (undated) DEVICE — CORD BIPOLAR 12 FOOT

## (undated) DEVICE — KIT CUSTOM MANIFOLD

## (undated) DEVICE — KIT MICROINTRO 4F .018X40X7CM

## (undated) DEVICE — MANIFOLD 4 PORT

## (undated) DEVICE — TAPE SILK 3IN

## (undated) DEVICE — SPIKE SHORT LG BORE 1-WAY 2IN

## (undated) DEVICE — NDL BROCKENBROUGH CRV 71CM

## (undated) DEVICE — GEL RESTYLANE INJ GEL LIDO 1ML

## (undated) DEVICE — SEE MEDLINE ITEM 157116

## (undated) DEVICE — SUT 2-0 ETHILON 18 FS

## (undated) DEVICE — SUT ETHILON 2-0 FS 18IN BLK

## (undated) DEVICE — INTRO FAST-CATH SL1 8.5FR 63CM

## (undated) DEVICE — SHEATH INTRODUCER 8FR 11CM

## (undated) DEVICE — GAUZE SPONGE PEANUT STRL

## (undated) DEVICE — DRESSING TEGADERM 4.4X5IN

## (undated) DEVICE — DRESSING AQUACEL FOAM 4X4IN

## (undated) DEVICE — OMNIPAQUE 350 200ML

## (undated) DEVICE — GAUZE SPONGE 4X4 12PLY

## (undated) DEVICE — SET BASIN 48X48IN 6000ML RING

## (undated) DEVICE — DRAPE LEICA MICROSCOPE 48X120

## (undated) DEVICE — ELECTRODE REM PLYHSV RETURN 9

## (undated) DEVICE — GLOVE BIOGEL SKINSENSE PI 7.0

## (undated) DEVICE — SUT VICRYL PLUS 3-0 SH 18IN

## (undated) DEVICE — SEE MEDLINE ITEM 146313

## (undated) DEVICE — SYR 30CC LUER LOCK

## (undated) DEVICE — ANGLED DRIVER TIP

## (undated) DEVICE — GUIDEWIRE AMPLATZ .035X260

## (undated) DEVICE — GLOVE BIOGEL PI MICRO INDIC 7

## (undated) DEVICE — CATH SUPER TORQUE MP 4FRX80CM